# Patient Record
Sex: FEMALE | Race: WHITE | NOT HISPANIC OR LATINO | Employment: OTHER | URBAN - METROPOLITAN AREA
[De-identification: names, ages, dates, MRNs, and addresses within clinical notes are randomized per-mention and may not be internally consistent; named-entity substitution may affect disease eponyms.]

---

## 2018-07-13 ENCOUNTER — HOSPITAL ENCOUNTER (OUTPATIENT)
Dept: OTHER | Facility: HOSPITAL | Age: 73
Setting detail: SPECIMEN
Discharge: HOME | End: 2018-07-13
Attending: FAMILY MEDICINE | Admitting: FAMILY MEDICINE

## 2018-07-13 LAB
ALBUMIN SERPL-MCNC: 4.1 G/DL (ref 3.5–4.8)
ALBUMIN/GLOB SERPL: 1.3 {RATIO} (ref 1–1.7)
ALP SERPL-CCNC: 61 IU/L (ref 32–91)
ALT SERPL-CCNC: 15 IU/L (ref 14–54)
ANION GAP SERPL CALC-SCNC: 9.8 MMOL/L (ref 10–20)
AST SERPL-CCNC: 22 IU/L (ref 15–41)
BASOPHILS # BLD AUTO: 0.1 10*3/UL (ref 0–0.2)
BASOPHILS NFR BLD AUTO: 1 % (ref 0–2)
BILIRUB SERPL-MCNC: 0.5 MG/DL (ref 0.3–1.2)
BUN SERPL-MCNC: 18 MG/DL (ref 8–20)
BUN/CREAT SERPL: 20 (ref 5.4–26.2)
CALCIUM SERPL-MCNC: 9.5 MG/DL (ref 8.9–10.3)
CHLORIDE SERPL-SCNC: 104 MMOL/L (ref 101–111)
CHOLEST SERPL-MCNC: 221 MG/DL
CHOLEST/HDLC SERPL: 3.3 {RATIO}
CONV ANISOCYTES: SLIGHT
CONV CO2: 26 MMOL/L (ref 22–32)
CONV LDL CHOLESTEROL DIRECT: 139 MG/DL (ref 0–100)
CONV TOTAL PROTEIN: 7.2 G/DL (ref 6.1–7.9)
CREAT UR-MCNC: 0.9 MG/DL (ref 0.4–1)
DIFFERENTIAL METHOD BLD: (no result)
EOSINOPHIL # BLD AUTO: 0.1 10*3/UL (ref 0–0.3)
EOSINOPHIL # BLD AUTO: 1 % (ref 0–3)
ERYTHROCYTE [DISTWIDTH] IN BLOOD BY AUTOMATED COUNT: 13.6 % (ref 11.5–14.5)
GLOBULIN UR ELPH-MCNC: 3.1 G/DL (ref 2.5–3.8)
GLUCOSE SERPL-MCNC: 89 MG/DL (ref 65–99)
HCT VFR BLD AUTO: 44.6 % (ref 35–49)
HDLC SERPL-MCNC: 67 MG/DL
HGB BLD-MCNC: 15.1 G/DL (ref 12–15)
LDLC/HDLC SERPL: 2.1 {RATIO}
LIPID INTERPRETATION: ABNORMAL
LYMPHOCYTES # BLD AUTO: 2.1 10*3/UL (ref 0.8–4.8)
LYMPHOCYTES NFR BLD AUTO: 29 % (ref 18–42)
MAGNESIUM SERPL-MCNC: 2.4 MG/DL (ref 1.8–2.5)
MCH RBC QN AUTO: 31.3 PG (ref 26–32)
MCHC RBC AUTO-ENTMCNC: 33.8 G/DL (ref 32–36)
MCV RBC AUTO: 92.5 FL (ref 80–94)
MONOCYTES # BLD AUTO: 0.1 10*3/UL (ref 0.1–1.3)
MONOCYTES NFR BLD AUTO: 2 % (ref 2–11)
NEUTROPHILS # BLD AUTO: 4.9 10*3/UL (ref 2.3–8.6)
NEUTROPHILS NFR BLD AUTO: 67 % (ref 50–75)
PLATELET # BLD AUTO: 213 10*3/UL (ref 150–450)
PMV BLD AUTO: 10.2 FL (ref 7.4–10.4)
POTASSIUM SERPL-SCNC: 3.8 MMOL/L (ref 3.6–5.1)
RBC # BLD AUTO: 4.82 10*6/UL (ref 4–5.4)
SODIUM SERPL-SCNC: 136 MMOL/L (ref 136–144)
TRIGL SERPL-MCNC: 66 MG/DL
VLDLC SERPL CALC-MCNC: 14.7 MG/DL
WBC # BLD AUTO: 7.3 10*3/UL (ref 4.5–11.5)

## 2018-08-03 ENCOUNTER — HOSPITAL ENCOUNTER (OUTPATIENT)
Dept: CT IMAGING | Facility: HOSPITAL | Age: 73
Discharge: HOME | End: 2018-08-03
Attending: FAMILY MEDICINE | Admitting: FAMILY MEDICINE

## 2019-08-22 RX ORDER — AMLODIPINE BESYLATE 10 MG/1
TABLET ORAL
Qty: 30 TABLET | Refills: 2 | Status: SHIPPED | OUTPATIENT
Start: 2019-08-22 | End: 2019-10-15

## 2019-09-03 RX ORDER — LORAZEPAM 1 MG/1
TABLET ORAL
Qty: 30 TABLET | Refills: 2 | Status: SHIPPED | OUTPATIENT
Start: 2019-09-03 | End: 2019-09-11

## 2019-09-11 ENCOUNTER — OFFICE VISIT (OUTPATIENT)
Dept: FAMILY MEDICINE CLINIC | Facility: CLINIC | Age: 74
End: 2019-09-11

## 2019-09-11 VITALS
DIASTOLIC BLOOD PRESSURE: 78 MMHG | BODY MASS INDEX: 36.93 KG/M2 | HEART RATE: 83 BPM | TEMPERATURE: 97.8 F | RESPIRATION RATE: 16 BRPM | HEIGHT: 63 IN | OXYGEN SATURATION: 97 % | SYSTOLIC BLOOD PRESSURE: 140 MMHG | WEIGHT: 208.4 LBS

## 2019-09-11 DIAGNOSIS — F41.9 CHRONIC ANXIETY: ICD-10-CM

## 2019-09-11 DIAGNOSIS — F03.90 DEMENTIA WITHOUT BEHAVIORAL DISTURBANCE, UNSPECIFIED DEMENTIA TYPE: Primary | ICD-10-CM

## 2019-09-11 DIAGNOSIS — I10 ESSENTIAL HYPERTENSION: ICD-10-CM

## 2019-09-11 PROBLEM — G47.30 SLEEP APNEA: Status: ACTIVE | Noted: 2019-09-11

## 2019-09-11 PROCEDURE — 99214 OFFICE O/P EST MOD 30 MIN: CPT | Performed by: NURSE PRACTITIONER

## 2019-09-11 RX ORDER — FLUOXETINE HYDROCHLORIDE 20 MG/1
CAPSULE ORAL
Qty: 60 CAPSULE | Refills: 4 | Status: SHIPPED | OUTPATIENT
Start: 2019-09-11 | End: 2019-10-15

## 2019-09-11 RX ORDER — DONEPEZIL HYDROCHLORIDE 10 MG/1
10 TABLET, FILM COATED ORAL NIGHTLY
Qty: 30 TABLET | Refills: 3 | Status: SHIPPED | OUTPATIENT
Start: 2019-09-11 | End: 2023-03-30

## 2019-09-11 RX ORDER — FUROSEMIDE 20 MG/1
TABLET ORAL
Qty: 30 TABLET | Refills: 0 | Status: SHIPPED | OUTPATIENT
Start: 2019-09-11 | End: 2019-09-30 | Stop reason: SDUPTHER

## 2019-09-11 NOTE — PROGRESS NOTES
"Subjective   Day Cabrera is a 73 y.o. female.     Chief Complaint   Patient presents with   • possible dementia     memory issues   • Establish Care   • Back Pain       /78 (BP Location: Left arm, Patient Position: Sitting, Cuff Size: Adult)   Pulse 83   Temp 97.8 °F (36.6 °C) (Oral)   Resp 16   Ht 160 cm (63\")   Wt 94.5 kg (208 lb 6.4 oz)   SpO2 97%   BMI 36.92 kg/m²     New pt needs to get est.  Pt used to see Dr. Koenig. Pt lives with daughter. Has concerns with worsening memory. Was seen about 1 year ago with same concerns and she had CT scan done. It was discussed strating aricept or namenda, but not started.   Pt lives with daughter and grandson. She is left alone during the day while daughter is at work. There has been some incidences of her leaving the stove on.   Pt does not drive.        Past Surgical History:   Procedure Laterality Date   • CHOLECYSTECTOMY     • HYSTERECTOMY      total        Family History   Problem Relation Age of Onset   • Heart disease Mother    • Alcohol abuse Father         murdered    • Pancreatic cancer Sister    • No Known Problems Half-Sister        Social History     Socioeconomic History   • Marital status:      Spouse name: Not on file   • Number of children: 3   • Years of education: Not on file   • Highest education level: Not on file   Tobacco Use   • Smoking status: Former Smoker     Years: 30.00     Types: Cigarettes     Last attempt to quit:      Years since quittin.7   • Smokeless tobacco: Never Used   Substance and Sexual Activity   • Alcohol use: No     Frequency: Never   • Drug use: No   • Sexual activity: Defer       The following portions of the patient's history were reviewed and updated as appropriate: allergies, current medications, past family history, past medical history, past social history, past surgical history and problem list.    Review of Systems    Objective   Physical Exam   Constitutional: She is oriented to person, " place, and time. She appears well-developed and well-nourished.   Eyes: Pupils are equal, round, and reactive to light.   Cardiovascular: Normal rate and regular rhythm.   Pulmonary/Chest: Effort normal and breath sounds normal.   Neurological: She is alert and oriented to person, place, and time.   Skin: Skin is warm and dry.   Psychiatric: She has a normal mood and affect. Her behavior is normal.       Assessment/Plan   Day was seen today for possible dementia, establish care and back pain.    Diagnoses and all orders for this visit:    Dementia without behavioral disturbance, unspecified dementia type  -     donepezil (ARICEPT) 10 MG tablet; Take 1 tablet by mouth Every Night.    Essential hypertension    Chronic anxiety    Will start aricept  Obtain medical records  Recommend some sort of day program for pt  Follow up in 3 months for medicare wellness   During this office visit, we discussed the pertinent aspects of the visit and treatment recommendations. Pt verbalizes understanding. Follow up was discussed. Patient was given the opportunity to ask questions and discuss other concerns.

## 2019-09-18 ENCOUNTER — HOSPITAL ENCOUNTER (EMERGENCY)
Facility: HOSPITAL | Age: 74
Discharge: HOME | End: 2019-09-19
Admitting: EMERGENCY MEDICINE

## 2019-09-18 ENCOUNTER — APPOINTMENT (OUTPATIENT)
Dept: GENERAL RADIOLOGY | Facility: HOSPITAL | Age: 74
End: 2019-09-18

## 2019-09-18 DIAGNOSIS — A08.4 VIRAL GASTROENTERITIS: Primary | ICD-10-CM

## 2019-09-18 LAB
ALBUMIN SERPL-MCNC: 4 G/DL (ref 3.5–4.8)
ALBUMIN/GLOB SERPL: 1.3 G/DL (ref 1–1.7)
ALP SERPL-CCNC: 63 U/L (ref 32–91)
ALT SERPL W P-5'-P-CCNC: 11 U/L (ref 14–54)
ANION GAP SERPL CALCULATED.3IONS-SCNC: 19 MMOL/L (ref 5–15)
AST SERPL-CCNC: 18 U/L (ref 15–41)
BACTERIA UR QL AUTO: ABNORMAL /HPF
BASOPHILS # BLD AUTO: 0.1 10*3/MM3 (ref 0–0.2)
BASOPHILS NFR BLD AUTO: 0.9 % (ref 0–1.5)
BILIRUB SERPL-MCNC: 0.9 MG/DL (ref 0.3–1.2)
BILIRUB UR QL STRIP: NEGATIVE
BUN BLD-MCNC: 11 MG/DL (ref 8–20)
BUN/CREAT SERPL: 10 (ref 5.4–26.2)
CALCIUM SPEC-SCNC: 9.1 MG/DL (ref 8.9–10.3)
CHLORIDE SERPL-SCNC: 99 MMOL/L (ref 101–111)
CLARITY UR: CLEAR
CO2 SERPL-SCNC: 22 MMOL/L (ref 22–32)
COLOR UR: YELLOW
CREAT BLD-MCNC: 1.1 MG/DL (ref 0.4–1)
DEPRECATED RDW RBC AUTO: 43.3 FL (ref 37–54)
EOSINOPHIL # BLD AUTO: 0 10*3/MM3 (ref 0–0.4)
EOSINOPHIL NFR BLD AUTO: 0.3 % (ref 0.3–6.2)
ERYTHROCYTE [DISTWIDTH] IN BLOOD BY AUTOMATED COUNT: 13.4 % (ref 12.3–15.4)
GFR SERPL CREATININE-BSD FRML MDRD: 49 ML/MIN/1.73
GLOBULIN UR ELPH-MCNC: 3 GM/DL (ref 2.5–3.8)
GLUCOSE BLD-MCNC: 162 MG/DL (ref 65–99)
GLUCOSE UR STRIP-MCNC: NEGATIVE MG/DL
HCT VFR BLD AUTO: 42.7 % (ref 34–46.6)
HGB BLD-MCNC: 14.5 G/DL (ref 12–15.9)
HGB UR QL STRIP.AUTO: ABNORMAL
HYALINE CASTS UR QL AUTO: ABNORMAL /LPF
KETONES UR QL STRIP: NEGATIVE
LEUKOCYTE ESTERASE UR QL STRIP.AUTO: ABNORMAL
LYMPHOCYTES # BLD AUTO: 1.4 10*3/MM3 (ref 0.7–3.1)
LYMPHOCYTES NFR BLD AUTO: 13.6 % (ref 19.6–45.3)
MCH RBC QN AUTO: 31.2 PG (ref 26.6–33)
MCHC RBC AUTO-ENTMCNC: 33.9 G/DL (ref 31.5–35.7)
MCV RBC AUTO: 92.1 FL (ref 79–97)
MONOCYTES # BLD AUTO: 0.6 10*3/MM3 (ref 0.1–0.9)
MONOCYTES NFR BLD AUTO: 6.1 % (ref 5–12)
NEUTROPHILS # BLD AUTO: 8.2 10*3/MM3 (ref 1.7–7)
NEUTROPHILS NFR BLD AUTO: 79.1 % (ref 42.7–76)
NITRITE UR QL STRIP: POSITIVE
NRBC BLD AUTO-RTO: 0.1 /100 WBC (ref 0–0.2)
PH UR STRIP.AUTO: 7.5 [PH] (ref 5–8)
PLATELET # BLD AUTO: 238 10*3/MM3 (ref 140–450)
PMV BLD AUTO: 9.6 FL (ref 6–12)
POTASSIUM BLD-SCNC: 3 MMOL/L (ref 3.6–5.1)
PROT SERPL-MCNC: 7 G/DL (ref 6.1–7.9)
PROT UR QL STRIP: NEGATIVE
RBC # BLD AUTO: 4.64 10*6/MM3 (ref 3.77–5.28)
RBC # UR: ABNORMAL /HPF
REF LAB TEST METHOD: ABNORMAL
SODIUM BLD-SCNC: 137 MMOL/L (ref 136–144)
SP GR UR STRIP: 1.01 (ref 1–1.03)
SQUAMOUS #/AREA URNS HPF: ABNORMAL /HPF
UROBILINOGEN UR QL STRIP: ABNORMAL
WBC NRBC COR # BLD: 10.4 10*3/MM3 (ref 3.4–10.8)
WBC UR QL AUTO: ABNORMAL /HPF

## 2019-09-18 PROCEDURE — 71045 X-RAY EXAM CHEST 1 VIEW: CPT

## 2019-09-18 PROCEDURE — 99283 EMERGENCY DEPT VISIT LOW MDM: CPT

## 2019-09-18 PROCEDURE — 96375 TX/PRO/DX INJ NEW DRUG ADDON: CPT

## 2019-09-18 PROCEDURE — 93005 ELECTROCARDIOGRAM TRACING: CPT | Performed by: NURSE PRACTITIONER

## 2019-09-18 PROCEDURE — 25010000002 KETOROLAC TROMETHAMINE PER 15 MG: Performed by: NURSE PRACTITIONER

## 2019-09-18 PROCEDURE — 80053 COMPREHEN METABOLIC PANEL: CPT | Performed by: NURSE PRACTITIONER

## 2019-09-18 PROCEDURE — 85025 COMPLETE CBC W/AUTO DIFF WBC: CPT | Performed by: NURSE PRACTITIONER

## 2019-09-18 PROCEDURE — 96374 THER/PROPH/DIAG INJ IV PUSH: CPT

## 2019-09-18 PROCEDURE — 25010000002 ONDANSETRON PER 1 MG: Performed by: NURSE PRACTITIONER

## 2019-09-18 PROCEDURE — 81001 URINALYSIS AUTO W/SCOPE: CPT | Performed by: NURSE PRACTITIONER

## 2019-09-18 RX ORDER — KETOROLAC TROMETHAMINE 15 MG/ML
15 INJECTION, SOLUTION INTRAMUSCULAR; INTRAVENOUS ONCE
Status: COMPLETED | OUTPATIENT
Start: 2019-09-18 | End: 2019-09-18

## 2019-09-18 RX ORDER — ONDANSETRON 2 MG/ML
4 INJECTION INTRAMUSCULAR; INTRAVENOUS ONCE
Status: COMPLETED | OUTPATIENT
Start: 2019-09-18 | End: 2019-09-18

## 2019-09-18 RX ADMIN — ONDANSETRON 4 MG: 2 INJECTION INTRAMUSCULAR; INTRAVENOUS at 23:28

## 2019-09-18 RX ADMIN — KETOROLAC TROMETHAMINE 15 MG: 15 INJECTION, SOLUTION INTRAMUSCULAR; INTRAVENOUS at 23:28

## 2019-09-19 VITALS
SYSTOLIC BLOOD PRESSURE: 122 MMHG | DIASTOLIC BLOOD PRESSURE: 55 MMHG | BODY MASS INDEX: 35.51 KG/M2 | RESPIRATION RATE: 16 BRPM | WEIGHT: 208 LBS | OXYGEN SATURATION: 98 % | HEIGHT: 64 IN | TEMPERATURE: 98 F | HEART RATE: 66 BPM

## 2019-09-19 PROCEDURE — 96376 TX/PRO/DX INJ SAME DRUG ADON: CPT

## 2019-09-19 PROCEDURE — 25010000002 ONDANSETRON PER 1 MG: Performed by: NURSE PRACTITIONER

## 2019-09-19 RX ORDER — ONDANSETRON 4 MG/1
4 TABLET, ORALLY DISINTEGRATING ORAL EVERY 6 HOURS PRN
Qty: 15 TABLET | Refills: 0 | Status: SHIPPED | OUTPATIENT
Start: 2019-09-19 | End: 2019-10-15

## 2019-09-19 RX ORDER — ONDANSETRON 2 MG/ML
4 INJECTION INTRAMUSCULAR; INTRAVENOUS ONCE
Status: COMPLETED | OUTPATIENT
Start: 2019-09-19 | End: 2019-09-19

## 2019-09-19 RX ADMIN — ONDANSETRON 4 MG: 2 INJECTION INTRAMUSCULAR; INTRAVENOUS at 00:32

## 2019-09-19 NOTE — ED PROVIDER NOTES
"Subjective   Patient states that she is vomited twice today and \"I just do not feel good\".  Patient is complaining of a burning pain in her chest after vomiting.  Caretaker has had nausea and vomiting for the past 2 days.            Review of Systems   Constitutional: Negative for fever.   Respiratory: Negative for cough and shortness of breath.    Cardiovascular: Positive for chest pain. Negative for palpitations and leg swelling.   Gastrointestinal: Positive for abdominal pain, nausea and vomiting. Negative for diarrhea.   Genitourinary: Negative for difficulty urinating.       Past Medical History:   Diagnosis Date   • Anxiety    • Arthritis    • Hypertension        Allergies   Allergen Reactions   • Ciprofloxacin Unknown (See Comments)       Past Surgical History:   Procedure Laterality Date   • CHOLECYSTECTOMY     • COLONOSCOPY     • HYSTERECTOMY      total        Family History   Problem Relation Age of Onset   • Heart disease Mother    • Alcohol abuse Father         murdered    • Pancreatic cancer Sister    • No Known Problems Half-Sister        Social History     Socioeconomic History   • Marital status:      Spouse name: Not on file   • Number of children: 3   • Years of education: Not on file   • Highest education level: Not on file   Tobacco Use   • Smoking status: Former Smoker     Years: 30.00     Types: Cigarettes     Last attempt to quit:      Years since quittin.7   • Smokeless tobacco: Never Used   Substance and Sexual Activity   • Alcohol use: No     Frequency: Never   • Drug use: No   • Sexual activity: Defer           Objective   Physical Exam  73-year-old woman awake alert no acute distress on examination eyes are clear nonicteric pharynx clear airway patent mouth moist neck is supple breath sounds clear and equal bilaterally heart sounds S1-S2 no murmur abdomen is soft obese minimally tender to palpation throughout the abdomen with no rebound or guarding sounds are " present  Procedures           ED Course      Results for orders placed or performed during the hospital encounter of 09/18/19   Comprehensive Metabolic Panel   Result Value Ref Range    Glucose 162 (H) 65 - 99 mg/dL    BUN 11 8 - 20 mg/dL    Creatinine 1.10 (H) 0.40 - 1.00 mg/dL    Sodium 137 136 - 144 mmol/L    Potassium 3.0 (L) 3.6 - 5.1 mmol/L    Chloride 99 (L) 101 - 111 mmol/L    CO2 22.0 22.0 - 32.0 mmol/L    Calcium 9.1 8.9 - 10.3 mg/dL    Total Protein 7.0 6.1 - 7.9 g/dL    Albumin 4.00 3.50 - 4.80 g/dL    ALT (SGPT) 11 (L) 14 - 54 U/L    AST (SGOT) 18 15 - 41 U/L    Alkaline Phosphatase 63 32 - 91 U/L    Total Bilirubin 0.9 0.3 - 1.2 mg/dL    eGFR Non African Amer 49 (L) >60 mL/min/1.73    Globulin 3.0 2.5 - 3.8 gm/dL    A/G Ratio 1.3 1.0 - 1.7 g/dL    BUN/Creatinine Ratio 10.0 5.4 - 26.2    Anion Gap 19.0 (H) 5.0 - 15.0 mmol/L   Urinalysis With Culture If Indicated - Urine, Clean Catch   Result Value Ref Range    Color, UA Yellow Yellow, Straw    Appearance, UA Clear Clear    pH, UA 7.5 5.0 - 8.0    Specific Gravity, UA 1.015 1.005 - 1.030    Glucose, UA Negative Negative    Ketones, UA Negative Negative    Bilirubin, UA Negative Negative    Blood, UA Trace (A) Negative    Protein, UA Negative Negative    Leuk Esterase, UA Small (1+) (A) Negative    Nitrite, UA Positive (A) Negative    Urobilinogen, UA 0.2 E.U./dL 0.2 - 1.0 E.U./dL   CBC Auto Differential   Result Value Ref Range    WBC 10.40 3.40 - 10.80 10*3/mm3    RBC 4.64 3.77 - 5.28 10*6/mm3    Hemoglobin 14.5 12.0 - 15.9 g/dL    Hematocrit 42.7 34.0 - 46.6 %    MCV 92.1 79.0 - 97.0 fL    MCH 31.2 26.6 - 33.0 pg    MCHC 33.9 31.5 - 35.7 g/dL    RDW 13.4 12.3 - 15.4 %    RDW-SD 43.3 37.0 - 54.0 fl    MPV 9.6 6.0 - 12.0 fL    Platelets 238 140 - 450 10*3/mm3    Neutrophil % 79.1 (H) 42.7 - 76.0 %    Lymphocyte % 13.6 (L) 19.6 - 45.3 %    Monocyte % 6.1 5.0 - 12.0 %    Eosinophil % 0.3 0.3 - 6.2 %    Basophil % 0.9 0.0 - 1.5 %    Neutrophils, Absolute  "8.20 (H) 1.70 - 7.00 10*3/mm3    Lymphocytes, Absolute 1.40 0.70 - 3.10 10*3/mm3    Monocytes, Absolute 0.60 0.10 - 0.90 10*3/mm3    Eosinophils, Absolute 0.00 0.00 - 0.40 10*3/mm3    Basophils, Absolute 0.10 0.00 - 0.20 10*3/mm3    nRBC 0.1 0.0 - 0.2 /100 WBC   Urinalysis, Microscopic Only - Urine, Clean Catch   Result Value Ref Range    RBC, UA 0-2 (A) None Seen /HPF    WBC, UA 3-5 (A) None Seen /HPF    Bacteria, UA 2+ (A) None Seen /HPF    Squamous Epithelial Cells, UA 13-20 (A) None Seen, 0-2 /HPF    Hyaline Casts, UA 3-6 None Seen /LPF    Methodology Automated Microscopy      Chest x-ray was clear EKG had no acute findings and unchanged from previous.  On reexamination patient states she feels \"much better\" she is drinking p.o. fluids without difficulty lab work is unremarkable patient will use supportive care for this viral illness            MDM    Final diagnoses:   Viral gastroenteritis              Akhil Robb, NP  09/19/19 0019    "

## 2019-09-25 RX ORDER — FUROSEMIDE 20 MG/1
TABLET ORAL
Qty: 30 TABLET | Refills: 0 | OUTPATIENT
Start: 2019-09-25

## 2019-09-25 NOTE — TELEPHONE ENCOUNTER
If she is only taking it for swelling she should take it as needed not daily.  Please see if she is taking it daily. She should come in as soon as possible for recheck.

## 2019-09-25 NOTE — TELEPHONE ENCOUNTER
Patient's potassium was very low on recent check. Please see if she is taking lasix daily and if she takes potassium with it. She needs recheck.

## 2019-09-25 NOTE — TELEPHONE ENCOUNTER
Patient is not taking a potassium supplement with lasix.  Should she have the level rechecked as soon as possible or after stopping lasix for a certain amount of time?  She reports not feeling well and chest tightness without any other symptoms.

## 2019-09-26 NOTE — TELEPHONE ENCOUNTER
Patient does take furosemide daily instead of prn.  Would you like her to schedule an appt or does she just need to have labs?

## 2019-09-26 NOTE — TELEPHONE ENCOUNTER
She needs both but have her go ahead and come in for labs so that we can treat potassium if we need to.   yes

## 2019-09-26 NOTE — TELEPHONE ENCOUNTER
Spoke to patient.  She seemed confused and disoriented so she said she would have her daughter call me on 9/27/19.

## 2019-09-30 RX ORDER — FUROSEMIDE 20 MG/1
TABLET ORAL
Qty: 30 TABLET | Refills: 0 | Status: SHIPPED | OUTPATIENT
Start: 2019-09-30 | End: 2019-10-15

## 2019-10-15 ENCOUNTER — APPOINTMENT (OUTPATIENT)
Dept: GENERAL RADIOLOGY | Facility: HOSPITAL | Age: 74
End: 2019-10-15

## 2019-10-15 ENCOUNTER — HOSPITAL ENCOUNTER (OUTPATIENT)
Facility: HOSPITAL | Age: 74
Setting detail: OBSERVATION
Discharge: HOME | End: 2019-10-16
Attending: EMERGENCY MEDICINE | Admitting: HOSPITALIST

## 2019-10-15 ENCOUNTER — OFFICE VISIT (OUTPATIENT)
Dept: FAMILY MEDICINE CLINIC | Facility: CLINIC | Age: 74
End: 2019-10-15

## 2019-10-15 VITALS
WEIGHT: 202.2 LBS | HEART RATE: 73 BPM | HEIGHT: 64 IN | RESPIRATION RATE: 16 BRPM | SYSTOLIC BLOOD PRESSURE: 132 MMHG | OXYGEN SATURATION: 95 % | TEMPERATURE: 97.7 F | DIASTOLIC BLOOD PRESSURE: 64 MMHG | BODY MASS INDEX: 34.52 KG/M2

## 2019-10-15 DIAGNOSIS — R53.1 WEAKNESS: ICD-10-CM

## 2019-10-15 DIAGNOSIS — N30.90 CYSTITIS: ICD-10-CM

## 2019-10-15 DIAGNOSIS — R07.89 OTHER CHEST PAIN: Primary | ICD-10-CM

## 2019-10-15 DIAGNOSIS — R06.09 DYSPNEA ON EXERTION: ICD-10-CM

## 2019-10-15 DIAGNOSIS — R11.2 NAUSEA AND VOMITING, INTRACTABILITY OF VOMITING NOT SPECIFIED, UNSPECIFIED VOMITING TYPE: ICD-10-CM

## 2019-10-15 DIAGNOSIS — R07.9 CHEST PAIN, UNSPECIFIED TYPE: Primary | ICD-10-CM

## 2019-10-15 LAB
ALBUMIN SERPL-MCNC: 4.2 G/DL (ref 3.5–5.2)
ALBUMIN/GLOB SERPL: 1.7 G/DL
ALP SERPL-CCNC: 74 U/L (ref 39–117)
ALT SERPL W P-5'-P-CCNC: 10 U/L (ref 1–33)
ANION GAP SERPL CALCULATED.3IONS-SCNC: 16.1 MMOL/L (ref 5–15)
AST SERPL-CCNC: 19 U/L (ref 1–32)
BACTERIA UR QL AUTO: ABNORMAL /HPF
BASOPHILS # BLD AUTO: 0.1 10*3/MM3 (ref 0–0.2)
BASOPHILS NFR BLD AUTO: 1 % (ref 0–1.5)
BILIRUB SERPL-MCNC: 0.4 MG/DL (ref 0.2–1.2)
BILIRUB UR QL STRIP: NEGATIVE
BUN BLD-MCNC: 7 MG/DL (ref 8–23)
BUN/CREAT SERPL: 7.5 (ref 7–25)
CALCIUM SPEC-SCNC: 8.8 MG/DL (ref 8.6–10.5)
CHLORIDE SERPL-SCNC: 102 MMOL/L (ref 98–107)
CLARITY UR: ABNORMAL
CO2 SERPL-SCNC: 25 MMOL/L (ref 22–29)
COLOR UR: YELLOW
CREAT BLD-MCNC: 0.93 MG/DL (ref 0.57–1)
DEPRECATED RDW RBC AUTO: 42.9 FL (ref 37–54)
EOSINOPHIL # BLD AUTO: 0.1 10*3/MM3 (ref 0–0.4)
EOSINOPHIL NFR BLD AUTO: 0.8 % (ref 0.3–6.2)
ERYTHROCYTE [DISTWIDTH] IN BLOOD BY AUTOMATED COUNT: 13.4 % (ref 12.3–15.4)
GFR SERPL CREATININE-BSD FRML MDRD: 59 ML/MIN/1.73
GLOBULIN UR ELPH-MCNC: 2.5 GM/DL
GLUCOSE BLD-MCNC: 113 MG/DL (ref 65–99)
GLUCOSE UR STRIP-MCNC: NEGATIVE MG/DL
HCT VFR BLD AUTO: 42.1 % (ref 34–46.6)
HGB BLD-MCNC: 14.2 G/DL (ref 12–15.9)
HGB UR QL STRIP.AUTO: NEGATIVE
HYALINE CASTS UR QL AUTO: ABNORMAL /LPF
KETONES UR QL STRIP: NEGATIVE
LEUKOCYTE ESTERASE UR QL STRIP.AUTO: ABNORMAL
LIPASE SERPL-CCNC: 44 U/L (ref 13–60)
LYMPHOCYTES # BLD AUTO: 1.5 10*3/MM3 (ref 0.7–3.1)
LYMPHOCYTES NFR BLD AUTO: 21.6 % (ref 19.6–45.3)
MCH RBC QN AUTO: 30.7 PG (ref 26.6–33)
MCHC RBC AUTO-ENTMCNC: 33.7 G/DL (ref 31.5–35.7)
MCV RBC AUTO: 91.1 FL (ref 79–97)
MONOCYTES # BLD AUTO: 0.5 10*3/MM3 (ref 0.1–0.9)
MONOCYTES NFR BLD AUTO: 6.6 % (ref 5–12)
NEUTROPHILS # BLD AUTO: 5 10*3/MM3 (ref 1.7–7)
NEUTROPHILS NFR BLD AUTO: 70 % (ref 42.7–76)
NITRITE UR QL STRIP: POSITIVE
NRBC BLD AUTO-RTO: 0.1 /100 WBC (ref 0–0.2)
PH UR STRIP.AUTO: 7.5 [PH] (ref 5–8)
PLATELET # BLD AUTO: 244 10*3/MM3 (ref 140–450)
PMV BLD AUTO: 9.6 FL (ref 6–12)
POTASSIUM BLD-SCNC: 3.1 MMOL/L (ref 3.5–5.2)
PROT SERPL-MCNC: 6.7 G/DL (ref 6–8.5)
PROT UR QL STRIP: NEGATIVE
RBC # BLD AUTO: 4.62 10*6/MM3 (ref 3.77–5.28)
RBC # UR: ABNORMAL /HPF
REF LAB TEST METHOD: ABNORMAL
SODIUM BLD-SCNC: 140 MMOL/L (ref 136–145)
SP GR UR STRIP: <=1.005 (ref 1–1.03)
SQUAMOUS #/AREA URNS HPF: ABNORMAL /HPF
TROPONIN T SERPL-MCNC: <0.01 NG/ML (ref 0–0.03)
TROPONIN T SERPL-MCNC: <0.01 NG/ML (ref 0–0.03)
UROBILINOGEN UR QL STRIP: ABNORMAL
WBC NRBC COR # BLD: 7.1 10*3/MM3 (ref 3.4–10.8)
WBC UR QL AUTO: ABNORMAL /HPF

## 2019-10-15 PROCEDURE — G0378 HOSPITAL OBSERVATION PER HR: HCPCS

## 2019-10-15 PROCEDURE — 99213 OFFICE O/P EST LOW 20 MIN: CPT | Performed by: NURSE PRACTITIONER

## 2019-10-15 PROCEDURE — 93005 ELECTROCARDIOGRAM TRACING: CPT | Performed by: EMERGENCY MEDICINE

## 2019-10-15 PROCEDURE — 84484 ASSAY OF TROPONIN QUANT: CPT | Performed by: EMERGENCY MEDICINE

## 2019-10-15 PROCEDURE — 87086 URINE CULTURE/COLONY COUNT: CPT | Performed by: EMERGENCY MEDICINE

## 2019-10-15 PROCEDURE — 96374 THER/PROPH/DIAG INJ IV PUSH: CPT

## 2019-10-15 PROCEDURE — 81001 URINALYSIS AUTO W/SCOPE: CPT | Performed by: EMERGENCY MEDICINE

## 2019-10-15 PROCEDURE — 99285 EMERGENCY DEPT VISIT HI MDM: CPT

## 2019-10-15 PROCEDURE — 71045 X-RAY EXAM CHEST 1 VIEW: CPT

## 2019-10-15 PROCEDURE — 99220 PR INITIAL OBSERVATION CARE/DAY 70 MINUTES: CPT | Performed by: HOSPITALIST

## 2019-10-15 PROCEDURE — 25010000002 CEFTRIAXONE PER 250 MG: Performed by: EMERGENCY MEDICINE

## 2019-10-15 PROCEDURE — 87088 URINE BACTERIA CULTURE: CPT | Performed by: EMERGENCY MEDICINE

## 2019-10-15 PROCEDURE — 80053 COMPREHEN METABOLIC PANEL: CPT | Performed by: EMERGENCY MEDICINE

## 2019-10-15 PROCEDURE — 85025 COMPLETE CBC W/AUTO DIFF WBC: CPT | Performed by: EMERGENCY MEDICINE

## 2019-10-15 PROCEDURE — 87186 SC STD MICRODIL/AGAR DIL: CPT | Performed by: EMERGENCY MEDICINE

## 2019-10-15 PROCEDURE — 25010000002 ONDANSETRON PER 1 MG: Performed by: EMERGENCY MEDICINE

## 2019-10-15 PROCEDURE — 96375 TX/PRO/DX INJ NEW DRUG ADDON: CPT

## 2019-10-15 PROCEDURE — 84484 ASSAY OF TROPONIN QUANT: CPT | Performed by: NURSE PRACTITIONER

## 2019-10-15 PROCEDURE — 83690 ASSAY OF LIPASE: CPT | Performed by: EMERGENCY MEDICINE

## 2019-10-15 RX ORDER — CHOLECALCIFEROL (VITAMIN D3) 125 MCG
5 CAPSULE ORAL NIGHTLY PRN
Status: DISCONTINUED | OUTPATIENT
Start: 2019-10-15 | End: 2019-10-16 | Stop reason: HOSPADM

## 2019-10-15 RX ORDER — ONDANSETRON 4 MG/1
4 TABLET, FILM COATED ORAL EVERY 6 HOURS PRN
Status: DISCONTINUED | OUTPATIENT
Start: 2019-10-15 | End: 2019-10-16 | Stop reason: HOSPADM

## 2019-10-15 RX ORDER — MAGNESIUM SULFATE HEPTAHYDRATE 40 MG/ML
2 INJECTION, SOLUTION INTRAVENOUS AS NEEDED
Status: DISCONTINUED | OUTPATIENT
Start: 2019-10-15 | End: 2019-10-16 | Stop reason: HOSPADM

## 2019-10-15 RX ORDER — POTASSIUM CHLORIDE 20 MEQ/1
20 TABLET, EXTENDED RELEASE ORAL DAILY
Status: DISCONTINUED | OUTPATIENT
Start: 2019-10-15 | End: 2019-10-16 | Stop reason: HOSPADM

## 2019-10-15 RX ORDER — FUROSEMIDE 20 MG/1
20 TABLET ORAL DAILY
COMMUNITY

## 2019-10-15 RX ORDER — ACETAMINOPHEN 325 MG/1
650 TABLET ORAL EVERY 4 HOURS PRN
Status: DISCONTINUED | OUTPATIENT
Start: 2019-10-15 | End: 2019-10-16 | Stop reason: HOSPADM

## 2019-10-15 RX ORDER — FLUOXETINE 10 MG/1
30 CAPSULE ORAL DAILY
COMMUNITY
End: 2023-03-30

## 2019-10-15 RX ORDER — SODIUM CHLORIDE 0.9 % (FLUSH) 0.9 %
10 SYRINGE (ML) INJECTION EVERY 12 HOURS SCHEDULED
Status: DISCONTINUED | OUTPATIENT
Start: 2019-10-15 | End: 2019-10-16 | Stop reason: HOSPADM

## 2019-10-15 RX ORDER — ASPIRIN 325 MG
325 TABLET ORAL ONCE
Status: COMPLETED | OUTPATIENT
Start: 2019-10-15 | End: 2019-10-15

## 2019-10-15 RX ORDER — AMLODIPINE BESYLATE 5 MG/1
10 TABLET ORAL DAILY
Status: DISCONTINUED | OUTPATIENT
Start: 2019-10-16 | End: 2019-10-16 | Stop reason: HOSPADM

## 2019-10-15 RX ORDER — ACETAMINOPHEN 650 MG/1
650 SUPPOSITORY RECTAL EVERY 4 HOURS PRN
Status: DISCONTINUED | OUTPATIENT
Start: 2019-10-15 | End: 2019-10-16 | Stop reason: HOSPADM

## 2019-10-15 RX ORDER — POTASSIUM CHLORIDE 20 MEQ/1
20 TABLET, EXTENDED RELEASE ORAL DAILY
Status: DISCONTINUED | OUTPATIENT
Start: 2019-10-16 | End: 2019-10-15

## 2019-10-15 RX ORDER — ONDANSETRON 2 MG/ML
8 INJECTION INTRAMUSCULAR; INTRAVENOUS ONCE
Status: COMPLETED | OUTPATIENT
Start: 2019-10-15 | End: 2019-10-15

## 2019-10-15 RX ORDER — SODIUM CHLORIDE 0.9 % (FLUSH) 0.9 %
10 SYRINGE (ML) INJECTION AS NEEDED
Status: DISCONTINUED | OUTPATIENT
Start: 2019-10-15 | End: 2019-10-16 | Stop reason: HOSPADM

## 2019-10-15 RX ORDER — POTASSIUM CHLORIDE 20 MEQ/1
40 TABLET, EXTENDED RELEASE ORAL AS NEEDED
Status: DISCONTINUED | OUTPATIENT
Start: 2019-10-15 | End: 2019-10-16 | Stop reason: HOSPADM

## 2019-10-15 RX ORDER — FLUOXETINE HYDROCHLORIDE 20 MG/1
40 CAPSULE ORAL DAILY
Status: DISCONTINUED | OUTPATIENT
Start: 2019-10-16 | End: 2019-10-16 | Stop reason: HOSPADM

## 2019-10-15 RX ORDER — DONEPEZIL HYDROCHLORIDE 5 MG/1
10 TABLET, FILM COATED ORAL NIGHTLY
Status: DISCONTINUED | OUTPATIENT
Start: 2019-10-15 | End: 2019-10-16 | Stop reason: HOSPADM

## 2019-10-15 RX ORDER — DONEPEZIL HYDROCHLORIDE 10 MG/1
10 TABLET, FILM COATED ORAL DAILY
Status: ON HOLD | COMMUNITY
End: 2019-10-16

## 2019-10-15 RX ORDER — AMLODIPINE BESYLATE 10 MG/1
10 TABLET ORAL DAILY
COMMUNITY
End: 2020-08-15 | Stop reason: HOSPADM

## 2019-10-15 RX ORDER — POTASSIUM CHLORIDE 1.5 G/1.77G
40 POWDER, FOR SOLUTION ORAL AS NEEDED
Status: DISCONTINUED | OUTPATIENT
Start: 2019-10-15 | End: 2019-10-16 | Stop reason: HOSPADM

## 2019-10-15 RX ORDER — ONDANSETRON 2 MG/ML
4 INJECTION INTRAMUSCULAR; INTRAVENOUS EVERY 6 HOURS PRN
Status: DISCONTINUED | OUTPATIENT
Start: 2019-10-15 | End: 2019-10-16 | Stop reason: HOSPADM

## 2019-10-15 RX ORDER — MAGNESIUM SULFATE 1 G/100ML
1 INJECTION INTRAVENOUS AS NEEDED
Status: DISCONTINUED | OUTPATIENT
Start: 2019-10-15 | End: 2019-10-16 | Stop reason: HOSPADM

## 2019-10-15 RX ORDER — ACETAMINOPHEN 160 MG/5ML
650 SOLUTION ORAL EVERY 4 HOURS PRN
Status: DISCONTINUED | OUTPATIENT
Start: 2019-10-15 | End: 2019-10-16 | Stop reason: HOSPADM

## 2019-10-15 RX ORDER — FUROSEMIDE 40 MG/1
20 TABLET ORAL DAILY
Status: DISCONTINUED | OUTPATIENT
Start: 2019-10-16 | End: 2019-10-16 | Stop reason: HOSPADM

## 2019-10-15 RX ADMIN — DONEPEZIL HYDROCHLORIDE 10 MG: 5 TABLET, FILM COATED ORAL at 21:44

## 2019-10-15 RX ADMIN — ASPIRIN 325 MG ORAL TABLET 325 MG: 325 PILL ORAL at 13:58

## 2019-10-15 RX ADMIN — Medication 10 ML: at 21:44

## 2019-10-15 RX ADMIN — POTASSIUM CHLORIDE 20 MEQ: 1500 TABLET, EXTENDED RELEASE ORAL at 17:55

## 2019-10-15 RX ADMIN — CEFTRIAXONE SODIUM 1 G: 10 INJECTION, POWDER, FOR SOLUTION INTRAVENOUS at 15:06

## 2019-10-15 RX ADMIN — Medication 10 ML: at 17:55

## 2019-10-15 RX ADMIN — POTASSIUM CHLORIDE 40 MEQ: 1500 TABLET, EXTENDED RELEASE ORAL at 17:55

## 2019-10-15 RX ADMIN — MELATONIN TAB 5 MG 5 MG: 5 TAB at 21:45

## 2019-10-15 RX ADMIN — ONDANSETRON 8 MG: 2 INJECTION INTRAMUSCULAR; INTRAVENOUS at 11:13

## 2019-10-15 RX ADMIN — SODIUM CHLORIDE 1000 ML: 900 INJECTION, SOLUTION INTRAVENOUS at 11:11

## 2019-10-15 NOTE — PROGRESS NOTES
"Subjective   Day Cabrera is a 74 y.o. female.     Chief Complaint   Patient presents with   • Vomiting       /64 (BP Location: Left arm, Patient Position: Sitting, Cuff Size: Large Adult)   Pulse 73   Temp 97.7 °F (36.5 °C) (Oral)   Resp 16   Ht 162.6 cm (64\")   Wt 91.7 kg (202 lb 3.2 oz)   SpO2 95%   BMI 34.71 kg/m²     BP Readings from Last 3 Encounters:   10/15/19 132/64   09/19/19 122/55   09/11/19 140/78       Wt Readings from Last 3 Encounters:   10/15/19 91.7 kg (202 lb 3.2 oz)   09/18/19 94.3 kg (208 lb)   09/11/19 94.5 kg (208 lb 6.4 oz)       Pt comes in today with c/o vomiting, nausea, weakness that started this morning.   Daughter states she has been c/o for months that weakness, fatigue, chest pain, SOA.   This morning woke up not feeling well. Vomited 4 times before coming to appt this morning.   Not eating well.   Pt has advanced dementia and daughter states she has been c/o CP and is always SOA, but seems to be getting worse over the past couple of weeks.  Has had N/V x 2 years, but not like this. Usually will get sick/vomit about once/month.   No history of CAD.        The following portions of the patient's history were reviewed and updated as appropriate: allergies, current medications, past family history, past medical history, past social history, past surgical history and problem list.    Review of Systems   Constitutional: Positive for activity change and fatigue.   Eyes: Negative for blurred vision.   Respiratory: Positive for chest tightness and shortness of breath.    Cardiovascular: Positive for chest pain. Negative for palpitations and leg swelling.   Gastrointestinal: Positive for nausea and vomiting.   Neurological: Positive for dizziness, weakness and confusion.       Objective   Physical Exam   Constitutional: She appears well-developed and well-nourished.   Eyes: Pupils are equal, round, and reactive to light.   Cardiovascular: Normal rate and regular rhythm. "   Pulmonary/Chest: Effort normal and breath sounds normal.   Abdominal: Soft. Bowel sounds are normal.   Neurological: She is alert. She is disoriented.         Diagnoses and all orders for this visit:    1. Other chest pain (Primary)    2. Dyspnea on exertion    3. Nausea and vomiting, intractability of vomiting not specified, unspecified vomiting type    4. Weakness    Daughter is taking pt to ER for further evaluation.     Return if symptoms worsen or fail to improve.

## 2019-10-16 VITALS
DIASTOLIC BLOOD PRESSURE: 64 MMHG | OXYGEN SATURATION: 96 % | RESPIRATION RATE: 17 BRPM | SYSTOLIC BLOOD PRESSURE: 113 MMHG | WEIGHT: 201.94 LBS | BODY MASS INDEX: 34.48 KG/M2 | TEMPERATURE: 97.8 F | HEART RATE: 73 BPM | HEIGHT: 64 IN

## 2019-10-16 PROBLEM — R07.9 CHEST PAIN: Status: RESOLVED | Noted: 2019-10-15 | Resolved: 2019-10-16

## 2019-10-16 PROBLEM — N39.0 E. COLI UTI (URINARY TRACT INFECTION): Status: ACTIVE | Noted: 2019-10-16

## 2019-10-16 PROBLEM — B96.20 E. COLI UTI (URINARY TRACT INFECTION): Status: ACTIVE | Noted: 2019-10-16

## 2019-10-16 LAB
ANION GAP SERPL CALCULATED.3IONS-SCNC: 14.7 MMOL/L (ref 5–15)
BASOPHILS # BLD AUTO: 0.1 10*3/MM3 (ref 0–0.2)
BASOPHILS NFR BLD AUTO: 1.3 % (ref 0–1.5)
BUN BLD-MCNC: 7 MG/DL (ref 8–23)
BUN/CREAT SERPL: 8.5 (ref 7–25)
CALCIUM SPEC-SCNC: 8.7 MG/DL (ref 8.6–10.5)
CHLORIDE SERPL-SCNC: 105 MMOL/L (ref 98–107)
CO2 SERPL-SCNC: 25 MMOL/L (ref 22–29)
CREAT BLD-MCNC: 0.82 MG/DL (ref 0.57–1)
DEPRECATED RDW RBC AUTO: 43.3 FL (ref 37–54)
EOSINOPHIL # BLD AUTO: 0.1 10*3/MM3 (ref 0–0.4)
EOSINOPHIL NFR BLD AUTO: 2.3 % (ref 0.3–6.2)
ERYTHROCYTE [DISTWIDTH] IN BLOOD BY AUTOMATED COUNT: 13.6 % (ref 12.3–15.4)
GFR SERPL CREATININE-BSD FRML MDRD: 68 ML/MIN/1.73
GLUCOSE BLD-MCNC: 99 MG/DL (ref 65–99)
HCT VFR BLD AUTO: 38.4 % (ref 34–46.6)
HGB BLD-MCNC: 13.3 G/DL (ref 12–15.9)
LYMPHOCYTES # BLD AUTO: 1.7 10*3/MM3 (ref 0.7–3.1)
LYMPHOCYTES NFR BLD AUTO: 34.8 % (ref 19.6–45.3)
MAGNESIUM SERPL-MCNC: 2 MG/DL (ref 1.6–2.4)
MCH RBC QN AUTO: 31.8 PG (ref 26.6–33)
MCHC RBC AUTO-ENTMCNC: 34.7 G/DL (ref 31.5–35.7)
MCV RBC AUTO: 91.5 FL (ref 79–97)
MONOCYTES # BLD AUTO: 0.4 10*3/MM3 (ref 0.1–0.9)
MONOCYTES NFR BLD AUTO: 7.8 % (ref 5–12)
NEUTROPHILS # BLD AUTO: 2.6 10*3/MM3 (ref 1.7–7)
NEUTROPHILS NFR BLD AUTO: 53.8 % (ref 42.7–76)
NRBC BLD AUTO-RTO: 0.1 /100 WBC (ref 0–0.2)
PLATELET # BLD AUTO: 205 10*3/MM3 (ref 140–450)
PMV BLD AUTO: 9.6 FL (ref 6–12)
POTASSIUM BLD-SCNC: 3.7 MMOL/L (ref 3.5–5.2)
RBC # BLD AUTO: 4.2 10*6/MM3 (ref 3.77–5.28)
SODIUM BLD-SCNC: 141 MMOL/L (ref 136–145)
TROPONIN T SERPL-MCNC: <0.01 NG/ML (ref 0–0.03)
TROPONIN T SERPL-MCNC: <0.01 NG/ML (ref 0–0.03)
WBC NRBC COR # BLD: 4.9 10*3/MM3 (ref 3.4–10.8)

## 2019-10-16 PROCEDURE — 85025 COMPLETE CBC W/AUTO DIFF WBC: CPT | Performed by: HOSPITALIST

## 2019-10-16 PROCEDURE — 84484 ASSAY OF TROPONIN QUANT: CPT | Performed by: NURSE PRACTITIONER

## 2019-10-16 PROCEDURE — 99217 PR OBSERVATION CARE DISCHARGE MANAGEMENT: CPT | Performed by: HOSPITALIST

## 2019-10-16 PROCEDURE — G0378 HOSPITAL OBSERVATION PER HR: HCPCS

## 2019-10-16 PROCEDURE — 80048 BASIC METABOLIC PNL TOTAL CA: CPT | Performed by: HOSPITALIST

## 2019-10-16 PROCEDURE — 83735 ASSAY OF MAGNESIUM: CPT | Performed by: HOSPITALIST

## 2019-10-16 RX ADMIN — FLUOXETINE 40 MG: 20 CAPSULE ORAL at 09:06

## 2019-10-16 RX ADMIN — Medication 10 ML: at 09:08

## 2019-10-16 RX ADMIN — POTASSIUM CHLORIDE 20 MEQ: 1500 TABLET, EXTENDED RELEASE ORAL at 09:06

## 2019-10-16 RX ADMIN — FUROSEMIDE 20 MG: 20 TABLET ORAL at 09:06

## 2019-10-16 RX ADMIN — AMLODIPINE BESYLATE 10 MG: 5 TABLET ORAL at 09:06

## 2019-10-17 ENCOUNTER — READMISSION MANAGEMENT (OUTPATIENT)
Dept: CALL CENTER | Facility: HOSPITAL | Age: 74
End: 2019-10-17

## 2019-10-17 ENCOUNTER — HOSPITAL ENCOUNTER (INPATIENT)
Facility: HOSPITAL | Age: 74
LOS: 5 days | Discharge: HOME | End: 2019-10-22
Attending: HOSPITALIST | Admitting: HOSPITALIST

## 2019-10-17 DIAGNOSIS — E87.6 HYPOKALEMIA: ICD-10-CM

## 2019-10-17 DIAGNOSIS — N39.0 URINARY TRACT INFECTION WITHOUT HEMATURIA, SITE UNSPECIFIED: Primary | ICD-10-CM

## 2019-10-17 LAB
ANION GAP SERPL CALCULATED.3IONS-SCNC: 16.1 MMOL/L (ref 5–15)
BACTERIA SPEC AEROBE CULT: ABNORMAL
BASOPHILS # BLD AUTO: 0.1 10*3/MM3 (ref 0–0.2)
BASOPHILS NFR BLD AUTO: 1.1 % (ref 0–1.5)
BILIRUB UR QL STRIP: NEGATIVE
BUN BLD-MCNC: 9 MG/DL (ref 8–23)
BUN/CREAT SERPL: 10.1 (ref 7–25)
CALCIUM SPEC-SCNC: 9 MG/DL (ref 8.6–10.5)
CHLORIDE SERPL-SCNC: 99 MMOL/L (ref 98–107)
CLARITY UR: CLEAR
CO2 SERPL-SCNC: 27 MMOL/L (ref 22–29)
COLOR UR: YELLOW
CREAT BLD-MCNC: 0.89 MG/DL (ref 0.57–1)
DEPRECATED RDW RBC AUTO: 43.3 FL (ref 37–54)
EOSINOPHIL # BLD AUTO: 0.1 10*3/MM3 (ref 0–0.4)
EOSINOPHIL NFR BLD AUTO: 0.8 % (ref 0.3–6.2)
ERYTHROCYTE [DISTWIDTH] IN BLOOD BY AUTOMATED COUNT: 13.7 % (ref 12.3–15.4)
GFR SERPL CREATININE-BSD FRML MDRD: 62 ML/MIN/1.73
GLUCOSE BLD-MCNC: 143 MG/DL (ref 65–99)
GLUCOSE UR STRIP-MCNC: NEGATIVE MG/DL
HCT VFR BLD AUTO: 41.6 % (ref 34–46.6)
HGB BLD-MCNC: 14.4 G/DL (ref 12–15.9)
HGB UR QL STRIP.AUTO: NEGATIVE
KETONES UR QL STRIP: NEGATIVE
LEUKOCYTE ESTERASE UR QL STRIP.AUTO: NEGATIVE
LYMPHOCYTES # BLD AUTO: 1.8 10*3/MM3 (ref 0.7–3.1)
LYMPHOCYTES NFR BLD AUTO: 28.8 % (ref 19.6–45.3)
MCH RBC QN AUTO: 31.5 PG (ref 26.6–33)
MCHC RBC AUTO-ENTMCNC: 34.5 G/DL (ref 31.5–35.7)
MCV RBC AUTO: 91.1 FL (ref 79–97)
MONOCYTES # BLD AUTO: 0.4 10*3/MM3 (ref 0.1–0.9)
MONOCYTES NFR BLD AUTO: 7 % (ref 5–12)
NEUTROPHILS # BLD AUTO: 3.9 10*3/MM3 (ref 1.7–7)
NEUTROPHILS NFR BLD AUTO: 62.3 % (ref 42.7–76)
NITRITE UR QL STRIP: NEGATIVE
NRBC BLD AUTO-RTO: 0.2 /100 WBC (ref 0–0.2)
PH UR STRIP.AUTO: 7 [PH] (ref 5–8)
PLATELET # BLD AUTO: 251 10*3/MM3 (ref 140–450)
PMV BLD AUTO: 9.6 FL (ref 6–12)
POTASSIUM BLD-SCNC: 3.1 MMOL/L (ref 3.5–5.2)
PROT UR QL STRIP: NEGATIVE
RBC # BLD AUTO: 4.57 10*6/MM3 (ref 3.77–5.28)
SODIUM BLD-SCNC: 139 MMOL/L (ref 136–145)
SP GR UR STRIP: 1.01 (ref 1–1.03)
UROBILINOGEN UR QL STRIP: NORMAL
WBC NRBC COR # BLD: 6.3 10*3/MM3 (ref 3.4–10.8)

## 2019-10-17 PROCEDURE — P9612 CATHETERIZE FOR URINE SPEC: HCPCS

## 2019-10-17 PROCEDURE — 99220 PR INITIAL OBSERVATION CARE/DAY 70 MINUTES: CPT | Performed by: PHYSICIAN ASSISTANT

## 2019-10-17 PROCEDURE — 85025 COMPLETE CBC W/AUTO DIFF WBC: CPT | Performed by: NURSE PRACTITIONER

## 2019-10-17 PROCEDURE — 80048 BASIC METABOLIC PNL TOTAL CA: CPT | Performed by: NURSE PRACTITIONER

## 2019-10-17 PROCEDURE — 93005 ELECTROCARDIOGRAM TRACING: CPT

## 2019-10-17 PROCEDURE — 93005 ELECTROCARDIOGRAM TRACING: CPT | Performed by: HOSPITALIST

## 2019-10-17 PROCEDURE — 25010000002 CEFTRIAXONE PER 250 MG: Performed by: NURSE PRACTITIONER

## 2019-10-17 PROCEDURE — 99285 EMERGENCY DEPT VISIT HI MDM: CPT

## 2019-10-17 PROCEDURE — G0378 HOSPITAL OBSERVATION PER HR: HCPCS

## 2019-10-17 PROCEDURE — 81003 URINALYSIS AUTO W/O SCOPE: CPT | Performed by: NURSE PRACTITIONER

## 2019-10-17 RX ORDER — BISACODYL 10 MG
10 SUPPOSITORY, RECTAL RECTAL DAILY PRN
Status: DISCONTINUED | OUTPATIENT
Start: 2019-10-17 | End: 2019-10-22 | Stop reason: HOSPADM

## 2019-10-17 RX ORDER — FUROSEMIDE 20 MG/1
20 TABLET ORAL DAILY PRN
Status: DISCONTINUED | OUTPATIENT
Start: 2019-10-17 | End: 2019-10-22 | Stop reason: HOSPADM

## 2019-10-17 RX ORDER — POTASSIUM CHLORIDE 20 MEQ/1
40 TABLET, EXTENDED RELEASE ORAL AS NEEDED
Status: DISCONTINUED | OUTPATIENT
Start: 2019-10-17 | End: 2019-10-22 | Stop reason: HOSPADM

## 2019-10-17 RX ORDER — AMOXICILLIN 500 MG/1
1000 CAPSULE ORAL 2 TIMES DAILY
Qty: 36 CAPSULE | Refills: 0 | Status: ON HOLD | OUTPATIENT
Start: 2019-10-17 | End: 2019-10-22 | Stop reason: SDUPTHER

## 2019-10-17 RX ORDER — ONDANSETRON 2 MG/ML
4 INJECTION INTRAMUSCULAR; INTRAVENOUS EVERY 6 HOURS PRN
Status: DISCONTINUED | OUTPATIENT
Start: 2019-10-17 | End: 2019-10-22 | Stop reason: HOSPADM

## 2019-10-17 RX ORDER — ACETAMINOPHEN 160 MG/5ML
650 SOLUTION ORAL EVERY 4 HOURS PRN
Status: DISCONTINUED | OUTPATIENT
Start: 2019-10-17 | End: 2019-10-22 | Stop reason: HOSPADM

## 2019-10-17 RX ORDER — POTASSIUM CHLORIDE 1.5 G/1.77G
40 POWDER, FOR SOLUTION ORAL AS NEEDED
Status: DISCONTINUED | OUTPATIENT
Start: 2019-10-17 | End: 2019-10-22 | Stop reason: HOSPADM

## 2019-10-17 RX ORDER — AMLODIPINE BESYLATE 5 MG/1
10 TABLET ORAL DAILY
Status: DISCONTINUED | OUTPATIENT
Start: 2019-10-17 | End: 2019-10-22 | Stop reason: HOSPADM

## 2019-10-17 RX ORDER — DONEPEZIL HYDROCHLORIDE 5 MG/1
10 TABLET, FILM COATED ORAL NIGHTLY
Status: DISCONTINUED | OUTPATIENT
Start: 2019-10-17 | End: 2019-10-22 | Stop reason: HOSPADM

## 2019-10-17 RX ORDER — SODIUM CHLORIDE 0.9 % (FLUSH) 0.9 %
10 SYRINGE (ML) INJECTION EVERY 12 HOURS SCHEDULED
Status: DISCONTINUED | OUTPATIENT
Start: 2019-10-17 | End: 2019-10-22 | Stop reason: HOSPADM

## 2019-10-17 RX ORDER — ONDANSETRON 4 MG/1
4 TABLET, FILM COATED ORAL EVERY 6 HOURS PRN
Status: DISCONTINUED | OUTPATIENT
Start: 2019-10-17 | End: 2019-10-22 | Stop reason: HOSPADM

## 2019-10-17 RX ORDER — ACETAMINOPHEN 650 MG/1
650 SUPPOSITORY RECTAL EVERY 4 HOURS PRN
Status: DISCONTINUED | OUTPATIENT
Start: 2019-10-17 | End: 2019-10-22 | Stop reason: HOSPADM

## 2019-10-17 RX ORDER — SODIUM CHLORIDE 0.9 % (FLUSH) 0.9 %
10 SYRINGE (ML) INJECTION AS NEEDED
Status: DISCONTINUED | OUTPATIENT
Start: 2019-10-17 | End: 2019-10-22 | Stop reason: HOSPADM

## 2019-10-17 RX ORDER — FLUOXETINE HYDROCHLORIDE 20 MG/1
40 CAPSULE ORAL DAILY
Status: DISCONTINUED | OUTPATIENT
Start: 2019-10-17 | End: 2019-10-22 | Stop reason: HOSPADM

## 2019-10-17 RX ORDER — DOCUSATE SODIUM 100 MG/1
100 CAPSULE, LIQUID FILLED ORAL 2 TIMES DAILY PRN
Status: DISCONTINUED | OUTPATIENT
Start: 2019-10-17 | End: 2019-10-22 | Stop reason: HOSPADM

## 2019-10-17 RX ORDER — ACETAMINOPHEN 325 MG/1
650 TABLET ORAL EVERY 4 HOURS PRN
Status: DISCONTINUED | OUTPATIENT
Start: 2019-10-17 | End: 2019-10-22 | Stop reason: HOSPADM

## 2019-10-17 RX ORDER — CALCIUM CARBONATE 200(500)MG
2 TABLET,CHEWABLE ORAL 2 TIMES DAILY PRN
Status: DISCONTINUED | OUTPATIENT
Start: 2019-10-17 | End: 2019-10-22 | Stop reason: HOSPADM

## 2019-10-17 RX ORDER — ONDANSETRON 4 MG/1
4 TABLET, ORALLY DISINTEGRATING ORAL ONCE
Status: COMPLETED | OUTPATIENT
Start: 2019-10-17 | End: 2019-10-17

## 2019-10-17 RX ORDER — CHOLECALCIFEROL (VITAMIN D3) 125 MCG
5 CAPSULE ORAL NIGHTLY PRN
Status: DISCONTINUED | OUTPATIENT
Start: 2019-10-17 | End: 2019-10-22 | Stop reason: HOSPADM

## 2019-10-17 RX ORDER — ALUMINA, MAGNESIA, AND SIMETHICONE 2400; 2400; 240 MG/30ML; MG/30ML; MG/30ML
15 SUSPENSION ORAL EVERY 6 HOURS PRN
Status: DISCONTINUED | OUTPATIENT
Start: 2019-10-17 | End: 2019-10-22 | Stop reason: HOSPADM

## 2019-10-17 RX ADMIN — CEFTRIAXONE SODIUM 1 G: 10 INJECTION, POWDER, FOR SOLUTION INTRAVENOUS at 11:05

## 2019-10-17 RX ADMIN — DONEPEZIL HYDROCHLORIDE 10 MG: 5 TABLET, FILM COATED ORAL at 22:57

## 2019-10-17 RX ADMIN — SODIUM CHLORIDE 500 ML: 900 INJECTION, SOLUTION INTRAVENOUS at 09:58

## 2019-10-17 RX ADMIN — MELATONIN TAB 5 MG 5 MG: 5 TAB at 22:58

## 2019-10-17 RX ADMIN — Medication 10 ML: at 22:58

## 2019-10-17 RX ADMIN — POTASSIUM CHLORIDE 40 MEQ: 1500 TABLET, EXTENDED RELEASE ORAL at 13:19

## 2019-10-17 RX ADMIN — ONDANSETRON 4 MG: 4 TABLET, ORALLY DISINTEGRATING ORAL at 09:52

## 2019-10-17 NOTE — OUTREACH NOTE
Prep Survey      Responses   Facility patient discharged from?  [facility]   Is patient eligible?  No   What are the reasons patient is not eligible?  Readmitted   Does the patient have one of the following disease processes/diagnoses(primary or secondary)?  Other   Prep survey completed?  Yes          Ksenia Fiore LPN

## 2019-10-17 NOTE — OUTREACH NOTE
Prep Survey      Responses   Facility patient discharged from?  [facility]   Is patient eligible?  No   What are the reasons patient is not eligible?  Readmitted   Does the patient have one of the following disease processes/diagnoses(primary or secondary)?  Other   Prep survey completed?  Yes          Noemy Jefferson RN

## 2019-10-18 LAB
ANION GAP SERPL CALCULATED.3IONS-SCNC: 10 MMOL/L (ref 5–15)
BASOPHILS # BLD AUTO: 0.1 10*3/MM3 (ref 0–0.2)
BASOPHILS NFR BLD AUTO: 1.2 % (ref 0–1.5)
BUN BLD-MCNC: 8 MG/DL (ref 8–23)
BUN/CREAT SERPL: 10.3 (ref 7–25)
CALCIUM SPEC-SCNC: 9.2 MG/DL (ref 8.6–10.5)
CHLORIDE SERPL-SCNC: 101 MMOL/L (ref 98–107)
CO2 SERPL-SCNC: 27 MMOL/L (ref 22–29)
CREAT BLD-MCNC: 0.78 MG/DL (ref 0.57–1)
DEPRECATED RDW RBC AUTO: 42.9 FL (ref 37–54)
EOSINOPHIL # BLD AUTO: 0.1 10*3/MM3 (ref 0–0.4)
EOSINOPHIL NFR BLD AUTO: 1.8 % (ref 0.3–6.2)
ERYTHROCYTE [DISTWIDTH] IN BLOOD BY AUTOMATED COUNT: 13.4 % (ref 12.3–15.4)
GFR SERPL CREATININE-BSD FRML MDRD: 72 ML/MIN/1.73
GLUCOSE BLD-MCNC: 115 MG/DL (ref 65–99)
HCT VFR BLD AUTO: 41.5 % (ref 34–46.6)
HGB BLD-MCNC: 14.1 G/DL (ref 12–15.9)
LYMPHOCYTES # BLD AUTO: 1.8 10*3/MM3 (ref 0.7–3.1)
LYMPHOCYTES NFR BLD AUTO: 29.4 % (ref 19.6–45.3)
MCH RBC QN AUTO: 31.3 PG (ref 26.6–33)
MCHC RBC AUTO-ENTMCNC: 34 G/DL (ref 31.5–35.7)
MCV RBC AUTO: 92 FL (ref 79–97)
MONOCYTES # BLD AUTO: 0.4 10*3/MM3 (ref 0.1–0.9)
MONOCYTES NFR BLD AUTO: 7 % (ref 5–12)
NEUTROPHILS # BLD AUTO: 3.7 10*3/MM3 (ref 1.7–7)
NEUTROPHILS NFR BLD AUTO: 60.6 % (ref 42.7–76)
NRBC BLD AUTO-RTO: 0 /100 WBC (ref 0–0.2)
PLATELET # BLD AUTO: 220 10*3/MM3 (ref 140–450)
PMV BLD AUTO: 10.2 FL (ref 6–12)
POTASSIUM BLD-SCNC: 3.3 MMOL/L (ref 3.5–5.2)
POTASSIUM BLD-SCNC: 4.5 MMOL/L (ref 3.5–5.2)
RBC # BLD AUTO: 4.51 10*6/MM3 (ref 3.77–5.28)
SODIUM BLD-SCNC: 138 MMOL/L (ref 136–145)
WBC NRBC COR # BLD: 6.2 10*3/MM3 (ref 3.4–10.8)

## 2019-10-18 PROCEDURE — 85025 COMPLETE CBC W/AUTO DIFF WBC: CPT | Performed by: PHYSICIAN ASSISTANT

## 2019-10-18 PROCEDURE — 80048 BASIC METABOLIC PNL TOTAL CA: CPT | Performed by: PHYSICIAN ASSISTANT

## 2019-10-18 PROCEDURE — 99225 PR SBSQ OBSERVATION CARE/DAY 25 MINUTES: CPT | Performed by: HOSPITALIST

## 2019-10-18 PROCEDURE — G0378 HOSPITAL OBSERVATION PER HR: HCPCS

## 2019-10-18 PROCEDURE — 25010000002 CEFTRIAXONE PER 250 MG: Performed by: PHYSICIAN ASSISTANT

## 2019-10-18 PROCEDURE — 97116 GAIT TRAINING THERAPY: CPT

## 2019-10-18 PROCEDURE — 97161 PT EVAL LOW COMPLEX 20 MIN: CPT

## 2019-10-18 PROCEDURE — 97165 OT EVAL LOW COMPLEX 30 MIN: CPT

## 2019-10-18 PROCEDURE — 84132 ASSAY OF SERUM POTASSIUM: CPT | Performed by: HOSPITALIST

## 2019-10-18 RX ORDER — POTASSIUM CHLORIDE 750 MG/1
10 TABLET, FILM COATED, EXTENDED RELEASE ORAL DAILY
Status: DISCONTINUED | OUTPATIENT
Start: 2019-10-19 | End: 2019-10-22 | Stop reason: HOSPADM

## 2019-10-18 RX ORDER — POTASSIUM CHLORIDE 20 MEQ/1
20 TABLET, EXTENDED RELEASE ORAL DAILY
Status: DISCONTINUED | OUTPATIENT
Start: 2019-10-19 | End: 2019-10-18

## 2019-10-18 RX ADMIN — POTASSIUM CHLORIDE 40 MEQ: 1500 TABLET, EXTENDED RELEASE ORAL at 09:07

## 2019-10-18 RX ADMIN — DONEPEZIL HYDROCHLORIDE 10 MG: 5 TABLET, FILM COATED ORAL at 20:07

## 2019-10-18 RX ADMIN — ACETAMINOPHEN 650 MG: 325 TABLET ORAL at 06:08

## 2019-10-18 RX ADMIN — POTASSIUM CHLORIDE 40 MEQ: 1500 TABLET, EXTENDED RELEASE ORAL at 11:14

## 2019-10-18 RX ADMIN — FLUOXETINE 40 MG: 20 CAPSULE ORAL at 09:07

## 2019-10-18 RX ADMIN — CEFTRIAXONE SODIUM 1 G: 1 INJECTION, POWDER, FOR SOLUTION INTRAMUSCULAR; INTRAVENOUS at 11:14

## 2019-10-18 RX ADMIN — MELATONIN TAB 5 MG 5 MG: 5 TAB at 20:07

## 2019-10-18 RX ADMIN — Medication 10 ML: at 09:04

## 2019-10-18 RX ADMIN — AMLODIPINE BESYLATE 10 MG: 5 TABLET ORAL at 09:07

## 2019-10-18 RX ADMIN — Medication 10 ML: at 20:07

## 2019-10-19 PROBLEM — F05 DELIRIUM DUE TO ANOTHER MEDICAL CONDITION: Status: ACTIVE | Noted: 2019-10-19

## 2019-10-19 LAB
ANION GAP SERPL CALCULATED.3IONS-SCNC: 11 MMOL/L (ref 5–15)
BASOPHILS # BLD AUTO: 0.1 10*3/MM3 (ref 0–0.2)
BASOPHILS NFR BLD AUTO: 1.2 % (ref 0–1.5)
BUN BLD-MCNC: 9 MG/DL (ref 8–23)
BUN/CREAT SERPL: 11.3 (ref 7–25)
CALCIUM SPEC-SCNC: 9 MG/DL (ref 8.6–10.5)
CHLORIDE SERPL-SCNC: 104 MMOL/L (ref 98–107)
CO2 SERPL-SCNC: 26 MMOL/L (ref 22–29)
CREAT BLD-MCNC: 0.8 MG/DL (ref 0.57–1)
DEPRECATED RDW RBC AUTO: 43.8 FL (ref 37–54)
EOSINOPHIL # BLD AUTO: 0.1 10*3/MM3 (ref 0–0.4)
EOSINOPHIL NFR BLD AUTO: 2.2 % (ref 0.3–6.2)
ERYTHROCYTE [DISTWIDTH] IN BLOOD BY AUTOMATED COUNT: 13.6 % (ref 12.3–15.4)
GFR SERPL CREATININE-BSD FRML MDRD: 70 ML/MIN/1.73
GLUCOSE BLD-MCNC: 83 MG/DL (ref 65–99)
HCT VFR BLD AUTO: 39.7 % (ref 34–46.6)
HGB BLD-MCNC: 13.2 G/DL (ref 12–15.9)
LYMPHOCYTES # BLD AUTO: 2.2 10*3/MM3 (ref 0.7–3.1)
LYMPHOCYTES NFR BLD AUTO: 36.8 % (ref 19.6–45.3)
MCH RBC QN AUTO: 30.8 PG (ref 26.6–33)
MCHC RBC AUTO-ENTMCNC: 33.4 G/DL (ref 31.5–35.7)
MCV RBC AUTO: 92.2 FL (ref 79–97)
MONOCYTES # BLD AUTO: 0.5 10*3/MM3 (ref 0.1–0.9)
MONOCYTES NFR BLD AUTO: 8.3 % (ref 5–12)
NEUTROPHILS # BLD AUTO: 3.1 10*3/MM3 (ref 1.7–7)
NEUTROPHILS NFR BLD AUTO: 51.5 % (ref 42.7–76)
NRBC BLD AUTO-RTO: 0.1 /100 WBC (ref 0–0.2)
PLATELET # BLD AUTO: 226 10*3/MM3 (ref 140–450)
PMV BLD AUTO: 10.2 FL (ref 6–12)
POTASSIUM BLD-SCNC: 3.9 MMOL/L (ref 3.5–5.2)
RBC # BLD AUTO: 4.3 10*6/MM3 (ref 3.77–5.28)
SODIUM BLD-SCNC: 141 MMOL/L (ref 136–145)
WBC NRBC COR # BLD: 6 10*3/MM3 (ref 3.4–10.8)

## 2019-10-19 PROCEDURE — 85025 COMPLETE CBC W/AUTO DIFF WBC: CPT | Performed by: PHYSICIAN ASSISTANT

## 2019-10-19 PROCEDURE — G0378 HOSPITAL OBSERVATION PER HR: HCPCS

## 2019-10-19 PROCEDURE — 99222 1ST HOSP IP/OBS MODERATE 55: CPT | Performed by: PSYCHIATRY & NEUROLOGY

## 2019-10-19 PROCEDURE — 99225 PR SBSQ OBSERVATION CARE/DAY 25 MINUTES: CPT | Performed by: HOSPITALIST

## 2019-10-19 PROCEDURE — 80048 BASIC METABOLIC PNL TOTAL CA: CPT | Performed by: PHYSICIAN ASSISTANT

## 2019-10-19 PROCEDURE — 25010000002 CEFTRIAXONE PER 250 MG: Performed by: PHYSICIAN ASSISTANT

## 2019-10-19 RX ORDER — RISPERIDONE 0.25 MG/1
0.25 TABLET ORAL 2 TIMES DAILY PRN
Status: DISCONTINUED | OUTPATIENT
Start: 2019-10-19 | End: 2019-10-22 | Stop reason: HOSPADM

## 2019-10-19 RX ADMIN — FLUOXETINE 40 MG: 20 CAPSULE ORAL at 07:34

## 2019-10-19 RX ADMIN — ACETAMINOPHEN 650 MG: 325 TABLET ORAL at 04:54

## 2019-10-19 RX ADMIN — Medication 10 ML: at 07:36

## 2019-10-19 RX ADMIN — AMLODIPINE BESYLATE 10 MG: 5 TABLET ORAL at 07:35

## 2019-10-19 RX ADMIN — POTASSIUM CHLORIDE 10 MEQ: 750 TABLET, EXTENDED RELEASE ORAL at 07:35

## 2019-10-19 RX ADMIN — ACETAMINOPHEN 650 MG: 325 TABLET ORAL at 22:25

## 2019-10-19 RX ADMIN — ALUMINUM HYDROXIDE, MAGNESIUM HYDROXIDE, AND DIMETHICONE 15 ML: 400; 400; 40 SUSPENSION ORAL at 04:54

## 2019-10-19 RX ADMIN — Medication 10 ML: at 20:16

## 2019-10-19 RX ADMIN — MELATONIN TAB 5 MG 5 MG: 5 TAB at 20:16

## 2019-10-19 RX ADMIN — DONEPEZIL HYDROCHLORIDE 10 MG: 5 TABLET, FILM COATED ORAL at 20:16

## 2019-10-19 RX ADMIN — CEFTRIAXONE SODIUM 1 G: 1 INJECTION, POWDER, FOR SOLUTION INTRAMUSCULAR; INTRAVENOUS at 12:19

## 2019-10-20 LAB
ANION GAP SERPL CALCULATED.3IONS-SCNC: 9 MMOL/L (ref 5–15)
BASOPHILS # BLD AUTO: 0.1 10*3/MM3 (ref 0–0.2)
BASOPHILS NFR BLD AUTO: 1.3 % (ref 0–1.5)
BUN BLD-MCNC: 8 MG/DL (ref 8–23)
BUN/CREAT SERPL: 9.5 (ref 7–25)
CALCIUM SPEC-SCNC: 8.8 MG/DL (ref 8.6–10.5)
CHLORIDE SERPL-SCNC: 102 MMOL/L (ref 98–107)
CO2 SERPL-SCNC: 27 MMOL/L (ref 22–29)
CREAT BLD-MCNC: 0.84 MG/DL (ref 0.57–1)
DEPRECATED RDW RBC AUTO: 43.8 FL (ref 37–54)
EOSINOPHIL # BLD AUTO: 0.1 10*3/MM3 (ref 0–0.4)
EOSINOPHIL NFR BLD AUTO: 2.1 % (ref 0.3–6.2)
ERYTHROCYTE [DISTWIDTH] IN BLOOD BY AUTOMATED COUNT: 13.9 % (ref 12.3–15.4)
GFR SERPL CREATININE-BSD FRML MDRD: 66 ML/MIN/1.73
GLUCOSE BLD-MCNC: 90 MG/DL (ref 65–99)
HCT VFR BLD AUTO: 37.7 % (ref 34–46.6)
HGB BLD-MCNC: 13.5 G/DL (ref 12–15.9)
LYMPHOCYTES # BLD AUTO: 2.5 10*3/MM3 (ref 0.7–3.1)
LYMPHOCYTES NFR BLD AUTO: 37.9 % (ref 19.6–45.3)
MCH RBC QN AUTO: 32.6 PG (ref 26.6–33)
MCHC RBC AUTO-ENTMCNC: 35.8 G/DL (ref 31.5–35.7)
MCV RBC AUTO: 91 FL (ref 79–97)
MONOCYTES # BLD AUTO: 0.5 10*3/MM3 (ref 0.1–0.9)
MONOCYTES NFR BLD AUTO: 7.9 % (ref 5–12)
NEUTROPHILS # BLD AUTO: 3.3 10*3/MM3 (ref 1.7–7)
NEUTROPHILS NFR BLD AUTO: 50.8 % (ref 42.7–76)
NRBC BLD AUTO-RTO: 0.1 /100 WBC (ref 0–0.2)
PLATELET # BLD AUTO: 216 10*3/MM3 (ref 140–450)
PMV BLD AUTO: 9.3 FL (ref 6–12)
POTASSIUM BLD-SCNC: 3.7 MMOL/L (ref 3.5–5.2)
RBC # BLD AUTO: 4.15 10*6/MM3 (ref 3.77–5.28)
SODIUM BLD-SCNC: 138 MMOL/L (ref 136–145)
WBC NRBC COR # BLD: 6.5 10*3/MM3 (ref 3.4–10.8)

## 2019-10-20 PROCEDURE — 25010000002 CEFTRIAXONE PER 250 MG: Performed by: PHYSICIAN ASSISTANT

## 2019-10-20 PROCEDURE — 97530 THERAPEUTIC ACTIVITIES: CPT

## 2019-10-20 PROCEDURE — G0378 HOSPITAL OBSERVATION PER HR: HCPCS

## 2019-10-20 PROCEDURE — 85025 COMPLETE CBC W/AUTO DIFF WBC: CPT | Performed by: PHYSICIAN ASSISTANT

## 2019-10-20 PROCEDURE — 99225 PR SBSQ OBSERVATION CARE/DAY 25 MINUTES: CPT | Performed by: HOSPITALIST

## 2019-10-20 PROCEDURE — 80048 BASIC METABOLIC PNL TOTAL CA: CPT | Performed by: PHYSICIAN ASSISTANT

## 2019-10-20 PROCEDURE — 99232 SBSQ HOSP IP/OBS MODERATE 35: CPT | Performed by: PSYCHIATRY & NEUROLOGY

## 2019-10-20 PROCEDURE — 97116 GAIT TRAINING THERAPY: CPT

## 2019-10-20 RX ADMIN — POTASSIUM CHLORIDE 10 MEQ: 750 TABLET, EXTENDED RELEASE ORAL at 09:18

## 2019-10-20 RX ADMIN — AMLODIPINE BESYLATE 10 MG: 5 TABLET ORAL at 09:18

## 2019-10-20 RX ADMIN — Medication 10 ML: at 21:25

## 2019-10-20 RX ADMIN — CEFTRIAXONE SODIUM 1 G: 1 INJECTION, POWDER, FOR SOLUTION INTRAMUSCULAR; INTRAVENOUS at 11:32

## 2019-10-20 RX ADMIN — DONEPEZIL HYDROCHLORIDE 10 MG: 5 TABLET, FILM COATED ORAL at 21:24

## 2019-10-20 RX ADMIN — ALUMINUM HYDROXIDE, MAGNESIUM HYDROXIDE, AND DIMETHICONE 15 ML: 400; 400; 40 SUSPENSION ORAL at 01:41

## 2019-10-20 RX ADMIN — Medication 10 ML: at 09:18

## 2019-10-20 RX ADMIN — FLUOXETINE 40 MG: 20 CAPSULE ORAL at 09:18

## 2019-10-20 RX ADMIN — ACETAMINOPHEN 650 MG: 325 TABLET ORAL at 04:07

## 2019-10-21 LAB
ANION GAP SERPL CALCULATED.3IONS-SCNC: 12 MMOL/L (ref 5–15)
BASOPHILS # BLD AUTO: 0.1 10*3/MM3 (ref 0–0.2)
BASOPHILS NFR BLD AUTO: 1.2 % (ref 0–1.5)
BUN BLD-MCNC: 8 MG/DL (ref 8–23)
BUN/CREAT SERPL: 10.4 (ref 7–25)
CALCIUM SPEC-SCNC: 8.9 MG/DL (ref 8.6–10.5)
CHLORIDE SERPL-SCNC: 101 MMOL/L (ref 98–107)
CO2 SERPL-SCNC: 24 MMOL/L (ref 22–29)
CREAT BLD-MCNC: 0.77 MG/DL (ref 0.57–1)
DEPRECATED RDW RBC AUTO: 44.6 FL (ref 37–54)
EOSINOPHIL # BLD AUTO: 0.1 10*3/MM3 (ref 0–0.4)
EOSINOPHIL NFR BLD AUTO: 1.9 % (ref 0.3–6.2)
ERYTHROCYTE [DISTWIDTH] IN BLOOD BY AUTOMATED COUNT: 13.6 % (ref 12.3–15.4)
GFR SERPL CREATININE-BSD FRML MDRD: 73 ML/MIN/1.73
GLUCOSE BLD-MCNC: 96 MG/DL (ref 65–99)
HCT VFR BLD AUTO: 41.2 % (ref 34–46.6)
HGB BLD-MCNC: 14 G/DL (ref 12–15.9)
LYMPHOCYTES # BLD AUTO: 1.9 10*3/MM3 (ref 0.7–3.1)
LYMPHOCYTES NFR BLD AUTO: 28.7 % (ref 19.6–45.3)
MCH RBC QN AUTO: 31.6 PG (ref 26.6–33)
MCHC RBC AUTO-ENTMCNC: 34.1 G/DL (ref 31.5–35.7)
MCV RBC AUTO: 92.6 FL (ref 79–97)
MONOCYTES # BLD AUTO: 0.5 10*3/MM3 (ref 0.1–0.9)
MONOCYTES NFR BLD AUTO: 7.8 % (ref 5–12)
NEUTROPHILS # BLD AUTO: 4 10*3/MM3 (ref 1.7–7)
NEUTROPHILS NFR BLD AUTO: 60.4 % (ref 42.7–76)
NRBC BLD AUTO-RTO: 0 /100 WBC (ref 0–0.2)
PLATELET # BLD AUTO: 220 10*3/MM3 (ref 140–450)
PMV BLD AUTO: 10 FL (ref 6–12)
POTASSIUM BLD-SCNC: 3.6 MMOL/L (ref 3.5–5.2)
RBC # BLD AUTO: 4.45 10*6/MM3 (ref 3.77–5.28)
SODIUM BLD-SCNC: 137 MMOL/L (ref 136–145)
WBC NRBC COR # BLD: 6.6 10*3/MM3 (ref 3.4–10.8)

## 2019-10-21 PROCEDURE — 97530 THERAPEUTIC ACTIVITIES: CPT

## 2019-10-21 PROCEDURE — 97535 SELF CARE MNGMENT TRAINING: CPT

## 2019-10-21 PROCEDURE — 85025 COMPLETE CBC W/AUTO DIFF WBC: CPT | Performed by: PHYSICIAN ASSISTANT

## 2019-10-21 PROCEDURE — 80048 BASIC METABOLIC PNL TOTAL CA: CPT | Performed by: PHYSICIAN ASSISTANT

## 2019-10-21 PROCEDURE — 99232 SBSQ HOSP IP/OBS MODERATE 35: CPT | Performed by: INTERNAL MEDICINE

## 2019-10-21 RX ORDER — CEPHALEXIN 250 MG/1
250 CAPSULE ORAL EVERY 12 HOURS SCHEDULED
Status: DISCONTINUED | OUTPATIENT
Start: 2019-10-21 | End: 2019-10-22 | Stop reason: HOSPADM

## 2019-10-21 RX ADMIN — CEPHALEXIN 250 MG: 250 CAPSULE ORAL at 20:17

## 2019-10-21 RX ADMIN — POTASSIUM CHLORIDE 10 MEQ: 750 TABLET, EXTENDED RELEASE ORAL at 10:04

## 2019-10-21 RX ADMIN — AMLODIPINE BESYLATE 10 MG: 5 TABLET ORAL at 10:04

## 2019-10-21 RX ADMIN — DONEPEZIL HYDROCHLORIDE 10 MG: 5 TABLET, FILM COATED ORAL at 20:17

## 2019-10-21 RX ADMIN — FLUOXETINE 40 MG: 20 CAPSULE ORAL at 10:04

## 2019-10-21 RX ADMIN — Medication 10 ML: at 10:05

## 2019-10-21 RX ADMIN — Medication 10 ML: at 20:31

## 2019-10-21 RX ADMIN — ACETAMINOPHEN 650 MG: 325 TABLET ORAL at 04:57

## 2019-10-21 RX ADMIN — MELATONIN TAB 5 MG 5 MG: 5 TAB at 23:56

## 2019-10-22 VITALS
DIASTOLIC BLOOD PRESSURE: 69 MMHG | BODY MASS INDEX: 31.96 KG/M2 | SYSTOLIC BLOOD PRESSURE: 119 MMHG | HEART RATE: 71 BPM | RESPIRATION RATE: 16 BRPM | WEIGHT: 191.8 LBS | TEMPERATURE: 97.9 F | HEIGHT: 65 IN | OXYGEN SATURATION: 96 %

## 2019-10-22 LAB
ANION GAP SERPL CALCULATED.3IONS-SCNC: 14 MMOL/L (ref 5–15)
BASOPHILS # BLD AUTO: 0 10*3/MM3 (ref 0–0.2)
BASOPHILS NFR BLD AUTO: 0.8 % (ref 0–1.5)
BUN BLD-MCNC: 9 MG/DL (ref 8–23)
BUN/CREAT SERPL: 10.5 (ref 7–25)
CALCIUM SPEC-SCNC: 9.4 MG/DL (ref 8.6–10.5)
CHLORIDE SERPL-SCNC: 101 MMOL/L (ref 98–107)
CO2 SERPL-SCNC: 27 MMOL/L (ref 22–29)
CREAT BLD-MCNC: 0.86 MG/DL (ref 0.57–1)
DEPRECATED RDW RBC AUTO: 43.3 FL (ref 37–54)
EOSINOPHIL # BLD AUTO: 0.1 10*3/MM3 (ref 0–0.4)
EOSINOPHIL NFR BLD AUTO: 1.9 % (ref 0.3–6.2)
ERYTHROCYTE [DISTWIDTH] IN BLOOD BY AUTOMATED COUNT: 13.6 % (ref 12.3–15.4)
GFR SERPL CREATININE-BSD FRML MDRD: 65 ML/MIN/1.73
GLUCOSE BLD-MCNC: 91 MG/DL (ref 65–99)
HCT VFR BLD AUTO: 39.6 % (ref 34–46.6)
HGB BLD-MCNC: 13.7 G/DL (ref 12–15.9)
LYMPHOCYTES # BLD AUTO: 2 10*3/MM3 (ref 0.7–3.1)
LYMPHOCYTES NFR BLD AUTO: 33.9 % (ref 19.6–45.3)
MCH RBC QN AUTO: 31.7 PG (ref 26.6–33)
MCHC RBC AUTO-ENTMCNC: 34.6 G/DL (ref 31.5–35.7)
MCV RBC AUTO: 91.4 FL (ref 79–97)
MONOCYTES # BLD AUTO: 0.5 10*3/MM3 (ref 0.1–0.9)
MONOCYTES NFR BLD AUTO: 8.9 % (ref 5–12)
NEUTROPHILS # BLD AUTO: 3.2 10*3/MM3 (ref 1.7–7)
NEUTROPHILS NFR BLD AUTO: 54.5 % (ref 42.7–76)
NRBC BLD AUTO-RTO: 0.1 /100 WBC (ref 0–0.2)
PLATELET # BLD AUTO: 224 10*3/MM3 (ref 140–450)
PMV BLD AUTO: 9.7 FL (ref 6–12)
POTASSIUM BLD-SCNC: 4.1 MMOL/L (ref 3.5–5.2)
RBC # BLD AUTO: 4.34 10*6/MM3 (ref 3.77–5.28)
SODIUM BLD-SCNC: 142 MMOL/L (ref 136–145)
WBC NRBC COR # BLD: 5.8 10*3/MM3 (ref 3.4–10.8)

## 2019-10-22 PROCEDURE — 80048 BASIC METABOLIC PNL TOTAL CA: CPT | Performed by: PHYSICIAN ASSISTANT

## 2019-10-22 PROCEDURE — 85025 COMPLETE CBC W/AUTO DIFF WBC: CPT | Performed by: PHYSICIAN ASSISTANT

## 2019-10-22 PROCEDURE — 99239 HOSP IP/OBS DSCHRG MGMT >30: CPT | Performed by: INTERNAL MEDICINE

## 2019-10-22 RX ORDER — PANTOPRAZOLE SODIUM 40 MG/10ML
40 INJECTION, POWDER, LYOPHILIZED, FOR SOLUTION INTRAVENOUS ONCE
Status: COMPLETED | OUTPATIENT
Start: 2019-10-22 | End: 2019-10-22

## 2019-10-22 RX ORDER — PANTOPRAZOLE SODIUM 40 MG/1
40 TABLET, DELAYED RELEASE ORAL DAILY
Qty: 30 TABLET | Refills: 0 | Status: SHIPPED | OUTPATIENT
Start: 2019-10-22 | End: 2019-11-21

## 2019-10-22 RX ORDER — AMOXICILLIN 500 MG/1
500 CAPSULE ORAL 2 TIMES DAILY
Qty: 8 CAPSULE | Refills: 0 | Status: SHIPPED | OUTPATIENT
Start: 2019-10-22 | End: 2019-10-26

## 2019-10-22 RX ORDER — ONDANSETRON 4 MG/1
4 TABLET, FILM COATED ORAL 4 TIMES DAILY PRN
Qty: 20 TABLET | Refills: 0 | Status: SHIPPED | OUTPATIENT
Start: 2019-10-22 | End: 2020-08-10

## 2019-10-22 RX ADMIN — Medication 10 ML: at 08:36

## 2019-10-22 RX ADMIN — CEPHALEXIN 250 MG: 250 CAPSULE ORAL at 08:36

## 2019-10-22 RX ADMIN — POTASSIUM CHLORIDE 10 MEQ: 750 TABLET, EXTENDED RELEASE ORAL at 08:36

## 2019-10-22 RX ADMIN — PANTOPRAZOLE SODIUM 40 MG: 40 INJECTION, POWDER, FOR SOLUTION INTRAVENOUS at 10:19

## 2019-10-22 RX ADMIN — ACETAMINOPHEN 650 MG: 325 TABLET ORAL at 03:20

## 2019-10-22 RX ADMIN — FLUOXETINE 40 MG: 20 CAPSULE ORAL at 08:36

## 2019-10-22 RX ADMIN — AMLODIPINE BESYLATE 10 MG: 5 TABLET ORAL at 08:35

## 2019-10-22 RX ADMIN — ONDANSETRON HYDROCHLORIDE 4 MG: 4 TABLET, FILM COATED ORAL at 08:37

## 2019-10-23 ENCOUNTER — APPOINTMENT (OUTPATIENT)
Dept: CT IMAGING | Facility: HOSPITAL | Age: 74
End: 2019-10-23

## 2019-10-23 ENCOUNTER — HOSPITAL ENCOUNTER (OUTPATIENT)
Facility: HOSPITAL | Age: 74
Setting detail: OBSERVATION
Discharge: NURSING, REHAB OR LONG-TERM CARE | End: 2019-10-25
Attending: INTERNAL MEDICINE | Admitting: INTERNAL MEDICINE

## 2019-10-23 ENCOUNTER — APPOINTMENT (OUTPATIENT)
Dept: GENERAL RADIOLOGY | Facility: HOSPITAL | Age: 74
End: 2019-10-23

## 2019-10-23 ENCOUNTER — READMISSION MANAGEMENT (OUTPATIENT)
Dept: CALL CENTER | Facility: HOSPITAL | Age: 74
End: 2019-10-23

## 2019-10-23 DIAGNOSIS — R53.1 WEAKNESS: ICD-10-CM

## 2019-10-23 DIAGNOSIS — I26.94 MULTIPLE SUBSEGMENTAL PULMONARY EMBOLI WITHOUT ACUTE COR PULMONALE (HCC): Primary | ICD-10-CM

## 2019-10-23 PROBLEM — G47.30 SLEEP APNEA: Chronic | Status: ACTIVE | Noted: 2019-09-11

## 2019-10-23 PROBLEM — F03.90 DEMENTIA (HCC): Chronic | Status: ACTIVE | Noted: 2018-07-13

## 2019-10-23 LAB
ANION GAP SERPL CALCULATED.3IONS-SCNC: 12 MMOL/L (ref 5–15)
APTT PPP: 117.4 SECONDS (ref 24–31)
APTT PPP: 23.9 SECONDS (ref 24–31)
BASOPHILS # BLD AUTO: 0 10*3/MM3 (ref 0–0.2)
BASOPHILS NFR BLD AUTO: 0.6 % (ref 0–1.5)
BUN BLD-MCNC: 12 MG/DL (ref 8–23)
BUN/CREAT SERPL: 11.2 (ref 7–25)
CALCIUM SPEC-SCNC: 9.1 MG/DL (ref 8.6–10.5)
CHLORIDE SERPL-SCNC: 102 MMOL/L (ref 98–107)
CO2 SERPL-SCNC: 24 MMOL/L (ref 22–29)
CREAT BLD-MCNC: 1.07 MG/DL (ref 0.57–1)
D DIMER PPP FEU-MCNC: 1.42 MCGFEU/ML (ref 0.17–0.59)
DEPRECATED RDW RBC AUTO: 44.6 FL (ref 37–54)
EOSINOPHIL # BLD AUTO: 0 10*3/MM3 (ref 0–0.4)
EOSINOPHIL NFR BLD AUTO: 0.6 % (ref 0.3–6.2)
ERYTHROCYTE [DISTWIDTH] IN BLOOD BY AUTOMATED COUNT: 13.7 % (ref 12.3–15.4)
GFR SERPL CREATININE-BSD FRML MDRD: 50 ML/MIN/1.73
GLUCOSE BLD-MCNC: 161 MG/DL (ref 65–99)
HCT VFR BLD AUTO: 41.8 % (ref 34–46.6)
HGB BLD-MCNC: 14.6 G/DL (ref 12–15.9)
HOLD SPECIMEN: NORMAL
INR PPP: 1.06 (ref 0.9–1.1)
LYMPHOCYTES # BLD AUTO: 1.4 10*3/MM3 (ref 0.7–3.1)
LYMPHOCYTES NFR BLD AUTO: 20 % (ref 19.6–45.3)
MCH RBC QN AUTO: 32.2 PG (ref 26.6–33)
MCHC RBC AUTO-ENTMCNC: 35.1 G/DL (ref 31.5–35.7)
MCV RBC AUTO: 91.9 FL (ref 79–97)
MONOCYTES # BLD AUTO: 0.4 10*3/MM3 (ref 0.1–0.9)
MONOCYTES NFR BLD AUTO: 5.7 % (ref 5–12)
NEUTROPHILS # BLD AUTO: 5.2 10*3/MM3 (ref 1.7–7)
NEUTROPHILS NFR BLD AUTO: 73.1 % (ref 42.7–76)
NRBC BLD AUTO-RTO: 0 /100 WBC (ref 0–0.2)
NT-PROBNP SERPL-MCNC: 81.4 PG/ML (ref 5–900)
PLATELET # BLD AUTO: 234 10*3/MM3 (ref 140–450)
PMV BLD AUTO: 9.1 FL (ref 6–12)
POTASSIUM BLD-SCNC: 3.4 MMOL/L (ref 3.5–5.2)
PROTHROMBIN TIME: 11 SECONDS (ref 9.6–11.7)
RBC # BLD AUTO: 4.55 10*6/MM3 (ref 3.77–5.28)
SODIUM BLD-SCNC: 138 MMOL/L (ref 136–145)
TROPONIN T SERPL-MCNC: <0.01 NG/ML (ref 0–0.03)
WBC NRBC COR # BLD: 7.1 10*3/MM3 (ref 3.4–10.8)

## 2019-10-23 PROCEDURE — 80048 BASIC METABOLIC PNL TOTAL CA: CPT | Performed by: PHYSICIAN ASSISTANT

## 2019-10-23 PROCEDURE — 93005 ELECTROCARDIOGRAM TRACING: CPT | Performed by: INTERNAL MEDICINE

## 2019-10-23 PROCEDURE — 83880 ASSAY OF NATRIURETIC PEPTIDE: CPT | Performed by: PHYSICIAN ASSISTANT

## 2019-10-23 PROCEDURE — 93005 ELECTROCARDIOGRAM TRACING: CPT

## 2019-10-23 PROCEDURE — 99284 EMERGENCY DEPT VISIT MOD MDM: CPT

## 2019-10-23 PROCEDURE — G0378 HOSPITAL OBSERVATION PER HR: HCPCS

## 2019-10-23 PROCEDURE — 85025 COMPLETE CBC W/AUTO DIFF WBC: CPT | Performed by: PHYSICIAN ASSISTANT

## 2019-10-23 PROCEDURE — 71045 X-RAY EXAM CHEST 1 VIEW: CPT

## 2019-10-23 PROCEDURE — 85379 FIBRIN DEGRADATION QUANT: CPT | Performed by: PHYSICIAN ASSISTANT

## 2019-10-23 PROCEDURE — 84484 ASSAY OF TROPONIN QUANT: CPT | Performed by: PHYSICIAN ASSISTANT

## 2019-10-23 PROCEDURE — 96366 THER/PROPH/DIAG IV INF ADDON: CPT

## 2019-10-23 PROCEDURE — 99220 PR INITIAL OBSERVATION CARE/DAY 70 MINUTES: CPT | Performed by: INTERNAL MEDICINE

## 2019-10-23 PROCEDURE — 36415 COLL VENOUS BLD VENIPUNCTURE: CPT

## 2019-10-23 PROCEDURE — 25010000002 HEPARIN (PORCINE) PER 1000 UNITS: Performed by: PHYSICIAN ASSISTANT

## 2019-10-23 PROCEDURE — 85610 PROTHROMBIN TIME: CPT | Performed by: PHYSICIAN ASSISTANT

## 2019-10-23 PROCEDURE — 96365 THER/PROPH/DIAG IV INF INIT: CPT

## 2019-10-23 PROCEDURE — 71275 CT ANGIOGRAPHY CHEST: CPT

## 2019-10-23 PROCEDURE — 85730 THROMBOPLASTIN TIME PARTIAL: CPT | Performed by: PHYSICIAN ASSISTANT

## 2019-10-23 PROCEDURE — 0 IOPAMIDOL PER 1 ML: Performed by: PHYSICIAN ASSISTANT

## 2019-10-23 RX ORDER — ALUMINA, MAGNESIA, AND SIMETHICONE 2400; 2400; 240 MG/30ML; MG/30ML; MG/30ML
15 SUSPENSION ORAL EVERY 6 HOURS PRN
Status: DISCONTINUED | OUTPATIENT
Start: 2019-10-23 | End: 2019-10-25 | Stop reason: HOSPADM

## 2019-10-23 RX ORDER — SODIUM CHLORIDE 0.9 % (FLUSH) 0.9 %
10 SYRINGE (ML) INJECTION EVERY 12 HOURS SCHEDULED
Status: DISCONTINUED | OUTPATIENT
Start: 2019-10-23 | End: 2019-10-25 | Stop reason: HOSPADM

## 2019-10-23 RX ORDER — ONDANSETRON 4 MG/1
4 TABLET, FILM COATED ORAL EVERY 6 HOURS PRN
Status: DISCONTINUED | OUTPATIENT
Start: 2019-10-23 | End: 2019-10-25 | Stop reason: HOSPADM

## 2019-10-23 RX ORDER — PANTOPRAZOLE SODIUM 40 MG/1
40 TABLET, DELAYED RELEASE ORAL DAILY
Status: DISCONTINUED | OUTPATIENT
Start: 2019-10-23 | End: 2019-10-25 | Stop reason: HOSPADM

## 2019-10-23 RX ORDER — AMLODIPINE BESYLATE 5 MG/1
10 TABLET ORAL DAILY
Status: DISCONTINUED | OUTPATIENT
Start: 2019-10-23 | End: 2019-10-25 | Stop reason: HOSPADM

## 2019-10-23 RX ORDER — ONDANSETRON 4 MG/1
4 TABLET, FILM COATED ORAL 4 TIMES DAILY PRN
Status: DISCONTINUED | OUTPATIENT
Start: 2019-10-23 | End: 2019-10-23 | Stop reason: SDUPTHER

## 2019-10-23 RX ORDER — PANTOPRAZOLE SODIUM 40 MG/1
TABLET, DELAYED RELEASE ORAL
Status: COMPLETED
Start: 2019-10-23 | End: 2019-10-23

## 2019-10-23 RX ORDER — AMLODIPINE BESYLATE 5 MG/1
TABLET ORAL
Status: COMPLETED
Start: 2019-10-23 | End: 2019-10-23

## 2019-10-23 RX ORDER — BISACODYL 10 MG
10 SUPPOSITORY, RECTAL RECTAL DAILY PRN
Status: DISCONTINUED | OUTPATIENT
Start: 2019-10-23 | End: 2019-10-25 | Stop reason: HOSPADM

## 2019-10-23 RX ORDER — HEPARIN SODIUM 10000 [USP'U]/100ML
1500 INJECTION, SOLUTION INTRAVENOUS
Status: DISCONTINUED | OUTPATIENT
Start: 2019-10-23 | End: 2019-10-24

## 2019-10-23 RX ORDER — DONEPEZIL HYDROCHLORIDE 5 MG/1
10 TABLET, FILM COATED ORAL NIGHTLY
Status: DISCONTINUED | OUTPATIENT
Start: 2019-10-23 | End: 2019-10-25 | Stop reason: HOSPADM

## 2019-10-23 RX ORDER — SODIUM CHLORIDE 0.9 % (FLUSH) 0.9 %
10 SYRINGE (ML) INJECTION AS NEEDED
Status: DISCONTINUED | OUTPATIENT
Start: 2019-10-23 | End: 2019-10-25 | Stop reason: HOSPADM

## 2019-10-23 RX ORDER — CHOLECALCIFEROL (VITAMIN D3) 125 MCG
5 CAPSULE ORAL NIGHTLY PRN
Status: DISCONTINUED | OUTPATIENT
Start: 2019-10-23 | End: 2019-10-25 | Stop reason: HOSPADM

## 2019-10-23 RX ORDER — ONDANSETRON 2 MG/ML
4 INJECTION INTRAMUSCULAR; INTRAVENOUS EVERY 6 HOURS PRN
Status: DISCONTINUED | OUTPATIENT
Start: 2019-10-23 | End: 2019-10-25 | Stop reason: HOSPADM

## 2019-10-23 RX ORDER — FLUOXETINE HYDROCHLORIDE 20 MG/1
20 CAPSULE ORAL DAILY
Status: DISCONTINUED | OUTPATIENT
Start: 2019-10-23 | End: 2019-10-25 | Stop reason: HOSPADM

## 2019-10-23 RX ORDER — FLUOXETINE HYDROCHLORIDE 20 MG/1
CAPSULE ORAL
Status: COMPLETED
Start: 2019-10-23 | End: 2019-10-23

## 2019-10-23 RX ORDER — FUROSEMIDE 40 MG/1
20 TABLET ORAL DAILY PRN
Status: DISCONTINUED | OUTPATIENT
Start: 2019-10-23 | End: 2019-10-25 | Stop reason: HOSPADM

## 2019-10-23 RX ORDER — AMOXICILLIN 250 MG/1
500 CAPSULE ORAL 2 TIMES DAILY
Status: DISCONTINUED | OUTPATIENT
Start: 2019-10-23 | End: 2019-10-25 | Stop reason: HOSPADM

## 2019-10-23 RX ADMIN — AMLODIPINE BESYLATE 10 MG: 5 TABLET ORAL at 17:23

## 2019-10-23 RX ADMIN — IOPAMIDOL 100 ML: 755 INJECTION, SOLUTION INTRAVENOUS at 13:08

## 2019-10-23 RX ADMIN — PANTOPRAZOLE SODIUM 40 MG: 40 TABLET, DELAYED RELEASE ORAL at 17:22

## 2019-10-23 RX ADMIN — HEPARIN SODIUM 1500 UNITS/HR: 10000 INJECTION, SOLUTION INTRAVENOUS at 14:12

## 2019-10-23 RX ADMIN — FLUOXETINE 20 MG: 20 CAPSULE ORAL at 17:22

## 2019-10-23 RX ADMIN — DONEPEZIL HYDROCHLORIDE 10 MG: 5 TABLET, FILM COATED ORAL at 21:18

## 2019-10-23 RX ADMIN — AMOXICILLIN 500 MG: 250 CAPSULE ORAL at 21:18

## 2019-10-23 RX ADMIN — MELATONIN TAB 5 MG 5 MG: 5 TAB at 22:36

## 2019-10-23 NOTE — OUTREACH NOTE
Prep Survey      Responses   Facility patient discharged from?  [facility]   Is patient eligible?  No   What are the reasons patient is not eligible?  Readmitted   Does the patient have one of the following disease processes/diagnoses(primary or secondary)?  Other   Prep survey completed?  Yes          Peg Rivas RN

## 2019-10-23 NOTE — ED NOTES
Pt provided with ice water and updated with CT results and next steps of plan of care to start Heparin gtt for PE treatment and management. Visitor at bedside. Will continue to monitor.     Iza Parker RN  10/23/19 1736

## 2019-10-23 NOTE — ED NOTES
Friend just returned to pt bedside. Pt asking for food and a soft drink at this time. Will inquire with DESIRAE Mclain. Pt awaiting admission room assignment. Tolerating Heparin gtt well. Will continue to monitor.       Iza Parker RN  10/23/19 1500

## 2019-10-23 NOTE — ED NOTES
Pt assisted with calling kitchen, ordering dinner from kitchen now on phone. Awaiting admission room to be cleaned. Pt updated and agreeable. Will continue to monitor.     Iza Parker RN  10/23/19 3436

## 2019-10-23 NOTE — ED PROVIDER NOTES
Subjective   History:  Patient is a 74-year-old female who presents to the ER with weakness and fatigue.  She reports that she was discharged from the hospital yesterday and her caregiver does not think that she can take care of her at home.  She did not get approval from insurance for higher level of care.  She also reports some chest pain and chest tightness but it is hard to nail her history down secondary to her Alzheimer's disease    Onset: several days  Location: generalized  Duration: constant  Character: weakness  Aggravating/Alleviating factors: None  Radiation None  Severity: moderate              Review of Systems   HENT: Negative.    Eyes: Negative.    Respiratory: Negative.    Cardiovascular: Positive for chest pain.   Gastrointestinal: Negative.    Genitourinary: Negative.    Musculoskeletal: Negative.    Skin: Negative.    Neurological: Positive for weakness.       Past Medical History:   Diagnosis Date   • Anxiety    • Arthritis    • Chest pain 10/15/2019   • Dementia (CMS/[facility])    • Hypertension        Allergies   Allergen Reactions   • Ciprofloxacin Unknown (See Comments)       Past Surgical History:   Procedure Laterality Date   • CHOLECYSTECTOMY     • COLONOSCOPY     • HYSTERECTOMY      total        Family History   Problem Relation Age of Onset   • Heart disease Mother    • Alcohol abuse Father         murdered    • Pancreatic cancer Sister    • No Known Problems Half-Sister        Social History     Socioeconomic History   • Marital status:      Spouse name: Not on file   • Number of children: 3   • Years of education: Not on file   • Highest education level: Not on file   Tobacco Use   • Smoking status: Former Smoker     Years: 30.00     Types: Cigarettes     Last attempt to quit:      Years since quittin.8   • Smokeless tobacco: Never Used   Substance and Sexual Activity   • Alcohol use: No     Frequency: Never   • Drug use: No   • Sexual activity: Defer           Objective    Physical Exam   Constitutional: She is oriented to person, place, and time. She appears well-developed and well-nourished.   HENT:   Head: Normocephalic and atraumatic.   Eyes: Pupils are equal, round, and reactive to light.   Neck: Normal range of motion.   Cardiovascular: Normal rate and regular rhythm.   Pulmonary/Chest: Effort normal and breath sounds normal.   Musculoskeletal: Normal range of motion.   Neurological: She is alert and oriented to person, place, and time.   Skin: Skin is warm and dry.   Psychiatric: She has a normal mood and affect. Her behavior is normal.       Procedures           ED Course      Xr Chest 1 View    Result Date: 10/23/2019  No acute cardiopulmonary disease is seen radiographically.   Electronically Signed By-Dr. Alex Tomlin MD On:10/23/2019 11:29 AM This report was finalized on [id number] by Dr. Alex Tomlin MD.    Ct Chest Pulmonary Embolism    Result Date: 10/23/2019  1. Acute pulmonary emboli involving right upper and right lower lobe pulmonary arteries extending into segmental branches as above. No findings of right heart strain or pulmonary infarct. 2. Coronary atherosclerotic disease. 3. Small hiatal hernia.  Findings discussed with the referring provider at 1:20 PM on 10/23/2019  Electronically Signed By-Rolan Fernandez On:10/23/2019 1:28 PM This report was finalized on [id number] by  Rolan Fernandez, .    Labs Reviewed   BASIC METABOLIC PANEL - Abnormal; Notable for the following components:       Result Value    Glucose 161 (*)     Creatinine 1.07 (*)     Potassium 3.4 (*)     eGFR Non  Amer 50 (*)     All other components within normal limits    Narrative:     GFR Normal >60  Chronic Kidney Disease <60  Kidney Failure <15   D-DIMER, QUANTITATIVE - Abnormal; Notable for the following components:    D-Dimer, Quantitative 1.42 (*)     All other components within normal limits    Narrative:     Reference  Range  --------------------------------------------------------------------     < 0.50   Negative Predictive Value  0.50-0.59   Indeterminate    >= 0.60   Probable VTE             A very low percentage of patients with DVT may yield D-Dimer results   below the cut-off of 0.50 MCGFEU/mL.  This is known to be more   prevalent in patients with distal DVT.             Results of this test should always be interpreted in conjunction with   the patient's medical history, clinical presentation and other   findings.  Clinical diagnosis should not be based on the result of   INNOVANCE D-Dimer alone.   APTT - Abnormal; Notable for the following components:    PTT 23.9 (*)     All other components within normal limits   TROPONIN (IN-HOUSE) - Normal    Narrative:     Troponin T Reference Range:  <= 0.03 ng/mL-   Negative for AMI  >0.03 ng/mL-     Abnormal for myocardial necrosis.  Clinicians would have to utilize clinical acumen, EKG, Troponin and serial changes to determine if it is an Acute Myocardial Infarction or myocardial injury due to an underlying chronic condition.    BNP (IN-HOUSE) - Normal    Narrative:     Among patients with dyspnea, NT-proBNP is highly sensitive for the detection of acute congestive heart failure. In addition NT-proBNP of <300 pg/ml effectively rules out acute congestive heart failure with 99% negative predictive value.   CBC WITH AUTO DIFFERENTIAL - Normal   PROTIME-INR - Normal   CBC AND DIFFERENTIAL    Narrative:     The following orders were created for panel order CBC & Differential.  Procedure                               Abnormality         Status                     ---------                               -----------         ------                     CBC Auto Differential[[id number]]        Normal              Final result                 Please view results for these tests on the individual orders.   EXTRA TUBES    Narrative:     The following orders were created for panel order Extra  Tubes.  Procedure                               Abnormality         Status                     ---------                               -----------         ------                     Gold Top - SST[[id number]]                                   Final result                 Please view results for these tests on the individual orders.   GOLD TOP - SST     Medications   sodium chloride 0.9 % flush 10 mL (not administered)   heparin bolus from bag 6,700 Units (not administered)   heparin 22011 units/250 ml (100 units/ml) in D5W (not administered)   heparin bolus from bag 3,300 Units (not administered)   heparin bolus from bag 6,700 Units (not administered)   iopamidol (ISOVUE-370) 76 % injection 100 mL (100 mL Intravenous Given 10/23/19 1308)                 MDM  Number of Diagnoses or Management Options  Multiple subsegmental pulmonary emboli without acute cor pulmonale:   Weakness:   Diagnosis management comments: DISPOSITION:   Chart Review:  Comorbidity:  has a past medical history of Anxiety, Arthritis, Chest pain (10/15/2019), Dementia (CMS/HCC), and Hypertension.  Differentials:this list is not all inclusive and does not constitute the entirety of considered causes --> Electrolyte abnormalities, worsening baseline, PE  ECG: interpreted by ER physician and reviewed by myself: Sinus rhythm   Labs: Elevated dimer    Imaging: Was interpreted by physician and reviewed by myself:  Xr Chest 1 View    Result Date: 10/23/2019  No acute cardiopulmonary disease is seen radiographically.   Electronically Signed By-Dr. Alex Tomlin MD On:10/23/2019 11:29 AM This report was finalized on [id number] by Dr. Alex Tomlin MD.    Ct Chest Pulmonary Embolism    Result Date: 10/23/2019  1. Acute pulmonary emboli involving right upper and right lower lobe pulmonary arteries extending into segmental branches as above. No findings of right heart strain or pulmonary infarct. 2. Coronary atherosclerotic disease. 3. Small hiatal  hernia.  Findings discussed with the referring provider at 1:20 PM on 10/23/2019  Electronically Signed By-Rolan Fernandez On:10/23/2019 1:28 PM This report was finalized on [id number] by  Rolan Fernandez, .      Disposition/Treatment:  Patient is a 74-year-old female presents to the ER with generalized weakness.  She reports this is worsening.  Caregiver is concerned she cannot take care of her at home.  Patient was found to have acute pulmonary emboli involving the right upper and right lower lobe.  She was started on heparin she was admitted to the hospital she was stable in agreement with plan       Amount and/or Complexity of Data Reviewed  Clinical lab tests: reviewed  Tests in the radiology section of CPT®: reviewed  Tests in the medicine section of CPT®: reviewed    Patient Progress  Patient progress: stable      Final diagnoses:   Multiple subsegmental pulmonary emboli without acute cor pulmonale   Weakness              Aliza Bran PA-C  10/23/19 1402

## 2019-10-23 NOTE — H&P
[facility] Medicine Services            Primary Care Provider:  Smita Howard APRN    Patient Care Team:  Smita Howard APRN as PCP - General (Nurse Practitioner)    CHIEF COMPLAINT:     Chief Complaint   Patient presents with   • Shortness of Breath         HISTORY OF PRESENT ILLNESS:    Information obtained from sister-in-law at bedside due to patient having Alzheimer's.    This is a 74-year-old  female with a past medical history of anxiety, depression, hypertension, and obesity who presented to [facility] on 10/23/2019 with complaints of shortness of breath, weakness, and chest pressure.  Per the sister-in-law at bedside the patient was just relieved yesterday.  She was hospitalized then for weakness, shortness of breath, chest pain, and was diagnosed with the UTI.  When the patient was discharged yesterday and got home she still have the same complaints so family brought her back into the ED.  Patient was able to admit she is still having shortness of breath and chest pressure.  She describes her chest pressure as a 6 out of 10.  She states pain medication helps the chest pressure and tension makes it worse.She denies any recent nausea, vomiting, diarrhea, fever, chills.     In the ED, CT chest showed Acute pulmonary emboli involving right upper and right lower lobe  pulmonary arteries extending into segmental branches as above. No  findings of right heart strain or pulmonary infarct.Coronary atherosclerotic disease.Small hiatal hernia.  Chest x-ray is unremarkable.  EKG showed Sinus rhythm, Left anterior fascicular block, Probable anteroseptal infarct, old.  All labs unremarkable upon admission except d-dimer 1.42.  All vital signs stable upon admission.  Patient started on heparin drip in the ED.    Upon review of patient, patient just recently discharged 10/17/2019.  She was admitted from 10/15-10/16 for a chest pain rule out, which was negative.  She was  "found to have a UTI and was treated with Rocephin while inpatient.  She was discharged home on Amoxicillin, but reportedly did not take any of this yet.      Past Medical History:   Diagnosis Date   • Anxiety    • Arthritis    • Chest pain 10/15/2019   • Dementia (CMS/HCC)    • Hypertension        Past Surgical History:   Procedure Laterality Date   • CHOLECYSTECTOMY     • COLONOSCOPY     • HYSTERECTOMY      total        Family History   Problem Relation Age of Onset   • Heart disease Mother    • Alcohol abuse Father         murdered    • Pancreatic cancer Sister    • No Known Problems Half-Sister        Social History     Tobacco Use   • Smoking status: Former Smoker     Years: 30.00     Types: Cigarettes     Last attempt to quit:      Years since quittin.8   • Smokeless tobacco: Never Used   Substance Use Topics   • Alcohol use: No     Frequency: Never   • Drug use: No         (Not in a hospital admission)    Allergies:  Ciprofloxacin      There is no immunization history on file for this patient.        REVIEW OF SYSTEMS:     Review of Systems   Constitution: Positive for weakness.   HENT: Negative.    Cardiovascular: Positive for chest pain.   Respiratory: Positive for shortness of breath.    Endocrine: Negative.    Skin: Negative.    Musculoskeletal: Negative.    Gastrointestinal: Negative.    Genitourinary: Negative.    Neurological:        Negative   Psychiatric/Behavioral: Negative.        Vital Signs  Temp:  [97.5 °F (36.4 °C)] 97.5 °F (36.4 °C)  Heart Rate:  [68-84] 71  Resp:  [20] 20  BP: (109-133)/(52-79) 119/52    Flowsheet Rows      First Filed Value   Admission Height  162.6 cm (64\") Documented at 10/23/2019 1032   Admission Weight  83.5 kg (184 lb) Documented at 10/23/2019 1032           Physical Exam:    Physical Exam   Constitutional: She appears well-developed and well-nourished.   HENT:   Head: Normocephalic and atraumatic.   Eyes: EOM are normal. Pupils are equal, round, and reactive to " light.   Neck: Normal range of motion. Neck supple.   Cardiovascular: Normal rate, regular rhythm and normal heart sounds.   S1, S2 audible   Pulmonary/Chest: Effort normal and breath sounds normal.   On room air    Abdominal: Soft. Bowel sounds are normal.   Neurological: She is alert.   Oriented X2, patient repeats and asks the same questions multiple times    Skin: Skin is warm and dry.   fragile   Psychiatric: She has a normal mood and affect. Her behavior is normal. Judgment and thought content normal.   Vitals reviewed.          Results Review:      I reviewed the patient's new clinical results.    Lab Results (most recent)     Procedure Component Value Units Date/Time    Protime-INR [[id number]]  (Normal) Collected:  10/23/19 1137    Specimen:  Blood from Arm, Left Updated:  10/23/19 1353     Protime 11.0 Seconds      INR 1.06    aPTT [[id number]]  (Abnormal) Collected:  10/23/19 1137    Specimen:  Blood from Arm, Left Updated:  10/23/19 1353     PTT 23.9 seconds     Extra Tubes [[id number]] Collected:  10/23/19 1137    Specimen:  Blood, Venous Line Updated:  10/23/19 1245    Narrative:       The following orders were created for panel order Extra Tubes.  Procedure                               Abnormality         Status                     ---------                               -----------         ------                     Gold Top - SST[[id number]]                                   Final result                 Please view results for these tests on the individual orders.    Gold Top - SST [[id number]] Collected:  10/23/19 1137    Specimen:  Blood Updated:  10/23/19 1245     Extra Tube Hold for add-ons.     Comment: Auto resulted.       Troponin [[id number]]  (Normal) Collected:  10/23/19 1137    Specimen:  Blood from Arm, Left Updated:  10/23/19 1218     Troponin T <0.010 ng/mL     Narrative:       Troponin T Reference Range:  <= 0.03 ng/mL-   Negative for AMI  >0.03 ng/mL-     Abnormal for myocardial necrosis.   Clinicians would have to utilize clinical acumen, EKG, Troponin and serial changes to determine if it is an Acute Myocardial Infarction or myocardial injury due to an underlying chronic condition.     BNP [[id number]]  (Normal) Collected:  10/23/19 1137    Specimen:  Blood from Arm, Left Updated:  10/23/19 1218     proBNP 81.4 pg/mL     Narrative:       Among patients with dyspnea, NT-proBNP is highly sensitive for the detection of acute congestive heart failure. In addition NT-proBNP of <300 pg/ml effectively rules out acute congestive heart failure with 99% negative predictive value.    Basic Metabolic Panel [[id number]]  (Abnormal) Collected:  10/23/19 1137    Specimen:  Blood from Arm, Left Updated:  10/23/19 1216     Glucose 161 mg/dL      BUN 12 mg/dL      Creatinine 1.07 mg/dL      Sodium 138 mmol/L      Potassium 3.4 mmol/L      Chloride 102 mmol/L      CO2 24.0 mmol/L      Calcium 9.1 mg/dL      eGFR Non African Amer 50 mL/min/1.73      BUN/Creatinine Ratio 11.2     Anion Gap 12.0 mmol/L     Narrative:       GFR Normal >60  Chronic Kidney Disease <60  Kidney Failure <15    D-dimer, Quantitative [[id number]]  (Abnormal) Collected:  10/23/19 1137    Specimen:  Blood from Arm, Left Updated:  10/23/19 1214     D-Dimer, Quantitative 1.42 MCGFEU/mL     Narrative:       Reference Range  --------------------------------------------------------------------     < 0.50   Negative Predictive Value  0.50-0.59   Indeterminate    >= 0.60   Probable VTE             A very low percentage of patients with DVT may yield D-Dimer results   below the cut-off of 0.50 MCGFEU/mL.  This is known to be more   prevalent in patients with distal DVT.             Results of this test should always be interpreted in conjunction with   the patient's medical history, clinical presentation and other   findings.  Clinical diagnosis should not be based on the result of   INNOVANCE D-Dimer alone.    CBC & Differential [[id number]] Collected:   10/23/19 1137    Specimen:  Blood Updated:  10/23/19 1157    Narrative:       The following orders were created for panel order CBC & Differential.  Procedure                               Abnormality         Status                     ---------                               -----------         ------                     CBC Auto Differential[[id number]]        Normal              Final result                 Please view results for these tests on the individual orders.    CBC Auto Differential [[id number]]  (Normal) Collected:  10/23/19 1137    Specimen:  Blood from Arm, Left Updated:  10/23/19 1157     WBC 7.10 10*3/mm3      RBC 4.55 10*6/mm3      Hemoglobin 14.6 g/dL      Hematocrit 41.8 %      MCV 91.9 fL      MCH 32.2 pg      MCHC 35.1 g/dL      RDW 13.7 %      RDW-SD 44.6 fl      MPV 9.1 fL      Platelets 234 10*3/mm3      Neutrophil % 73.1 %      Lymphocyte % 20.0 %      Monocyte % 5.7 %      Eosinophil % 0.6 %      Basophil % 0.6 %      Neutrophils, Absolute 5.20 10*3/mm3      Lymphocytes, Absolute 1.40 10*3/mm3      Monocytes, Absolute 0.40 10*3/mm3      Eosinophils, Absolute 0.00 10*3/mm3      Basophils, Absolute 0.00 10*3/mm3      nRBC 0.0 /100 WBC           Imaging Results (most recent)     Procedure Component Value Units Date/Time    CT Chest Pulmonary Embolism [[id number]] Collected:  10/23/19 1321     Updated:  10/23/19 1330    Narrative:          DATE OF EXAM:  10/23/2019 1:02 PM     PROCEDURE:  CT CHEST PULMONARY EMBOLISM-     INDICATIONS:   elevated dimer + SOA     COMPARISON:   Portable chest radiograph 10/23/2019     TECHNIQUE:  Routine transaxial slices were obtained through chest after  administration of intravenous 68 ml of Isovue 370. Reconstructed coronal  and sagittal images were also obtained. Automated exposure control and  iterative reconstruction methods were used.      FINDINGS:  Pulmonary arteries are well-opacified with contrast. There are filling  defects within the right upper lobe  pulmonary artery extending into  multiple segmental branches and within the right lower lobe pulmonary  artery consistent with acute pulmonary emboli. No embolus identified in  the left. No evidence of right heart strain. The main pulmonary artery  is normal in caliber. Heart size normal. Mild coronary calcifications.  No pericardial effusion. There is a small hiatal hernia.     The trachea and mainstem bronchi are patent. No focal consolidation,  pneumothorax, or pleural effusion. The liver, spleen, adrenal glands are  within normal limits. Pancreas without findings of pancreatitis. The  gallbladder is absent. No free fluid in the upper abdomen. Osseous  structures intact. Disc disease noted at the C5-6 level with posterior  disc osteophyte.        Impression:       1. Acute pulmonary emboli involving right upper and right lower lobe  pulmonary arteries extending into segmental branches as above. No  findings of right heart strain or pulmonary infarct.  2. Coronary atherosclerotic disease.  3. Small hiatal hernia.     Findings discussed with the referring provider at 1:20 PM on 10/23/2019     Electronically Signed By-Rolan Fernandez On:10/23/2019 1:28 PM  This report was finalized on [id number] by  Rolan Fernandez, .    XR Chest 1 View [[id number]] Collected:  10/23/19 1128     Updated:  10/23/19 1131    Narrative:       DATE OF EXAM:  10/23/2019 11:20 AM     PROCEDURE:  XR CHEST 1 VW-     INDICATIONS:  chest pain and heaviness     COMPARISON:  10/15/2019.     TECHNIQUE:   Single radiographic AP view of the chest was obtained.     FINDINGS:  A single view of the chest reveals no cardiac enlargement and no focal  infiltrate. No pneumothorax is seen. There is chronic calcified  granulomatous disease of the chest. No significant interval change is  appreciated since the 10/15/2019 study.       Impression:       No acute cardiopulmonary disease is seen radiographically.        Electronically Signed By-Dr. Alex Tomlin MD  On:10/23/2019 11:29 AM  This report was finalized on [id number] by Dr. Alex Tomlin MD.            ECG/EMG Results (most recent)     Procedure Component Value Units Date/Time    ECG 12 Lead [[id number]] Collected:  10/23/19 1042     Updated:  10/23/19 1044    Narrative:       HEART RATE= 92  bpm  RR Interval= 652  ms  NH Interval= 161  ms  P Horizontal Axis= 9  deg  P Front Axis= 17  deg  QRSD Interval= 87  ms  QT Interval= 365  ms  QRS Axis= -57  deg  T Wave Axis= -58  deg  - ABNORMAL ECG -  Sinus rhythm  Left anterior fascicular block  Probable anteroseptal infarct, old  Electronically Signed By:   Date and Time of Study: 2019-10-23 10:42:04                      CT Chest Pulmonary Embolism  Narrative:    DATE OF EXAM:  10/23/2019 1:02 PM     PROCEDURE:  CT CHEST PULMONARY EMBOLISM-     INDICATIONS:   elevated dimer + SOA     COMPARISON:   Portable chest radiograph 10/23/2019     TECHNIQUE:  Routine transaxial slices were obtained through chest after  administration of intravenous 68 ml of Isovue 370. Reconstructed coronal  and sagittal images were also obtained. Automated exposure control and  iterative reconstruction methods were used.      FINDINGS:  Pulmonary arteries are well-opacified with contrast. There are filling  defects within the right upper lobe pulmonary artery extending into  multiple segmental branches and within the right lower lobe pulmonary  artery consistent with acute pulmonary emboli. No embolus identified in  the left. No evidence of right heart strain. The main pulmonary artery  is normal in caliber. Heart size normal. Mild coronary calcifications.  No pericardial effusion. There is a small hiatal hernia.     The trachea and mainstem bronchi are patent. No focal consolidation,  pneumothorax, or pleural effusion. The liver, spleen, adrenal glands are  within normal limits. Pancreas without findings of pancreatitis. The  gallbladder is absent. No free fluid in the upper abdomen.  Osseous  structures intact. Disc disease noted at the C5-6 level with posterior  disc osteophyte.      Impression: 1. Acute pulmonary emboli involving right upper and right lower lobe  pulmonary arteries extending into segmental branches as above. No  findings of right heart strain or pulmonary infarct.  2. Coronary atherosclerotic disease.  3. Small hiatal hernia.     Findings discussed with the referring provider at 1:20 PM on 10/23/2019     Electronically Signed By-Rolan Fernandez On:10/23/2019 1:28 PM  This report was finalized on [id number] by  Rolan Fernandez, .  XR Chest 1 View  Narrative: DATE OF EXAM:  10/23/2019 11:20 AM     PROCEDURE:  XR CHEST 1 VW-     INDICATIONS:  chest pain and heaviness     COMPARISON:  10/15/2019.     TECHNIQUE:   Single radiographic AP view of the chest was obtained.     FINDINGS:  A single view of the chest reveals no cardiac enlargement and no focal  infiltrate. No pneumothorax is seen. There is chronic calcified  granulomatous disease of the chest. No significant interval change is  appreciated since the 10/15/2019 study.     Impression: No acute cardiopulmonary disease is seen radiographically.        Electronically Signed By-Dr. Alex Tomlin MD On:10/23/2019 11:29 AM  This report was finalized on [id number] by Dr. Alex Tomlin MD.      Active Hospital Problems    Diagnosis  POA   • Multiple subsegmental pulmonary emboli without acute cor pulmonale [I26.94]  Yes      Resolved Hospital Problems   No resolved problems to display.         Assessment/Plan     Shortness of breath and chest pressure secondary to pulmonary emboli  - CT chest showed Acute pulmonary emboli involving right upper and right lower lobe  pulmonary arteries extending into segmental branches as above. No  findings of right heart strain or pulmonary infarct.Coronary atherosclerotic disease.Small hiatal hernia.  Chest x-ray is unremarkable.    - EKG showed Sinus rhythm, Left anterior fascicular block,  Probable anteroseptal infarct, old.    - D-dimer 1.42  -  Patient started on heparin drip in the ED.-continue heparin drip  - consult to see what anticoagulations insurance may cover upon discharge  -We will switch patient to oral anticoagulation when appropriate  -Continuous cardiac monitoring    Weakness  - Likely mutifactorial  - PT/OT consulted     Recent diagnosis of UTI  -Continue amoxicillin    Essential hypertension  -Controlled  -Continue amlodipine 9    Bilateral lower leg edema  -Continue Lasix as needed      GERD  -Continue pantoprazole    Depression  -Continue Prozac    Dementia  -Patient alert and oriented x2 at this time  -Continue Aricept    Obesity  -BMI 31.5  -Encouraged lifestyle modifications    Disposition  Possibly discharge tomorrow or when medically stable    I discussed the patients findings and my recommendations with patient.     Goldie Herman, APRN  10/23/19  2:33 PM

## 2019-10-23 NOTE — ED NOTES
Pt resting in stretcher, friend at bedside, tolerating Heparin gtt well, awaiting admission room assignment. Will continue to monitor.     Iza Parker RN  10/23/19 4149

## 2019-10-23 NOTE — ED NOTES
Pt resting in room with family at bedside, awaiting to go to CT. Will continue to monitor.     Iza Parker RN  10/23/19 9534

## 2019-10-24 LAB
ANION GAP SERPL CALCULATED.3IONS-SCNC: 11 MMOL/L (ref 5–15)
APTT PPP: 109.5 SECONDS (ref 61–76.5)
BASOPHILS # BLD AUTO: 0.1 10*3/MM3 (ref 0–0.2)
BASOPHILS NFR BLD AUTO: 1.9 % (ref 0–1.5)
BUN BLD-MCNC: 9 MG/DL (ref 8–23)
BUN/CREAT SERPL: 9.7 (ref 7–25)
CALCIUM SPEC-SCNC: 9.1 MG/DL (ref 8.6–10.5)
CHLORIDE SERPL-SCNC: 100 MMOL/L (ref 98–107)
CO2 SERPL-SCNC: 26 MMOL/L (ref 22–29)
CREAT BLD-MCNC: 0.93 MG/DL (ref 0.57–1)
DEPRECATED RDW RBC AUTO: 45.5 FL (ref 37–54)
EOSINOPHIL # BLD AUTO: 0.2 10*3/MM3 (ref 0–0.4)
EOSINOPHIL NFR BLD AUTO: 2.6 % (ref 0.3–6.2)
ERYTHROCYTE [DISTWIDTH] IN BLOOD BY AUTOMATED COUNT: 13.9 % (ref 12.3–15.4)
GFR SERPL CREATININE-BSD FRML MDRD: 59 ML/MIN/1.73
GLUCOSE BLD-MCNC: 109 MG/DL (ref 65–99)
HCT VFR BLD AUTO: 41.6 % (ref 34–46.6)
HGB BLD-MCNC: 14.2 G/DL (ref 12–15.9)
LYMPHOCYTES # BLD AUTO: 2.7 10*3/MM3 (ref 0.7–3.1)
LYMPHOCYTES NFR BLD AUTO: 34.3 % (ref 19.6–45.3)
MCH RBC QN AUTO: 31.8 PG (ref 26.6–33)
MCHC RBC AUTO-ENTMCNC: 34.1 G/DL (ref 31.5–35.7)
MCV RBC AUTO: 93.4 FL (ref 79–97)
MONOCYTES # BLD AUTO: 0.6 10*3/MM3 (ref 0.1–0.9)
MONOCYTES NFR BLD AUTO: 8 % (ref 5–12)
NEUTROPHILS # BLD AUTO: 4.2 10*3/MM3 (ref 1.7–7)
NEUTROPHILS NFR BLD AUTO: 53.2 % (ref 42.7–76)
NRBC BLD AUTO-RTO: 0.4 /100 WBC (ref 0–0.2)
PLATELET # BLD AUTO: 240 10*3/MM3 (ref 140–450)
PMV BLD AUTO: 9.4 FL (ref 6–12)
POTASSIUM BLD-SCNC: 4.2 MMOL/L (ref 3.5–5.2)
RBC # BLD AUTO: 4.46 10*6/MM3 (ref 3.77–5.28)
SODIUM BLD-SCNC: 137 MMOL/L (ref 136–145)
WBC NRBC COR # BLD: 7.9 10*3/MM3 (ref 3.4–10.8)

## 2019-10-24 PROCEDURE — 85730 THROMBOPLASTIN TIME PARTIAL: CPT | Performed by: INTERNAL MEDICINE

## 2019-10-24 PROCEDURE — G0378 HOSPITAL OBSERVATION PER HR: HCPCS

## 2019-10-24 PROCEDURE — 96366 THER/PROPH/DIAG IV INF ADDON: CPT

## 2019-10-24 PROCEDURE — 97535 SELF CARE MNGMENT TRAINING: CPT

## 2019-10-24 PROCEDURE — 97116 GAIT TRAINING THERAPY: CPT

## 2019-10-24 PROCEDURE — 99222 1ST HOSP IP/OBS MODERATE 55: CPT | Performed by: NURSE PRACTITIONER

## 2019-10-24 PROCEDURE — 85025 COMPLETE CBC W/AUTO DIFF WBC: CPT | Performed by: NURSE PRACTITIONER

## 2019-10-24 PROCEDURE — 97161 PT EVAL LOW COMPLEX 20 MIN: CPT

## 2019-10-24 PROCEDURE — 25010000002 ONDANSETRON PER 1 MG: Performed by: NURSE PRACTITIONER

## 2019-10-24 PROCEDURE — 99225 PR SBSQ OBSERVATION CARE/DAY 25 MINUTES: CPT | Performed by: INTERNAL MEDICINE

## 2019-10-24 PROCEDURE — 25010000002 HEPARIN (PORCINE) PER 1000 UNITS: Performed by: PHYSICIAN ASSISTANT

## 2019-10-24 PROCEDURE — 80048 BASIC METABOLIC PNL TOTAL CA: CPT | Performed by: NURSE PRACTITIONER

## 2019-10-24 PROCEDURE — 97166 OT EVAL MOD COMPLEX 45 MIN: CPT

## 2019-10-24 PROCEDURE — 25010000002 ENOXAPARIN PER 10 MG: Performed by: INTERNAL MEDICINE

## 2019-10-24 PROCEDURE — 96375 TX/PRO/DX INJ NEW DRUG ADDON: CPT

## 2019-10-24 PROCEDURE — 96372 THER/PROPH/DIAG INJ SC/IM: CPT

## 2019-10-24 RX ORDER — RISPERIDONE 0.25 MG/1
0.25 TABLET ORAL NIGHTLY
Status: DISCONTINUED | OUTPATIENT
Start: 2019-10-24 | End: 2019-10-25 | Stop reason: HOSPADM

## 2019-10-24 RX ORDER — ALPRAZOLAM 0.25 MG/1
0.25 TABLET ORAL NIGHTLY PRN
Status: DISCONTINUED | OUTPATIENT
Start: 2019-10-24 | End: 2019-10-25 | Stop reason: HOSPADM

## 2019-10-24 RX ADMIN — ONDANSETRON 4 MG: 2 INJECTION INTRAMUSCULAR; INTRAVENOUS at 09:52

## 2019-10-24 RX ADMIN — Medication 10 ML: at 20:30

## 2019-10-24 RX ADMIN — Medication 10 ML: at 09:51

## 2019-10-24 RX ADMIN — AMOXICILLIN 500 MG: 250 CAPSULE ORAL at 20:29

## 2019-10-24 RX ADMIN — HEPARIN SODIUM 1250 UNITS/HR: 10000 INJECTION, SOLUTION INTRAVENOUS at 04:02

## 2019-10-24 RX ADMIN — RISPERIDONE 0.25 MG: 0.25 TABLET ORAL at 18:19

## 2019-10-24 RX ADMIN — DONEPEZIL HYDROCHLORIDE 10 MG: 5 TABLET, FILM COATED ORAL at 20:29

## 2019-10-24 RX ADMIN — AMLODIPINE BESYLATE 10 MG: 5 TABLET ORAL at 09:52

## 2019-10-24 RX ADMIN — RISPERIDONE 0.25 MG: 0.25 TABLET ORAL at 20:29

## 2019-10-24 RX ADMIN — AMOXICILLIN 500 MG: 250 CAPSULE ORAL at 09:53

## 2019-10-24 RX ADMIN — ENOXAPARIN SODIUM 80 MG: 80 INJECTION SUBCUTANEOUS at 20:28

## 2019-10-24 RX ADMIN — ENOXAPARIN SODIUM 80 MG: 80 INJECTION SUBCUTANEOUS at 10:17

## 2019-10-24 RX ADMIN — PANTOPRAZOLE SODIUM 40 MG: 40 TABLET, DELAYED RELEASE ORAL at 09:52

## 2019-10-24 RX ADMIN — FLUOXETINE 20 MG: 20 CAPSULE ORAL at 09:52

## 2019-10-24 RX ADMIN — ENOXAPARIN SODIUM 80 MG: 80 INJECTION SUBCUTANEOUS at 09:51

## 2019-10-24 NOTE — CONSULTS
"Adult Nutrition  Assessment/PES    Patient Name:  Day Cabrera  YOB: 1945  MRN: [id number]  Admit Date:  10/23/2019    Assessment Date:  10/24/2019    Comments:  Ordering Boost + QD to provide additional nutrients with po diet, will continue to monitor po intake for nutritional adequacy.    Reason for Assessment     Row Name 10/24/19 1449          Reason for Assessment    Reason For Assessment MST 2, Nursing Admission Screen Consult       Diagnosis PMH: Anxiety, Depression, HTN, Dementia    Current: admit r/t SOA, weakness, chest pressure    Recent dx of UTI, SOA and chest pressure 2* pulmonary emboli, weakness, bilateral lower leg edema         Nutrition/Diet History     Row Name 10/24/19 1449          Nutrition/Diet History    Typical Food/Fluid Intake  Unable to speak with pt during visit attempt     Food Allergies  No known food allergies per EMR         Anthropometrics     Row Name 10/24/19 1449       Anthropometrics    Height  162.6 cm (64\")    Weight  89.8 kg (197 lb 15.6 oz)  10/24/19       Admit Weight    Admit Weight  83.5 kg (184 lb)  10/23/19 (noted to be a stated weight)       Ideal Body Weight (IBW)    Ideal Body Weight (IBW) 120 lb    % Ideal Body Weight  164%       Usual Body Weight (UBW)    Usual Body Weight  UTD    % Usual Body Weight  UTD %    Weight History  202 lb (10/15/19)  208 lb (9/11/19)       Body Mass Index (BMI)    BMI (kg/m2)  34.05     Labs/Tests/Procedures/Meds     Row Name 10/24/19 1450          Labs/Procedures/Meds    Lab Results Reviewed  reviewed     Lab Results Comments  Gluc 109, Na 137, K 4.2, BUN 9, Crt 0.9, Ca 9.1, Hgb 14.2, Hct 41.6        Medications    Pertinent Medications Reviewed  reviewed     Pertinent Medications Comments  Protonix, Zofran         Physical Findings     Row Name 10/24/19 1451          Physical Findings    Overall Physical Appearance  10/24: Unable to visually observe     Gastrointestinal  Unable to review N/V, No BM recorded x1 day  "    Tubes  N/A     Oral/Mouth Cavity  Unable to review     Skin  Skin intact         Estimated/Assessed Needs     Row Name 10/24/19 1451 10/24/19 1449       Calculation Measurements    Weight Used For Calculations      Height         KCAL/KG    KCAL/KG         Es[place]-St. Jeor Equation    RMR (Essex-St. Jeor Equation)         Protein Requirements    Weight Used For Protein Calculations      Est Protein Requirement Amount (gms/kg)      Estimated Protein Requirements (gms/day)         Fluid Requirements    Estimated Fluid Requirements (mL/day)       Nutrition Prescription Ordered     Row Name 10/24/19 1452          Nutrition Prescription PO    Current PO Diet  Healthy Heart        Nutrition Prescription EN    Enteral Route      Product      TF Delivery Method      Continuous TF Goal Rate (mL/hr)      Continuous TF Current Rate (mL/hr)      Water flush (mL)       Water Flush Frequency          Evaluation of Received Nutrient/Fluid Intake     Row Name 10/24/19 1452          PO Evaluation    % PO Intake  75% x1 meal        EN Evaluation    TF Changes      TF Residual      TF Tolerance          Problem/Interventions:  Problem 1     Row Name 10/24/19 1452          Nutrition Diagnoses Problem 1    Problem 1  Inadequate oral intake     Etiology (related to)  decreased ability to consume sufficient energy needs     Signs/Symptoms (evidenced by)  moderate weight loss per EMR (5% x1 month)         Intervention Goal     Row Name 10/24/19 1453          Intervention Goal    PO  PO intake (%)     PO Intake %  75 %     TF/PN  N/A         Nutrition Intervention     Row Name 10/24/19 1453          Nutrition Intervention    RD/Tech Action  Recommend/ordered     Recommended/Ordered  Supplement         Nutrition Prescription     Row Name 10/24/19 1453          Nutrition Prescription PO    PO Prescription  Begin/change supplement     Supplement  Boost Plus     Supplement Frequency  Daily        Nutrition Prescription EN    Enteral  Prescription  N/A         Education/Evaluation     Row Name 10/24/19 1454          Monitor/Evaluation    Monitor  PO intake;Supplement intake;Weight;Skin status;Pertinent labs/meds; BMs           Electronically signed by:  Shyanne Palacios RD  10/24/19 2:54 PM

## 2019-10-24 NOTE — PLAN OF CARE
Problem: Patient Care Overview  Goal: Plan of Care Review   10/24/19 151   Coping/Psychosocial   Plan of Care Reviewed With patient;family;sibling   OTHER   Outcome Summary 73 yo female adm for acute PEs. Demonstrates inconsistent motor planning ability. Able to perform task w/ supervision one moment, then requires min to moderate assistance the next. Pt has signifiant confusion & is very unsafe for home alone. She is high risk for injurious falls due to her decreased motor planning ability. Will follow 3xwk. Needs short IP rehab stay at d/c.

## 2019-10-24 NOTE — PLAN OF CARE
Problem: Patient Care Overview  Goal: Plan of Care Review  Outcome: Ongoing (interventions implemented as appropriate)   10/24/19 7083   Coping/Psychosocial   Plan of Care Reviewed With patient   Plan of Care Review   Progress no change   OTHER   Outcome Summary Pt admitted w/ HTN & pulmonary emboli. She is weak and dizzy/nauseated after minimal upright activity. She lives w/ employed dtr & needs to be safe alone during the day, which she is not now due to imbalance & poor activity tolerance as well as a persistent UTI & mild confusion. Pt is recommended for [facility] rehab

## 2019-10-24 NOTE — THERAPY EVALUATION
Patient Name: Day Cabrera  : 1945    MRN: [id number]                              Today's Date: 10/24/2019       Admit Date: 10/23/2019    Visit Dx:     ICD-10-CM ICD-9-CM   1. Multiple subsegmental pulmonary emboli without acute cor pulmonale I26.94 415.19   2. Weakness R53.1 780.79     Patient Active Problem List   Diagnosis   • Chronic anxiety   • Dementia (CMS/HCC)   • Hypertension   • Memory impairment   • Obesity   • Sleep apnea   • E. coli UTI (urinary tract infection)   • Urinary tract infection without hematuria   • Delirium due to another medical condition   • Multiple subsegmental pulmonary emboli without acute cor pulmonale     Past Medical History:   Diagnosis Date   • Anxiety    • Arthritis    • Chest pain 10/15/2019   • Dementia (CMS/HCC)    • Hypertension      Past Surgical History:   Procedure Laterality Date   • CHOLECYSTECTOMY     • COLONOSCOPY     • HYSTERECTOMY      total      General Information     Row Name 10/24/19 1448          PT Evaluation Time/Intention    Document Type  evaluation  -CM     Mode of Treatment  physical therapy 75 yo female adm for acute PEs. Now s/p anticoagulation x 24 hrs. Heparin was stopped 2* marked elevation in PTT after heparin was given. Pt then given lovenox.  -CM     Row Name 10/24/19 1448          General Information    Patient Profile Reviewed?  yes  -CM     Prior Level of Function  independent:;all household mobility;community mobility walks dog; family members have seen pt out walking on her own  -CM     Existing Precautions/Restrictions  fall  -CM     Barriers to Rehab  cognitive status  -CM     Row Name 10/24/19 1446          Relationship/Environment    Lives With  child(chapis), adult;alone is alone all day while daughter works; grandson has severe seizure d/o and is often home but unable to assist  -CM     Row Name 10/24/19 1441          Resource/Environmental Concerns    Current Living Arrangements  home/apartment/condo sister states that pt  lives in 2 bedroom apartment w/ daughter & gson; pt sleeps in recliner or on couch; does not have a bed to sleep in  -CM     Row Name 10/24/19 1448          Home Main Entrance    Number of Stairs, Main Entrance  none  -CM     Row Name 10/24/19 1448          Stairs Within Home, Primary    Number of Stairs, Within Home, Primary  none  -CM     Row Name 10/24/19 1508 10/24/19 1448       Cognitive Assessment/Intervention- PT/OT    Orientation Status (Cognition)  --  disoriented to;time;person  -CM    Cognitive Assessment/Intervention Comment  unable to state what street she lives on; cannot remember where family members live; unable to state which city she lives in.  -CM  --    Row Name 10/24/19 1448          Safety Issues, Functional Mobility    Safety Issues Affecting Function (Mobility)  at risk behavior observed;impulsivity;insight into deficits/self awareness;problem solving;judgment;safety precaution awareness;safety precautions follow-through/compliance  -CM     Impairments Affecting Function (Mobility)  cognition;endurance/activity tolerance;motor planning  -CM       User Key  (r) = Recorded By, (t) = Taken By, (c) = Cosigned By    Initials Name Provider Type    CM Aggie Rolle, PT Physical Therapist        Mobility     Row Name 10/24/19 1502          Bed Mobility Assessment/Treatment    Bed Mobility Assessment/Treatment  sit-supine  -CM     Sit-Supine Bracken (Bed Mobility)  set up;supervision  -CM     Comment (Bed Mobility)  was observed by 2 staff earlier to nearly fall when tried to get out of bed earlier.  -CM     Row Name 10/24/19 1502          Transfer Assessment/Treatment    Comment (Transfers)  was observed by OT to be inconsistent in motor planning; able to amb/transfer well one minute, then later becomes confused & demonstrates festinating gait & difficulty w/ motor planning for transfer  -CM     Row Name 10/24/19 1502          Sit-Stand Transfer    Sit-Stand Bracken (Transfers)  contact  guard  -CM     Row Name 10/24/19 1502          Gait/Stairs Assessment/Training    [place]oun Level (Gait)  contact guard  -CM     Distance in Feet (Gait)  50  -CM     Pattern (Gait)  step-through  -CM     Deviations/Abnormal Patterns (Gait)  -- impulsive elisabeth; lateral sway   -CM     Bilateral Gait Deviations  forward flexed posture  -CM       User Key  (r) = Recorded By, (t) = Taken By, (c) = Cosigned By    Initials Name Provider Type    CM Aggie Rolle, PT Physical Therapist        Obj/Interventions     Row Name 10/24/19 1505          General ROM    GENERAL ROM COMMENTS  wfl  -CM     Row Name 10/24/19 1505          MMT (Manual Muscle Testing)    General MMT Comments  wfl  -CM     Row Name 10/24/19 1505          Static Sitting Balance    Level of [place]oun (Unsupported Sitting, Static Balance)  conditional independence  -CM     Sitting Position (Unsupported Sitting, Static Balance)  sitting in chair  -CM     Row Name 10/24/19 1505          Dynamic Sitting Balance    Level of [place], Reaches Outside Midline (Sitting, Dynamic Balance)  supervision  -CM     Sitting Position, Reaches Outside Midline (Sitting, Dynamic Balance)  sitting in chair  -CM     Row Name 10/24/19 1505          Static Standing Balance    Level of [place] (Supported Standing, Static Balance)  supervision  -CM     Row Name 10/24/19 1505          Dynamic Standing Balance    Level of [place], Reaches Outside Midline (Standing, Dynamic Balance)  contact guard assist  -CM     Row Name 10/24/19 1505          Sensory Assessment/Intervention    Sensory General Assessment  no sensation deficits identified  -CM       User Key  (r) = Recorded By, (t) = Taken By, (c) = Cosigned By    Initials Name Provider Type    CM Aggie Rolle, PT Physical Therapist        Goals/Plan     Row Name 10/24/19 1510          Bed Mobility Goal 1 (PT)    Activity/Assistive Device (Bed Mobility Goal 1, PT)  bed mobility activities, all  -CM      Lorman Level/Cues Needed (Bed Mobility Goal 1, PT)  independent  -CM     Time Frame (Bed Mobility Goal 1, PT)  2 weeks  -CM     Barriers (Bed Mobility Goal 1, PT)  no loss of balance or set up needed  -CM     Row Name 10/24/19 1510          Transfer Goal 1 (PT)    Activity/Assistive Device (Transfer Goal 1, PT)  transfers, all  -CM     [place]orman Level/Cues Needed (Transfer Goal 1, PT)  independent  -CM     Time Frame (Transfer Goal 1, PT)  2 weeks  -CM     Barriers (Transfers Goal 1, PT)  comes to stand w/o loss of balance 10 of 10 tries; able to perform dynamic standing exercises w/o loss of balance.   -CM     Row Name 10/24/19 1510          Gait Training Goal 1 (PT)    Activity/Assistive Device (Gait Training Goal 1, PT)  gait (walking locomotion);assistive device use  -CM     Lorman Level (Gait Training Goal 1, PT)  independent  -CM     Distance (Gait Goal 1, PT)  100 ft w/o loss of balance, lateral sway or confusion regarding direction of ambulation  -CM     Time Frame (Gait Training Goal 1, PT)  2 weeks  -CM       User Key  (r) = Recorded By, (t) = Taken By, (c) = Cosigned By    Initials Name Provider Type    CM Aggie Rolle, PT Physical Therapist        Clinical Impression     Row Name 10/24/19 1504          Pain Assessment    Additional Documentation  Pain Scale: FACES Pre/Post-Treatment (Group)  -CM     Row Name 10/24/19 1507          Pain Scale: FACES Pre/Post-Treatment    Pain: FACES Scale, Pretreatment  0-->no hurt  -CM     Pain: FACES Scale, Post-Treatment  0-->no hurt  -CM     Row Name 10/24/19 1505          Plan of Care Review    Plan of Care Reviewed With  patient  -CM     Row Name 10/24/19 1509 10/24/19 150       Physical Therapy Clinical Impression    Patient/Family Goals Statement (PT Clinical Impression)  73 yo female adm for acute PEs. Presents w/ significant confusion & decreased endurance due to PEs. Pt also presents w/ inconsistent ability for motor planning, which causes  her to appear steady one moment, then unable to safely perform transfers/ambulation the next. Pt is very unsafe for home. Will require IP rehab at d/c.   -CM  --    Criteria for Skilled Interventions Met (PT Clinical Impression)  yes;treatment indicated  -CM  yes;treatment indicated  -CM    Rehab Potential (PT Clinical Summary)  --  good, to achieve stated therapy goals  -CM    Predicted Duration of Therapy (PT)  --  until d/c  -CM    Row Name 10/24/19 1508          Vital Signs    O2 Delivery Pre Treatment  room air  -CM     O2 Delivery Post Treatment  room air  -CM     Pre Patient Position  Supine  -CM     Post Patient Position  Sitting  -CM     Row Name 10/24/19 1508          Positioning and Restraints    Pre-Treatment Position  in bed  -CM     Post Treatment Position  chair  -CM     In Chair  notified nsg;sitting;call light within reach;encouraged to call for assist;with family/caregiver;exit alarm on  -CM       User Key  (r) = Recorded By, (t) = Taken By, (c) = Cosigned By    Initials Name Provider Type    Aggie Gibbs, PT Physical Therapist        Outcome Measures     Row Name 10/24/19 1515          How much help from another person do you currently need...    Turning from your back to your side while in flat bed without using bedrails?  4  -CM     Moving from lying on back to sitting on the side of a flat bed without bedrails?  4  -CM     Moving to and from a bed to a chair (including a wheelchair)?  3  -CM     Standing up from a chair using your arms (e.g., wheelchair, bedside chair)?  3  -CM     Climbing 3-5 steps with a railing?  3  -CM     To walk in hospital room?  3  -CM     AM-PAC 6 Clicks Score (PT)  20  -CM       User Key  (r) = Recorded By, (t) = Taken By, (c) = Cosigned By    Initials Name Provider Type    Aggie Gibbs, PT Physical Therapist        Physical Therapy Education     Title: PT OT SLP Therapies (In Progress)     Topic: Physical Therapy (In Progress)     Point: Mobility  training (Done)     Learning Progress Summary           Patient Acceptance, E,TB, VU,NR by CM at 10/24/2019  3:13 PM    Acceptance, E,TB, NR by MA at 10/23/2019  6:42 PM   Family Acceptance, E,TB, VU,NR by CM at 10/24/2019  3:13 PM    Acceptance, E,TB, NR by MA at 10/23/2019  6:42 PM                   Point: Home exercise program (In Progress)     Learning Progress Summary           Patient Acceptance, E,TB, NR by MA at 10/23/2019  6:42 PM   Family Acceptance, E,TB, NR by MA at 10/23/2019  6:42 PM                   Point: Body mechanics (Done)     Learning Progress Summary           Patient Acceptance, E,TB, VU,NR by CM at 10/24/2019  3:13 PM    Acceptance, E,TB, NR by MA at 10/23/2019  6:42 PM   Family Acceptance, E,TB, VU,NR by CM at 10/24/2019  3:13 PM    Acceptance, E,TB, NR by MA at 10/23/2019  6:42 PM                   Point: Precautions (Done)     Learning Progress Summary           Patient Acceptance, E,TB, VU,NR by JORGE L at 10/24/2019  3:13 PM    Acceptance, E,TB, NR by MA at 10/23/2019  6:42 PM   Family Acceptance, E,TB, VU,NR by CM at 10/24/2019  3:13 PM    Acceptance, E,TB, NR by MA at 10/23/2019  6:42 PM                               User Key     Initials Effective Dates Name Provider Type Discipline     03/01/19 -  Aggie Rolle, PT Physical Therapist PT    MA 03/01/19 -  Becca Marquez, RN Registered Nurse Nurse              PT Recommendation and Plan  Planned Therapy Interventions (PT Eval): balance training, bed mobility training, gait training, home exercise program, motor coordination training, patient/family education, postural re-education, strengthening, transfer training  Outcome Summary/Treatment Plan (PT)  Anticipated Discharge Disposition (PT): inpatient rehabilitation facility, skilled nursing facility  Plan of Care Reviewed With: patient, family, sibling  Outcome Summary: 73 yo female adm for acute PEs. Demonstrates inconsistent motor planning ability. Able to perform task w/  supervision one moment, then requires min to moderate assistance the next. Pt has signifiant confusion & is very unsafe for home alone. She is high risk for injurious falls due to her decreased motor planning ability. Will follow 3xwk. Needs short IP rehab stay at d/c.      Time Calculation:   PT Charges     Row Name 10/24/19 1515 10/24/19 0936          Time Calculation    Start Time  1315  -CM  --     Stop Time  1343  -CM  --     Time Calculation (min)  28 min  -CM  --     PT Received On  10/24/19  -CM  --     PT - Next Appointment  10/27/19  -CM  10/25/19  -CM     PT Goal Re-Cert Due Date  11/07/19  -CM  --        Time Calculation- PT    Total Timed Code Minutes- PT  10 minute(s)  -CM  --       User Key  (r) = Recorded By, (t) = Taken By, (c) = Cosigned By    Initials Name Provider Type     Aggie Rolle, PT Physical Therapist        Therapy Charges for Today     Code Description Service Date Service Provider Modifiers Qty    35733963667 HC PT EVAL LOW COMPLEXITY 4 10/24/2019 Aggie Rolle, PT GP 1    [id number] HC GAIT TRAINING EA 15 MIN 10/24/2019 Aggie Rolle, PT GP 1          PT G-Codes  Outcome Measure Options: AM-PAC 6 Clicks Basic Mobility (PT)  AM-PAC 6 Clicks Score (PT): 20    Aggie Rolle PT  10/24/2019

## 2019-10-24 NOTE — NURSING NOTE
Pt's PTT resulted >102 for the second time, heparin drip stopped and call placed to hospitalist group. Waiting for call back. Will continue to monitor.

## 2019-10-24 NOTE — SIGNIFICANT NOTE
10/24/19 1000   Coping/Psychosocial   Observed Emotional State cooperative;anxious;sad   Verbalized Emotional State sadness   Plan of Care Reviewed With patient   Additional Documentation Pastoral/Spiritual Care (Group)   Psychosocial Support   Trust Relationship/Rapport care explained;empathic listening provided   Pastoral/Spiritual Care   Pastoral Care Visit Type initial   Pastoral Care Source patient request (describe)   Receptivity to Spiritual Care visit welcomed   Pastoral Care Request spiritual/moral support;prayer support;sacrament support  (PT requested to speak with . Contacted Father Andrea)   Pastoral Care Interventions theological discussion provided;supportive conversation provided;referral provided;prayer support provided   Pastoral Care Response visit helpful;thanks expressed;receptive of support;engaged in conversation   Use of Spiritual Resources spirituality for coping, indicated strong use of;prayer   Pastoral Care Follow-Up follow-up planned regularly for general support     PT is saddened by her ailments and requested to be prayed for. PT requested that a  come and see her. Contacted Father Andrea who will come and see them.

## 2019-10-24 NOTE — DISCHARGE PLACEMENT REQUEST
"Pilar Cabreraley CHUCK (74 y.o. Female)     Date of Birth Social Security Number Address Home Phone MRN    [id number]  [address] [id number] [phone] [id number]    [employer]al Marital Status          Christianity        Admission Date Admission Type Admitting Provider Attending Provider Department, Room/Bed    10/23/19 Emergency Fabrice Kiser MD Salcedo, Federico N, MD [address] PEDIATRICS, 205/1    Discharge Date Discharge Disposition Discharge Destination                       Attending Provider:  Fabrice Kiser MD    Allergies:  Ciprofloxacin    Isolation:  None   Infection:  None   Code Status:  CPR    Ht:  162.6 cm (64\")   Wt:  89.8 kg (197 lb 15.6 oz)    Admission Cmt:  None   Principal Problem:  Multiple subsegmental pulmonary emboli without acute cor pulmonale [I26.94]                 Active Insurance as of 10/23/2019     Primary Coverage     Payor Plan Insurance Group Employer/Plan Group    [facility] MEDICARE REPLACEMENT [facility] [id number]     Payor Plan Address Payor Plan Phone Number Payor Plan Fax Number Effective Dates    [address] [address]   1/1/2019 - None Entered    [facility] [id number]       Subscriber Name Subscriber Birth Date Member ID       Day aCbrera 1945 [id number]                 Emergency Contacts      (Rel.) Home Phone Work Phone Mobile Phone    SHANON CABRERA (Daughter) -- -- [phone]            Emergency Contact Information     Name Relation Home Work Mobile    SHANON CABRERA Daughter   [phone]          Insurance Information                [facility] MEDICARE REPLACEMENT/[employer] Phone:     Subscriber: Day Cabrera Subscriber#: [id number]    Group#: [id number] Precert#:              History & Physical      Goldie Herman, [place] at 10/23/19 1433     Attestation signed by Fabrice Kiser MD at 10/23/19 9797    74-year-old  female with a past medical history of anxiety, " depression, hypertension, and obesity who presented to [facility] on 10/23/2019 with complaints of shortness of breath, weakness, and chest pressure.  Per the sister-in-law at bedside the patient was just relieved yesterday.  She was hospitalized then for weakness, shortness of breath, chest pain, and was diagnosed with the UTI.  When the patient was discharged yesterday and got home she still have the same complaints so family brought her back into the ED.  Patient was able to admit she is still having shortness of breath and chest pressure.    Shortness of breath and chest pressure secondary to pulmonary emboli  - CT chest showed Acute pulmonary emboli involving right upper and right lower lobe  pulmonary arteries extending into segmental branches as above. No  findings of right heart strain or pulmonary infarct.Coronary atherosclerotic disease.Small hiatal hernia.  Chest x-ray is unremarkable.    - EKG showed Sinus rhythm, Left anterior fascicular block, Probable anteroseptal infarct, old.    - D-dimer 1.42  -  Patient started on heparin drip in the ED.-continue heparin drip  - consult to see what anticoagulations insurance may cover upon discharge  -We will switch patient to oral anticoagulation when appropriate  -Continuous cardiac monitoring                        [facility] Medicine Services            Primary Care Provider:  Smita Howard APRN    Patient Care Team:  Smita Howard APRN as PCP - General (Nurse Practitioner)    CHIEF COMPLAINT:     Chief Complaint   Patient presents with   • Shortness of Breath         HISTORY OF PRESENT ILLNESS:    Information obtained from sister-in-law at bedside due to patient having Alzheimer's.    This is a 74-year-old  female with a past medical history of anxiety, depression, hypertension, and obesity who presented to [facility] on 10/23/2019 with complaints of shortness of breath, weakness, and chest  pressure.  Per the sister-in-law at bedside the patient was just relieved yesterday.  She was hospitalized then for weakness, shortness of breath, chest pain, and was diagnosed with the UTI.  When the patient was discharged yesterday and got home she still have the same complaints so family brought her back into the ED.  Patient was able to admit she is still having shortness of breath and chest pressure.  She describes her chest pressure as a 6 out of 10.  She states pain medication helps the chest pressure and tension makes it worse.She denies any recent nausea, vomiting, diarrhea, fever, chills.     In the ED, CT chest showed Acute pulmonary emboli involving right upper and right lower lobe  pulmonary arteries extending into segmental branches as above. No  findings of right heart strain or pulmonary infarct.Coronary atherosclerotic disease.Small hiatal hernia.  Chest x-ray is unremarkable.  EKG showed Sinus rhythm, Left anterior fascicular block, Probable anteroseptal infarct, old.  All labs unremarkable upon admission except d-dimer 1.42.  All vital signs stable upon admission.  Patient started on heparin drip in the ED.    Upon review of patient, patient just recently discharged 10/17/2019.  She was admitted from 10/15-10/16 for a chest pain rule out, which was negative.  She was found to have a UTI and was treated with Rocephin while inpatient.  She was discharged home on Amoxicillin, but reportedly did not take any of this yet.      Past Medical History:   Diagnosis Date   • Anxiety    • Arthritis    • Chest pain 10/15/2019   • Dementia (CMS/HCC)    • Hypertension        Past Surgical History:   Procedure Laterality Date   • CHOLECYSTECTOMY     • COLONOSCOPY     • HYSTERECTOMY      total        Family History   Problem Relation Age of Onset   • Heart disease Mother    • Alcohol abuse Father         murdered    • Pancreatic cancer Sister    • No Known Problems Half-Sister        Social History     Tobacco Use  "  • Smoking status: Former Smoker     Years: 30.00     Types: Cigarettes     Last attempt to quit: 1984     Years since quittin.8   • Smokeless tobacco: Never Used   Substance Use Topics   • Alcohol use: No     Frequency: Never   • Drug use: No         (Not in a hospital admission)    Allergies:  Ciprofloxacin      There is no immunization history on file for this patient.        REVIEW OF SYSTEMS:     Review of Systems   Constitution: Positive for weakness.   HENT: Negative.    Cardiovascular: Positive for chest pain.   Respiratory: Positive for shortness of breath.    Endocrine: Negative.    Skin: Negative.    Musculoskeletal: Negative.    Gastrointestinal: Negative.    Genitourinary: Negative.    Neurological:        Negative   Psychiatric/Behavioral: Negative.        Vital Signs  Temp:  [97.5 °F (36.4 °C)] 97.5 °F (36.4 °C)  Heart Rate:  [68-84] 71  Resp:  [20] 20  BP: (109-133)/(52-79) 119/52    Flowsheet Rows      First Filed Value   Admission Height  162.6 cm (64\") Documented at 10/23/2019 1032   Admission Weight  83.5 kg (184 lb) Documented at 10/23/2019 1032           Physical Exam:    Physical Exam   Constitutional: She appears well-developed and well-nourished.   HENT:   Head: Normocephalic and atraumatic.   Eyes: EOM are normal. Pupils are equal, round, and reactive to light.   Neck: Normal range of motion. Neck supple.   Cardiovascular: Normal rate, regular rhythm and normal heart sounds.   S1, S2 audible   Pulmonary/Chest: Effort normal and breath sounds normal.   On room air    Abdominal: Soft. Bowel sounds are normal.   Neurological: She is alert.   Oriented X2, patient repeats and asks the same questions multiple times    Skin: Skin is warm and dry.   fragile   Psychiatric: She has a normal mood and affect. Her behavior is normal. Judgment and thought content normal.   Vitals reviewed.          Results Review:      I reviewed the patient's new clinical results.    Lab Results (most recent)     " Procedure Component Value Units Date/Time    Protime-INR [[id number]]  (Normal) Collected:  10/23/19 1137    Specimen:  Blood from Arm, Left Updated:  10/23/19 1353     Protime 11.0 Seconds      INR 1.06    aPTT [[id number]]  (Abnormal) Collected:  10/23/19 1137    Specimen:  Blood from Arm, Left Updated:  10/23/19 1353     PTT 23.9 seconds     Extra Tubes [[id number]] Collected:  10/23/19 1137    Specimen:  Blood, Venous Line Updated:  10/23/19 1245    Narrative:       The following orders were created for panel order Extra Tubes.  Procedure                               Abnormality         Status                     ---------                               -----------         ------                     Gold Top - SST[[id number]]                                   Final result                 Please view results for these tests on the individual orders.    Gold Top - SST [[id number]] Collected:  10/23/19 1137    Specimen:  Blood Updated:  10/23/19 1245     Extra Tube Hold for add-ons.     Comment: Auto resulted.       Troponin [[id number]]  (Normal) Collected:  10/23/19 1137    Specimen:  Blood from Arm, Left Updated:  10/23/19 1218     Troponin T <0.010 ng/mL     Narrative:       Troponin T Reference Range:  <= 0.03 ng/mL-   Negative for AMI  >0.03 ng/mL-     Abnormal for myocardial necrosis.  Clinicians would have to utilize clinical acumen, EKG, Troponin and serial changes to determine if it is an Acute Myocardial Infarction or myocardial injury due to an underlying chronic condition.     BNP [[id number]]  (Normal) Collected:  10/23/19 1137    Specimen:  Blood from Arm, Left Updated:  10/23/19 1218     proBNP 81.4 pg/mL     Narrative:       Among patients with dyspnea, NT-proBNP is highly sensitive for the detection of acute congestive heart failure. In addition NT-proBNP of <300 pg/ml effectively rules out acute congestive heart failure with 99% negative predictive value.    Basic Metabolic Panel [[id number]]  (Abnormal)  Collected:  10/23/19 1137    Specimen:  Blood from Arm, Left Updated:  10/23/19 1216     Glucose 161 mg/dL      BUN 12 mg/dL      Creatinine 1.07 mg/dL      Sodium 138 mmol/L      Potassium 3.4 mmol/L      Chloride 102 mmol/L      CO2 24.0 mmol/L      Calcium 9.1 mg/dL      eGFR Non African Amer 50 mL/min/1.73      BUN/Creatinine Ratio 11.2     Anion Gap 12.0 mmol/L     Narrative:       GFR Normal >60  Chronic Kidney Disease <60  Kidney Failure <15    D-dimer, Quantitative [[id number]]  (Abnormal) Collected:  10/23/19 1137    Specimen:  Blood from Arm, Left Updated:  10/23/19 1214     D-Dimer, Quantitative 1.42 MCGFEU/mL     Narrative:       Reference Range  --------------------------------------------------------------------     < 0.50   Negative Predictive Value  0.50-0.59   Indeterminate    >= 0.60   Probable VTE             A very low percentage of patients with DVT may yield D-Dimer results   below the cut-off of 0.50 MCGFEU/mL.  This is known to be more   prevalent in patients with distal DVT.             Results of this test should always be interpreted in conjunction with   the patient's medical history, clinical presentation and other   findings.  Clinical diagnosis should not be based on the result of   INNOVANCE D-Dimer alone.    CBC & Differential [[id number]] Collected:  10/23/19 1137    Specimen:  Blood Updated:  10/23/19 1157    Narrative:       The following orders were created for panel order CBC & Differential.  Procedure                               Abnormality         Status                     ---------                               -----------         ------                     CBC Auto Differential[[id number]]        Normal              Final result                 Please view results for these tests on the individual orders.    CBC Auto Differential [[id number]]  (Normal) Collected:  10/23/19 1137    Specimen:  Blood from Arm, Left Updated:  10/23/19 1157     WBC 7.10 10*3/mm3      RBC 4.55  10*6/mm3      Hemoglobin 14.6 g/dL      Hematocrit 41.8 %      MCV 91.9 fL      MCH 32.2 pg      MCHC 35.1 g/dL      RDW 13.7 %      RDW-SD 44.6 fl      MPV 9.1 fL      Platelets 234 10*3/mm3      Neutrophil % 73.1 %      Lymphocyte % 20.0 %      Monocyte % 5.7 %      Eosinophil % 0.6 %      Basophil % 0.6 %      Neutrophils, Absolute 5.20 10*3/mm3      Lymphocytes, Absolute 1.40 10*3/mm3      Monocytes, Absolute 0.40 10*3/mm3      Eosinophils, Absolute 0.00 10*3/mm3      Basophils, Absolute 0.00 10*3/mm3      nRBC 0.0 /100 WBC           Imaging Results (most recent)     Procedure Component Value Units Date/Time    CT Chest Pulmonary Embolism [[id number]] Collected:  10/23/19 1321     Updated:  10/23/19 1330    Narrative:          DATE OF EXAM:  10/23/2019 1:02 PM     PROCEDURE:  CT CHEST PULMONARY EMBOLISM-     INDICATIONS:   elevated dimer + SOA     COMPARISON:   Portable chest radiograph 10/23/2019     TECHNIQUE:  Routine transaxial slices were obtained through chest after  administration of intravenous 68 ml of Isovue 370. Reconstructed coronal  and sagittal images were also obtained. Automated exposure control and  iterative reconstruction methods were used.      FINDINGS:  Pulmonary arteries are well-opacified with contrast. There are filling  defects within the right upper lobe pulmonary artery extending into  multiple segmental branches and within the right lower lobe pulmonary  artery consistent with acute pulmonary emboli. No embolus identified in  the left. No evidence of right heart strain. The main pulmonary artery  is normal in caliber. Heart size normal. Mild coronary calcifications.  No pericardial effusion. There is a small hiatal hernia.     The trachea and mainstem bronchi are patent. No focal consolidation,  pneumothorax, or pleural effusion. The liver, spleen, adrenal glands are  within normal limits. Pancreas without findings of pancreatitis. The  gallbladder is absent. No free fluid in the  upper abdomen. Osseous  structures intact. Disc disease noted at the C5-6 level with posterior  disc osteophyte.        Impression:       1. Acute pulmonary emboli involving right upper and right lower lobe  pulmonary arteries extending into segmental branches as above. No  findings of right heart strain or pulmonary infarct.  2. Coronary atherosclerotic disease.  3. Small hiatal hernia.     Findings discussed with the referring provider at 1:20 PM on 10/23/2019     Electronically Signed By-Rolan Fernandez On:10/23/2019 1:28 PM  This report was finalized on [id number] by  Rolan Fernandez, .    XR Chest 1 View [[id number]] Collected:  10/23/19 1128     Updated:  10/23/19 1131    Narrative:       DATE OF EXAM:  10/23/2019 11:20 AM     PROCEDURE:  XR CHEST 1 VW-     INDICATIONS:  chest pain and heaviness     COMPARISON:  10/15/2019.     TECHNIQUE:   Single radiographic AP view of the chest was obtained.     FINDINGS:  A single view of the chest reveals no cardiac enlargement and no focal  infiltrate. No pneumothorax is seen. There is chronic calcified  granulomatous disease of the chest. No significant interval change is  appreciated since the 10/15/2019 study.       Impression:       No acute cardiopulmonary disease is seen radiographically.        Electronically Signed By-Dr. Alex Tomlin MD On:10/23/2019 11:29 AM  This report was finalized on [id number] by Dr. Alex Tomlin MD.            ECG/EMG Results (most recent)     Procedure Component Value Units Date/Time    ECG 12 Lead [[id number]] Collected:  10/23/19 1042     Updated:  10/23/19 1044    Narrative:       HEART RATE= 92  bpm  RR Interval= 652  ms  [place] Interval= 161  ms  P Horizontal Axis= 9  deg  P Front Axis= 17  deg  QRSD Interval= 87  ms  QT Interval= 365  ms  QRS Axis= -57  deg  T Wave Axis= -58  deg  - ABNORMAL ECG -  Sinus rhythm  Left anterior fascicular block  Probable anteroseptal infarct, old  Electronically Signed By:   Date and Time of Study:  2019-10-23 10:42:04                      CT Chest Pulmonary Embolism  Narrative:    DATE OF EXAM:  10/23/2019 1:02 PM     PROCEDURE:  CT CHEST PULMONARY EMBOLISM-     INDICATIONS:   elevated dimer + SOA     COMPARISON:   Portable chest radiograph 10/23/2019     TECHNIQUE:  Routine transaxial slices were obtained through chest after  administration of intravenous 68 ml of Isovue 370. Reconstructed coronal  and sagittal images were also obtained. Automated exposure control and  iterative reconstruction methods were used.      FINDINGS:  Pulmonary arteries are well-opacified with contrast. There are filling  defects within the right upper lobe pulmonary artery extending into  multiple segmental branches and within the right lower lobe pulmonary  artery consistent with acute pulmonary emboli. No embolus identified in  the left. No evidence of right heart strain. The main pulmonary artery  is normal in caliber. Heart size normal. Mild coronary calcifications.  No pericardial effusion. There is a small hiatal hernia.     The trachea and mainstem bronchi are patent. No focal consolidation,  pneumothorax, or pleural effusion. The liver, spleen, adrenal glands are  within normal limits. Pancreas without findings of pancreatitis. The  gallbladder is absent. No free fluid in the upper abdomen. Osseous  structures intact. Disc disease noted at the C5-6 level with posterior  disc osteophyte.      Impression: 1. Acute pulmonary emboli involving right upper and right lower lobe  pulmonary arteries extending into segmental branches as above. No  findings of right heart strain or pulmonary infarct.  2. Coronary atherosclerotic disease.  3. Small hiatal hernia.     Findings discussed with the referring provider at 1:20 PM on 10/23/2019     Electronically Signed By-Rolan Fernandez On:10/23/2019 1:28 PM  This report was finalized on [id number] by  Rolan Fernandez, .  XR Chest 1 View  Narrative: DATE OF EXAM:  10/23/2019 11:20 AM      PROCEDURE:  XR CHEST 1 VW-     INDICATIONS:  chest pain and heaviness     COMPARISON:  10/15/2019.     TECHNIQUE:   Single radiographic AP view of the chest was obtained.     FINDINGS:  A single view of the chest reveals no cardiac enlargement and no focal  infiltrate. No pneumothorax is seen. There is chronic calcified  granulomatous disease of the chest. No significant interval change is  appreciated since the 10/15/2019 study.     Impression: No acute cardiopulmonary disease is seen radiographically.        Electronically Signed By-Dr. Alex Tomlin MD On:10/23/2019 11:29 AM  This report was finalized on [id number] by Dr. Alex Tomlin MD.      Active Hospital Problems    Diagnosis  POA   • Multiple subsegmental pulmonary emboli without acute cor pulmonale [I26.94]  Yes      Resolved Hospital Problems   No resolved problems to display.         Assessment/Plan     Shortness of breath and chest pressure secondary to pulmonary emboli  - CT chest showed Acute pulmonary emboli involving right upper and right lower lobe  pulmonary arteries extending into segmental branches as above. No  findings of right heart strain or pulmonary infarct.Coronary atherosclerotic disease.Small hiatal hernia.  Chest x-ray is unremarkable.    - EKG showed Sinus rhythm, Left anterior fascicular block, Probable anteroseptal infarct, old.    - D-dimer 1.42  -  Patient started on heparin drip in the ED.-continue heparin drip  - consult to see what anticoagulations insurance may cover upon discharge  -We will switch patient to oral anticoagulation when appropriate  -Continuous cardiac monitoring    Weakness  - Likely mutifactorial  - PT/OT consulted     Recent diagnosis of UTI  -Continue amoxicillin    Essential hypertension  -Controlled  -Continue amlodipine 9    Bilateral lower leg edema  -Continue Lasix as needed      GERD  -Continue pantoprazole    Depression  -Continue Prozac    Dementia  -Patient alert and oriented x2  "at this time  -Continue Aricept    Obesity  -BMI 31.5  -Encouraged lifestyle modifications    Disposition  Possibly discharge tomorrow or when medically stable    I discussed the patients findings and my recommendations with patient.     Goldie PATINO Virgil, APRN  10/23/19  2:33 PM            Electronically signed by Fabrice Kiser MD at 10/23/19 2305       Hospital Medications (active)       Dose Frequency Start End    aluminum-magnesium hydroxide-simethicone (MAALOX MAX) 400-400-40 MG/5ML suspension 15 mL 15 mL Every 6 Hours PRN 10/23/2019     Sig - Route: Take 15 mL by mouth Every 6 (Six) Hours As Needed for Heartburn. - Oral    amLODIPine (NORVASC) tablet 10 mg 10 mg Daily 10/23/2019     Sig - Route: Take 2 tablets by mouth Daily. - Oral    amoxicillin (AMOXIL) capsule 500 mg 500 mg 2 Times Daily 10/23/2019 10/26/2019    Sig - Route: Take 2 capsules by mouth 2 (Two) Times a Day. - Oral    apixaban (ELIQUIS) tablet 10 mg 10 mg Every 12 Hours Scheduled 10/25/2019 11/1/2019    Sig - Route: Take 2 tablets by mouth Every 12 (Twelve) Hours. - Oral    Linked Group 1:  \"Followed by\" Linked Group Details        apixaban (ELIQUIS) tablet 5 mg 5 mg Every 12 Hours Scheduled 11/1/2019     Sig - Route: Take 1 tablet by mouth Every 12 (Twelve) Hours. - Oral    Linked Group 1:  \"Followed by\" Linked Group Details        bisacodyl (DULCOLAX) suppository 10 mg 10 mg Daily PRN 10/23/2019     Sig - Route: Insert 1 suppository into the rectum Daily As Needed for Constipation. - Rectal    donepezil (ARICEPT) tablet 10 mg 10 mg Nightly 10/23/2019     Sig - Route: Take 2 tablets by mouth Every Night. - Oral    enoxaparin (LOVENOX) syringe 80 mg 80 mg Every 12 Hours Scheduled 10/24/2019 10/25/2019    Sig - Route: Inject 0.8 mL under the skin into the appropriate area as directed Every 12 (Twelve) Hours. - Subcutaneous    FLUoxetine (PROzac) capsule 20 mg 20 mg Daily 10/23/2019     Sig - Route: Take 1 capsule by mouth Daily. - Oral    " "furosemide (LASIX) tablet 20 mg 20 mg Daily PRN 10/23/2019     Sig - Route: Take 0.5 tablets by mouth Daily As Needed (for swelling). - Oral    magnesium hydroxide (MILK OF MAGNESIA) suspension 2400 mg/10mL 10 mL 10 mL Daily PRN 10/23/2019     Sig - Route: Take 10 mL by mouth Daily As Needed for Constipation. - Oral    melatonin tablet 5 mg 5 mg Nightly PRN 10/23/2019     Sig - Route: Take 1 tablet by mouth At Night As Needed for Sleep. - Oral    ondansetron (ZOFRAN) injection 4 mg 4 mg Every 6 Hours PRN 10/23/2019     Sig - Route: Infuse 2 mL into a venous catheter Every 6 (Six) Hours As Needed for Nausea or Vomiting. - Intravenous    Linked Group 2:  \"Or\" Linked Group Details        ondansetron (ZOFRAN) tablet 4 mg 4 mg Every 6 Hours PRN 10/23/2019     Sig - Route: Take 1 tablet by mouth Every 6 (Six) Hours As Needed for Nausea or Vomiting. - Oral    Linked Group 2:  \"Or\" Linked Group Details        pantoprazole (PROTONIX) EC tablet 40 mg 40 mg Daily 10/23/2019 11/21/2019    Sig - Route: Take 1 tablet by mouth Daily. - Oral    sodium chloride 0.9 % flush 10 mL 10 mL As Needed 10/23/2019     Sig - Route: Infuse 10 mL into a venous catheter As Needed for Line Care. - Intravenous    Cosign for Ordering: Required by Giacomo Pierre MD    Linked Group 3:  \"And\" Linked Group Details        sodium chloride 0.9 % flush 10 mL 10 mL Every 12 Hours Scheduled 10/23/2019     Sig - Route: Infuse 10 mL into a venous catheter Every 12 (Twelve) Hours. - Intravenous    sodium chloride 0.9 % flush 10 mL 10 mL As Needed 10/23/2019     Sig - Route: Infuse 10 mL into a venous catheter As Needed for Line Care. - Intravenous    enoxaparin (LOVENOX) syringe 80 mg (Discontinued) 80 mg Every 12 Hours 10/24/2019 10/24/2019    Sig - Route: Inject 0.8 mL under the skin into the appropriate area as directed Every 12 (Twelve) Hours. - Subcutaneous    heparin 08387 units/250 ml (100 units/ml) in D5W (Discontinued) 1,500 Units/hr Titrated " 10/23/2019 10/24/2019    Sig - Route: Infuse 1,500 Units/hr into a venous catheter Dose Adjusted By Provider As Needed. - Intravenous    Cosign for Ordering: Required by Giacomo Pierre MD    heparin bolus from bag 3,300 Units (Discontinued) 40 Units/kg × 83.5 kg Every 6 Hours PRN 10/23/2019 10/24/2019    Sig - Route: Infuse 33.4 mL into a venous catheter Every 6 (Six) Hours As Needed (per heparin nomogram). - Intravenous    Reason for Discontinue: *Therapy completed    Cosign for Ordering: Required by Giacomo Pierre MD    heparin bolus from bag 6,700 Units (Discontinued) 80 Units/kg × 83.5 kg Every 6 Hours PRN 10/23/2019 10/24/2019    Sig - Route: Infuse 66.8 mL into a venous catheter Every 6 (Six) Hours As Needed (per heparin nomogram). - Intravenous    Reason for Discontinue: *Therapy completed    Cosign for Ordering: Required by Giacomo Pierre MD    ondansetron (ZOFRAN) tablet 4 mg (Discontinued) 4 mg 4 Times Daily PRN 10/23/2019 10/23/2019    Sig - Route: Take 1 tablet by mouth 4 (Four) Times a Day As Needed for Nausea or Vomiting. - Oral    Reason for Discontinue: Duplicate order             Physician Progress Notes (last 24 hours) (Notes from 10/23/19 1508 through 10/24/19 1508)      Fabrice Kiser MD at 10/24/19 0929          Hospitalist Team    Patient Care Team:  Smita Howard APRN as PCP - General (Nurse Practitioner)    Interval History and ROS: 74-year-old  female with a past medical history of anxiety, depression, hypertension, and obesity who presented to [facility] on 10/23/2019 with complaints of shortness of breath, weakness, and chest pressure.  Per the sister-in-law at bedside the patient was just relieved yesterday.  She was hospitalized then for weakness, shortness of breath, chest pain, and was diagnosed with the UTI.  When the patient was discharged yesterday and got home she still have the same complaints so family brought her back into the  "ED.  Patient was able to admit she is still having shortness of breath and chest pressure.  She describes her chest pressure as a 6 out of 10.  She states pain medication helps the chest pressure and tension makes it worse.She denies any recent nausea, vomiting, diarrhea, fever, chills.      In the ED, CT chest showed Acute pulmonary emboli involving right upper and right lower lobe  pulmonary arteries extending into segmental branches as above. No  findings of right heart strain or pulmonary infarct.Coronary atherosclerotic disease.Small hiatal hernia.  Chest x-ray is unremarkable.  EKG showed Sinus rhythm, Left anterior fascicular block, Probable anteroseptal infarct, old.  All labs unremarkable upon admission except d-dimer 1.42.  All vital signs stable upon admission.  Patient started on heparin drip in the ED.     Upon review of patient, patient just recently discharged 10/17/2019.  She was admitted from 10/15-10/16 for a chest pain rule out, which was negative.  She was found to have a UTI and was treated with Rocephin while inpatient.  She was discharged home on Amoxicillin, but reportedly did not take any of this yet    10/24 weakness, denies chest pain      History taken from: patient    Review of Systems   Constitutional: Positive for fatigue.   HENT: Negative.    Respiratory: Negative.    Cardiovascular: Negative.    Gastrointestinal: Negative.    Neurological: Positive for weakness.       Objective    Vital Signs  Temp:  [97.5 °F (36.4 °C)-97.8 °F (36.6 °C)] 97.5 °F (36.4 °C)  Heart Rate:  [67-89] 81  Resp:  [16-20] 16  BP: (109-135)/(52-89) 116/71  Oxygen Therapy  SpO2: 96 %  Pulse Oximetry Type: Intermittent  Device (Oxygen Therapy): room air  Flowsheet Rows      First Filed Value   Admission Height  162.6 cm (64\") Documented at 10/23/2019 1032   Admission Weight  83.5 kg (184 lb) Documented at 10/23/2019 1032        Intake & Output (last 3 days)       10/21 0701 - 10/22 0700 10/22 0701 - 10/23 0700 " 10/23 0701 - 10/24 0700 10/24 0701 - 10/25 0700            Urine Unmeasured Occurrence   3 x         Lines, Drains & Airways    Active LDAs     Name:   Placement date:   Placement time:   Site:   Days:    Peripheral IV 10/23/19 1109   10/23/19    1109    --   less than 1                Physical Exam:    General Appearance:    Alert, cooperative, in no acute distress   Head:    Normocephalic, without obvious abnormality, atraumatic   Eyes:            Lids and lashes normal, conjunctivae and sclerae normal, no   icterus, no pallor, corneas clear, PERRLA   Neck:   No adenopathy, supple, trachea midline, no thyromegaly, no   carotid bruit, no JVD   Back:     No kyphosis present, no scoliosis present, no skin lesions,      erythema or scars, no tenderness to percussion or                   palpation,   range of motion normal   Lungs:     Clear to auscultation,respirations regular, even and                  unlabored    Heart:    Regular rhythm and normal rate, normal S1 and S2, no            murmur, no gallop, no rub, no click   Chest Wall:    No abnormalities observed   Abdomen:     Normal bowel sounds, no masses, no organomegaly, soft        non-tender, non-distended, no guarding, no rebound                tenderness   Extremities:   Moves all extremities well, no edema, no cyanosis, no             redness   Skin:   No bleeding, bruising or rash   Neurologic:   Cranial nerves 2 - 12 grossly intact, sensation intact, DTR       present and equal bilaterally       Results Review:      Lab Results (last 24 hours)     Procedure Component Value Units Date/Time    Basic Metabolic Panel [[id number]]  (Abnormal) Collected:  10/24/19 0410    Specimen:  Blood Updated:  10/24/19 0506     Glucose 109 mg/dL      BUN 9 mg/dL      Creatinine 0.93 mg/dL      Sodium 137 mmol/L      Potassium 4.2 mmol/L      Chloride 100 mmol/L      CO2 26.0 mmol/L      Calcium 9.1 mg/dL      eGFR Non African Amer 59 mL/min/1.73      BUN/Creatinine Ratio  9.7     Anion Gap 11.0 mmol/L     Narrative:       GFR Normal >60  Chronic Kidney Disease <60  Kidney Failure <15    aPTT [[id number]]  (Abnormal) Collected:  10/24/19 0410    Specimen:  Blood Updated:  10/24/19 0445     .5 seconds     CBC Auto Differential [[id number]]  (Abnormal) Collected:  10/24/19 0410    Specimen:  Blood Updated:  10/24/19 0429     WBC 7.90 10*3/mm3      RBC 4.46 10*6/mm3      Hemoglobin 14.2 g/dL      Hematocrit 41.6 %      MCV 93.4 fL      MCH 31.8 pg      MCHC 34.1 g/dL      RDW 13.9 %      RDW-SD 45.5 fl      MPV 9.4 fL      Platelets 240 10*3/mm3      Neutrophil % 53.2 %      Lymphocyte % 34.3 %      Monocyte % 8.0 %      Eosinophil % 2.6 %      Basophil % 1.9 %      Neutrophils, Absolute 4.20 10*3/mm3      Lymphocytes, Absolute 2.70 10*3/mm3      Monocytes, Absolute 0.60 10*3/mm3      Eosinophils, Absolute 0.20 10*3/mm3      Basophils, Absolute 0.10 10*3/mm3      nRBC 0.4 /100 WBC     aPTT [[id number]]  (Abnormal) Collected:  10/23/19 1958    Specimen:  Blood Updated:  10/23/19 2044     .4 seconds     Protime-INR [[id number]]  (Normal) Collected:  10/23/19 1137    Specimen:  Blood from Arm, Left Updated:  10/23/19 1353     Protime 11.0 Seconds      INR 1.06    aPTT [[id number]]  (Abnormal) Collected:  10/23/19 1137    Specimen:  Blood from Arm, Left Updated:  10/23/19 1353     PTT 23.9 seconds     Extra Tubes [[id number]] Collected:  10/23/19 1137    Specimen:  Blood, Venous Line Updated:  10/23/19 1245    Narrative:       The following orders were created for panel order Extra Tubes.  Procedure                               Abnormality         Status                     ---------                               -----------         ------                     Gold Top - SST[[id number]]                                   Final result                 Please view results for these tests on the individual orders.    Gold Top - SST [[id number]] Collected:  10/23/19 1137    Specimen:  Blood  Updated:  10/23/19 1245     Extra Tube Hold for add-ons.     Comment: Auto resulted.       Troponin [[id number]]  (Normal) Collected:  10/23/19 1137    Specimen:  Blood from Arm, Left Updated:  10/23/19 1218     Troponin T <0.010 ng/mL     Narrative:       Troponin T Reference Range:  <= 0.03 ng/mL-   Negative for AMI  >0.03 ng/mL-     Abnormal for myocardial necrosis.  Clinicians would have to utilize clinical acumen, EKG, Troponin and serial changes to determine if it is an Acute Myocardial Infarction or myocardial injury due to an underlying chronic condition.     BNP [[id number]]  (Normal) Collected:  10/23/19 1137    Specimen:  Blood from Arm, Left Updated:  10/23/19 1218     proBNP 81.4 pg/mL     Narrative:       Among patients with dyspnea, NT-proBNP is highly sensitive for the detection of acute congestive heart failure. In addition NT-proBNP of <300 pg/ml effectively rules out acute congestive heart failure with 99% negative predictive value.    Basic Metabolic Panel [[id number]]  (Abnormal) Collected:  10/23/19 1137    Specimen:  Blood from Arm, Left Updated:  10/23/19 1216     Glucose 161 mg/dL      BUN 12 mg/dL      Creatinine 1.07 mg/dL      Sodium 138 mmol/L      Potassium 3.4 mmol/L      Chloride 102 mmol/L      CO2 24.0 mmol/L      Calcium 9.1 mg/dL      eGFR Non African Amer 50 mL/min/1.73      BUN/Creatinine Ratio 11.2     Anion Gap 12.0 mmol/L     Narrative:       GFR Normal >60  Chronic Kidney Disease <60  Kidney Failure <15    D-dimer, Quantitative [[id number]]  (Abnormal) Collected:  10/23/19 1137    Specimen:  Blood from Arm, Left Updated:  10/23/19 1214     D-Dimer, Quantitative 1.42 MCGFEU/mL     Narrative:       Reference Range  --------------------------------------------------------------------     < 0.50   Negative Predictive Value  0.50-0.59   Indeterminate    >= 0.60   Probable VTE             A very low percentage of patients with DVT may yield D-Dimer results   below the cut-off of 0.50  MCGFEU/mL.  This is known to be more   prevalent in patients with distal DVT.             Results of this test should always be interpreted in conjunction with   the patient's medical history, clinical presentation and other   findings.  Clinical diagnosis should not be based on the result of   IN[place]OVANCE D-Dimer alone.    CBC & Differential [[id number]] Collected:  10/23/19 1137    Specimen:  Blood Updated:  10/23/19 1157    Narrative:       The following orders were created for panel order CBC & Differential.  Procedure                               Abnormality         Status                     ---------                               -----------         ------                     CBC Auto Differential[[id number]]        Normal              Final result                 Please view results for these tests on the individual orders.    CBC Auto Differential [[id number]]  (Normal) Collected:  10/23/19 1137    Specimen:  Blood from Arm, Left Updated:  10/23/19 1157     WBC 7.10 10*3/mm3      RBC 4.55 10*6/mm3      Hemoglobin 14.6 g/dL      Hematocrit 41.8 %      MCV 91.9 fL      MCH 32.2 pg      MCHC 35.1 g/dL      RDW 13.7 %      RDW-SD 44.6 fl      MPV 9.1 fL      Platelets 234 10*3/mm3      Neutrophil % 73.1 %      Lymphocyte % 20.0 %      Monocyte % 5.7 %      Eosinophil % 0.6 %      Basophil % 0.6 %      Neutrophils, Absolute 5.20 10*3/mm3      Lymphocytes, Absolute 1.40 10*3/mm3      Monocytes, Absolute 0.40 10*3/mm3      Eosinophils, Absolute 0.00 10*3/mm3      Basophils, Absolute 0.00 10*3/mm3      nRBC 0.0 /100 WBC           Imaging Results (last 24 hours)     Procedure Component Value Units Date/Time    CT Chest Pulmonary Embolism [[id number]] Collected:  10/23/19 1321     Updated:  10/23/19 1330    Narrative:          DATE OF EXAM:  10/23/2019 1:02 PM     PROCEDURE:  CT CHEST PULMONARY EMBOLISM-     INDICATIONS:   elevated dimer + SOA     COMPARISON:   Portable chest radiograph 10/23/2019     TECHNIQUE:  Routine  transaxial slices were obtained through chest after  administration of intravenous 68 ml of Isovue 370. Reconstructed coronal  and sagittal images were also obtained. Automated exposure control and  iterative reconstruction methods were used.      FINDINGS:  Pulmonary arteries are well-opacified with contrast. There are filling  defects within the right upper lobe pulmonary artery extending into  multiple segmental branches and within the right lower lobe pulmonary  artery consistent with acute pulmonary emboli. No embolus identified in  the left. No evidence of right heart strain. The main pulmonary artery  is normal in caliber. Heart size normal. Mild coronary calcifications.  No pericardial effusion. There is a small hiatal hernia.     The trachea and mainstem bronchi are patent. No focal consolidation,  pneumothorax, or pleural effusion. The liver, spleen, adrenal glands are  within normal limits. Pancreas without findings of pancreatitis. The  gallbladder is absent. No free fluid in the upper abdomen. Osseous  structures intact. Disc disease noted at the C5-6 level with posterior  disc osteophyte.        Impression:       1. Acute pulmonary emboli involving right upper and right lower lobe  pulmonary arteries extending into segmental branches as above. No  findings of right heart strain or pulmonary infarct.  2. Coronary atherosclerotic disease.  3. Small hiatal hernia.     Findings discussed with the referring provider at 1:20 PM on 10/23/2019     Electronically Signed By-Rolan Fernandez On:10/23/2019 1:28 PM  This report was finalized on [id number] by  Rolan Fernandez, .    XR Chest 1 View [[id number]] Collected:  10/23/19 1128     Updated:  10/23/19 1131    Narrative:       DATE OF EXAM:  10/23/2019 11:20 AM     PROCEDURE:  XR CHEST 1 VW-     INDICATIONS:  chest pain and heaviness     COMPARISON:  10/15/2019.     TECHNIQUE:   Single radiographic AP view of the chest was obtained.     FINDINGS:  A single view of  the chest reveals no cardiac enlargement and no focal  infiltrate. No pneumothorax is seen. There is chronic calcified  granulomatous disease of the chest. No significant interval change is  appreciated since the 10/15/2019 study.       Impression:       No acute cardiopulmonary disease is seen radiographically.        Electronically Signed By-Dr. Alex Tomlin MD On:10/23/2019 11:29 AM  This report was finalized on [id number] by Dr. Alex Tomlin MD.          ECG/EMG Results (most recent)     Procedure Component Value Units Date/Time    ECG 12 Lead [[id number]] Collected:  10/23/19 1042     Updated:  10/24/19 0841    Narrative:       HEART RATE= 92  bpm  RR Interval= 652  ms  MA Interval= 161  ms  P Horizontal Axis= 9  deg  P Front Axis= 17  deg  QRSD Interval= 87  ms  QT Interval= 365  ms  QRS Axis= -57  deg  T Wave Axis= -58  deg  - ABNORMAL ECG -  Sinus rhythm  Probable anteroseptal infarct, old  When compared with ECG of 17-Oct-2019 9:37:59,  No significant change  Electronically Signed By: Giacomo Pierre (BRIAN) 24-Oct-2019 08:40:55  Date and Time of Study: 2019-10-23 10:42:04          I reviewed the patient's new clinical results.    Medication Review:     Current Facility-Administered Medications:   •  aluminum-magnesium hydroxide-simethicone (MAALOX MAX) 400-400-40 MG/5ML suspension 15 mL, 15 mL, Oral, Q6H PRN, Kellams, Goldie Z, APRN  •  amLODIPine (NORVASC) tablet 10 mg, 10 mg, Oral, Daily, Kellams Goldie Z, APRN, 10 mg at 10/23/19 1723  •  amoxicillin (AMOXIL) capsule 500 mg, 500 mg, Oral, BID, Kellams, Goldie Z, APRN, 500 mg at 10/23/19 2118  •  bisacodyl (DULCOLAX) suppository 10 mg, 10 mg, Rectal, Daily PRN, Kellams Goldie Z, APRN  •  donepezil (ARICEPT) tablet 10 mg, 10 mg, Oral, Nightly, Kellams, Goldie Z, APRN, 10 mg at 10/23/19 2118  •  enoxaparin (LOVENOX) syringe 80 mg, 80 mg, Subcutaneous, Q12H, Fabrice Kiser MD  •  FLUoxetine (PROzac) capsule 20 mg, 20 mg, Oral, Daily, Virgil,  Goldie Z, APRN, 20 mg at 10/23/19 1722  •  furosemide (LASIX) tablet 20 mg, 20 mg, Oral, Daily PRN, Goldie Herman, APRN  •  heparin bolus from bag 3,300 Units, 40 Units/kg, Intravenous, Q6H PRN, Aliza Bran PA-C  •  heparin bolus from bag 6,700 Units, 80 Units/kg, Intravenous, Q6H PRN, Aliza Bran PA-C  •  magnesium hydroxide (MILK OF MAGNESIA) suspension 2400 mg/10mL 10 mL, 10 mL, Oral, Daily PRN, Goldie Herman, APRN  •  melatonin tablet 5 mg, 5 mg, Oral, Nightly PRN, Goldie Herman, APRN, 5 mg at 10/23/19 2236  •  ondansetron (ZOFRAN) tablet 4 mg, 4 mg, Oral, Q6H PRN **OR** ondansetron (ZOFRAN) injection 4 mg, 4 mg, Intravenous, Q6H PRN, Goldie Herman, APRN  •  pantoprazole (PROTONIX) EC tablet 40 mg, 40 mg, Oral, Daily, Frankie Hermana SUKUMAR, APRN, 40 mg at 10/23/19 1722  •  [COMPLETED] Insert peripheral IV, , , Once **AND** sodium chloride 0.9 % flush 10 mL, 10 mL, Intravenous, PRN, Aliza Bran PA-C  •  sodium chloride 0.9 % flush 10 mL, 10 mL, Intravenous, Q12H, Goldie Herman, APRN  •  sodium chloride 0.9 % flush 10 mL, 10 mL, Intravenous, PRN, Goldie Herman, APRN    I have reviewed the patient's current medication list    Assessment/Plan     Multiple subsegmental pulmonary emboli without acute cor pulmonale   Shortness of breath and chest pressure secondary to pulmonary emboli  - CT chest showed Acute pulmonary emboli involving right upper and right lower lobe  pulmonary arteries extending into segmental branches as above. No  findings of right heart strain or pulmonary infarct.Coronary atherosclerotic disease.Small hiatal hernia.  Chest x-ray is unremarkable.    - EKG showed Sinus rhythm, Left anterior fascicular block, Probable anteroseptal infarct, old.    - D-dimer 1.42  -  Patient started on heparin drip in the ED.-continue heparin drip  - consult to see what anticoagulations insurance may cover upon discharge  -We will switch patient to oral  anticoagulation in am  -Continuous cardiac monitoring     Weakness- Likely mutifactorial  - PT/OT consulted      Recent diagnosis of UTI  -Continue amoxicillin     Essential hypertension-Controlled  -Continue amlodipine 9     Bilateral lower leg edema  -Continue Lasix as needed     GERD  -Continue pantoprazole     Depression  -Continue Prozac     Dementia  -Patient alert and oriented x2 at this time  -Continue Aricept     Obesity  -BMI 31.5  -Encouraged lifestyle modifications     Disposition  Per clinical course    dw daughter concern patient is alone at home, unable to take meds,   Can care for self       Fabrice Kiser MD  10/24/19  9:17 AM          Electronically signed by Fabrice Kiser MD at 10/24/19 1502       Physical Therapy Notes (last 24 hours) (Notes from 10/23/19 1508 through 10/24/19 1508)     No notes of this type exist for this encounter.           Occupational Therapy Notes (last 24 hours) (Notes from 10/23/19 1508 through 10/24/19 1508)      Jaimee Bennett OT at 10/24/19 1242          Acute Care - Occupational Therapy Initial Evaluation  [facility]     Patient Name: Day Cabrera  : 1945  MRN: [id number]  Today's Date: 10/24/2019             Admit Date: 10/23/2019       ICD-10-CM ICD-9-CM   1. Multiple subsegmental pulmonary emboli without acute cor pulmonale I26.94 415.19   2. Weakness R53.1 780.79     Patient Active Problem List   Diagnosis   • Chronic anxiety   • Dementia (CMS/HCC)   • Hypertension   • Memory impairment   • Obesity   • Sleep apnea   • E. coli UTI (urinary tract infection)   • Urinary tract infection without hematuria   • Delirium due to another medical condition   • Multiple subsegmental pulmonary emboli without acute cor pulmonale     Past Medical History:   Diagnosis Date   • Anxiety    • Arthritis    • Chest pain 10/15/2019   • Dementia (CMS/HCC)    • Hypertension      Past Surgical History:   Procedure Laterality Date   • CHOLECYSTECTOMY     •  "COLONOSCOPY     • HYSTERECTOMY      total           OT ASSESSMENT FLOWSHEET (last 12 hours)      Occupational Therapy Evaluation     Row Name 10/24/19 1100                   OT Evaluation Time/Intention    Subjective Information  complains of;weakness;fatigue;dizziness;nausea/vomiting sudden onset dizziness & nause after 10 min. upright ADL.  -        Patient Effort  excellent  -        Symptoms Noted During/After Treatment  shortness of breath;fatigue;dizziness;significant change in vital signs BP was 130's this am and was 114/62 after upright activity.   -        Comment  Pt does not own or use DME. She lives with employed Dtr and she does her own ADL but reports she \"Isn;t doing much else. That's probably part of the problem.\" Pt was educated about blood pooling in extremities during inactivity & sudden HTN possibly loosening arterial clots, etc. OT also wrote down a med mgmt gwendolyn to help Pt not miss doses.  -           General Information    Patient Observations  alert;cooperative;agree to therapy  -        Patient/Family Observations  Dtr is [employer] employee. Pt is pleasant & agreeable but confused & reports impaired memory as well as frequent UTIs for the past \"year or so\". She also reports missing BM med doses just prior to admission for multiple small pulmonary emboli.  -        General Observations of Patient  Always wears her lipstick  -        Prior Level of Function  independent:;all household mobility  -        Pertinent History of Current Functional Problem  Pt is admitted to treat pulmonary emboli. She has been anticoagulated on Heparin. She is off Heparin now & on Lovenox to maintain levels.  -           Relationship/Environment    Lives With  child(chapis), adult;grandchild(chapis)  -           Resource/Environmental Concerns    Current Living Arrangements  home/apartment/condo  -           Home Main Entrance    Number of Stairs, Main Entrance  two  -           Cognitive " Assessment/Intervention- PT/OT    Orientation Status (Cognition)  disoriented to;time  -        Follows Commands (Cognition)  follows one step commands;WFL  -        Safety Deficit (Cognitive)  mild deficit;at risk behavior observed;awareness of need for assistance;judgment;problem solving Pt got up w/o (A) & nearly fell per staff who assistted.  -           Safety Issues, Functional Mobility    Safety Issues Affecting Function (Mobility)  at risk behavior observed;awareness of need for assistance;insight into deficits/self awareness;judgment;problem solving;safety precautions follow-through/compliance  -        Impairments Affecting Function (Mobility)  cognition;strength;endurance/activity tolerance  -           Bed Mobility Assessment/Treatment    Bed Mobility Assessment/Treatment  sit-supine  -        Sit-Supine [place] (Bed Mobility)  set up;supervision  -           Functional Mobility    Functional Mobility- Ind. Level  contact guard assist  -        Functional Mobility-Distance (Feet)  20  -        Functional Mobility- Safety Issues  balance decreased during turns  -        Functional Mobility- Comment  Pt was walking fairly well except when she first got up out of the bed, and when she reported the need to lie down due to sudden dizziness/nausea she required Min (A) for safe transfer & to walk 2 feet to the bed. Pt was very unsteady at this time.  -           Transfer Assessment/Treatment    Transfer Assessment/Treatment  sit-stand transfer;stand-sit transfer  -           Sit-Stand Transfer    Sit-Stand [place] (Transfers)  contact guard unsteady upon come to stand  -           Stand-Sit Transfer    Stand-Sit [place] (Transfers)  contact guard  -           ADL Assessment/Intervention    BADL Assessment/Intervention  grooming;lower body dressing;toileting  -           Lower Body Dressing Assessment/Training    Lower Body Dressing [place] Level   doff;don;undergarment;conditional independence  -        Lower Body Dressing Position  supported standing;unsupported sitting  -           Grooming Assessment/Training    [place] Level (Grooming)  hair care, combing/brushing;oral care regimen;wash face, hands;supervision  -        Grooming Position  sink side  -           Toileting Assessment/Training    [place] Level (Toileting)  adjust/manage clothing;perform perineal hygiene;conditional independence  -        Toileting Position  unsupported sitting  -           General ROM    GENERAL ROM COMMENTS  WFL  -           MMT (Manual Muscle Testing)    General MMT Comments  WFL though Pt reports & likely does have some global weakness from her baseline  -           Positioning and Restraints    Pre-Treatment Position  standing in room  -        Post Treatment Position  bed  -           Clinical Impression (OT)    Criteria for Skilled Therapeutic Interventions Met (OT Eval)  treatment indicated  -        Rehab Potential (OT Eval)  good, to achieve stated therapy goals  -        Therapy Frequency (OT Eval)  3 times/wk  -        Predicted Duration of Therapy Intervention (Therapy Eval)  until D/C  -        Anticipated Equipment Needs at Discharge (OT)  front wheeled walker  -        Anticipated Discharge Disposition (OT)  inpatient rehabilitation facility  -           Planned OT Interventions    Planned Therapy Interventions (OT Eval)  activity tolerance training;adaptive equipment training;BADL retraining;cognitive/visual perception retraining;functional balance retraining;occupation/activity based interventions;patient/caregiver education/training;transfer/mobility retraining  -           OT Goals    Bathing Goal Selection (OT)  bathing, OT goal 1  -        Dressing Goal Selection (OT)  dressing, OT goal 1  -        Activity Tolerance Goal Selection (OT)  activity tolerance, OT goal 1  -        Additional Documentation   Activity Tolerance Goal Selection (OT) (Row)  -           Bathing Goal 1 (OT)    Activity/Assistive Device (Bathing Goal 1, OT)  bathing skills, all  -        [place] Level/Cues Needed (Bathing Goal 1, OT)  conditional independence  -MH        Time Frame (Bathing Goal 1, OT)  2 weeks  -           Dressing Goal 1 (OT)    Activity/Assistive Device (Dressing Goal 1, OT)  dressing skills, all  -MH        [place]/Cues Needed (Dressing Goal 1, OT)  conditional independence  -        Time Frame (Dressing Goal 1, OT)  2 weeks  -            Activity Tolerance Goal 1 (OT)    Activity Tolerance Goal 1 (OT)  no disturbance of vital signs or symptoms of malaise after upright  -        Activity Level (Endurance Goal 1, OT)  15 min activity  -        Time Frame (Activity Tolerance Goal 1, OT)  by discharge  -           Living Environment    Home Accessibility  stairs to enter home  -          User Key  (r) = Recorded By, (t) = Taken By, (c) = Cosigned By    Initials Name Effective Dates     Jaimee Bennett, KIMBERLEY 03/01/19 -          Occupational Therapy Education     Title: PT OT SLP Therapies (In Progress)     Topic: Occupational Therapy (In Progress)     Point: ADL training (In Progress)     Description: Instruct learner(s) on proper safety adaptation and remediation techniques during self care or transfers.   Instruct in proper use of assistive devices.    Learning Progress Summary           Patient Acceptance, E,TB, NR by MA at 10/23/2019  6:42 PM   Family Acceptance, E,TB, NR by MA at 10/23/2019  6:42 PM                   Point: Home exercise program (In Progress)     Description: Instruct learner(s) on appropriate technique for monitoring, assisting and/or progressing therapeutic exercises/activities.    Learning Progress Summary           Patient Acceptance, E,TB, NR by MA at 10/23/2019  6:42 PM   Family Acceptance, E,TB, NR by MA at 10/23/2019  6:42 PM                   Point: Precautions (In  Progress)     Description: Instruct learner(s) on prescribed precautions during self-care and functional transfers.    Learning Progress Summary           Patient Acceptance, E,TB, NR by MA at 10/23/2019  6:42 PM   Family Acceptance, E,TB, NR by MA at 10/23/2019  6:42 PM                   Point: Body mechanics (In Progress)     Description: Instruct learner(s) on proper positioning and spine alignment during self-care, functional mobility activities and/or exercises.    Learning Progress Summary           Patient Acceptance, E,TB, NR by MA at 10/23/2019  6:42 PM   Family Acceptance, E,TB, NR by MA at 10/23/2019  6:42 PM                               User Key     Initials Effective Dates Name Provider Type Discipline    MA 03/01/19 -  Becca Marquez RN Registered Nurse Nurse                  OT Recommendation and Plan  Outcome Summary/Treatment Plan (OT)  Anticipated Equipment Needs at Discharge (OT): front wheeled walker  Anticipated Discharge Disposition (OT): inpatient rehabilitation facility  Planned Therapy Interventions (OT Eval): activity tolerance training, adaptive equipment training, BADL retraining, cognitive/visual perception retraining, functional balance retraining, occupation/activity based interventions, patient/caregiver education/training, transfer/mobility retraining  Therapy Frequency (OT Eval): 3 times/wk  Plan of Care Review  Plan of Care Reviewed With: patient  Plan of Care Reviewed With: patient  Outcome Summary: Pt admitted w/ HTN & pulmonary emboli. She is weak and dizzy/nauseated after minimal upright activity. She lives w/ employed dtr & needs to be safe alone during the day, which she is not now due to imbalance & poor activity tolerance as well as a persistent UTI & mild confusion. Pt is recommended for [facility] rehab    Outcome Measures     Row Name 10/24/19 1200             How much help from another person do you currently need...    Turning from your back to your side while in flat  bed without using bedrails?  4  -MH      Moving from lying on back to sitting on the side of a flat bed without bedrails?  4  -MH      Moving to and from a bed to a chair (including a wheelchair)?  3  -MH      Standing up from a chair using your arms (e.g., wheelchair, bedside chair)?  3  -MH      Climbing 3-5 steps with a railing?  3  -MH      To walk in hospital room?  3  -MH      AM-PAC 6 Clicks Score (PT)  20  -         Functional Assessment    Outcome Measure Options  AM-PAC 6 Clicks Basic Mobility (PT)  -        User Key  (r) = Recorded By, (t) = Taken By, (c) = Cosigned By    Initials Name Provider Type     Jaimee Bennett OT Occupational Therapist          Time Calculation:   Time Calculation- OT     Row Name 10/24/19 1241             Time Calculation-     OT Start Time  1112  -      OT Stop Time  1147  -      OT Time Calculation (min)  35 min  -      Total Timed Code Minutes- OT  15 minute(s)  -      OT Received On  10/24/19  -      OT - Next Appointment  10/25/19  -      OT Goal Re-Cert Due Date  11/07/19  -        User Key  (r) = Recorded By, (t) = Taken By, (c) = Cosigned By    Initials Name Provider Type     Jaimee Bennett OT Occupational Therapist        Therapy Charges for Today     Code Description Service Date Service Provider Modifiers Qty    82050952761  OT EVAL MOD COMPLEXITY 4 10/24/2019 Jaimee Bennett OT GO 1    [id number]  OT SELF CARE/MGMT/TRAIN EA 15 MIN 10/24/2019 Jaimee Bennett OT GO 1               Jaimee Bennett OT  10/24/2019    Electronically signed by Jaimee Bennett OT at 10/24/19 1242     Jaimee Bennett OT at 10/24/19 1240          Problem: Patient Care Overview  Goal: Plan of Care Review  Outcome: Ongoing (interventions implemented as appropriate)   10/24/19 1236   Coping/Psychosocial   Plan of Care Reviewed With patient   Plan of Care Review   Progress no change   OTHER   Outcome Summary Pt admitted w/ HTN & pulmonary emboli. She is weak and  dizzy/nauseated after minimal upright activity. She lives w/ employed dtr & needs to be safe alone during the day, which she is not now due to imbalance & poor activity tolerance as well as a persistent UTI & mild confusion. Pt is recommended for [facility] rehab           Electronically signed by Jaimee Bennett OT at 10/24/19 9568

## 2019-10-24 NOTE — PROGRESS NOTES
Hospitalist Team    Patient Care Team:  Smita Howard APRN as PCP - General (Nurse Practitioner)    Interval History and ROS: 74-year-old  female with a past medical history of anxiety, depression, hypertension, and obesity who presented to [facility] on 10/23/2019 with complaints of shortness of breath, weakness, and chest pressure.  Per the sister-in-law at bedside the patient was just relieved yesterday.  She was hospitalized then for weakness, shortness of breath, chest pain, and was diagnosed with the UTI.  When the patient was discharged yesterday and got home she still have the same complaints so family brought her back into the ED.  Patient was able to admit she is still having shortness of breath and chest pressure.  She describes her chest pressure as a 6 out of 10.  She states pain medication helps the chest pressure and tension makes it worse.She denies any recent nausea, vomiting, diarrhea, fever, chills.      In the ED, CT chest showed Acute pulmonary emboli involving right upper and right lower lobe  pulmonary arteries extending into segmental branches as above. No  findings of right heart strain or pulmonary infarct.Coronary atherosclerotic disease.Small hiatal hernia.  Chest x-ray is unremarkable.  EKG showed Sinus rhythm, Left anterior fascicular block, Probable anteroseptal infarct, old.  All labs unremarkable upon admission except d-dimer 1.42.  All vital signs stable upon admission.  Patient started on heparin drip in the ED.     Upon review of patient, patient just recently discharged 10/17/2019.  She was admitted from 10/15-10/16 for a chest pain rule out, which was negative.  She was found to have a UTI and was treated with Rocephin while inpatient.  She was discharged home on Amoxicillin, but reportedly did not take any of this yet    10/24 weakness, denies chest pain      History taken from: patient    Review of Systems   Constitutional: Positive for fatigue.   HENT:  "Negative.    Respiratory: Negative.    Cardiovascular: Negative.    Gastrointestinal: Negative.    Neurological: Positive for weakness.       Objective    Vital Signs  Temp:  [97.5 °F (36.4 °C)-97.8 °F (36.6 °C)] 97.5 °F (36.4 °C)  Heart Rate:  [67-89] 81  Resp:  [16-20] 16  BP: (109-135)/(52-89) 116/71  Oxygen Therapy  SpO2: 96 %  Pulse Oximetry Type: Intermittent  Device (Oxygen Therapy): room air  Flowsheet Rows      First Filed Value   Admission Height  162.6 cm (64\") Documented at 10/23/2019 1032   Admission Weight  83.5 kg (184 lb) Documented at 10/23/2019 1032        Intake & Output (last 3 days)       10/21 0701 - 10/22 0700 10/22 0701 - 10/23 0700 10/23 0701 - 10/24 0700 10/24 0701 - 10/25 0700            Urine Unmeasured Occurrence   3 x         Lines, Drains & Airways    Active LDAs     Name:   Placement date:   Placement time:   Site:   Days:    Peripheral IV 10/23/19 1109   10/23/19    1109    --   less than 1                Physical Exam:    General Appearance:    Alert, cooperative, in no acute distress   Head:    Normocephalic, without obvious abnormality, atraumatic   Eyes:            Lids and lashes normal, conjunctivae and sclerae normal, no   icterus, no pallor, corneas clear, PERRLA   Neck:   No adenopathy, supple, trachea midline, no thyromegaly, no   carotid bruit, no JVD   Back:     No kyphosis present, no scoliosis present, no skin lesions,      erythema or scars, no tenderness to percussion or                   palpation,   range of motion normal   Lungs:     Clear to auscultation,respirations regular, even and                  unlabored    Heart:    Regular rhythm and normal rate, normal S1 and S2, no            murmur, no gallop, no rub, no click   Chest Wall:    No abnormalities observed   Abdomen:     Normal bowel sounds, no masses, no organomegaly, soft        non-tender, non-distended, no guarding, no rebound                tenderness   Extremities:   Moves all extremities well, no " edema, no cyanosis, no             redness   Skin:   No bleeding, bruising or rash   Neurologic:   Cranial nerves 2 - 12 grossly intact, sensation intact, DTR       present and equal bilaterally       Results Review:      Lab Results (last 24 hours)     Procedure Component Value Units Date/Time    Basic Metabolic Panel [[id number]]  (Abnormal) Collected:  10/24/19 0410    Specimen:  Blood Updated:  10/24/19 0506     Glucose 109 mg/dL      BUN 9 mg/dL      Creatinine 0.93 mg/dL      Sodium 137 mmol/L      Potassium 4.2 mmol/L      Chloride 100 mmol/L      CO2 26.0 mmol/L      Calcium 9.1 mg/dL      eGFR Non African Amer 59 mL/min/1.73      BUN/Creatinine Ratio 9.7     Anion Gap 11.0 mmol/L     Narrative:       GFR Normal >60  Chronic Kidney Disease <60  Kidney Failure <15    aPTT [[id number]]  (Abnormal) Collected:  10/24/19 0410    Specimen:  Blood Updated:  10/24/19 0445     .5 seconds     CBC Auto Differential [[id number]]  (Abnormal) Collected:  10/24/19 0410    Specimen:  Blood Updated:  10/24/19 0429     WBC 7.90 10*3/mm3      RBC 4.46 10*6/mm3      Hemoglobin 14.2 g/dL      Hematocrit 41.6 %      MCV 93.4 fL      MCH 31.8 pg      MCHC 34.1 g/dL      RDW 13.9 %      RDW-SD 45.5 fl      MPV 9.4 fL      Platelets 240 10*3/mm3      Neutrophil % 53.2 %      Lymphocyte % 34.3 %      Monocyte % 8.0 %      Eosinophil % 2.6 %      Basophil % 1.9 %      Neutrophils, Absolute 4.20 10*3/mm3      Lymphocytes, Absolute 2.70 10*3/mm3      Monocytes, Absolute 0.60 10*3/mm3      Eosinophils, Absolute 0.20 10*3/mm3      Basophils, Absolute 0.10 10*3/mm3      nRBC 0.4 /100 WBC     aPTT [[id number]]  (Abnormal) Collected:  10/23/19 1958    Specimen:  Blood Updated:  10/23/19 2044     .4 seconds     Protime-INR [[id number]]  (Normal) Collected:  10/23/19 1137    Specimen:  Blood from Arm, Left Updated:  10/23/19 1353     Protime 11.0 Seconds      INR 1.06    aPTT [[id number]]  (Abnormal) Collected:  10/23/19 1135     Specimen:  Blood from Arm, Left Updated:  10/23/19 1353     PTT 23.9 seconds     Extra Tubes [[id number]] Collected:  10/23/19 1137    Specimen:  Blood, Venous Line Updated:  10/23/19 1245    Narrative:       The following orders were created for panel order Extra Tubes.  Procedure                               Abnormality         Status                     ---------                               -----------         ------                     Gold Top - SST[[id number]]                                   Final result                 Please view results for these tests on the individual orders.    Gold Top - SST [[id number]] Collected:  10/23/19 1137    Specimen:  Blood Updated:  10/23/19 1245     Extra Tube Hold for add-ons.     Comment: Auto resulted.       Troponin [[id number]]  (Normal) Collected:  10/23/19 1137    Specimen:  Blood from Arm, Left Updated:  10/23/19 1218     Troponin T <0.010 ng/mL     Narrative:       Troponin T Reference Range:  <= 0.03 ng/mL-   Negative for AMI  >0.03 ng/mL-     Abnormal for myocardial necrosis.  Clinicians would have to utilize clinical acumen, EKG, Troponin and serial changes to determine if it is an Acute Myocardial Infarction or myocardial injury due to an underlying chronic condition.     BNP [[id number]]  (Normal) Collected:  10/23/19 1137    Specimen:  Blood from Arm, Left Updated:  10/23/19 1218     proBNP 81.4 pg/mL     Narrative:       Among patients with dyspnea, NT-proBNP is highly sensitive for the detection of acute congestive heart failure. In addition NT-proBNP of <300 pg/ml effectively rules out acute congestive heart failure with 99% negative predictive value.    Basic Metabolic Panel [[id number]]  (Abnormal) Collected:  10/23/19 1137    Specimen:  Blood from Arm, Left Updated:  10/23/19 1216     Glucose 161 mg/dL      BUN 12 mg/dL      Creatinine 1.07 mg/dL      Sodium 138 mmol/L      Potassium 3.4 mmol/L      Chloride 102 mmol/L      CO2 24.0 mmol/L      Calcium 9.1  mg/dL      eGFR Non African Amer 50 mL/min/1.73      BUN/Creatinine Ratio 11.2     Anion Gap 12.0 mmol/L     Narrative:       GFR Normal >60  Chronic Kidney Disease <60  Kidney Failure <15    D-dimer, Quantitative [[id number]]  (Abnormal) Collected:  10/23/19 1137    Specimen:  Blood from Arm, Left Updated:  10/23/19 1214     D-Dimer, Quantitative 1.42 MCGFEU/mL     Narrative:       Reference Range  --------------------------------------------------------------------     < 0.50   Negative Predictive Value  0.50-0.59   Indeterminate    >= 0.60   Probable VTE             A very low percentage of patients with DVT may yield D-Dimer results   below the cut-off of 0.50 MCGFEU/mL.  This is known to be more   prevalent in patients with distal DVT.             Results of this test should always be interpreted in conjunction with   the patient's medical history, clinical presentation and other   findings.  Clinical diagnosis should not be based on the result of   INNOVANCE D-Dimer alone.    CBC & Differential [[id number]] Collected:  10/23/19 1137    Specimen:  Blood Updated:  10/23/19 1157    Narrative:       The following orders were created for panel order CBC & Differential.  Procedure                               Abnormality         Status                     ---------                               -----------         ------                     CBC Auto Differential[[id number]]        Normal              Final result                 Please view results for these tests on the individual orders.    CBC Auto Differential [[id number]]  (Normal) Collected:  10/23/19 1137    Specimen:  Blood from Arm, Left Updated:  10/23/19 1157     WBC 7.10 10*3/mm3      RBC 4.55 10*6/mm3      Hemoglobin 14.6 g/dL      Hematocrit 41.8 %      MCV 91.9 fL      MCH 32.2 pg      MCHC 35.1 g/dL      RDW 13.7 %      RDW-SD 44.6 fl      MPV 9.1 fL      Platelets 234 10*3/mm3      Neutrophil % 73.1 %      Lymphocyte % 20.0 %      Monocyte % 5.7 %       Eosinophil % 0.6 %      Basophil % 0.6 %      Neutrophils, Absolute 5.20 10*3/mm3      Lymphocytes, Absolute 1.40 10*3/mm3      Monocytes, Absolute 0.40 10*3/mm3      Eosinophils, Absolute 0.00 10*3/mm3      Basophils, Absolute 0.00 10*3/mm3      nRBC 0.0 /100 WBC           Imaging Results (last 24 hours)     Procedure Component Value Units Date/Time    CT Chest Pulmonary Embolism [[id number]] Collected:  10/23/19 1321     Updated:  10/23/19 1330    Narrative:          DATE OF EXAM:  10/23/2019 1:02 PM     PROCEDURE:  CT CHEST PULMONARY EMBOLISM-     INDICATIONS:   elevated dimer + SOA     COMPARISON:   Portable chest radiograph 10/23/2019     TECHNIQUE:  Routine transaxial slices were obtained through chest after  administration of intravenous 68 ml of Isovue 370. Reconstructed coronal  and sagittal images were also obtained. Automated exposure control and  iterative reconstruction methods were used.      FINDINGS:  Pulmonary arteries are well-opacified with contrast. There are filling  defects within the right upper lobe pulmonary artery extending into  multiple segmental branches and within the right lower lobe pulmonary  artery consistent with acute pulmonary emboli. No embolus identified in  the left. No evidence of right heart strain. The main pulmonary artery  is normal in caliber. Heart size normal. Mild coronary calcifications.  No pericardial effusion. There is a small hiatal hernia.     The trachea and mainstem bronchi are patent. No focal consolidation,  pneumothorax, or pleural effusion. The liver, spleen, adrenal glands are  within normal limits. Pancreas without findings of pancreatitis. The  gallbladder is absent. No free fluid in the upper abdomen. Osseous  structures intact. Disc disease noted at the C5-6 level with posterior  disc osteophyte.        Impression:       1. Acute pulmonary emboli involving right upper and right lower lobe  pulmonary arteries extending into segmental branches as above.  No  findings of right heart strain or pulmonary infarct.  2. Coronary atherosclerotic disease.  3. Small hiatal hernia.     Findings discussed with the referring provider at 1:20 PM on 10/23/2019     Electronically Signed By-Rolan Fernandez On:10/23/2019 1:28 PM  This report was finalized on [id number] by  Rolan Fernandez, .    XR Chest 1 View [[id number]] Collected:  10/23/19 1128     Updated:  10/23/19 1131    Narrative:       DATE OF EXAM:  10/23/2019 11:20 AM     PROCEDURE:  XR CHEST 1 VW-     INDICATIONS:  chest pain and heaviness     COMPARISON:  10/15/2019.     TECHNIQUE:   Single radiographic AP view of the chest was obtained.     FINDINGS:  A single view of the chest reveals no cardiac enlargement and no focal  infiltrate. No pneumothorax is seen. There is chronic calcified  granulomatous disease of the chest. No significant interval change is  appreciated since the 10/15/2019 study.       Impression:       No acute cardiopulmonary disease is seen radiographically.        Electronically Signed By-Dr. Alex Tomlin MD On:10/23/2019 11:29 AM  This report was finalized on [id number] by Dr. Alex Tomlin MD.          ECG/EMG Results (most recent)     Procedure Component Value Units Date/Time    ECG 12 Lead [[id number]] Collected:  10/23/19 1042     Updated:  10/24/19 0841    Narrative:       HEART RATE= 92  bpm  RR Interval= 652  ms  OK Interval= 161  ms  P Horizontal Axis= 9  deg  P Front Axis= 17  deg  QRSD Interval= 87  ms  QT Interval= 365  ms  QRS Axis= -57  deg  T Wave Axis= -58  deg  - ABNORMAL ECG -  Sinus rhythm  Probable anteroseptal infarct, old  When compared with ECG of 17-Oct-2019 9:37:59,  No significant change  Electronically Signed By: Giacomo Pierre (BRIAN) 24-Oct-2019 08:40:55  Date and Time of Study: 2019-10-23 10:42:04          I reviewed the patient's new clinical results.    Medication Review:     Current Facility-Administered Medications:   •  aluminum-magnesium hydroxide-simethicone (MAALOX  MAX) 400-400-40 MG/5ML suspension 15 mL, 15 mL, Oral, Q6H PRN, Kellams, Goldie Z, APRN  •  amLODIPine (NORVASC) tablet 10 mg, 10 mg, Oral, Daily, Kellams, Goldie Z, APRN, 10 mg at 10/23/19 1723  •  amoxicillin (AMOXIL) capsule 500 mg, 500 mg, Oral, BID, Kellams, Goldie Z, APRN, 500 mg at 10/23/19 2118  •  bisacodyl (DULCOLAX) suppository 10 mg, 10 mg, Rectal, Daily PRN, Kellams, Goldie Z, APRN  •  donepezil (ARICEPT) tablet 10 mg, 10 mg, Oral, Nightly, Kelandress, Goldie Z, APRN, 10 mg at 10/23/19 2118  •  enoxaparin (LOVENOX) syringe 80 mg, 80 mg, Subcutaneous, Q12H, Fabrice Kiser MD  •  FLUoxetine (PROzac) capsule 20 mg, 20 mg, Oral, Daily, Kellams, Goldie Z, APRN, 20 mg at 10/23/19 1722  •  furosemide (LASIX) tablet 20 mg, 20 mg, Oral, Daily PRN, KelandressRadhaGoldie Z, APRN  •  heparin bolus from bag 3,300 Units, 40 Units/kg, Intravenous, Q6H PRN, Aliza Bran PA-C  •  heparin bolus from bag 6,700 Units, 80 Units/kg, Intravenous, Q6H PRN, Aliza Bran PA-C  •  magnesium hydroxide (MILK OF MAGNESIA) suspension 2400 mg/10mL 10 mL, 10 mL, Oral, Daily PRN, Kellams, Goldie Z, APRN  •  melatonin tablet 5 mg, 5 mg, Oral, Nightly PRN, Radha Hermansea Z, APRN, 5 mg at 10/23/19 2236  •  ondansetron (ZOFRAN) tablet 4 mg, 4 mg, Oral, Q6H PRN **OR** ondansetron (ZOFRAN) injection 4 mg, 4 mg, Intravenous, Q6H PRN, Radha Hermansea Z, APRN  •  pantoprazole (PROTONIX) EC tablet 40 mg, 40 mg, Oral, Daily, Goldie Herman APRN, 40 mg at 10/23/19 1722  •  [COMPLETED] Insert peripheral IV, , , Once **AND** sodium chloride 0.9 % flush 10 mL, 10 mL, Intravenous, PRN, Aliza Bran PA-C  •  sodium chloride 0.9 % flush 10 mL, 10 mL, Intravenous, Q12H, Goldie Herman APRN  •  sodium chloride 0.9 % flush 10 mL, 10 mL, Intravenous, PRN, Goldie Herman APRN    I have reviewed the patient's current medication list    Assessment/Plan     Multiple subsegmental pulmonary emboli without acute cor pulmonale    Shortness of breath and chest pressure secondary to pulmonary emboli  - CT chest showed Acute pulmonary emboli involving right upper and right lower lobe  pulmonary arteries extending into segmental branches as above. No  findings of right heart strain or pulmonary infarct.Coronary atherosclerotic disease.Small hiatal hernia.  Chest x-ray is unremarkable.    - EKG showed Sinus rhythm, Left anterior fascicular block, Probable anteroseptal infarct, old.    - D-dimer 1.42  -  Patient started on heparin drip in the ED.-continue heparin drip  - consult to see what anticoagulations insurance may cover upon discharge  -We will switch patient to oral anticoagulation in am  -Continuous cardiac monitoring     Weakness- Likely mutifactorial  - PT/OT consulted      Recent diagnosis of UTI  -Continue amoxicillin     Essential hypertension-Controlled  -Continue amlodipine 9     Bilateral lower leg edema  -Continue Lasix as needed     GERD  -Continue pantoprazole     Depression  -Continue Prozac     Dementia  -Patient alert and oriented x2 at this time  -Continue Aricept     Obesity  -BMI 31.5  -Encouraged lifestyle modifications     Disposition  Per clinical course    dw daughter concern patient is alone at home, unable to take meds,   Can care for self       Fabrice Kiser MD  10/24/19  9:17 AM

## 2019-10-24 NOTE — PROGRESS NOTES
Continued Stay Note  ROSY Collier     Patient Name: Day Cabrera  MRN: [id number]  Today's Date: 10/24/2019    Admit Date: 10/23/2019    Discharge Plan     Row Name 10/24/19 1607       Plan    Plan  DC Plan: [facility] accepted-pending precert. PASRR needs completed. Precert started 10/24.    Patient/Family in Agreement with Plan  yes        Discharge Codes    No documentation.       Expected Discharge Date and Time     Expected Discharge Date Expected Discharge Time    Oct 26, 2019         KAREEN Capellan    Phone # [phone]  Cell #[phone]  Fax#[phone]  [email or web address]      KAREEN Capellan

## 2019-10-24 NOTE — NURSING NOTE
Spoke to Dr. Brandon about heparin gtt. Said to re-weigh the pt and go from there. Pt's weight this morning was more than previous weight. Spoke to pharmacy. They reviewed the heparin rate and boluses and said everything looked good from what they could see. Call placed to Dr. Kiser to find out next step. Waiting for call back. Will continue to monitor.

## 2019-10-24 NOTE — THERAPY EVALUATION
"Acute Care - Occupational Therapy Initial Evaluation   N[facility]     Patient Name: Day Cabrera  : 1945  MRN: [id number]  Today's Date: 10/24/2019             Admit Date: 10/23/2019       ICD-10-CM ICD-9-CM   1. Multiple subsegmental pulmonary emboli without acute cor pulmonale I26.94 415.19   2. Weakness R53.1 780.79     Patient Active Problem List   Diagnosis   • Chronic anxiety   • Dementia (CMS/HCC)   • Hypertension   • Memory impairment   • Obesity   • Sleep apnea   • E. coli UTI (urinary tract infection)   • Urinary tract infection without hematuria   • Delirium due to another medical condition   • Multiple subsegmental pulmonary emboli without acute cor pulmonale     Past Medical History:   Diagnosis Date   • Anxiety    • Arthritis    • Chest pain 10/15/2019   • Dementia (CMS/HCC)    • Hypertension      Past Surgical History:   Procedure Laterality Date   • CHOLECYSTECTOMY     • COLONOSCOPY     • HYSTERECTOMY      total           OT ASSESSMENT FLOWSHEET (last 12 hours)      Occupational Therapy Evaluation     Row Name 10/24/19 1100                   OT Evaluation Time/Intention    Subjective Information  complains of;weakness;fatigue;dizziness;nausea/vomiting sudden onset dizziness & nause after 10 min. upright ADL.  -        Patient Effort  excellent  -        Symptoms Noted During/After Treatment  shortness of breath;fatigue;dizziness;significant change in vital signs BP was 130's this am and was 114/62 after upright activity.   -        Comment  Pt does not own or use DME. She lives with employed Dtr and she does her own ADL but reports she \"Isn;t doing much else. That's probably part of the problem.\" Pt was educated about blood pooling in extremities during inactivity & sudden HTN possibly loosening arterial clots, etc. OT also wrote down a med Hair Scynce gwendolyn to help Pt not miss doses.  -           General Information    Patient Observations  alert;cooperative;agree to therapy  -        " "Patient/Family Observations  Dtr is [employer] employee. Pt is pleasant & agreeable but confused & reports impaired memory as well as frequent UTIs for the past \"year or so\". She also reports missing BM med doses just prior to admission for multiple small pulmonary emboli.  -        General Observations of Patient  Always wears her lipstick  -        Prior Level of Function  independent:;all household mobility  -        Pertinent History of Current Functional Problem  Pt is admitted to treat pulmonary emboli. She has been anticoagulated on Heparin. She is off Heparin now & on Lovenox to maintain levels.  -           Relationship/Environment    Lives With  child(chapis), adult;grandchild(chapis)  -           Resource/Environmental Concerns    Current Living Arrangements  home/apartment/condo  -           Home Main Entrance    Number of Stairs, Main Entrance  two  -           Cognitive Assessment/Intervention- PT/OT    Orientation Status (Cognition)  disoriented to;time  -        Follows Commands (Cognition)  follows one step commands;WFL  -        Safety Deficit (Cognitive)  mild deficit;at risk behavior observed;awareness of need for assistance;judgment;problem solving Pt got up w/o (A) & nearly fell per staff who assistted.  -           Safety Issues, Functional Mobility    Safety Issues Affecting Function (Mobility)  at risk behavior observed;awareness of need for assistance;insight into deficits/self awareness;judgment;problem solving;safety precautions follow-through/compliance  -        Impairments Affecting Function (Mobility)  cognition;strength;endurance/activity tolerance  -           Bed Mobility Assessment/Treatment    Bed Mobility Assessment/Treatment  sit-supine  -        Sit-Supine Tucker (Bed Mobility)  set up;supervision  -           Functional Mobility    Functional Mobility- Ind. Level  contact guard assist  -        Functional Mobility-Distance (Feet)  20  -        " Functional Mobility- Safety Issues  balance decreased during turns  -        Functional Mobility- Comment  Pt was walking fairly well except when she first got up out of the bed, and when she reported the need to lie down due to sudden dizziness/nausea she required Min (A) for safe transfer & to walk 2 feet to the bed. Pt was very unsteady at this time.  -           Transfer Assessment/Treatment    Transfer Assessment/Treatment  sit-stand transfer;stand-sit transfer  -           Sit-Stand Transfer    Sit-Stand Sampson (Transfers)  contact guard unsteady upon come to stand  -           Stand-Sit Transfer    Stand-Sit Sampson (Transfers)  contact guard  -           ADL Assessment/Intervention    BADL Assessment/Intervention  grooming;lower body dressing;toileting  -           Lower Body Dressing Assessment/Training    Lower Body Dressing Sampson Level  doff;don;undergarment;conditional independence  -        Lower Body Dressing Position  supported standing;unsupported sitting  -           Grooming Assessment/Training    [place] Level (Grooming)  hair care, combing/brushing;oral care regimen;wash face, hands;supervision  -        Grooming Position  sink side  -           Toileting Assessment/Training    [place] Level (Toileting)  adjust/manage clothing;perform perineal hygiene;conditional independence  -        Toileting Position  unsupported sitting  -           General ROM    GENERAL ROM COMMENTS  WFL  -           MMT (Manual Muscle Testing)    General MMT Comments  WFL though Pt reports & likely does have some global weakness from her baseline  -           Positioning and Restraints    Pre-Treatment Position  standing in room  -        Post Treatment Position  bed  -           Clinical Impression (OT)    Criteria for Skilled Therapeutic Interventions Met (OT Eval)  treatment indicated  -        Rehab Potential (OT Eval)  good, to achieve stated therapy goals   -        Therapy Frequency (OT Eval)  3 times/wk  -        Predicted Duration of Therapy Intervention (Therapy Eval)  until D/C  -        Anticipated Equipment Needs at Discharge (OT)  front wheeled walker  -        Anticipated Discharge Disposition (OT)  inpatient rehabilitation facility  -           Planned OT Interventions    Planned Therapy Interventions (OT Eval)  activity tolerance training;adaptive equipment training;BADL retraining;cognitive/visual perception retraining;functional balance retraining;occupation/activity based interventions;patient/caregiver education/training;transfer/mobility retraining  -           OT Goals    Bathing Goal Selection (OT)  bathing, OT goal 1  -        Dressing Goal Selection (OT)  dressing, OT goal 1  -        Activity Tolerance Goal Selection (OT)  activity tolerance, OT goal 1  -        Additional Documentation  Activity Tolerance Goal Selection (OT) (Row)  -           Bathing Goal 1 (OT)    Activity/Assistive Device (Bathing Goal 1, OT)  bathing skills, all  -        [place] Level/Cues Needed (Bathing Goal 1, OT)  conditional independence  -        Time Frame (Bathing Goal 1, OT)  2 weeks  -           Dressing Goal 1 (OT)    Activity/Assistive Device (Dressing Goal 1, OT)  dressing skills, all  -        [place]/Cues Needed (Dressing Goal 1, OT)  conditional independence  -        Time Frame (Dressing Goal 1, OT)  2 weeks  -            Activity Tolerance Goal 1 (OT)    Activity Tolerance Goal 1 (OT)  no disturbance of vital signs or symptoms of malaise after upright  -        Activity Level (Endurance Goal 1, OT)  15 min activity  -        Time Frame (Activity Tolerance Goal 1, OT)  by discharge  -           Living Environment    Home Accessibility  stairs to enter home  -          User Key  (r) = Recorded By, (t) = Taken By, (c) = Cosigned By    Initials Name Effective Dates     Jaimee Bennett, OT 03/01/19 -           Occupational Therapy Education     Title: PT OT SLP Therapies (In Progress)     Topic: Occupational Therapy (In Progress)     Point: ADL training (In Progress)     Description: Instruct learner(s) on proper safety adaptation and remediation techniques during self care or transfers.   Instruct in proper use of assistive devices.    Learning Progress Summary           Patient Acceptance, E,TB, NR by MA at 10/23/2019  6:42 PM   Family Acceptance, E,TB, NR by MA at 10/23/2019  6:42 PM                   Point: Home exercise program (In Progress)     Description: Instruct learner(s) on appropriate technique for monitoring, assisting and/or progressing therapeutic exercises/activities.    Learning Progress Summary           Patient Acceptance, E,TB, NR by MA at 10/23/2019  6:42 PM   Family Acceptance, E,TB, NR by MA at 10/23/2019  6:42 PM                   Point: Precautions (In Progress)     Description: Instruct learner(s) on prescribed precautions during self-care and functional transfers.    Learning Progress Summary           Patient Acceptance, E,TB, NR by MA at 10/23/2019  6:42 PM   Family Acceptance, E,TB, NR by MA at 10/23/2019  6:42 PM                   Point: Body mechanics (In Progress)     Description: Instruct learner(s) on proper positioning and spine alignment during self-care, functional mobility activities and/or exercises.    Learning Progress Summary           Patient Acceptance, E,TB, NR by MA at 10/23/2019  6:42 PM   Family Acceptance, E,TB, NR by MA at 10/23/2019  6:42 PM                               User Key     Initials Effective Dates Name Provider Type Discipline    MA 03/01/19 -  Becca Marquez, RN Registered Nurse Nurse                  OT Recommendation and Plan  Outcome Summary/Treatment Plan (OT)  Anticipated Equipment Needs at Discharge (OT): front wheeled walker  Anticipated Discharge Disposition (OT): inpatient rehabilitation facility  Planned Therapy Interventions (OT Eval): activity  tolerance training, adaptive equipment training, BADL retraining, cognitive/visual perception retraining, functional balance retraining, occupation/activity based interventions, patient/caregiver education/training, transfer/mobility retraining  Therapy Frequency (OT Eval): 3 times/wk  Plan of Care Review  Plan of Care Reviewed With: patient  Plan of Care Reviewed With: patient  Outcome Summary: Pt admitted w/ HTN & pulmonary emboli. She is weak and dizzy/nauseated after minimal upright activity. She lives w/ employed dtr & needs to be safe alone during the day, which she is not now due to imbalance & poor activity tolerance as well as a persistent UTI & mild confusion. Pt is recommended for [facility] rehab    Outcome Measures     Row Name 10/24/19 1200             How much help from another person do you currently need...    Turning from your back to your side while in flat bed without using bedrails?  4  -MH      Moving from lying on back to sitting on the side of a flat bed without bedrails?  4  -MH      Moving to and from a bed to a chair (including a wheelchair)?  3  -MH      Standing up from a chair using your arms (e.g., wheelchair, bedside chair)?  3  -MH      Climbing 3-5 steps with a railing?  3  -MH      To walk in hospital room?  3  -      AM-PAC 6 Clicks Score (PT)  20  -         Functional Assessment    Outcome Measure Options  AM-PAC 6 Clicks Basic Mobility (PT)  -        User Key  (r) = Recorded By, (t) = Taken By, (c) = Cosigned By    Initials Name Provider Type     Jaimee Bennett OT Occupational Therapist          Time Calculation:   Time Calculation- OT     Row Name 10/24/19 1241             Time Calculation-     OT Start Time  1112  -      OT Stop Time  1147  -      OT Time Calculation (min)  35 min  -      Total Timed Code Minutes- OT  15 minute(s)  -      OT Received On  10/24/19  -      OT - Next Appointment  10/25/19  -      OT Goal Re-Cert Due Date  11/07/19  -         User Key  (r) = Recorded By, (t) = Taken By, (c) = Cosigned By    Initials Name Provider Type     Jaimee Bennett OT Occupational Therapist        Therapy Charges for Today     Code Description Service Date Service Provider Modifiers Qty    [id number]  OT EVAL MOD COMPLEXITY 4 10/24/2019 Jaimee Bennett OT GO 1    [id number]  OT SELF CARE/MGMT/TRAIN EA 15 MIN 10/24/2019 Jaimee Bennett OT GO 1               Jaimee Bennett OT  10/24/2019

## 2019-10-24 NOTE — PROGRESS NOTES
Discharge Planning Assessment  [facility]     Patient Name: Day Cabrera  MRN: [id number]  Today's Date: 10/24/2019    Admit Date: 10/23/2019    Discharge Needs Assessment     Row Name 10/24/19 1302       Living Environment    Lives With  child(chapis), adult    Current Living Arrangements  home/apartment/condo    Primary Care Provided by  child(chapis)    Provides Primary Care For  no one    Family Caregiver if Needed  child(chapis), adult    Family Caregiver Names  Steve Armstrong    Quality of Family Relationships  involved       Resource/Environmental Concerns    Resource/Environmental Concerns  other (see comments) family requesting Memory Care placement/[facility]    Transportation Concerns  car, none       Transition Planning    Patient/Family Anticipates Transition to  long term care facility;inpatient rehabilitation facility    Patient/Family Anticipated Services at Transition  skilled nursing;rehabilitation services    Transportation Anticipated  family or friend will provide       Discharge Needs Assessment    Readmission Within the Last 30 Days  current reason for admission unrelated to previous admission    Concerns to be Addressed  discharge planning    Equipment Currently Used at Home  cane, straight    Anticipated Changes Related to Illness  inability to care for self    Equipment Needed After Discharge  none        Discharge Plan     Row Name 10/24/19 0672       Plan    Plan  PT eval pending.     Plan Comments  Met with patient at bedside, spoke with daughter over the phone who reports her goal is to get patient in to [facility]. OT recommends inpatient rehab, PT eval pending. Spoke with Mat in PT who reports patient will be seen this afternoon. Notified CRISTAL Zazueta, of daughter's goal for discharge. Discussed with Dr. Kiser the plan at recent discharge was home with daughter and for patient to attend adult  at [facility] while daughter worked; patient was denied at [facility] during  last admission. Spoke with Kendall, pharmacist, 30 day Eliquis out of pocket will be $2.80, free 30 day coupon left at bedside. Patient currenty on anticoagulation and antibiotics.             Expected Discharge Date and Time     Expected Discharge Date Expected Discharge Time    Oct 26, 2019         Demographic Summary     Row Name 10/24/19 1301       General Information    Admission Type  observation    Arrived From  home    Required Notices Provided  Observation Status Notice    Referral Source  admission list    Reason for Consult  discharge planning        Functional Status     Row Name 10/24/19 1302       Functional Status    Usual Activity Tolerance  moderate    Current Activity Tolerance  moderate       Functional Status, IADL    Medications  assistive person    Meal Preparation  assistive person    Housekeeping  assistive person    Laundry  assistive person    Shopping  assistive person       Mental Status    General Appearance WDL  WDL       Mental Status Summary    Recent Changes in Mental Status/Cognitive Functioning  memory (recent);memory (remote)        Rossi Truong  [phone]

## 2019-10-24 NOTE — CONSULTS
Palliative Care Consultation    Patient Code Status    Code Status and Medical Interventions:   Ordered at: 10/23/19 1554     Level Of Support Discussed With:    Patient     Code Status:    CPR     Medical Interventions (Level of Support Prior to Arrest):    Full       Requesting clinician:  Consulting Physician(s)     Provider Relationship Specialty    Fabrice Kiser MD Consulting Physician Hospitalist        Reason for consult: Consultation for clarification of goals of care and code status.      Chief Complaint    Generalized weakness    History of Present Illness    Day Cabrera is a 74 y.o. female who presented to the ED on 10/23/19 with complaints of weakness and shortness of air. Elevated D dimer with CT scan completed that show positive for PE.     Study Notes      Balbir Plaza on 10/23/2019  1:10 PM   soa  68cc isovue 370  mwm      Balbir Plaza on 10/23/2019  1:07 PM   Elevated D dimer      Appointment Information   PACS Images    Radiology Images   Study Result     DATE OF EXAM:  10/23/2019 1:02 PM  PROCEDURE:  CT CHEST PULMONARY EMBOLISM-     INDICATIONS:   elevated dimer + S  COMPARISON:   Portable chest radiograph 10/23/2019  TECHNIQUE:  Routine transaxial slices were obtained through chest after  administration of intravenous 68 ml of Isovue 370. Reconstructed coronal  and sagittal images were also obtained. Automated exposure control and  iterative reconstruction methods were used.   FINDINGS:  Pulmonary arteries are well-opacified with contrast. There are filling  defects within the right upper lobe pulmonary artery extending into  multiple segmental branches and within the right lower lobe pulmonary  artery consistent with acute pulmonary emboli. No embolus identified in  the left. No evidence of right heart strain. The main pulmonary artery  is normal in caliber. Heart size normal. Mild coronary calcifications.  No pericardial effusion. There is a small hiatal hernia.  The trachea and  mainstem bronchi are patent. No focal consolidation,  pneumothorax, or pleural effusion. The liver, spleen, adrenal glands are  within normal limits. Pancreas without findings of pancreatitis. The  gallbladder is absent. No free fluid in the upper abdomen. Osseous  structures intact. Disc disease noted at the C5-6 level with posterior  disc osteophyte.   IMPRESSION:  1. Acute pulmonary emboli involving right upper and right lower lobe  pulmonary arteries extending into segmental branches as above. No  findings of right heart strain or pulmonary infarct.  2. Coronary atherosclerotic disease.  3. Small hiatal hernia.     Findings discussed with the referring provider at 1:20 PM on 10/23/2019     Electronically Signed By-Rolan Fernandez On:10/23/2019 1:28 PM  This report was finalized on [id number] by  Rolan Fernandez, .   Imaging     CT Chest Pulmonary Embolism (Order: [id number]) - 10/23/2019   Reprint Order Requisition     CT Chest Pulmonary Embolism (Order #[id number]) on 10/23/19   Signed by     Signed Date/Time  Phone Pager   ROLAN FERNANDEZ 10/23/2019 13:28 [phone]      Patient is very pleasant but is extremely forgetful. We had the same conversation over and over again multiple time with the patent not remembering the answers.  She is able to tell me her  husbands name is june but doesn't know how long ago he passed. She can name her children, grand children and great grand children. But can not remember conversation we had when I walked into the room.          Advanced Care Planning    Advanced Directives: Patient does not have advance directive  Health Care Directive on file: Other (Comment)  Health Care Surrogate:      Comments: not in the EMR    Assessment/Plan   Shortness of air with + PE  Anxiety / depression  Hypertension  Weakness  Alzheimer dementia    Principal Problem:    Multiple subsegmental pulmonary emboli without acute cor pulmonale      Plan    Need further clarification with daughters  x3    Review of Systems   Unable to perform ROS: Dementia         Past Medical History:   Diagnosis Date   • Anxiety    • Arthritis    • Chest pain 10/15/2019   • Dementia (CMS/HCC)    • Hypertension      Past Surgical History:   Procedure Laterality Date   • CHOLECYSTECTOMY     • COLONOSCOPY     • HYSTERECTOMY      total      Family History   Problem Relation Age of Onset   • Heart disease Mother    • Alcohol abuse Father         murdered    • Pancreatic cancer Sister    • No Known Problems Half-Sister      Social History     Tobacco Use   • Smoking status: Former Smoker     Years: 30.00     Types: Cigarettes     Last attempt to quit:      Years since quittin.8   • Smokeless tobacco: Never Used   Substance Use Topics   • Alcohol use: No     Frequency: Never   • Drug use: No     Medications Prior to Admission   Medication Sig Dispense Refill Last Dose   • amLODIPine (NORVASC) 10 MG tablet Take 10 mg by mouth Daily.   Unknown at Unknown time   • amoxicillin (AMOXIL) 500 MG capsule Take 1 capsule by mouth 2 (Two) Times a Day for 4 days. 8 capsule 0    • donepezil (ARICEPT) 10 MG tablet Take 1 tablet by mouth Every Night. 30 tablet 3 10/15/2019 at Unknown time   • FLUoxetine (PROzac) 20 MG capsule Take 20 mg by mouth Daily.   10/15/2019 at Unknown time   • furosemide (LASIX) 20 MG tablet Take 20 mg by mouth Daily As Needed (for swelling).   10/15/2019 at Unknown time   • ondansetron (ZOFRAN) 4 MG tablet Take 1 tablet by mouth 4 (Four) Times a Day As Needed for Nausea or Vomiting. 20 tablet 0    • pantoprazole (PROTONIX) 40 MG EC tablet Take 1 tablet by mouth Daily for 30 days. 30 tablet 0        Allergies  Ciprofloxacin    Scheduled Meds:    amLODIPine 10 mg Oral Daily   amoxicillin 500 mg Oral BID   [START ON 10/25/2019] apixaban 10 mg Oral Q12H   Followed by      [START ON 2019] apixaban 5 mg Oral Q12H   donepezil 10 mg Oral Nightly   enoxaparin 80 mg Subcutaneous Q12H   FLUoxetine 20 mg Oral Daily    pantoprazole 40 mg Oral Daily   sodium chloride 10 mL Intravenous Q12H     Continuous Infusions:     PRN Meds:  •  aluminum-magnesium hydroxide-simethicone  •  bisacodyl  •  furosemide  •  magnesium hydroxide  •  melatonin  •  ondansetron **OR** ondansetron  •  [COMPLETED] Insert peripheral IV **AND** sodium chloride  •  sodium chloride    Lab Results (last 24 hours)     Procedure Component Value Units Date/Time    Basic Metabolic Panel [[id number]]  (Abnormal) Collected:  10/24/19 0410    Specimen:  Blood Updated:  10/24/19 0506     Glucose 109 mg/dL      BUN 9 mg/dL      Creatinine 0.93 mg/dL      Sodium 137 mmol/L      Potassium 4.2 mmol/L      Chloride 100 mmol/L      CO2 26.0 mmol/L      Calcium 9.1 mg/dL      eGFR Non African Amer 59 mL/min/1.73      BUN/Creatinine Ratio 9.7     Anion Gap 11.0 mmol/L     Narrative:       GFR Normal >60  Chronic Kidney Disease <60  Kidney Failure <15    aPTT [[id number]]  (Abnormal) Collected:  10/24/19 0410    Specimen:  Blood Updated:  10/24/19 0445     .5 seconds     CBC Auto Differential [[id number]]  (Abnormal) Collected:  10/24/19 0410    Specimen:  Blood Updated:  10/24/19 0429     WBC 7.90 10*3/mm3      RBC 4.46 10*6/mm3      Hemoglobin 14.2 g/dL      Hematocrit 41.6 %      MCV 93.4 fL      MCH 31.8 pg      MCHC 34.1 g/dL      RDW 13.9 %      RDW-SD 45.5 fl      MPV 9.4 fL      Platelets 240 10*3/mm3      Neutrophil % 53.2 %      Lymphocyte % 34.3 %      Monocyte % 8.0 %      Eosinophil % 2.6 %      Basophil % 1.9 %      Neutrophils, Absolute 4.20 10*3/mm3      Lymphocytes, Absolute 2.70 10*3/mm3      Monocytes, Absolute 0.60 10*3/mm3      Eosinophils, Absolute 0.20 10*3/mm3      Basophils, Absolute 0.10 10*3/mm3      nRBC 0.4 /100 WBC     aPTT [[id number]]  (Abnormal) Collected:  10/23/19 1958    Specimen:  Blood Updated:  10/23/19 2044     .4 seconds               Palliative Assessment     Vital Signs (last 24 hours)       10/23 0700  -  10/24 0659 10/24  0700  -  10/24 1455   Most Recent    Temp (°F) 97.5 -  97.8      97.8     97.8 (36.6)    Heart Rate 67 -  89    72 -  75     75    Resp 16 -  20      16     16    /58 -  135/60    118/71 -  130/74     118/71    SpO2 (%) 95 -  100      97     97        Physical Exam   Constitutional: She appears well-developed and well-nourished.   HENT:   Head: Normocephalic.   Eyes: Pupils are equal, round, and reactive to light.   Neck: Normal range of motion.   Cardiovascular: Normal rate.   Pulmonary/Chest: Effort normal and breath sounds normal.   Abdominal: Soft. Bowel sounds are normal.   Musculoskeletal: Normal range of motion.   Generalized edema   Skin: Skin is warm and dry.   Vitals reviewed.        Decisional Capacity: questionable  Patient's understanding of illness: questionable  Patient goals of care:  Need further clarification with daughter Ellie Armstrong, & Jolene Morales, [place]

## 2019-10-25 VITALS
TEMPERATURE: 97.7 F | SYSTOLIC BLOOD PRESSURE: 113 MMHG | RESPIRATION RATE: 18 BRPM | OXYGEN SATURATION: 95 % | BODY MASS INDEX: 33.8 KG/M2 | DIASTOLIC BLOOD PRESSURE: 78 MMHG | WEIGHT: 197.97 LBS | HEART RATE: 73 BPM | HEIGHT: 64 IN

## 2019-10-25 LAB
ANION GAP SERPL CALCULATED.3IONS-SCNC: 11 MMOL/L (ref 5–15)
BASOPHILS # BLD AUTO: 0.1 10*3/MM3 (ref 0–0.2)
BASOPHILS NFR BLD AUTO: 1.2 % (ref 0–1.5)
BUN BLD-MCNC: 8 MG/DL (ref 8–23)
BUN/CREAT SERPL: 8.3 (ref 7–25)
CALCIUM SPEC-SCNC: 9 MG/DL (ref 8.6–10.5)
CHLORIDE SERPL-SCNC: 102 MMOL/L (ref 98–107)
CO2 SERPL-SCNC: 26 MMOL/L (ref 22–29)
CREAT BLD-MCNC: 0.96 MG/DL (ref 0.57–1)
D DIMER PPP FEU-MCNC: 0.84 MCGFEU/ML (ref 0.17–0.59)
DEPRECATED RDW RBC AUTO: 43.3 FL (ref 37–54)
EOSINOPHIL # BLD AUTO: 0.1 10*3/MM3 (ref 0–0.4)
EOSINOPHIL NFR BLD AUTO: 1.7 % (ref 0.3–6.2)
ERYTHROCYTE [DISTWIDTH] IN BLOOD BY AUTOMATED COUNT: 13.7 % (ref 12.3–15.4)
GFR SERPL CREATININE-BSD FRML MDRD: 57 ML/MIN/1.73
GLUCOSE BLD-MCNC: 119 MG/DL (ref 65–99)
HCT VFR BLD AUTO: 38.6 % (ref 34–46.6)
HGB BLD-MCNC: 13.5 G/DL (ref 12–15.9)
LYMPHOCYTES # BLD AUTO: 2.3 10*3/MM3 (ref 0.7–3.1)
LYMPHOCYTES NFR BLD AUTO: 38 % (ref 19.6–45.3)
MCH RBC QN AUTO: 31.9 PG (ref 26.6–33)
MCHC RBC AUTO-ENTMCNC: 35 G/DL (ref 31.5–35.7)
MCV RBC AUTO: 91 FL (ref 79–97)
MONOCYTES # BLD AUTO: 0.5 10*3/MM3 (ref 0.1–0.9)
MONOCYTES NFR BLD AUTO: 8 % (ref 5–12)
NEUTROPHILS # BLD AUTO: 3.1 10*3/MM3 (ref 1.7–7)
NEUTROPHILS NFR BLD AUTO: 51.1 % (ref 42.7–76)
NRBC BLD AUTO-RTO: 0.1 /100 WBC (ref 0–0.2)
PLATELET # BLD AUTO: 212 10*3/MM3 (ref 140–450)
PMV BLD AUTO: 8.7 FL (ref 6–12)
POTASSIUM BLD-SCNC: 3.5 MMOL/L (ref 3.5–5.2)
RBC # BLD AUTO: 4.24 10*6/MM3 (ref 3.77–5.28)
SODIUM BLD-SCNC: 139 MMOL/L (ref 136–145)
TROPONIN T SERPL-MCNC: <0.01 NG/ML (ref 0–0.03)
WBC NRBC COR # BLD: 6.1 10*3/MM3 (ref 3.4–10.8)

## 2019-10-25 PROCEDURE — 93005 ELECTROCARDIOGRAM TRACING: CPT | Performed by: INTERNAL MEDICINE

## 2019-10-25 PROCEDURE — 80048 BASIC METABOLIC PNL TOTAL CA: CPT | Performed by: INTERNAL MEDICINE

## 2019-10-25 PROCEDURE — 85025 COMPLETE CBC W/AUTO DIFF WBC: CPT | Performed by: PHYSICIAN ASSISTANT

## 2019-10-25 PROCEDURE — 85379 FIBRIN DEGRADATION QUANT: CPT | Performed by: INTERNAL MEDICINE

## 2019-10-25 PROCEDURE — 96375 TX/PRO/DX INJ NEW DRUG ADDON: CPT

## 2019-10-25 PROCEDURE — 84484 ASSAY OF TROPONIN QUANT: CPT | Performed by: INTERNAL MEDICINE

## 2019-10-25 PROCEDURE — 99217 PR OBSERVATION CARE DISCHARGE MANAGEMENT: CPT | Performed by: INTERNAL MEDICINE

## 2019-10-25 PROCEDURE — 25010000002 LORAZEPAM PER 2 MG: Performed by: INTERNAL MEDICINE

## 2019-10-25 PROCEDURE — G0378 HOSPITAL OBSERVATION PER HR: HCPCS

## 2019-10-25 RX ORDER — NITROGLYCERIN 0.4 MG/1
TABLET SUBLINGUAL
Status: COMPLETED
Start: 2019-10-25 | End: 2019-10-25

## 2019-10-25 RX ORDER — NITROGLYCERIN 0.4 MG/1
0.4 TABLET SUBLINGUAL
Qty: 30 TABLET | Refills: 12
Start: 2019-10-25

## 2019-10-25 RX ORDER — NITROGLYCERIN 0.4 MG/1
0.4 TABLET SUBLINGUAL
Status: DISCONTINUED | OUTPATIENT
Start: 2019-10-25 | End: 2019-10-25 | Stop reason: HOSPADM

## 2019-10-25 RX ORDER — ALPRAZOLAM 0.25 MG/1
0.25 TABLET ORAL NIGHTLY PRN
Qty: 30 TABLET | Refills: 0 | Status: SHIPPED | OUTPATIENT
Start: 2019-10-25 | End: 2019-11-03

## 2019-10-25 RX ORDER — LORAZEPAM 2 MG/ML
0.25 INJECTION INTRAMUSCULAR 2 TIMES DAILY
Status: DISCONTINUED | OUTPATIENT
Start: 2019-10-25 | End: 2019-10-25 | Stop reason: HOSPADM

## 2019-10-25 RX ORDER — RISPERIDONE 0.25 MG/1
0.25 TABLET ORAL NIGHTLY
Qty: 30 TABLET | Refills: 0
Start: 2019-10-25 | End: 2020-08-10

## 2019-10-25 RX ADMIN — APIXABAN 10 MG: 5 TABLET, FILM COATED ORAL at 10:13

## 2019-10-25 RX ADMIN — NITROGLYCERIN: 0.4 TABLET SUBLINGUAL at 11:17

## 2019-10-25 RX ADMIN — FLUOXETINE 20 MG: 20 CAPSULE ORAL at 10:13

## 2019-10-25 RX ADMIN — PANTOPRAZOLE SODIUM 40 MG: 40 TABLET, DELAYED RELEASE ORAL at 10:13

## 2019-10-25 RX ADMIN — Medication 10 ML: at 10:11

## 2019-10-25 RX ADMIN — LORAZEPAM 0.25 MG: 2 INJECTION INTRAMUSCULAR at 11:10

## 2019-10-25 RX ADMIN — AMOXICILLIN 500 MG: 250 CAPSULE ORAL at 10:13

## 2019-10-25 RX ADMIN — AMLODIPINE BESYLATE 10 MG: 5 TABLET ORAL at 10:14

## 2019-10-25 NOTE — PROGRESS NOTES
Continued Stay Note  ROSY Collier     Patient Name: Day Cabrera  MRN: [id number]  Today's Date: 10/25/2019    Admit Date: 10/23/2019    Discharge Plan     Row Name 10/25/19 1554       Plan    Plan  [facility] accepted, precert pending, started 10/24. See [facility]W for PASRR.     Plan Comments  Met with patient and her sister-in-law, Mindy, at bedside. Patient and family are currently agreeable to inpatient rehab. Patient currently on antibiotics and anticoagulant.             Expected Discharge Date and Time     Expected Discharge Date Expected Discharge Time    Oct 26, 2019           Rossi Truong  [phone]

## 2019-10-25 NOTE — PLAN OF CARE
Problem: Patient Care Overview  Goal: Plan of Care Review  Outcome: Ongoing (interventions implemented as appropriate)    Goal: Individualization and Mutuality  Outcome: Ongoing (interventions implemented as appropriate)    Goal: Discharge Needs Assessment  Outcome: Ongoing (interventions implemented as appropriate)      Problem: Fall Risk (Adult)  Goal: Identify Related Risk Factors and Signs and Symptoms  Outcome: Ongoing (interventions implemented as appropriate)    Goal: Absence of Fall  Outcome: Ongoing (interventions implemented as appropriate)      Problem: Skin Injury Risk (Adult)  Goal: Identify Related Risk Factors and Signs and Symptoms  Outcome: Ongoing (interventions implemented as appropriate)    Goal: Skin Health and Integrity  Outcome: Ongoing (interventions implemented as appropriate)      Problem: VTE, DVT and PE (Adult)  Goal: Signs and Symptoms of Listed Potential Problems Will be Absent, Minimized or Managed (VTE, DVT and PE)  Outcome: Ongoing (interventions implemented as appropriate)

## 2019-10-25 NOTE — PLAN OF CARE
Problem: Patient Care Overview  Goal: Plan of Care Review  Outcome: Ongoing (interventions implemented as appropriate)   10/25/19 0208   Coping/Psychosocial   Plan of Care Reviewed With patient   OTHER   Outcome Summary pt continues to have moments of confusion. Pt frequently calls to let staff know she thinks she has been abandoned and no one is checking on her. Currently sleeping comfortably. Will continue to monitor.        Problem: Fall Risk (Adult)  Goal: Identify Related Risk Factors and Signs and Symptoms  Outcome: Ongoing (interventions implemented as appropriate)   10/25/19 0208   Fall Risk (Adult)   Related Risk Factors (Fall Risk) confusion/agitation;bladder function altered;gait/mobility problems;history of falls;sensory deficits;environment unfamiliar     Goal: Absence of Fall  Outcome: Ongoing (interventions implemented as appropriate)   10/25/19 0208   Fall Risk (Adult)   Absence of Fall making progress toward outcome       Problem: Skin Injury Risk (Adult)  Goal: Identify Related Risk Factors and Signs and Symptoms  Outcome: Ongoing (interventions implemented as appropriate)   10/25/19 0208   Skin Injury Risk (Adult)   Related Risk Factors (Skin Injury Risk) cognitive impairment;infection     Goal: Skin Health and Integrity  Outcome: Ongoing (interventions implemented as appropriate)   10/25/19 0208   Skin Injury Risk (Adult)   Skin Health and Integrity making progress toward outcome

## 2019-10-25 NOTE — PROGRESS NOTES
Continued Stay Note  ROSY Collier     Patient Name: Day Cabrera  MRN: [id number]  Today's Date: 10/25/2019    Admit Date: 10/23/2019    Discharge Plan     Row Name 10/25/19 1611       Plan    Plan  DC Plan: [facility] accepted. PASRR approved. Precert approved 10/25 and is good for 72 hrs.     Patient/Family in Agreement with Plan  yes    Row Name 10/25/19 8024          KAREEN Capellan    Phone # [phone]  Cell #[phone]  Fax#[phone]  [email or web address]    KAREEN Capellan

## 2019-10-25 NOTE — DISCHARGE SUMMARY
Date of Admission: 10/23/2019    Date of Discharge:  10/25/2019    Length of stay:  LOS: 0 days     Discharge Diagnosis:     Multiple subsegmental pulmonary emboli without acute cor pulmonale   Shortness of breath and chest pressure secondary to pulmonary emboli  - CT chest showed Acute pulmonary emboli involving right upper and right lower lobe  pulmonary arteries extending into segmental branches as above. No  findings of right heart strain or pulmonary infarct.Coronary atherosclerotic disease.Small hiatal hernia.  Chest x-ray is unremarkable.    - EKG showed Sinus rhythm, Left anterior fascicular block, Probable anteroseptal infarct, old.    - D-dimer 1.42  -  Patient started on heparin drip in the ED.  -switched to oral anticoagulation     Weakness- Likely mutifactorial  - PT/OT consulted      Recent diagnosis of UTI  -Continue amoxicillin     Essential hypertension-Controlled  -Continue amlodipine 9     Bilateral lower leg edema  -Continue Lasix as needed     GERD  -Continue pantoprazole     Depression  -Continue Prozac     Dementia  -Patient alert and oriented x2 at this time  -Continue Aricept     Obesity  -BMI 31.5  -Encouraged lifestyle modifications      Presenting Problem/History of Present Illness  Hospital Course     74-year-old  female with a past medical history of anxiety, depression, hypertension, and obesity who presented to [facility] on 10/23/2019 with complaints of shortness of breath, weakness, and chest pressure.  Per the sister-in-law at bedside the patient was just relieved yesterday.  She was hospitalized then for weakness, shortness of breath, chest pain, and was diagnosed with the UTI.  When the patient was discharged yesterday and got home she still have the same complaints so family brought her back into the ED.  Patient was able to admit she is still having shortness of breath and chest pressure.  She describes her chest pressure as a 6 out of 10.  She states pain  medication helps the chest pressure and tension makes it worse.She denies any recent nausea, vomiting, diarrhea, fever, chills.      In the ED, CT chest showed Acute pulmonary emboli involving right upper and right lower lobe  pulmonary arteries extending into segmental branches as above. No  findings of right heart strain or pulmonary infarct.Coronary atherosclerotic disease.Small hiatal hernia.  Chest x-ray is unremarkable.  EKG showed Sinus rhythm, Left anterior fascicular block, Probable anteroseptal infarct, old.  All labs unremarkable upon admission except d-dimer 1.42.  All vital signs stable upon admission.  Patient started on heparin drip in the ED.     Upon review of patient, patient just recently discharged 10/17/2019.  She was admitted from 10/15-10/16 for a chest pain rule out, which was negative.  She was found to have a UTI and was treated with Rocephin while inpatient.  She was discharged home on Amoxicillin, but reportedly did not take any of this yet     10/24 weakness, denies chest pain  10/25 c/o chest pain, will dc to [place]    Active Hospital Problems    Diagnosis  POA   • **Multiple subsegmental pulmonary emboli without acute cor pulmonale [I26.94]  Yes      Resolved Hospital Problems   No resolved problems to display.          Past Medical History:     Past Medical History:   Diagnosis Date   • Anxiety    • Arthritis    • Chest pain 10/15/2019   • Dementia (CMS/HCC)    • Hypertension        Past Surgical History:     Past Surgical History:   Procedure Laterality Date   • CHOLECYSTECTOMY     • COLONOSCOPY     • HYSTERECTOMY      total        Social History:   Social History     Socioeconomic History   • Marital status:      Spouse name: Not on file   • Number of children: 3   • Years of education: Not on file   • Highest education level: Not on file   Tobacco Use   • Smoking status: Former Smoker     Years: 30.00     Types: Cigarettes     Last attempt to quit: 1984     Years since quitting:  35.8   • Smokeless tobacco: Never Used   Substance and Sexual Activity   • Alcohol use: No     Frequency: Never   • Drug use: No   • Sexual activity: Defer       Procedures Performed         Consults:   Consults     Date and Time Order Name Status Description    10/23/2019 1341 Hospitalist (on-call MD unless specified) Completed     10/18/2019 1539 Inpatient Psychiatrist Consult Completed     10/17/2019 1124 Hospitalist (on-call MD unless specified) Completed     10/15/2019 1355 Hospitalist (on-call MD unless specified) Completed           Pertinent Test Results:     Lab Results (most recent)     Procedure Component Value Units Date/Time    Basic Metabolic Panel [[id number]]  (Abnormal) Collected:  10/25/19 1152    Specimen:  Blood Updated:  10/25/19 1333     Glucose 119 mg/dL      BUN 8 mg/dL      Creatinine 0.96 mg/dL      Sodium 139 mmol/L      Potassium 3.5 mmol/L      Chloride 102 mmol/L      CO2 26.0 mmol/L      Calcium 9.0 mg/dL      eGFR Non African Amer 57 mL/min/1.73      BUN/Creatinine Ratio 8.3     Anion Gap 11.0 mmol/L     Narrative:       GFR Normal >60  Chronic Kidney Disease <60  Kidney Failure <15    Troponin [[id number]]  (Normal) Collected:  10/25/19 1152    Specimen:  Blood Updated:  10/25/19 1332     Troponin T <0.010 ng/mL     Narrative:       Troponin T Reference Range:  <= 0.03 ng/mL-   Negative for AMI  >0.03 ng/mL-     Abnormal for myocardial necrosis.  Clinicians would have to utilize clinical acumen, EKG, Troponin and serial changes to determine if it is an Acute Myocardial Infarction or myocardial injury due to an underlying chronic condition.     D-dimer, Quantitative [[id number]]  (Abnormal) Collected:  10/25/19 1152    Specimen:  Blood Updated:  10/25/19 1304     D-Dimer, Quantitative 0.84 MCGFEU/mL     Narrative:       Reference Range  --------------------------------------------------------------------     < 0.50   Negative Predictive Value  0.50-0.59   Indeterminate    >= 0.60    Probable VTE             A very low percentage of patients with DVT may yield D-Dimer results   below the cut-off of 0.50 MCGFEU/mL.  This is known to be more   prevalent in patients with distal DVT.             Results of this test should always be interpreted in conjunction with   the patient's medical history, clinical presentation and other   findings.  Clinical diagnosis should not be based on the result of   INNOVANCE D-Dimer alone.    CBC & Differential [[id number]] Collected:  10/25/19 0300    Specimen:  Blood Updated:  10/25/19 0324    Narrative:       The following orders were created for panel order CBC & Differential.  Procedure                               Abnormality         Status                     ---------                               -----------         ------                     CBC Auto Differential[[id number]]        Normal              Final result                 Please view results for these tests on the individual orders.    CBC Auto Differential [[id number]]  (Normal) Collected:  10/25/19 0300    Specimen:  Blood Updated:  10/25/19 0324     WBC 6.10 10*3/mm3      RBC 4.24 10*6/mm3      Hemoglobin 13.5 g/dL      Hematocrit 38.6 %      MCV 91.0 fL      MCH 31.9 pg      MCHC 35.0 g/dL      RDW 13.7 %      RDW-SD 43.3 fl      MPV 8.7 fL      Platelets 212 10*3/mm3      Neutrophil % 51.1 %      Lymphocyte % 38.0 %      Monocyte % 8.0 %      Eosinophil % 1.7 %      Basophil % 1.2 %      Neutrophils, Absolute 3.10 10*3/mm3      Lymphocytes, Absolute 2.30 10*3/mm3      Monocytes, Absolute 0.50 10*3/mm3      Eosinophils, Absolute 0.10 10*3/mm3      Basophils, Absolute 0.10 10*3/mm3      nRBC 0.1 /100 WBC     Basic Metabolic Panel [[id number]]  (Abnormal) Collected:  10/24/19 0410    Specimen:  Blood Updated:  10/24/19 0506     Glucose 109 mg/dL      BUN 9 mg/dL      Creatinine 0.93 mg/dL      Sodium 137 mmol/L      Potassium 4.2 mmol/L      Chloride 100 mmol/L      CO2 26.0 mmol/L      Calcium 9.1  mg/dL      eGFR Non African Amer 59 mL/min/1.73      BUN/Creatinine Ratio 9.7     Anion Gap 11.0 mmol/L     Narrative:       GFR Normal >60  Chronic Kidney Disease <60  Kidney Failure <15    aPTT [[id number]]  (Abnormal) Collected:  10/24/19 0410    Specimen:  Blood Updated:  10/24/19 0445     .5 seconds     CBC Auto Differential [[id number]]  (Abnormal) Collected:  10/24/19 0410    Specimen:  Blood Updated:  10/24/19 0429     WBC 7.90 10*3/mm3      RBC 4.46 10*6/mm3      Hemoglobin 14.2 g/dL      Hematocrit 41.6 %      MCV 93.4 fL      MCH 31.8 pg      MCHC 34.1 g/dL      RDW 13.9 %      RDW-SD 45.5 fl      MPV 9.4 fL      Platelets 240 10*3/mm3      Neutrophil % 53.2 %      Lymphocyte % 34.3 %      Monocyte % 8.0 %      Eosinophil % 2.6 %      Basophil % 1.9 %      Neutrophils, Absolute 4.20 10*3/mm3      Lymphocytes, Absolute 2.70 10*3/mm3      Monocytes, Absolute 0.60 10*3/mm3      Eosinophils, Absolute 0.20 10*3/mm3      Basophils, Absolute 0.10 10*3/mm3      nRBC 0.4 /100 WBC     aPTT [[id number]]  (Abnormal) Collected:  10/23/19 1958    Specimen:  Blood Updated:  10/23/19 2044     .4 seconds     Protime-INR [[id number]]  (Normal) Collected:  10/23/19 1137    Specimen:  Blood from Arm, Left Updated:  10/23/19 1353     Protime 11.0 Seconds      INR 1.06    Extra Tubes [[id number]] Collected:  10/23/19 1137    Specimen:  Blood, Venous Line Updated:  10/23/19 1245    Narrative:       The following orders were created for panel order Extra Tubes.  Procedure                               Abnormality         Status                     ---------                               -----------         ------                     Gold Top - SST[[id number]]                                   Final result                 Please view results for these tests on the individual orders.    Gold Top - SST [[id number]] Collected:  10/23/19 1137    Specimen:  Blood Updated:  10/23/19 1245     Extra Tube Hold for add-ons.      Comment: Auto resulted.       Troponin [[id number]]  (Normal) Collected:  10/23/19 1137    Specimen:  Blood from Arm, Left Updated:  10/23/19 1218     Troponin T <0.010 ng/mL     Narrative:       Troponin T Reference Range:  <= 0.03 ng/mL-   Negative for AMI  >0.03 ng/mL-     Abnormal for myocardial necrosis.  Clinicians would have to utilize clinical acumen, EKG, Troponin and serial changes to determine if it is an Acute Myocardial Infarction or myocardial injury due to an underlying chronic condition.     BNP [[id number]]  (Normal) Collected:  10/23/19 1137    Specimen:  Blood from Arm, Left Updated:  10/23/19 1218     proBNP 81.4 pg/mL     Narrative:       Among patients with dyspnea, NT-proBNP is highly sensitive for the detection of acute congestive heart failure. In addition NT-proBNP of <300 pg/ml effectively rules out acute congestive heart failure with 99% negative predictive value.    D-dimer, Quantitative [[id number]]  (Abnormal) Collected:  10/23/19 1137    Specimen:  Blood from Arm, Left Updated:  10/23/19 1214     D-Dimer, Quantitative 1.42 MCGFEU/mL     Narrative:       Reference Range  --------------------------------------------------------------------     < 0.50   Negative Predictive Value  0.50-0.59   Indeterminate    >= 0.60   Probable VTE             A very low percentage of patients with DVT may yield D-Dimer results   below the cut-off of 0.50 MCGFEU/mL.  This is known to be more   prevalent in patients with distal DVT.             Results of this test should always be interpreted in conjunction with   the patient's medical history, clinical presentation and other   findings.  Clinical diagnosis should not be based on the result of   INNOVANCE D-Dimer alone.    CBC & Differential [[id number]] Collected:  10/23/19 1137    Specimen:  Blood Updated:  10/23/19 1157    Narrative:       The following orders were created for panel order CBC & Differential.  Procedure                                Abnormality         Status                     ---------                               -----------         ------                     CBC Auto Differential[[id number]]        Normal              Final result                 Please view results for these tests on the individual orders.                Imaging Results (all)     Procedure Component Value Units Date/Time    CT Chest Pulmonary Embolism [[id number]] Collected:  10/23/19 1321     Updated:  10/23/19 1330    Narrative:          DATE OF EXAM:  10/23/2019 1:02 PM     PROCEDURE:  CT CHEST PULMONARY EMBOLISM-     INDICATIONS:   elevated dimer + SOA     COMPARISON:   Portable chest radiograph 10/23/2019     TECHNIQUE:  Routine transaxial slices were obtained through chest after  administration of intravenous 68 ml of Isovue 370. Reconstructed coronal  and sagittal images were also obtained. Automated exposure control and  iterative reconstruction methods were used.      FINDINGS:  Pulmonary arteries are well-opacified with contrast. There are filling  defects within the right upper lobe pulmonary artery extending into  multiple segmental branches and within the right lower lobe pulmonary  artery consistent with acute pulmonary emboli. No embolus identified in  the left. No evidence of right heart strain. The main pulmonary artery  is normal in caliber. Heart size normal. Mild coronary calcifications.  No pericardial effusion. There is a small hiatal hernia.     The trachea and mainstem bronchi are patent. No focal consolidation,  pneumothorax, or pleural effusion. The liver, spleen, adrenal glands are  within normal limits. Pancreas without findings of pancreatitis. The  gallbladder is absent. No free fluid in the upper abdomen. Osseous  structures intact. Disc disease noted at the C5-6 level with posterior  disc osteophyte.        Impression:       1. Acute pulmonary emboli involving right upper and right lower lobe  pulmonary arteries extending into segmental  branches as above. No  findings of right heart strain or pulmonary infarct.  2. Coronary atherosclerotic disease.  3. Small hiatal hernia.     Findings discussed with the referring provider at 1:20 PM on 10/23/2019     Electronically Signed By-Rolan Fernandez On:10/23/2019 1:28 PM  This report was finalized on [id number] by  Rolan Fernandez, .    XR Chest 1 View [[id number]] Collected:  10/23/19 1128     Updated:  10/23/19 1131    Narrative:       DATE OF EXAM:  10/23/2019 11:20 AM     PROCEDURE:  XR CHEST 1 VW-     INDICATIONS:  chest pain and heaviness     COMPARISON:  10/15/2019.     TECHNIQUE:   Single radiographic AP view of the chest was obtained.     FINDINGS:  A single view of the chest reveals no cardiac enlargement and no focal  infiltrate. No pneumothorax is seen. There is chronic calcified  granulomatous disease of the chest. No significant interval change is  appreciated since the 10/15/2019 study.       Impression:       No acute cardiopulmonary disease is seen radiographically.        Electronically Signed By-Dr. Alex Tomlin MD On:10/23/2019 11:29 AM  This report was finalized on [id number] by Dr. Alex Tomlin MD.          ECG/EMG Results (most recent)     Procedure Component Value Units Date/Time    ECG 12 Lead [[id number]] Collected:  10/23/19 1042     Updated:  10/24/19 0841    Narrative:       HEART RATE= 92  bpm  RR Interval= 652  ms  [place] Interval= 161  ms  P Horizontal Axis= 9  deg  P Front Axis= 17  deg  QRSD Interval= 87  ms  QT Interval= 365  ms  QRS Axis= -57  deg  T Wave Axis= -58  deg  - ABNORMAL ECG -  Sinus rhythm  Probable anteroseptal infarct, old  When compared with ECG of 17-Oct-2019 9:37:59,  No significant change  Electronically Signed By: Giacomo Pierre (BRIAN) 24-Oct-2019 08:40:55  Date and Time of Study: 2019-10-23 10:42:04    ECG 12 Lead [[id number]] Collected:  10/25/19 1308     Updated:  10/25/19 1313    Narrative:       HEART RATE= 77  bpm  RR Interval= 780  ms  KY Interval= 175   ms  P Horizontal Axis= 4  deg  P Front Axis= 47  deg  QRSD Interval= 95  ms  QT Interval= 395  ms  QRS Axis= -46  deg  T Wave Axis= 137  deg  - ABNORMAL ECG -  Sinus rhythm  Left anterior fascicular block  LVH with secondary repolarization abnormality  Anterior Q waves, possibly due to LVH  Electronically Signed By:   Date and Time of Study: 2019-10-25 13:08:52          Condition on Discharge:  stable    Vital Signs  Temp:  [97.7 °F (36.5 °C)-97.8 °F (36.6 °C)] 97.7 °F (36.5 °C)  Heart Rate:  [73-82] 73  Resp:  [18] 18  BP: (113-123)/(70-78) 113/78    Physical Exam:     General Appearance:    Alert, cooperative, in no acute distress   Head:    Normocephalic, without obvious abnormality, atraumatic   Eyes:            Lids and lashes normal, conjunctivae and sclerae normal, no   icterus, no pallor, corneas clear, PERRLA   Neck:   No adenopathy, supple, trachea midline, no thyromegaly, no   carotid bruit, no JVD   Back:     No kyphosis present, no scoliosis present, no skin lesions,      erythema or scars, no tenderness to percussion or                   palpation,   range of motion normal   Lungs:     Clear to auscultation,respirations regular, even and                  unlabored    Heart:    Regular rhythm and normal rate, normal S1 and S2, no            murmur, no gallop, no rub, no click   Chest Wall:    No abnormalities observed   Abdomen:     Normal bowel sounds, no masses, no organomegaly, soft        non-tender, non-distended, no guarding, no rebound                tenderness   Extremities:   Moves all extremities well, no edema, no cyanosis, no             redness   Skin:   No bleeding, bruising or rash   Neurologic:   Cranial nerves 2 - 12 grossly intact, sensation intact, DTR       present and equal bilaterally       Discharge Disposition  Skilled Nursing Facility (DC - External)    Discharge Medications     Discharge Medications      New Medications      Instructions Start Date   ALPRAZolam 0.25 MG  tablet  Commonly known as:  XANAX   0.25 mg, Oral, Nightly PRN      apixaban 5 MG tablet tablet  Commonly known as:  ELIQUIS   10 mg, Oral, Every 12 Hours Scheduled      apixaban 5 MG tablet tablet  Commonly known as:  ELIQUIS   5 mg, Oral, Every 12 Hours Scheduled   Start Date:  11/1/2019     magnesium hydroxide 2400 MG/10ML suspension suspension  Commonly known as:  MILK OF MAGNESIA   10 mL, Oral, Daily PRN      nitroglycerin 0.4 MG SL tablet  Commonly known as:  NITROSTAT   0.4 mg, Sublingual, Every 5 Minutes PRN, Take no more than 3 doses in 15 minutes.      risperiDONE 0.25 MG tablet  Commonly known as:  risperDAL   0.25 mg, Oral, Nightly         Continue These Medications      Instructions Start Date   amLODIPine 10 MG tablet  Commonly known as:  NORVASC   10 mg, Oral, Daily      amoxicillin 500 MG capsule  Commonly known as:  AMOXIL   500 mg, Oral, 2 Times Daily      donepezil 10 MG tablet  Commonly known as:  ARICEPT   10 mg, Oral, Nightly      FLUoxetine 20 MG capsule  Commonly known as:  PROzac   20 mg, Oral, Daily      furosemide 20 MG tablet  Commonly known as:  LASIX   20 mg, Oral, Daily PRN      ondansetron 4 MG tablet  Commonly known as:  ZOFRAN   4 mg, Oral, 4 Times Daily PRN      pantoprazole 40 MG EC tablet  Commonly known as:  PROTONIX   40 mg, Oral, Daily             Discharge Diet:   Diet Instructions     Diet: Consistent Carbohydrate      Discharge Diet:  Consistent Carbohydrate          Activity at Discharge:   Activity Instructions     Gradually Increase Activity Until at Pre-Hospitalization Level            Follow-up Appointments  No future appointments.      Test Results Pending at Discharge       Risk for Readmission (LACE) Score: 7 (10/25/2019  6:00 AM)          Fabrice Kiser MD  10/25/19  4:59 PM    Time: Discharge 35 min

## 2019-10-28 NOTE — PROGRESS NOTES
Case Management Discharge Note    Final Note: [place].         Final Discharge Disposition Code: 03 - skilled nursing facility (SNF)

## 2019-11-04 RX ORDER — FUROSEMIDE 20 MG/1
TABLET ORAL
Qty: 30 TABLET | Refills: 0 | OUTPATIENT
Start: 2019-11-04

## 2020-07-17 PROCEDURE — 93010 ELECTROCARDIOGRAM REPORT: CPT | Performed by: INTERNAL MEDICINE

## 2020-08-06 ENCOUNTER — PATIENT OUTREACH (OUTPATIENT)
Dept: FAMILY MEDICINE CLINIC | Facility: CLINIC | Age: 75
End: 2020-08-06

## 2020-08-10 ENCOUNTER — HOSPITAL ENCOUNTER (INPATIENT)
Facility: HOSPITAL | Age: 75
LOS: 5 days | Discharge: NURSING, REHAB OR LONG-TERM CARE | End: 2020-08-15
Attending: EMERGENCY MEDICINE | Admitting: INTERNAL MEDICINE

## 2020-08-10 ENCOUNTER — APPOINTMENT (OUTPATIENT)
Dept: GENERAL RADIOLOGY | Facility: HOSPITAL | Age: 75
End: 2020-08-10

## 2020-08-10 DIAGNOSIS — F03.91 DEMENTIA WITH BEHAVIORAL DISTURBANCE, UNSPECIFIED DEMENTIA TYPE: ICD-10-CM

## 2020-08-10 DIAGNOSIS — A41.9 SEVERE SEPSIS (HCC): ICD-10-CM

## 2020-08-10 DIAGNOSIS — R65.20 SEVERE SEPSIS (HCC): ICD-10-CM

## 2020-08-10 DIAGNOSIS — J12.82 PNEUMONIA DUE TO 2019 NOVEL CORONAVIRUS: Primary | ICD-10-CM

## 2020-08-10 DIAGNOSIS — U07.1 PNEUMONIA DUE TO 2019 NOVEL CORONAVIRUS: Primary | ICD-10-CM

## 2020-08-10 DIAGNOSIS — N39.0 URINARY TRACT INFECTION AFTER IMMOBILITY: ICD-10-CM

## 2020-08-10 DIAGNOSIS — E86.0 SEVERE DEHYDRATION: ICD-10-CM

## 2020-08-10 PROBLEM — J96.00 ACUTE RESPIRATORY FAILURE DUE TO COVID-19 (HCC): Status: ACTIVE | Noted: 2020-08-10

## 2020-08-10 PROBLEM — F03.918 DEMENTIA WITH BEHAVIORAL DISTURBANCE (HCC): Chronic | Status: ACTIVE | Noted: 2018-07-13

## 2020-08-10 PROBLEM — G93.49 ENCEPHALOPATHY DUE TO COVID-19 VIRUS: Status: ACTIVE | Noted: 2020-08-10

## 2020-08-10 PROBLEM — R26.9 URINARY TRACT INFECTION AFTER IMMOBILITY: Status: ACTIVE | Noted: 2019-10-17

## 2020-08-10 PROBLEM — I26.99 PULMONARY EMBOLUS: Status: ACTIVE | Noted: 2019-10-23

## 2020-08-10 PROBLEM — M19.90 ARTHRITIS: Chronic | Status: ACTIVE | Noted: 2020-08-10

## 2020-08-10 LAB
ALBUMIN SERPL-MCNC: 3.8 G/DL (ref 3.5–5.2)
ALBUMIN/GLOB SERPL: 1.1 G/DL
ALP SERPL-CCNC: 71 U/L (ref 39–117)
ALT SERPL W P-5'-P-CCNC: 18 U/L (ref 1–33)
ANION GAP SERPL CALCULATED.3IONS-SCNC: 18 MMOL/L (ref 5–15)
AST SERPL-CCNC: 34 U/L (ref 1–32)
B PARAPERT DNA SPEC QL NAA+PROBE: NOT DETECTED
B PERT DNA SPEC QL NAA+PROBE: NOT DETECTED
BACTERIA UR QL AUTO: ABNORMAL /HPF
BASOPHILS # BLD AUTO: 0.1 10*3/MM3 (ref 0–0.2)
BASOPHILS NFR BLD AUTO: 0.7 % (ref 0–1.5)
BILIRUB SERPL-MCNC: 0.8 MG/DL (ref 0–1.2)
BILIRUB UR QL STRIP: ABNORMAL
BUN SERPL-MCNC: 56 MG/DL (ref 8–23)
BUN SERPL-MCNC: ABNORMAL MG/DL
BUN/CREAT SERPL: ABNORMAL
C PNEUM DNA NPH QL NAA+NON-PROBE: NOT DETECTED
CALCIUM SPEC-SCNC: 9.8 MG/DL (ref 8.6–10.5)
CHLORIDE SERPL-SCNC: 121 MMOL/L (ref 98–107)
CLARITY UR: ABNORMAL
CO2 SERPL-SCNC: 22 MMOL/L (ref 22–29)
COLOR UR: ABNORMAL
CREAT SERPL-MCNC: 1.07 MG/DL (ref 0.57–1)
CREAT SERPL-MCNC: 1.41 MG/DL (ref 0.57–1)
CRP SERPL-MCNC: 16.47 MG/DL (ref 0–0.5)
D DIMER PPP FEU-MCNC: 0.66 MG/L (FEU) (ref 0–0.59)
DEPRECATED RDW RBC AUTO: 50.3 FL (ref 37–54)
EOSINOPHIL # BLD AUTO: 0 10*3/MM3 (ref 0–0.4)
EOSINOPHIL NFR BLD AUTO: 0.2 % (ref 0.3–6.2)
ERYTHROCYTE [DISTWIDTH] IN BLOOD BY AUTOMATED COUNT: 15.7 % (ref 12.3–15.4)
FERRITIN SERPL-MCNC: 1265 NG/ML (ref 13–150)
FLUAV H1 2009 PAND RNA NPH QL NAA+PROBE: NOT DETECTED
FLUAV H1 HA GENE NPH QL NAA+PROBE: NOT DETECTED
FLUAV H3 RNA NPH QL NAA+PROBE: NOT DETECTED
FLUAV SUBTYP SPEC NAA+PROBE: NOT DETECTED
FLUBV RNA ISLT QL NAA+PROBE: NOT DETECTED
GFR SERPL CREATININE-BSD FRML MDRD: 36 ML/MIN/1.73
GFR SERPL CREATININE-BSD FRML MDRD: 50 ML/MIN/1.73
GLOBULIN UR ELPH-MCNC: 3.4 GM/DL
GLUCOSE BLDC GLUCOMTR-MCNC: 117 MG/DL (ref 70–105)
GLUCOSE BLDC GLUCOMTR-MCNC: 124 MG/DL (ref 70–105)
GLUCOSE BLDC GLUCOMTR-MCNC: 125 MG/DL (ref 70–105)
GLUCOSE SERPL-MCNC: 130 MG/DL (ref 65–99)
GLUCOSE UR STRIP-MCNC: NEGATIVE MG/DL
HADV DNA SPEC NAA+PROBE: NOT DETECTED
HCOV 229E RNA SPEC QL NAA+PROBE: NOT DETECTED
HCOV HKU1 RNA SPEC QL NAA+PROBE: NOT DETECTED
HCOV NL63 RNA SPEC QL NAA+PROBE: NOT DETECTED
HCOV OC43 RNA SPEC QL NAA+PROBE: NOT DETECTED
HCT VFR BLD AUTO: 46.1 % (ref 34–46.6)
HGB BLD-MCNC: 15 G/DL (ref 12–15.9)
HGB UR QL STRIP.AUTO: ABNORMAL
HMPV RNA NPH QL NAA+NON-PROBE: NOT DETECTED
HOLD SPECIMEN: NORMAL
HOLD SPECIMEN: NORMAL
HPIV1 RNA SPEC QL NAA+PROBE: NOT DETECTED
HPIV2 RNA SPEC QL NAA+PROBE: NOT DETECTED
HPIV3 RNA NPH QL NAA+PROBE: NOT DETECTED
HPIV4 P GENE NPH QL NAA+PROBE: NOT DETECTED
HYALINE CASTS UR QL AUTO: ABNORMAL /LPF
KETONES UR QL STRIP: ABNORMAL
LDH SERPL-CCNC: 172 U/L (ref 135–214)
LEUKOCYTE ESTERASE UR QL STRIP.AUTO: ABNORMAL
LYMPHOCYTES # BLD AUTO: 2.1 10*3/MM3 (ref 0.7–3.1)
LYMPHOCYTES NFR BLD AUTO: 16.1 % (ref 19.6–45.3)
M PNEUMO IGG SER IA-ACNC: NOT DETECTED
MCH RBC QN AUTO: 30.1 PG (ref 26.6–33)
MCHC RBC AUTO-ENTMCNC: 32.6 G/DL (ref 31.5–35.7)
MCV RBC AUTO: 92.4 FL (ref 79–97)
MONOCYTES # BLD AUTO: 1.6 10*3/MM3 (ref 0.1–0.9)
MONOCYTES NFR BLD AUTO: 12.4 % (ref 5–12)
NEUTROPHILS NFR BLD AUTO: 70.6 % (ref 42.7–76)
NEUTROPHILS NFR BLD AUTO: 9.3 10*3/MM3 (ref 1.7–7)
NITRITE UR QL STRIP: NEGATIVE
NRBC BLD AUTO-RTO: 0.1 /100 WBC (ref 0–0.2)
PH UR STRIP.AUTO: 7 [PH] (ref 5–8)
PLATELET # BLD AUTO: 280 10*3/MM3 (ref 140–450)
PMV BLD AUTO: 12.5 FL (ref 6–12)
POTASSIUM SERPL-SCNC: 4.7 MMOL/L (ref 3.5–5.2)
PROCALCITONIN SERPL-MCNC: 0.26 NG/ML (ref 0–0.25)
PROT SERPL-MCNC: 7.2 G/DL (ref 6–8.5)
PROT UR QL STRIP: ABNORMAL
RBC # BLD AUTO: 4.99 10*6/MM3 (ref 3.77–5.28)
RBC # UR: ABNORMAL /HPF
REF LAB TEST METHOD: ABNORMAL
RHINOVIRUS RNA SPEC NAA+PROBE: NOT DETECTED
RSV RNA NPH QL NAA+NON-PROBE: NOT DETECTED
SARS-COV-2 RNA PNL SPEC NAA+PROBE: DETECTED
SODIUM SERPL-SCNC: 161 MMOL/L (ref 136–145)
SP GR UR STRIP: 1.02 (ref 1–1.03)
SQUAMOUS #/AREA URNS HPF: ABNORMAL /HPF
TROPONIN T SERPL-MCNC: <0.01 NG/ML (ref 0–0.03)
UROBILINOGEN UR QL STRIP: ABNORMAL
VALPROATE SERPL-MCNC: 16.8 MCG/ML (ref 50–125)
WBC # BLD AUTO: 13.2 10*3/MM3 (ref 3.4–10.8)
WBC UR QL AUTO: ABNORMAL /HPF
WHOLE BLOOD HOLD SPECIMEN: NORMAL
WHOLE BLOOD HOLD SPECIMEN: NORMAL

## 2020-08-10 PROCEDURE — 83615 LACTATE (LD) (LDH) ENZYME: CPT | Performed by: NURSE PRACTITIONER

## 2020-08-10 PROCEDURE — 25010000002 DEXAMETHASONE PER 1 MG: Performed by: EMERGENCY MEDICINE

## 2020-08-10 PROCEDURE — 25010000002 CEFEPIME IN SWFI 2 GM/10ML IV PUSH SYRINGE (SIMPLE): Performed by: EMERGENCY MEDICINE

## 2020-08-10 PROCEDURE — 93005 ELECTROCARDIOGRAM TRACING: CPT | Performed by: EMERGENCY MEDICINE

## 2020-08-10 PROCEDURE — 82728 ASSAY OF FERRITIN: CPT | Performed by: NURSE PRACTITIONER

## 2020-08-10 PROCEDURE — 71045 X-RAY EXAM CHEST 1 VIEW: CPT

## 2020-08-10 PROCEDURE — 85379 FIBRIN DEGRADATION QUANT: CPT | Performed by: NURSE PRACTITIONER

## 2020-08-10 PROCEDURE — 36415 COLL VENOUS BLD VENIPUNCTURE: CPT

## 2020-08-10 PROCEDURE — P9612 CATHETERIZE FOR URINE SPEC: HCPCS

## 2020-08-10 PROCEDURE — 94640 AIRWAY INHALATION TREATMENT: CPT

## 2020-08-10 PROCEDURE — 84484 ASSAY OF TROPONIN QUANT: CPT | Performed by: EMERGENCY MEDICINE

## 2020-08-10 PROCEDURE — 25010000002 VANCOMYCIN 10 G RECONSTITUTED SOLUTION: Performed by: EMERGENCY MEDICINE

## 2020-08-10 PROCEDURE — 99223 1ST HOSP IP/OBS HIGH 75: CPT | Performed by: INTERNAL MEDICINE

## 2020-08-10 PROCEDURE — 86140 C-REACTIVE PROTEIN: CPT | Performed by: NURSE PRACTITIONER

## 2020-08-10 PROCEDURE — 82962 GLUCOSE BLOOD TEST: CPT

## 2020-08-10 PROCEDURE — 93005 ELECTROCARDIOGRAM TRACING: CPT | Performed by: INTERNAL MEDICINE

## 2020-08-10 PROCEDURE — 0202U NFCT DS 22 TRGT SARS-COV-2: CPT | Performed by: EMERGENCY MEDICINE

## 2020-08-10 PROCEDURE — 94799 UNLISTED PULMONARY SVC/PX: CPT

## 2020-08-10 PROCEDURE — 93005 ELECTROCARDIOGRAM TRACING: CPT

## 2020-08-10 PROCEDURE — 85025 COMPLETE CBC W/AUTO DIFF WBC: CPT | Performed by: EMERGENCY MEDICINE

## 2020-08-10 PROCEDURE — 99285 EMERGENCY DEPT VISIT HI MDM: CPT

## 2020-08-10 PROCEDURE — 25010000002 ENOXAPARIN PER 10 MG: Performed by: NURSE PRACTITIONER

## 2020-08-10 PROCEDURE — 81001 URINALYSIS AUTO W/SCOPE: CPT | Performed by: EMERGENCY MEDICINE

## 2020-08-10 PROCEDURE — 80053 COMPREHEN METABOLIC PANEL: CPT | Performed by: EMERGENCY MEDICINE

## 2020-08-10 PROCEDURE — 80164 ASSAY DIPROPYLACETIC ACD TOT: CPT | Performed by: EMERGENCY MEDICINE

## 2020-08-10 PROCEDURE — 25010000002 CEFEPIME PER 500 MG: Performed by: EMERGENCY MEDICINE

## 2020-08-10 PROCEDURE — XW033E5 INTRODUCTION OF REMDESIVIR ANTI-INFECTIVE INTO PERIPHERAL VEIN, PERCUTANEOUS APPROACH, NEW TECHNOLOGY GROUP 5: ICD-10-PCS | Performed by: INTERNAL MEDICINE

## 2020-08-10 PROCEDURE — 87077 CULTURE AEROBIC IDENTIFY: CPT | Performed by: EMERGENCY MEDICINE

## 2020-08-10 PROCEDURE — XW033F5 INTRODUCTION OF OTHER NEW TECHNOLOGY THERAPEUTIC SUBSTANCE INTO PERIPHERAL VEIN, PERCUTANEOUS APPROACH, NEW TECHNOLOGY GROUP 5: ICD-10-PCS | Performed by: INTERNAL MEDICINE

## 2020-08-10 PROCEDURE — 87899 AGENT NOS ASSAY W/OPTIC: CPT | Performed by: NURSE PRACTITIONER

## 2020-08-10 PROCEDURE — 84520 ASSAY OF UREA NITROGEN: CPT | Performed by: EMERGENCY MEDICINE

## 2020-08-10 PROCEDURE — 87186 SC STD MICRODIL/AGAR DIL: CPT | Performed by: EMERGENCY MEDICINE

## 2020-08-10 PROCEDURE — 87086 URINE CULTURE/COLONY COUNT: CPT | Performed by: EMERGENCY MEDICINE

## 2020-08-10 PROCEDURE — 82565 ASSAY OF CREATININE: CPT | Performed by: EMERGENCY MEDICINE

## 2020-08-10 PROCEDURE — 87040 BLOOD CULTURE FOR BACTERIA: CPT | Performed by: EMERGENCY MEDICINE

## 2020-08-10 PROCEDURE — 84145 PROCALCITONIN (PCT): CPT | Performed by: NURSE PRACTITIONER

## 2020-08-10 RX ORDER — ALUMINA, MAGNESIA, AND SIMETHICONE 2400; 2400; 240 MG/30ML; MG/30ML; MG/30ML
15 SUSPENSION ORAL EVERY 6 HOURS PRN
Status: DISCONTINUED | OUTPATIENT
Start: 2020-08-10 | End: 2020-08-15 | Stop reason: HOSPADM

## 2020-08-10 RX ORDER — LEVOTHYROXINE SODIUM 0.03 MG/1
25 TABLET ORAL DAILY
Status: DISCONTINUED | OUTPATIENT
Start: 2020-08-11 | End: 2020-08-10

## 2020-08-10 RX ORDER — LEVOTHYROXINE SODIUM 0.03 MG/1
25 TABLET ORAL
Status: DISCONTINUED | OUTPATIENT
Start: 2020-08-11 | End: 2020-08-15 | Stop reason: HOSPADM

## 2020-08-10 RX ORDER — DEXAMETHASONE SODIUM PHOSPHATE 4 MG/ML
6 INJECTION, SOLUTION INTRA-ARTICULAR; INTRALESIONAL; INTRAMUSCULAR; INTRAVENOUS; SOFT TISSUE DAILY
Status: DISCONTINUED | OUTPATIENT
Start: 2020-08-11 | End: 2020-08-15 | Stop reason: HOSPADM

## 2020-08-10 RX ORDER — PANTOPRAZOLE SODIUM 40 MG/10ML
40 INJECTION, POWDER, LYOPHILIZED, FOR SOLUTION INTRAVENOUS
Status: DISCONTINUED | OUTPATIENT
Start: 2020-08-11 | End: 2020-08-11

## 2020-08-10 RX ORDER — ONDANSETRON 4 MG/1
4 TABLET, FILM COATED ORAL EVERY 6 HOURS PRN
Status: DISCONTINUED | OUTPATIENT
Start: 2020-08-10 | End: 2020-08-15 | Stop reason: HOSPADM

## 2020-08-10 RX ORDER — SODIUM CHLORIDE 0.9 % (FLUSH) 0.9 %
10 SYRINGE (ML) INJECTION AS NEEDED
Status: DISCONTINUED | OUTPATIENT
Start: 2020-08-10 | End: 2020-08-15 | Stop reason: HOSPADM

## 2020-08-10 RX ORDER — PANTOPRAZOLE SODIUM 40 MG/1
40 TABLET, DELAYED RELEASE ORAL DAILY
Status: DISCONTINUED | OUTPATIENT
Start: 2020-08-11 | End: 2020-08-10

## 2020-08-10 RX ORDER — SODIUM CHLORIDE 9 MG/ML
75 INJECTION, SOLUTION INTRAVENOUS CONTINUOUS
Status: DISCONTINUED | OUTPATIENT
Start: 2020-08-10 | End: 2020-08-15 | Stop reason: HOSPADM

## 2020-08-10 RX ORDER — MIRTAZAPINE 15 MG/1
15 TABLET, FILM COATED ORAL NIGHTLY
COMMUNITY
End: 2023-03-30

## 2020-08-10 RX ORDER — DEXAMETHASONE SODIUM PHOSPHATE 4 MG/ML
8 INJECTION, SOLUTION INTRA-ARTICULAR; INTRALESIONAL; INTRAMUSCULAR; INTRAVENOUS; SOFT TISSUE ONCE
Status: COMPLETED | OUTPATIENT
Start: 2020-08-10 | End: 2020-08-10

## 2020-08-10 RX ORDER — LEVOTHYROXINE SODIUM 0.1 MG/1
100 TABLET ORAL DAILY
COMMUNITY

## 2020-08-10 RX ORDER — DIVALPROEX SODIUM 125 MG/1
125 TABLET, DELAYED RELEASE ORAL 2 TIMES DAILY
COMMUNITY
End: 2023-03-30

## 2020-08-10 RX ORDER — ACETAMINOPHEN 650 MG/1
650 SUPPOSITORY RECTAL EVERY 4 HOURS PRN
Status: DISCONTINUED | OUTPATIENT
Start: 2020-08-10 | End: 2020-08-15 | Stop reason: HOSPADM

## 2020-08-10 RX ORDER — ACETAMINOPHEN 650 MG/1
975 SUPPOSITORY RECTAL ONCE
Status: COMPLETED | OUTPATIENT
Start: 2020-08-10 | End: 2020-08-10

## 2020-08-10 RX ORDER — PANTOPRAZOLE SODIUM 40 MG/1
40 TABLET, DELAYED RELEASE ORAL DAILY
COMMUNITY

## 2020-08-10 RX ORDER — TRAMADOL HYDROCHLORIDE 50 MG/1
50 TABLET ORAL 2 TIMES DAILY
COMMUNITY
End: 2020-09-26 | Stop reason: HOSPADM

## 2020-08-10 RX ORDER — ACETAMINOPHEN 325 MG/1
650 TABLET ORAL EVERY 6 HOURS PRN
COMMUNITY

## 2020-08-10 RX ORDER — POTASSIUM CHLORIDE 750 MG/1
30 TABLET, EXTENDED RELEASE ORAL DAILY
COMMUNITY

## 2020-08-10 RX ORDER — RISPERIDONE 0.5 MG/1
0.5 TABLET ORAL NIGHTLY
COMMUNITY
End: 2023-03-30

## 2020-08-10 RX ORDER — ACETAMINOPHEN 160 MG/5ML
650 SOLUTION ORAL EVERY 4 HOURS PRN
Status: DISCONTINUED | OUTPATIENT
Start: 2020-08-10 | End: 2020-08-15 | Stop reason: HOSPADM

## 2020-08-10 RX ORDER — ALBUTEROL SULFATE 90 UG/1
2 AEROSOL, METERED RESPIRATORY (INHALATION)
Status: DISCONTINUED | OUTPATIENT
Start: 2020-08-10 | End: 2020-08-15 | Stop reason: HOSPADM

## 2020-08-10 RX ORDER — ACETAMINOPHEN 325 MG/1
650 TABLET ORAL EVERY 4 HOURS PRN
Status: DISCONTINUED | OUTPATIENT
Start: 2020-08-10 | End: 2020-08-15 | Stop reason: HOSPADM

## 2020-08-10 RX ORDER — ASCORBIC ACID 500 MG
2000 TABLET ORAL DAILY
Status: DISCONTINUED | OUTPATIENT
Start: 2020-08-11 | End: 2020-08-15 | Stop reason: HOSPADM

## 2020-08-10 RX ORDER — ASCORBIC ACID 500 MG
2000 TABLET ORAL DAILY
COMMUNITY
End: 2020-08-15 | Stop reason: HOSPADM

## 2020-08-10 RX ORDER — BISACODYL 10 MG
10 SUPPOSITORY, RECTAL RECTAL DAILY PRN
Status: DISCONTINUED | OUTPATIENT
Start: 2020-08-10 | End: 2020-08-15 | Stop reason: HOSPADM

## 2020-08-10 RX ORDER — ZINC SULFATE 50(220)MG
220 CAPSULE ORAL DAILY
Status: DISCONTINUED | OUTPATIENT
Start: 2020-08-10 | End: 2020-08-15 | Stop reason: HOSPADM

## 2020-08-10 RX ORDER — SODIUM CHLORIDE 0.9 % (FLUSH) 0.9 %
10 SYRINGE (ML) INJECTION EVERY 12 HOURS SCHEDULED
Status: DISCONTINUED | OUTPATIENT
Start: 2020-08-10 | End: 2020-08-15 | Stop reason: HOSPADM

## 2020-08-10 RX ORDER — ALBUTEROL SULFATE 90 UG/1
2 AEROSOL, METERED RESPIRATORY (INHALATION) ONCE
Status: COMPLETED | OUTPATIENT
Start: 2020-08-10 | End: 2020-08-10

## 2020-08-10 RX ORDER — CYCLOBENZAPRINE HCL 5 MG
5 TABLET ORAL EVERY 8 HOURS PRN
COMMUNITY
End: 2020-09-26 | Stop reason: HOSPADM

## 2020-08-10 RX ORDER — DILTIAZEM HYDROCHLORIDE 5 MG/ML
10 INJECTION INTRAVENOUS ONCE
Status: COMPLETED | OUTPATIENT
Start: 2020-08-10 | End: 2020-08-10

## 2020-08-10 RX ORDER — DIVALPROEX SODIUM 125 MG/1
125 TABLET, DELAYED RELEASE ORAL 2 TIMES DAILY
Status: DISCONTINUED | OUTPATIENT
Start: 2020-08-10 | End: 2020-08-10

## 2020-08-10 RX ORDER — OMEGA-3S/DHA/EPA/FISH OIL/D3 300MG-1000
2000 CAPSULE ORAL DAILY
COMMUNITY
End: 2020-09-24 | Stop reason: ALTCHOICE

## 2020-08-10 RX ORDER — ONDANSETRON 2 MG/ML
4 INJECTION INTRAMUSCULAR; INTRAVENOUS EVERY 6 HOURS PRN
Status: DISCONTINUED | OUTPATIENT
Start: 2020-08-10 | End: 2020-08-15 | Stop reason: HOSPADM

## 2020-08-10 RX ORDER — DONEPEZIL HYDROCHLORIDE 5 MG/1
10 TABLET, FILM COATED ORAL NIGHTLY
Status: DISCONTINUED | OUTPATIENT
Start: 2020-08-10 | End: 2020-08-15 | Stop reason: HOSPADM

## 2020-08-10 RX ORDER — BISACODYL 5 MG/1
5 TABLET, DELAYED RELEASE ORAL DAILY PRN
Status: DISCONTINUED | OUTPATIENT
Start: 2020-08-10 | End: 2020-08-15 | Stop reason: HOSPADM

## 2020-08-10 RX ADMIN — DEXAMETHASONE SODIUM PHOSPHATE 8 MG: 4 INJECTION, SOLUTION INTRAMUSCULAR; INTRAVENOUS at 09:54

## 2020-08-10 RX ADMIN — ACETAMINOPHEN 975 MG: 650 SUPPOSITORY RECTAL at 10:02

## 2020-08-10 RX ADMIN — Medication 1500 MG: at 10:06

## 2020-08-10 RX ADMIN — ENOXAPARIN SODIUM 80 MG: 80 INJECTION SUBCUTANEOUS at 20:30

## 2020-08-10 RX ADMIN — SODIUM CHLORIDE, SODIUM LACTATE, POTASSIUM CHLORIDE, AND CALCIUM CHLORIDE 2535 ML: 600; 310; 30; 20 INJECTION, SOLUTION INTRAVENOUS at 09:51

## 2020-08-10 RX ADMIN — ALBUTEROL SULFATE 2 PUFF: 90 AEROSOL, METERED RESPIRATORY (INHALATION) at 09:57

## 2020-08-10 RX ADMIN — CEFEPIME HYDROCHLORIDE 2 G: 2 INJECTION, POWDER, FOR SOLUTION INTRAVENOUS at 22:47

## 2020-08-10 RX ADMIN — SODIUM CHLORIDE, SODIUM LACTATE, POTASSIUM CHLORIDE, AND CALCIUM CHLORIDE 1000 ML: .6; .31; .03; .02 INJECTION, SOLUTION INTRAVENOUS at 14:26

## 2020-08-10 RX ADMIN — REMDESIVIR 200 MG: 100 INJECTION, POWDER, LYOPHILIZED, FOR SOLUTION INTRAVENOUS at 20:30

## 2020-08-10 RX ADMIN — Medication 10 ML: at 20:30

## 2020-08-10 RX ADMIN — CEFEPIME HYDROCHLORIDE 2 G: 2 INJECTION, POWDER, FOR SOLUTION INTRAVENOUS at 10:05

## 2020-08-10 RX ADMIN — VALPROATE SODIUM 125 MG: 100 INJECTION, SOLUTION INTRAVENOUS at 22:00

## 2020-08-10 RX ADMIN — SODIUM CHLORIDE 5 MG/HR: 900 INJECTION, SOLUTION INTRAVENOUS at 20:52

## 2020-08-10 RX ADMIN — SODIUM CHLORIDE 75 ML/HR: 900 INJECTION, SOLUTION INTRAVENOUS at 20:29

## 2020-08-10 RX ADMIN — DILTIAZEM HYDROCHLORIDE 10 MG: 5 INJECTION INTRAVENOUS at 20:31

## 2020-08-10 NOTE — ED NOTES
Spoke with daughter, Patti [phone] and updated on status and wait for results.      Celeste Serna RN  08/10/20 6721

## 2020-08-10 NOTE — ED NOTES
Patient has already had a creatinine, Celeste is going to verify if we need it recollected     Mae Cha  08/10/20 0610

## 2020-08-10 NOTE — PROGRESS NOTES
"Pharmacy Antimicrobial Dosing Service    Subjective:  Day Cabrera is a 74 y.o.female admitted with shortness of breath, fever. Pharmacy has been consulted to dose Vancomycin for possible sepsis.     COVID (+)      Assessment/Plan    1. Day #1 Vancomycin: Pulse dosing d/t HD status. Patient received 1500 mg (20 mg/kg DBW) IV x1 dose. Given NENA, will pulse dose for now. Plan to obtain random with AM labs tomorrow and re-dose when level expected to be < 20 mcg/mL.     2. Day #1 Cefepime: 2g IV q12h for estCrCl 30-59 mL/min.    Will continue to monitor drug levels, renal function, culture and sensitivities, and patient clinical status.       Objective:  Relevant clinical data and objective history reviewed:  172.7 cm (68\")   84.5 kg (186 lb 4.6 oz)   Ideal body weight: 63.9 kg (140 lb 14 oz)  Adjusted ideal body weight: 72.1 kg (159 lb 0.6 oz)  Body mass index is 28.33 kg/m².        Results from last 7 days   Lab Units 08/10/20  0945   CREATININE mg/dL 1.41*     Estimated Creatinine Clearance: 39.8 mL/min (A) (by C-G formula based on SCr of 1.41 mg/dL (H)).  No intake/output data recorded.    Results from last 7 days   Lab Units 08/10/20  0945   WBC 10*3/mm3 13.20*     Temperature    08/10/20 0928 08/10/20 0942   Temp: 98.1 °F (36.7 °C) (!) 101.3 °F (38.5 °C)     Baseline culture/source/susceptibility:  Microbiology Results (last 10 days)       Procedure Component Value - Date/Time    COVID PRE-OP / PRE-PROCEDURE SCREENING ORDER (NO ISOLATION) - Swab, Nasopharynx [[id number]] Collected:  08/10/20 0951    Lab Status:  Final result Specimen:  Swab from Nasopharynx Updated:  08/10/20 1049    Narrative:       The following orders were created for panel order COVID PRE-OP / PRE-PROCEDURE SCREENING ORDER (NO ISOLATION) - Swab, Nasopharynx.  Procedure                               Abnormality         Status                     ---------                               -----------         ------                     Respiratory " Panel PCR w/...[[id number]]  Abnormal            Final result                 Please view results for these tests on the individual orders.    Respiratory Panel PCR w/COVID-19(SARS-CoV-2) [facility] In-House, NP Swab in UT/VTP, 3-4 Hr TAT - Swab, Nasopharynx [[id number]]  (Abnormal) Collected:  08/10/20 0951    Lab Status:  Final result Specimen:  Swab from Nasopharynx Updated:  08/10/20 1049     ADENOVIRUS, PCR Not Detected     Coronavirus 229E Not Detected     Coronavirus HKU1 Not Detected     Coronavirus NL63 Not Detected     Coronavirus OC43 Not Detected     COVID19 Detected     Human Metapneumovirus Not Detected     Human Rhinovirus/Enterovirus Not Detected     Influenza A PCR Not Detected     Influenza A H1 Not Detected     Influenza A H1 2009 PCR Not Detected     Influenza A H3 Not Detected     Influenza B PCR Not Detected     Parainfluenza Virus 1 Not Detected     Parainfluenza Virus 2 Not Detected     Parainfluenza Virus 3 Not Detected     Parainfluenza Virus 4 Not Detected     RSV, PCR Not Detected     Bordetella pertussis pcr Not Detected     Bordetella parapertussis PCR Not Detected     Chlamydophila pneumoniae PCR Not Detected     Mycoplasma pneumo by PCR Not Detected    Narrative:       Fact sheet for providers: https://docs.[employer]/wp-content/uploads/EOD7586-5045-QD1.1-EUA-Provider-Fact-Sheet-3.pdf    Fact sheet for patients: https://docs.[employer]/wp-content/uploads/CEJ4897-3327-DF9.1-EUA-Patient-Fact-Sheet-1.pdf             Anti-Infectives (From admission, onward)      Ordered     Dose/Rate Route Frequency Start Stop    08/10/20 0947  ceFEPime (MAXIPIME) in FI 2g/10ml IV PUSH syringe  Review   Ordering Provider:  Bruno Roach MD    2 g  over 5 Minutes Intravenous Every 12 Hours 08/10/20 1000 08/13/20 0959    08/10/20 0954  vancomycin 1500 mg/500 mL 0.9% NS IVPB ([facility])     Ordering Provider:  Bruno Roach MD    1,500 mg Intravenous Once 08/10/20 0956 08/10/20 1006    08/10/20  0947  Pharmacy to dose vancomycin  Review   Ordering Provider:  Bruno Roach MD     Does not apply Continuous PRN 08/10/20 0944 08/15/20 0943            Barbara Huff RPH  08/10/20 11:08

## 2020-08-10 NOTE — H&P
[facility]      Patient Name: Day Cabrera  : 1945  MRN: [id number]  Primary Care Physician: Smita Howard APRN  Date of admission: 8/10/2020    Patient Care Team:  Smita Howard APRN as PCP - General (Nurse Practitioner)          Subjective   History Present Illness     Chief Complaint:   Chief Complaint   Patient presents with   • Loss of Consciousness       Ms. Cabrera is a 74 y.o. female from [facility] who presented to [facility] ER 8/10/20 with decreased responsiveness, fever, cough, and congestion. [facility] has a known outbreak of Covid-19. The patient had a fever of 101.3, HR 130s. CXR did not show acute findings per radiology report; however, ER physician felt the patient's CXR was suggestive of pneumonia from Covid-19. Covid-19 swab was positive. Her WBC was 13.2, creatinine 1.41, AST 34. UA showed large leukocytes, too numerous wbcs, and 3+ bacteria. She was given cefepime, vanc, 30cc/kg IVFs, 8mg decadron, albuterol inhaler, and tylenol suppository. Daughter Patti Cabrera was notified of the patient's condition and she is a full code.     Upon exam the patient would open her eyes and mumble but could not provide any information. She is requiring 4L O2 via nasal cannula.     I spoke with Patti Cabrera about remdesivir over the phone. She is in agreement with starting the medication.  The FDA has authorized the emergency use of remdesivir, which is not an FDA approved drug. Discussions with the patient/patient caregiver regarding the risks and benefits of remdesivir have occurred. The patient/patient caregiver verbalizes recognition that this is an investigational medication which may offer both benefits and risks, the extent of which are unknown. Information on available alternative treatments and the risks and benefits of those alternatives was discussed. “Fact Sheet for Patients and Parents/Caregivers” was discussed with the  patient/patient caregiver. All questions were answered to satisfaction and the patient/patient caregiver would like to proceed treating with remdesivir.         Review of Systems   Unable to perform ROS: mental status change           Personal History     Past Medical History:   Past Medical History:   Diagnosis Date   • Anxiety    • Arthritis    • Chest pain 10/15/2019   • Dementia (CMS/HCC)    • Hypertension    • Pulmonary embolus (CMS/HCC)        Surgical History:      Past Surgical History:   Procedure Laterality Date   • CHOLECYSTECTOMY     • COLONOSCOPY     • HYSTERECTOMY      total            Family History: family history includes Alcohol abuse in her father; Heart disease in her mother; No Known Problems in her half-sister; Pancreatic cancer in her sister.     Social History:  reports that she quit smoking about 36 years ago. Her smoking use included cigarettes. She quit after 30.00 years of use. She has never used smokeless tobacco. She reports that she does not drink alcohol or use drugs.      Medications:  Prior to Admission medications    Medication Sig Start Date End Date Taking? Authorizing Provider   acetaminophen (TYLENOL) 325 MG tablet Take 650 mg by mouth Every 6 (Six) Hours As Needed for Mild Pain .   Yes Keyana Selby MD   cholecalciferol (VITAMIN D3) 10 MCG (400 UNIT) tablet Take 2,000 Units by mouth Daily.   Yes Keyana Selby MD   cyclobenzaprine (FLEXERIL) 5 MG tablet Take 5 mg by mouth Every 8 (Eight) Hours As Needed for Muscle Spasms.   Yes Keyana Selby MD   divalproex (DEPAKOTE) 125 MG DR tablet Take 125 mg by mouth 2 (Two) Times a Day.   Yes Keyana Selby MD   levothyroxine (SYNTHROID, LEVOTHROID) 25 MCG tablet Take 25 mcg by mouth Daily.   Yes Keyana Selby MD   Menthol, Topical Analgesic, (Biofreeze) 4 % gel Apply 1 application topically Every 6 (Six) Hours.   Yes Keyana Selby MD   mirtazapine (REMERON) 7.5 MG half tablet Take 15 mg by  mouth Every Night.   Yes Keyana Selby MD   pantoprazole (PROTONIX) 40 MG EC tablet Take 40 mg by mouth Daily.   Yes Keyana Selby MD   potassium chloride (K-DUR,KLOR-CON) 10 MEQ CR tablet Take 30 mEq by mouth Daily.   Yes Keyana Selby MD   risperiDONE (risperDAL) 0.5 MG tablet Take 0.5 mg by mouth Every Night.   Yes Keyana Selby MD   traMADol (ULTRAM) 50 MG tablet Take 50 mg by mouth 2 (two) times a day.   Yes Keyana Selby MD   vitamin C (ASCORBIC ACID) 500 MG tablet Take 2,000 mg by mouth Daily.   Yes Keyana Selby MD   ZINC GLUCONATE PO Take 220 mg by mouth Daily.   Yes Keyana Selby MD   amLODIPine (NORVASC) 10 MG tablet Take 10 mg by mouth Daily.    Keyana Selby MD   apixaban (ELIQUIS) 5 MG tablet tablet Take 1 tablet by mouth Every 12 (Twelve) Hours. 11/1/19   Fabrice Kiser MD   donepezil (ARICEPT) 10 MG tablet Take 1 tablet by mouth Every Night. 9/11/19   Smita Howard APRN   FLUoxetine (PROzac) 20 MG capsule Take 30 mg by mouth Daily.    ProviderKeyana MD   furosemide (LASIX) 20 MG tablet Take 20 mg by mouth Daily As Needed (for swelling).    Keyana Selby MD   nitroglycerin (NITROSTAT) 0.4 MG SL tablet Place 1 tablet under the tongue Every 5 (Five) Minutes As Needed for Chest Pain. Take no more than 3 doses in 15 minutes. 10/25/19   Fabrice Kiser MD   magnesium hydroxide (MILK OF MAGNESIA) 2400 MG/10ML suspension suspension Take 10 mL by mouth Daily As Needed (constipation). 10/25/19 8/10/20  Fabrice Kiser MD   ondansetron (ZOFRAN) 4 MG tablet Take 1 tablet by mouth 4 (Four) Times a Day As Needed for Nausea or Vomiting. 10/22/19 8/10/20  Yobany Marquez MD   risperiDONE (risperDAL) 0.25 MG tablet Take 1 tablet by mouth Every Night. 10/25/19 8/10/20  Fabrice Kiser MD       Allergies:    Allergies   Allergen Reactions   • Ciprofloxacin Unknown (See Comments)       Objective   Objective     Vital  Signs  Temp:  [98.1 °F (36.7 °C)-101.3 °F (38.5 °C)] 100 °F (37.8 °C)  Heart Rate:  [103-138] 108  Resp:  [18-30] 26  BP: (102-124)/(59-76) 122/71  SpO2:  [92 %-97 %] 97 %  on  Flow (L/min):  [3-4] 4;   Device (Oxygen Therapy): nasal cannula  Body mass index is 28.33 kg/m².    Physical Exam   Constitutional: She appears lethargic. She appears ill.   HENT:   Head: Normocephalic and atraumatic.   Nose: Nose normal.   Eyes: Pupils are equal, round, and reactive to light. Conjunctivae and EOM are normal.   Neck: Normal range of motion.   Cardiovascular: Regular rhythm, normal heart sounds and intact distal pulses. Tachycardia present.   Pulmonary/Chest: Effort normal. No stridor. No respiratory distress. She has decreased breath sounds in the right lower field and the left lower field. She has no wheezes.   Abdominal: Soft. Bowel sounds are normal. She exhibits no distension and no mass. There is no tenderness. There is no guarding.   Musculoskeletal: Normal range of motion. She exhibits no edema, tenderness or deformity.   Neurological: She appears lethargic. No cranial nerve deficit. Coordination normal.   Drowsy, opens eyes to name. Will not follow commands, mumbles    Skin: Skin is warm and dry. No rash noted. She is not diaphoretic. No erythema.   Psychiatric: She has a normal mood and affect. Her behavior is normal.   Nursing note and vitals reviewed.      Results Review:  I have personally reviewed most recent lab results and radiology images and interpretations and agree with findings    Results from last 7 days   Lab Units 08/10/20  0945   WBC 10*3/mm3 13.20*   HEMOGLOBIN g/dL 15.0   HEMATOCRIT % 46.1   PLATELETS 10*3/mm3 280     Results from last 7 days   Lab Units 08/10/20  1409 08/10/20  0945   SODIUM mmol/L  --  161*   POTASSIUM mmol/L  --  4.7   CHLORIDE mmol/L  --  121*   CO2 mmol/L  --  22.0   BUN mg/dL  --  56*   CREATININE mg/dL 1.07* 1.41*   GLUCOSE mg/dL  --  130*   CALCIUM mg/dL  --  9.8   ALT  (SGPT) U/L  --  18   AST (SGOT) U/L  --  34*   TROPONIN T ng/mL  --  <0.010     Estimated Creatinine Clearance: 52.5 mL/min (A) (by C-G formula based on SCr of 1.07 mg/dL (H)).  Brief Urine Lab Results  (Last result in the past 365 days)      Color   Clarity   Blood   Leuk Est   Nitrite   Protein   CREAT   Urine HCG        08/10/20 0949 Dark Yellow  Comment:  Possible interference with the dipstick results due to the color of the urine. Turbid Moderate (2+) Large (3+) Negative 100 mg/dL (2+)               Microbiology Results (last 10 days)     Procedure Component Value - Date/Time    COVID PRE-OP / PRE-PROCEDURE SCREENING ORDER (NO ISOLATION) - Swab, Nasopharynx [[id number]] Collected:  08/10/20 0951    Lab Status:  Final result Specimen:  Swab from Nasopharynx Updated:  08/10/20 1049    Narrative:       The following orders were created for panel order COVID PRE-OP / PRE-PROCEDURE SCREENING ORDER (NO ISOLATION) - Swab, Nasopharynx.  Procedure                               Abnormality         Status                     ---------                               -----------         ------                     Respiratory Panel PCR w/...[[id number]]  Abnormal            Final result                 Please view results for these tests on the individual orders.    Respiratory Panel PCR w/COVID-19(SARS-CoV-2) XANDER/BIN/BRIAN In-House, NP Swab in [facility], 3-4 Hr TAT - Swab, Nasopharynx [[id number]]  (Abnormal) Collected:  08/10/20 0951    Lab Status:  Final result Specimen:  Swab from Nasopharynx Updated:  08/10/20 1049     ADENOVIRUS, PCR Not Detected     Coronavirus 229E Not Detected     Coronavirus HKU1 Not Detected     Coronavirus NL63 Not Detected     Coronavirus OC43 Not Detected     COVID19 Detected     Human Metapneumovirus Not Detected     Human Rhinovirus/Enterovirus Not Detected     Influenza A PCR Not Detected     Influenza A H1 Not Detected     Influenza A H1 2009 PCR Not Detected     Influenza A H3 Not Detected      Influenza B PCR Not Detected     Parainfluenza Virus 1 Not Detected     Parainfluenza Virus 2 Not Detected     Parainfluenza Virus 3 Not Detected     Parainfluenza Virus 4 Not Detected     RSV, PCR Not Detected     Bordetella pertussis pcr Not Detected     Bordetella parapertussis PCR Not Detected     Chlamydophila pneumoniae PCR Not Detected     Mycoplasma pneumo by PCR Not Detected    Narrative:       Fact sheet for providers: https://docs.[employer]/wp-content/uploads/JLM9714-1425-YO5.1-EUA-Provider-Fact-Sheet-3.pdf    Fact sheet for patients: https://docs.[employer]/wp-content/uploads/PQY6596-2112-CE8.1-EUA-Patient-Fact-Sheet-1.pdf          ECG/EMG Results (most recent)     Procedure Component Value Units Date/Time    ECG 12 Lead [[id number]] Collected:  08/10/20 0928     Updated:  08/10/20 0930    Narrative:       HEART RATE= 137  bpm  RR Interval= 436  ms  MS Interval= 112  ms  P Horizontal Axis= 18  deg  P Front Axis= 46  deg  QRSD Interval= 86  ms  QT Interval= 310  ms  QRS Axis= -77  deg  T Wave Axis= 102  deg  - ABNORMAL ECG -  Sinus tachycardia  Consider left ventricular hypertrophy  Nonspecific T abnormalities, lateral leads  When compared with ECG of 25-Oct-2019 13:08:52,  Significant rate increase  Significant repolarization change  Electronically Signed By:   Date and Time of Study: 2020-08-10 09:28:48                    Xr Chest 1 View    Result Date: 8/10/2020  No acute chest finding.  Electronically Signed By-Dr. Leanne Lyn MD On:8/10/2020 10:42 AM This report was finalized on [id number] by Dr. Leanne Lyn MD.        Estimated Creatinine Clearance: 52.5 mL/min (A) (by C-G formula based on SCr of 1.07 mg/dL (H)).    Assessment/Plan   Assessment/Plan       Active Hospital Problems    Diagnosis  POA   • **COVID-19 virus detected [U07.1]  Yes     Priority: High   • Pneumonia due to 2019 novel coronavirus [U07.1, J12.89]  Yes     Priority: High   • Severe sepsis (CMS/HCC) [A41.9, R65.20]   Unknown     Priority: High   • Encephalopathy due to COVID-19 virus [U07.1, G93.49]  Yes     Priority: High   • Acute respiratory failure due to COVID-19 (CMS/HCC) [U07.1, J96.00]  Yes     Priority: High   • Urinary tract infection after immobility [N39.0]  Unknown     Priority: High   • Severe dehydration [E86.0]  Unknown     Priority: Medium   • Pulmonary embolus (CMS/HCC) [I26.99]  Unknown   • Dementia with behavioral disturbance (CMS/HCC) [F03.91]  Unknown   • Chronic anxiety [F41.9]  Yes   • Hypertension [I10]  Yes      Resolved Hospital Problems   No resolved problems to display.     Covid-19 virus detected  -Covid-19 swab was positive  -isolation precautions  -fever 101.3, tachycardic, decreased responsiveness, requiring 4L O2   -recheck CXR  -covid labs pending   -antipyretics prn  -given decadron 8mg IV x1, start daily 6mg IV decadron per Covid-19 treatment recommendations   -remdesivir discussed with daughter, pharmacy to dose  -cont zinc and vitamin C    Acute hypoxic respiratory failure due to Covid-19  -requiring 4L O2  -recheck CXR as above  -albuterol inhaler 4x/daily    Pneumonia due to Covid-19  -CXR reviewed. Appears there is some left basilar atelectasis, Will treat as pneumonia due to acute respiratory failure requiring 4L O2, fever, and positive Covid-19 swab  -IV cefepime, vanc    Urinary tract infection  -UA: large leukocytes, too numerous wbcs, and 3+ bacteria.  -IV cefepime and vanc started in ER    Sepsis without hypotension  -fever, tachycardia, wbc 13.2, not hypotensive  -procalcitonin pending  -30cc/kg IVFs given in ER  -IV cefepime, vancomcyin     Acute encephalopathy due to Covid-19  -pt opens eyes but lethargic  -neuro checks  -hold sedating meds flexeril, risperdal, ultram, remeron    NENA, Dehydration   -pt with creatinine 1.41 and Na 161 s/p 30cc/kg IVFs now with creatinine 1.07  -cont IVFs  -hold lasix    H/o multiple PE's on eliquis    Hypertension  -bp normal but borderline  normal. Hold norvasc for now    Depression  -cont home depakote, prozac    Hypothyroidism  -cont home synthroid    Dementia  -cont home aricept  -hold risperdal, ultram, remeron       VTE Prophylaxis - lovenox, pharmacy to dose. Pt unable to take home oral eliquis    Prognosis guarded     CODE STATUS:    Code Status and Medical Interventions:   Ordered at: 08/10/20 1522     Level Of Support Discussed With:    Health Care Surrogate     Code Status:    CPR     Medical Interventions (Level of Support Prior to Arrest):    Full       This patient has been examined wearing appropriate Personal Protective Equipment. N95, faceshield, gown, gloves. 08/10/20      I discussed the patient's findings and my recommendations with family.        Electronically signed by CHARLOTTE Mcguire, 08/10/20, 1:47 PM.  [facility] Hospitalist Team

## 2020-08-10 NOTE — ED NOTES
Pt from [place] with c/o altered mental status and SOB. Pt has congested cough, conjunctivitis. Responsiveness is minimal, nonverbal but does moan and groan occasionally.      Celeste Serna RN  08/10/20 1014

## 2020-08-10 NOTE — NURSING NOTE
Daughter Patti called and does not want pt to be on a respirator, would like her DNI but would like CPR if needed. This conversation was also witnessed by nurse Veras.

## 2020-08-10 NOTE — NURSING NOTE
Pts daughter, Patti called and stated her mom would need last rights if she takes a turn. Would like to know if our pastoral staff can do last rights, if not her mothers  is willing to come in.  Let night shift nurse know and charge nurse.

## 2020-08-10 NOTE — ED PROVIDER NOTES
Mr. Bergeron Subjective   74-year-old female found to be less responsive than usual at [facility].  The patient had been previously tested for COVID and had a negative test on the .  The patient had the onset of fever and chills within the last 24 hours.  The patient has had some cough.  The patient was noted to have decreased oral intake and decreased urine output.  There is been no reports of vomiting or diarrhea.  The patient is unable to provide any history          Review of Systems   Unable to perform ROS: Dementia       Past Medical History:   Diagnosis Date   • Anxiety    • Arthritis    • Chest pain 10/15/2019   • Dementia (CMS/HCC)    • Hypertension      Patient has known history of coronary artery disease and glaucoma.  She has had a history of seizures in the past as well as chronic dependent edema  Allergies   Allergen Reactions   • Ciprofloxacin Unknown (See Comments)       Past Surgical History:   Procedure Laterality Date   • CHOLECYSTECTOMY     • COLONOSCOPY     • HYSTERECTOMY      total        Family History   Problem Relation Age of Onset   • Heart disease Mother    • Alcohol abuse Father         murdered    • Pancreatic cancer Sister    • No Known Problems Half-Sister        Social History     Socioeconomic History   • Marital status:      Spouse name: Not on file   • Number of children: 3   • Years of education: Not on file   • Highest education level: Not on file   Tobacco Use   • Smoking status: Former Smoker     Years: 30.00     Types: Cigarettes     Last attempt to quit:      Years since quittin.6   • Smokeless tobacco: Never Used   Substance and Sexual Activity   • Alcohol use: No     Frequency: Never   • Drug use: No   • Sexual activity: Defer           Objective   Physical Exam  Alert [place] Coma Scale 15   HEENT: Pupils equal and reactive to light. Conjunctivae are not injected. normal tympanic membranes. Oropharynx and nares are normal.   Neck: Supple. Midline  trachea. No JVD. No goiter.   Chest: Clear and equal breath sounds bilaterally regular rate and rhythm without murmur or rub.   Abdomen: Positive bowel sounds nontender nondistended. No rebound or peritoneal signs. No CVA tenderness.   Extremities no clubbing cyanosis or edema motor sensory exam is normal the full range of motion is intact   skin: Warm and dry, no rashes or petechia.   Lymphatic: No regional lymphadenopathy. No calf pain, swelling or Lukas's sign    Procedures           ED Course           Labs Reviewed   RESPIRATORY PANEL PCR W/ COVID-19 (SARS-COV-2) XANDER/BIN/BRIAN IN-HOUSE, NP SWAB IN UTM/VTP, 3-4 HR TAT - Abnormal; Notable for the following components:       Result Value    COVID19 Detected (*)     All other components within normal limits    Narrative:     Fact sheet for providers: https://docs.[employer]/wp-content/uploads/XTW9915-5680-GD6.1-EUA-Provider-Fact-Sheet-3.pdf    Fact sheet for patients: https://docs.[employer]/wp-content/uploads/DGK0325-2365-YL1.1-EUA-Patient-Fact-Sheet-1.pdf   COMPREHENSIVE METABOLIC PANEL - Abnormal; Notable for the following components:    Glucose 130 (*)     Creatinine 1.41 (*)     Sodium 161 (*)     Chloride 121 (*)     AST (SGOT) 34 (*)     eGFR Non  Amer 36 (*)     Anion Gap 18.0 (*)     All other components within normal limits    Narrative:     GFR Normal >60  Chronic Kidney Disease <60  Kidney Failure <15     CBC WITH AUTO DIFFERENTIAL - Abnormal; Notable for the following components:    WBC 13.20 (*)     RDW 15.7 (*)     MPV 12.5 (*)     Lymphocyte % 16.1 (*)     Monocyte % 12.4 (*)     Eosinophil % 0.2 (*)     Neutrophils, Absolute 9.30 (*)     Monocytes, Absolute 1.60 (*)     All other components within normal limits   URINALYSIS W/ CULTURE IF INDICATED - Abnormal; Notable for the following components:    Color, UA Dark Yellow (*)     Appearance, UA Turbid (*)     Ketones, UA Trace (*)     Bilirubin, UA Small (1+) (*)     Blood, UA Moderate  (2+) (*)     Protein,  mg/dL (2+) (*)     Leuk Esterase, UA Large (3+) (*)     All other components within normal limits   BUN - Abnormal; Notable for the following components:    BUN 56 (*)     All other components within normal limits   URINALYSIS, MICROSCOPIC ONLY - Abnormal; Notable for the following components:    RBC, UA 6-12 (*)     WBC, UA Too Numerous to Count (*)     Bacteria, UA 3+ (*)     All other components within normal limits   POCT GLUCOSE FINGERSTICK - Abnormal; Notable for the following components:    Glucose 124 (*)     All other components within normal limits   TROPONIN (IN-HOUSE) - Normal    Narrative:     Troponin T Reference Range:  <= 0.03 ng/mL-   Negative for AMI  >0.03 ng/mL-     Abnormal for myocardial necrosis.  Clinicians would have to utilize clinical acumen, EKG, Troponin and serial changes to determine if it is an Acute Myocardial Infarction or myocardial injury due to an underlying chronic condition.       Results may be falsely decreased if patient taking Biotin.     COVID PRE-OP / PRE-PROCEDURE SCREENING ORDER (NO ISOLATION)    Narrative:     The following orders were created for panel order COVID PRE-OP / PRE-PROCEDURE SCREENING ORDER (NO ISOLATION) - Swab, Nasopharynx.  Procedure                               Abnormality         Status                     ---------                               -----------         ------                     Respiratory Panel PCR w/...[[id number]]  Abnormal            Final result                 Please view results for these tests on the individual orders.   BLOOD CULTURE   BLOOD CULTURE   URINE CULTURE   RAINBOW DRAW    Narrative:     The following orders were created for panel order [place] Draw.  Procedure                               Abnormality         Status                     ---------                               -----------         ------                     Light Blue Top[[id number]]                                   Final  result               Green Top (Gel)[[id number]]                                  Final result               Lavender Top[[id number]]                                     Final result               Gold Top - SST[[id number]]                                   Final result                 Please view results for these tests on the individual orders.   CREATININE, SERUM   VALPROIC ACID LEVEL, TOTAL   POC LACTATE   POC LACTATE   CBC AND DIFFERENTIAL    Narrative:     The following orders were created for panel order CBC & Differential.  Procedure                               Abnormality         Status                     ---------                               -----------         ------                     CBC Auto Differential[[id number]]        Abnormal            Final result                 Please view results for these tests on the individual orders.   LIGHT BLUE TOP   GREEN TOP   LAVENDER TOP   GOLD TOP - SST     Medications   sodium chloride 0.9 % flush 10 mL (has no administration in time range)   ceFEPime (MAXIPIME) in SWFI 2g/10ml IV PUSH syringe (2 g Intravenous Given 8/10/20 1005)   Pharmacy to dose vancomycin (has no administration in time range)   vancomycin (VANCOCIN) 1,000 mg in sodium chloride 0.9 % 250 mL IVPB (has no administration in time range)   !Vancomycin Level Draw Needed (has no administration in time range)   lactated ringers bolus 1,000 mL (has no administration in time range)   acetaminophen (TYLENOL) suppository 975 mg (975 mg Rectal Given 8/10/20 1002)   dexamethasone (DECADRON) injection 8 mg (8 mg Intravenous Given 8/10/20 0954)   albuterol sulfate HFA (PROVENTIL HFA;VENTOLIN HFA;PROAIR HFA) inhaler 2 puff (2 puffs Inhalation Given 8/10/20 0957)   lactated ringers bolus 2,535 mL (0 mL/kg × 84.5 kg Intravenous Stopped 8/10/20 1129)   vancomycin 1500 mg/500 mL 0.9% NS IVPB ([facility]) (1,500 mg Intravenous New Bag 8/10/20 1006)     Xr Chest 1 View    Result Date: 8/10/2020  No acute chest finding.   Electronically Signed By-Dr. Leanne Lyn MD On:8/10/2020 10:42 AM This report was finalized on [id number] by Dr. Leanne Lyn MD.                          Patient was managed using sepsis protocols        SEPTIC SHOCK FOCUSED EXAM ATTESTATION    I attest that I have reassessed tissue perfusion after the fluid bolus given.    Bruno Roach MD  08/10/20  13:14  MDM  Number of Diagnoses or Management Options     Amount and/or Complexity of Data Reviewed  Clinical lab tests: reviewed  Tests in the radiology section of CPT®: reviewed  Review and summarize past medical records: yes  Discuss the patient with other providers: yes  Independent visualization of images, tracings, or specimens: yes    Risk of Complications, Morbidity, and/or Mortality  Presenting problems: high  Diagnostic procedures: high  Management options: high        Final diagnoses:   Pneumonia due to 2019 novel coronavirus   Severe sepsis (CMS/HCC)   Urinary tract infection after immobility   Severe dehydration   Dementia with behavioral disturbance, unspecified dementia type (CMS/HCC)            Bruno Roach MD  08/10/20 1318

## 2020-08-11 LAB
ALBUMIN SERPL-MCNC: 2.9 G/DL (ref 3.5–5.2)
ALBUMIN/GLOB SERPL: 1 G/DL
ALP SERPL-CCNC: 55 U/L (ref 39–117)
ALT SERPL W P-5'-P-CCNC: 12 U/L (ref 1–33)
ANION GAP SERPL CALCULATED.3IONS-SCNC: 12 MMOL/L (ref 5–15)
AST SERPL-CCNC: 13 U/L (ref 1–32)
BASOPHILS # BLD AUTO: 0 10*3/MM3 (ref 0–0.2)
BASOPHILS NFR BLD AUTO: 0.3 % (ref 0–1.5)
BILIRUB SERPL-MCNC: 0.4 MG/DL (ref 0–1.2)
BUN SERPL-MCNC: 42 MG/DL (ref 8–23)
BUN SERPL-MCNC: ABNORMAL MG/DL
BUN/CREAT SERPL: ABNORMAL
CALCIUM SPEC-SCNC: 8.7 MG/DL (ref 8.6–10.5)
CHLORIDE SERPL-SCNC: 122 MMOL/L (ref 98–107)
CO2 SERPL-SCNC: 23 MMOL/L (ref 22–29)
CREAT SERPL-MCNC: 0.69 MG/DL (ref 0.57–1)
CRP SERPL-MCNC: 14.67 MG/DL (ref 0–0.5)
D DIMER PPP FEU-MCNC: 0.77 MG/L (FEU) (ref 0–0.59)
DEPRECATED RDW RBC AUTO: 51.6 FL (ref 37–54)
EOSINOPHIL # BLD AUTO: 0 10*3/MM3 (ref 0–0.4)
EOSINOPHIL NFR BLD AUTO: 0 % (ref 0.3–6.2)
ERYTHROCYTE [DISTWIDTH] IN BLOOD BY AUTOMATED COUNT: 15.7 % (ref 12.3–15.4)
FERRITIN SERPL-MCNC: 1402 NG/ML (ref 13–150)
GFR SERPL CREATININE-BSD FRML MDRD: 83 ML/MIN/1.73
GLOBULIN UR ELPH-MCNC: 2.9 GM/DL
GLUCOSE BLDC GLUCOMTR-MCNC: 113 MG/DL (ref 70–105)
GLUCOSE BLDC GLUCOMTR-MCNC: 122 MG/DL (ref 70–105)
GLUCOSE SERPL-MCNC: 123 MG/DL (ref 65–99)
HCT VFR BLD AUTO: 37.2 % (ref 34–46.6)
HGB BLD-MCNC: 11.9 G/DL (ref 12–15.9)
LYMPHOCYTES # BLD AUTO: 0.9 10*3/MM3 (ref 0.7–3.1)
LYMPHOCYTES NFR BLD AUTO: 6.7 % (ref 19.6–45.3)
MAGNESIUM SERPL-MCNC: 2.3 MG/DL (ref 1.6–2.4)
MCH RBC QN AUTO: 29.6 PG (ref 26.6–33)
MCHC RBC AUTO-ENTMCNC: 31.9 G/DL (ref 31.5–35.7)
MCV RBC AUTO: 92.9 FL (ref 79–97)
MONOCYTES # BLD AUTO: 0.8 10*3/MM3 (ref 0.1–0.9)
MONOCYTES NFR BLD AUTO: 6.3 % (ref 5–12)
MRSA DNA SPEC QL NAA+PROBE: NORMAL
NEUTROPHILS NFR BLD AUTO: 11.3 10*3/MM3 (ref 1.7–7)
NEUTROPHILS NFR BLD AUTO: 86.7 % (ref 42.7–76)
NRBC BLD AUTO-RTO: 0 /100 WBC (ref 0–0.2)
PLATELET # BLD AUTO: 188 10*3/MM3 (ref 140–450)
PMV BLD AUTO: 12.5 FL (ref 6–12)
POTASSIUM SERPL-SCNC: 3.5 MMOL/L (ref 3.5–5.2)
PROCALCITONIN SERPL-MCNC: 0.25 NG/ML (ref 0–0.25)
PROT SERPL-MCNC: 5.8 G/DL (ref 6–8.5)
RBC # BLD AUTO: 4 10*6/MM3 (ref 3.77–5.28)
S PNEUM AG SPEC QL LA: NEGATIVE
SODIUM SERPL-SCNC: 157 MMOL/L (ref 136–145)
TSH SERPL DL<=0.05 MIU/L-ACNC: 1.22 UIU/ML (ref 0.27–4.2)
VANCOMYCIN SERPL-MCNC: 10.2 MCG/ML (ref 5–40)
WBC # BLD AUTO: 13 10*3/MM3 (ref 3.4–10.8)

## 2020-08-11 PROCEDURE — 25010000002 DIGOXIN PER 500 MCG: Performed by: INTERNAL MEDICINE

## 2020-08-11 PROCEDURE — 87641 MR-STAPH DNA AMP PROBE: CPT | Performed by: NURSE PRACTITIONER

## 2020-08-11 PROCEDURE — 25010000002 CEFEPIME PER 500 MG: Performed by: INTERNAL MEDICINE

## 2020-08-11 PROCEDURE — 25010000002 DEXAMETHASONE PER 1 MG: Performed by: NURSE PRACTITIONER

## 2020-08-11 PROCEDURE — 25010000002 VANCOMYCIN 10 G RECONSTITUTED SOLUTION: Performed by: INTERNAL MEDICINE

## 2020-08-11 PROCEDURE — 80202 ASSAY OF VANCOMYCIN: CPT | Performed by: EMERGENCY MEDICINE

## 2020-08-11 PROCEDURE — 85379 FIBRIN DEGRADATION QUANT: CPT | Performed by: INTERNAL MEDICINE

## 2020-08-11 PROCEDURE — B548ZZA ULTRASONOGRAPHY OF SUPERIOR VENA CAVA, GUIDANCE: ICD-10-PCS | Performed by: INTERNAL MEDICINE

## 2020-08-11 PROCEDURE — 80053 COMPREHEN METABOLIC PANEL: CPT | Performed by: INTERNAL MEDICINE

## 2020-08-11 PROCEDURE — 85025 COMPLETE CBC W/AUTO DIFF WBC: CPT | Performed by: INTERNAL MEDICINE

## 2020-08-11 PROCEDURE — 92610 EVALUATE SWALLOWING FUNCTION: CPT

## 2020-08-11 PROCEDURE — 83735 ASSAY OF MAGNESIUM: CPT | Performed by: NURSE PRACTITIONER

## 2020-08-11 PROCEDURE — 25010000003 POTASSIUM CHLORIDE 10 MEQ/100ML SOLUTION: Performed by: NURSE PRACTITIONER

## 2020-08-11 PROCEDURE — 99233 SBSQ HOSP IP/OBS HIGH 50: CPT | Performed by: INTERNAL MEDICINE

## 2020-08-11 PROCEDURE — 25010000002 ENOXAPARIN PER 10 MG: Performed by: NURSE PRACTITIONER

## 2020-08-11 PROCEDURE — 99222 1ST HOSP IP/OBS MODERATE 55: CPT | Performed by: INTERNAL MEDICINE

## 2020-08-11 PROCEDURE — C1751 CATH, INF, PER/CENT/MIDLINE: HCPCS

## 2020-08-11 PROCEDURE — 25010000002 AMIODARONE PER 30 MG: Performed by: INTERNAL MEDICINE

## 2020-08-11 PROCEDURE — 94799 UNLISTED PULMONARY SVC/PX: CPT

## 2020-08-11 PROCEDURE — 93005 ELECTROCARDIOGRAM TRACING: CPT | Performed by: INTERNAL MEDICINE

## 2020-08-11 PROCEDURE — 02HV33Z INSERTION OF INFUSION DEVICE INTO SUPERIOR VENA CAVA, PERCUTANEOUS APPROACH: ICD-10-PCS | Performed by: INTERNAL MEDICINE

## 2020-08-11 PROCEDURE — 84443 ASSAY THYROID STIM HORMONE: CPT | Performed by: INTERNAL MEDICINE

## 2020-08-11 PROCEDURE — 84145 PROCALCITONIN (PCT): CPT | Performed by: INTERNAL MEDICINE

## 2020-08-11 PROCEDURE — 86140 C-REACTIVE PROTEIN: CPT | Performed by: INTERNAL MEDICINE

## 2020-08-11 PROCEDURE — 82728 ASSAY OF FERRITIN: CPT | Performed by: INTERNAL MEDICINE

## 2020-08-11 PROCEDURE — 82962 GLUCOSE BLOOD TEST: CPT

## 2020-08-11 RX ORDER — POTASSIUM CHLORIDE 20 MEQ/1
40 TABLET, EXTENDED RELEASE ORAL ONCE
Status: DISCONTINUED | OUTPATIENT
Start: 2020-08-11 | End: 2020-08-15 | Stop reason: HOSPADM

## 2020-08-11 RX ORDER — AMIODARONE HCL/D5W 450 MG/250
1 PLASTIC BAG, INJECTION (ML) INTRAVENOUS CONTINUOUS
Status: DISCONTINUED | OUTPATIENT
Start: 2020-08-11 | End: 2020-08-11

## 2020-08-11 RX ORDER — AMIODARONE HYDROCHLORIDE 200 MG/1
400 TABLET ORAL EVERY 12 HOURS SCHEDULED
Status: DISCONTINUED | OUTPATIENT
Start: 2020-08-11 | End: 2020-08-11

## 2020-08-11 RX ORDER — AMIODARONE HCL/D5W 450 MG/250
0.5 PLASTIC BAG, INJECTION (ML) INTRAVENOUS CONTINUOUS
Status: DISCONTINUED | OUTPATIENT
Start: 2020-08-11 | End: 2020-08-13

## 2020-08-11 RX ORDER — AMIODARONE HCL/D5W 450 MG/250
0.5 PLASTIC BAG, INJECTION (ML) INTRAVENOUS CONTINUOUS
Status: DISCONTINUED | OUTPATIENT
Start: 2020-08-11 | End: 2020-08-11

## 2020-08-11 RX ORDER — FAMOTIDINE 10 MG/ML
20 INJECTION, SOLUTION INTRAVENOUS EVERY 12 HOURS SCHEDULED
Status: DISCONTINUED | OUTPATIENT
Start: 2020-08-11 | End: 2020-08-15 | Stop reason: HOSPADM

## 2020-08-11 RX ORDER — POTASSIUM CHLORIDE 20 MEQ/1
20 TABLET, EXTENDED RELEASE ORAL ONCE
Status: COMPLETED | OUTPATIENT
Start: 2020-08-11 | End: 2020-08-11

## 2020-08-11 RX ORDER — POTASSIUM CHLORIDE 7.45 MG/ML
10 INJECTION INTRAVENOUS ONCE
Status: COMPLETED | OUTPATIENT
Start: 2020-08-11 | End: 2020-08-11

## 2020-08-11 RX ORDER — DIGOXIN 0.25 MG/ML
500 INJECTION INTRAMUSCULAR; INTRAVENOUS ONCE
Status: COMPLETED | OUTPATIENT
Start: 2020-08-11 | End: 2020-08-11

## 2020-08-11 RX ORDER — CHOLECALCIFEROL (VITAMIN D3) 125 MCG
10 CAPSULE ORAL NIGHTLY
Status: DISCONTINUED | OUTPATIENT
Start: 2020-08-12 | End: 2020-08-15 | Stop reason: HOSPADM

## 2020-08-11 RX ADMIN — CEFEPIME HYDROCHLORIDE 2 G: 2 INJECTION, POWDER, FOR SOLUTION INTRAVENOUS at 11:18

## 2020-08-11 RX ADMIN — Medication 4000 UNITS: at 09:21

## 2020-08-11 RX ADMIN — SODIUM CHLORIDE 75 ML/HR: 900 INJECTION, SOLUTION INTRAVENOUS at 11:19

## 2020-08-11 RX ADMIN — ENOXAPARIN SODIUM 80 MG: 80 INJECTION SUBCUTANEOUS at 22:37

## 2020-08-11 RX ADMIN — FAMOTIDINE 20 MG: 10 INJECTION INTRAVENOUS at 11:20

## 2020-08-11 RX ADMIN — VALPROATE SODIUM 125 MG: 100 INJECTION, SOLUTION INTRAVENOUS at 22:37

## 2020-08-11 RX ADMIN — DIGOXIN 500 MCG: 250 INJECTION, SOLUTION INTRAMUSCULAR; INTRAVENOUS; PARENTERAL at 01:49

## 2020-08-11 RX ADMIN — ZINC SULFATE 220 MG (50 MG) CAPSULE 220 MG: CAPSULE at 09:20

## 2020-08-11 RX ADMIN — CEFEPIME HYDROCHLORIDE 2 G: 2 INJECTION, POWDER, FOR SOLUTION INTRAVENOUS at 17:33

## 2020-08-11 RX ADMIN — REMDESIVIR 100 MG: 100 INJECTION, POWDER, LYOPHILIZED, FOR SOLUTION INTRAVENOUS at 22:36

## 2020-08-11 RX ADMIN — ALBUTEROL SULFATE 2 PUFF: 90 AEROSOL, METERED RESPIRATORY (INHALATION) at 11:09

## 2020-08-11 RX ADMIN — Medication: at 05:21

## 2020-08-11 RX ADMIN — POTASSIUM CHLORIDE 10 MEQ: 7.46 INJECTION, SOLUTION INTRAVENOUS at 14:42

## 2020-08-11 RX ADMIN — ALBUTEROL SULFATE 2 PUFF: 90 AEROSOL, METERED RESPIRATORY (INHALATION) at 20:39

## 2020-08-11 RX ADMIN — VANCOMYCIN HYDROCHLORIDE 1500 MG: 10 INJECTION, POWDER, LYOPHILIZED, FOR SOLUTION INTRAVENOUS at 11:45

## 2020-08-11 RX ADMIN — AMIODARONE HYDROCHLORIDE 1 MG/MIN: 50 INJECTION, SOLUTION INTRAVENOUS at 01:59

## 2020-08-11 RX ADMIN — Medication 10 ML: at 09:25

## 2020-08-11 RX ADMIN — DEXAMETHASONE SODIUM PHOSPHATE 6 MG: 4 INJECTION, SOLUTION INTRAMUSCULAR; INTRAVENOUS at 09:06

## 2020-08-11 RX ADMIN — ENOXAPARIN SODIUM 80 MG: 80 INJECTION SUBCUTANEOUS at 09:20

## 2020-08-11 RX ADMIN — AMIODARONE HYDROCHLORIDE 0.5 MG/MIN: 50 INJECTION, SOLUTION INTRAVENOUS at 11:19

## 2020-08-11 RX ADMIN — METOPROLOL TARTRATE 12.5 MG: 25 TABLET, FILM COATED ORAL at 09:20

## 2020-08-11 RX ADMIN — FAMOTIDINE 20 MG: 10 INJECTION INTRAVENOUS at 01:49

## 2020-08-11 RX ADMIN — POTASSIUM CHLORIDE 20 MEQ: 1500 TABLET, EXTENDED RELEASE ORAL at 09:19

## 2020-08-11 RX ADMIN — OXYCODONE HYDROCHLORIDE AND ACETAMINOPHEN 2000 MG: 500 TABLET ORAL at 09:20

## 2020-08-11 RX ADMIN — FAMOTIDINE 20 MG: 10 INJECTION INTRAVENOUS at 22:38

## 2020-08-11 RX ADMIN — FLUOXETINE 30 MG: 20 CAPSULE ORAL at 09:20

## 2020-08-11 RX ADMIN — Medication 10 ML: at 22:38

## 2020-08-11 RX ADMIN — ALBUTEROL SULFATE 2 PUFF: 90 AEROSOL, METERED RESPIRATORY (INHALATION) at 07:38

## 2020-08-11 RX ADMIN — VALPROATE SODIUM 125 MG: 100 INJECTION, SOLUTION INTRAVENOUS at 09:02

## 2020-08-11 NOTE — PROGRESS NOTES
Discharge Planning Assessment  [facility]     Patient Name: Day Cabrera  MRN: [id number]  Today's Date: 8/11/2020    Admit Date: 8/10/2020        Row Name 08/11/20 1219       Living Environment    Lives With  facility resident    Name(s) of Who Lives With Patient  LTC at [facility]    Current Living Arrangements  extended care facility    Primary Care Provided by  homecare agency    Provides Primary Care For  no one, unable/limited ability to care for self    Family Caregiver if Needed  none    Able to Return to Prior Arrangements  yes       Resource/Environmental Concerns    Resource/Environmental Concerns  none       Transition Planning    Patient/Family Anticipates Transition to  long term care facility    Patient/Family Anticipated Services at Transition  skilled nursing    Transportation Anticipated  health plan transportation       Discharge Needs Assessment    Readmission Within the Last 30 Days  no previous admission in last 30 days    Concerns to be Addressed  discharge planning    Anticipated Changes Related to Illness  none    Equipment Needed After Discharge  none    Provided Post Acute Provider List?  N/A        Discharge Plan     Row Name 08/11/20 1224       Plan    Plan  OK to return to [facility]. no PASRR or precert needed    Plan Comments  due to covid precautions spoke to patient's daughter by phone.  Daughter confirms plan is to return to [facility] on dishcarge.        Destination      Service Provider Request Status Selected Services Address Phone Number Fax Number    [facility] [place] Pending - Request Sent N/A [id number] [place] [address] [phone]             Demographic Summary     Row Name 08/11/20 1219       General Information    Admission Type  inpatient    Arrived From  long term [facility]    Referral Source  admission list    Reason for Consult  discharge planning        Functional Status     Row Name 08/11/20 7226        Functional Status    Usual Activity Tolerance  moderate    Current Activity Tolerance  moderate       Functional Status, IADL    Medications  completely dependent    Meal Preparation  completely dependent    Housekeeping  completely dependent    Laundry  completely dependent    Shopping  completely dependent    IADL Comments  LTC resident at [place]          Sheri Erwin RN

## 2020-08-11 NOTE — DISCHARGE PLACEMENT REQUEST
"Day Cabrera (74 y.o. Female)     Date of Birth Social Security Number Address Home Phone MRN    1945  [id number] [address] [phone] [id number]    [employer] Marital Status          Faith        Admission Date Admission Type Admitting Provider Attending Provider Department, Room/Bed    8/10/20 Emergency Salena Fitch MD Kapadia, Shefali A, MD [facility], 2128/1    Discharge Date Discharge Disposition Discharge Destination                       Attending Provider:  Salena Fitch MD    Allergies:  Ciprofloxacin    Isolation:  Enh Drop/Con   Infection:  COVID (confirmed) (08/10/20)   Code Status:  CPR    Ht:  172.7 cm (68\")   Wt:  76.3 kg (168 lb 3.4 oz)    Admission Cmt:  None   Principal Problem:  COVID-19 virus detected [U07.1]                 Active Insurance as of 8/10/2020     Primary Coverage     Payor Plan Insurance Group Employer/Plan Group    [facility] MEDICARE REPLACEMENT [facility] MEDICARE REPLACEMENT [id number]     Payor Plan Address Payor Plan Phone Number Payor Plan Fax Number Effective Dates    [address] [address]   1/1/2019 - None Entered    [facility] [id number]       Subscriber Name Subscriber Birth Date Member ID       Day Cabrera 1945 [id number]                 Emergency Contacts      (Rel.) Home Phone Work Phone Mobile Phone    SHANON CABRERA (Daughter) -- -- [phone]              "

## 2020-08-11 NOTE — PLAN OF CARE
Problem: Patient Care Overview  Goal: Plan of Care Review  Outcome: Ongoing (interventions implemented as appropriate)  Flowsheets  Taken 8/11/2020 0405  Progress: improving  Taken 8/11/2020 0000  Plan of Care Reviewed With: patient  Note:   Pt converted to NSR from AFIB this am after cardizem switched to amio. Pt alert and oriented to self. Unsure if pt at baseline ms as pt has hx dementia. Bedside swallow this am. Will continue to monitor.

## 2020-08-11 NOTE — NURSING NOTE
Dr. Munson paged r/t PO amiodarone and speech recommendation to keep her NPO including meds due to vomiting episode while evaluating.  This nurse also advised him speech said she may be able to take her medications with applesauce and be okay.  He advised to continue the drip at this time and to start the PO amio when patient was able to take medications by mouth.

## 2020-08-11 NOTE — CONSULTS
picc team note,  Triple lumen power picc placed in left upper arm under u/s guidance. Pt tolerated well.

## 2020-08-11 NOTE — PROGRESS NOTES
"Pharmacy Antimicrobial Dosing Service    Subjective:  Day Cabrera is a 74 y.o.female admitted with shortness of breath, fever. Pharmacy has been consulted to dose Vancomycin for possible sepsis, PNA.     COVID (+)      Assessment/Plan    1. Day #2 Vancomycin: Goal -600 mcg*h/mL. Due patients return to baseline renal function and random level this morning ~04:00= 10.2 mcg/mL.  Plan to initiate 1500 mg IV q24h due to age and renal function.   Peak 8/13 at 1500  Trough 8/14 at 1000    2. Day #2 Cefepime: 2g IV q8h for estCrCl > 60 mL/min.    Will continue to monitor drug levels, renal function, culture and sensitivities, and patient clinical status.       Objective:  Relevant clinical data and objective history reviewed:  172.7 cm (68\")   76.3 kg (168 lb 3.4 oz)   Ideal body weight: 63.9 kg (140 lb 14 oz)  Adjusted ideal body weight: 68.9 kg (151 lb 12.9 oz)  Body mass index is 25.58 kg/m².    Results from last 7 days   Lab Units 08/11/20  0416   VANCOMYCIN RM mcg/mL 10.20     Results from last 7 days   Lab Units 08/11/20  0416 08/10/20  1409 08/10/20  0945   CREATININE mg/dL 0.69 1.07* 1.41*     Estimated Creatinine Clearance: 74.3 mL/min (by C-G formula based on SCr of 0.69 mg/dL).  I/O last 3 completed shifts:  In: 3035 [IV Piggyback:3035]  Out: -     Results from last 7 days   Lab Units 08/10/20  0945   WBC 10*3/mm3 13.20*     Temperature    08/10/20 1844 08/11/20 0146 08/11/20 0525   Temp: 96.7 °F (35.9 °C) 97.6 °F (36.4 °C) 97.2 °F (36.2 °C)     Baseline culture/source/susceptibility:  Microbiology Results (last 10 days)       Procedure Component Value - Date/Time    S. Pneumo Ag Urine or CSF - Urine, Urine, Catheter In/Out [[id number]]  (Normal) Collected:  08/10/20 2305    Lab Status:  Final result Specimen:  Urine, Catheter In/Out Updated:  08/11/20 0014     Strep Pneumo Ag Negative    COVID PRE-OP / PRE-PROCEDURE SCREENING ORDER (NO ISOLATION) - Swab, Nasopharynx [[id number]] Collected:  08/10/20 " 0951    Lab Status:  Final result Specimen:  Swab from Nasopharynx Updated:  08/10/20 1049    Narrative:       The following orders were created for panel order COVID PRE-OP / PRE-PROCEDURE SCREENING ORDER (NO ISOLATION) - Swab, Nasopharynx.  Procedure                               Abnormality         Status                     ---------                               -----------         ------                     Respiratory Panel PCR w/...[[id number]]  Abnormal            Final result                 Please view results for these tests on the individual orders.    Respiratory Panel PCR w/COVID-19(SARS-CoV-2) XANDER/BIN/BRIAN In-House, NP Swab in UTM/VTP, 3-4 Hr TAT - Swab, Nasopharynx [[id number]]  (Abnormal) Collected:  08/10/20 0951    Lab Status:  Final result Specimen:  Swab from Nasopharynx Updated:  08/10/20 1049     ADENOVIRUS, PCR Not Detected     Coronavirus 229E Not Detected     Coronavirus HKU1 Not Detected     Coronavirus NL63 Not Detected     Coronavirus OC43 Not Detected     COVID19 Detected     Human Metapneumovirus Not Detected     Human Rhinovirus/Enterovirus Not Detected     Influenza A PCR Not Detected     Influenza A H1 Not Detected     Influenza A H1 2009 PCR Not Detected     Influenza A H3 Not Detected     Influenza B PCR Not Detected     Parainfluenza Virus 1 Not Detected     Parainfluenza Virus 2 Not Detected     Parainfluenza Virus 3 Not Detected     Parainfluenza Virus 4 Not Detected     RSV, PCR Not Detected     Bordetella pertussis pcr Not Detected     Bordetella parapertussis PCR Not Detected     Chlamydophila pneumoniae PCR Not Detected     Mycoplasma pneumo by PCR Not Detected    Narrative:       Fact sheet for providers: https://docs.[employer]/wp-content/uploads/SAQ4776-6782-JZ3.1-EUA-Provider-Fact-Sheet-3.pdf    Fact sheet for patients: https://docs.[employer]/wp-content/uploads/PMT9643-5670-TK7.1-EUA-Patient-Fact-Sheet-1.pdf    Urine Culture - Urine, Urine, Catheter In/Out  [[id number]]  (Abnormal) Collected:  08/10/20 0949    Lab Status:  Preliminary result Specimen:  Urine, Catheter In/Out Updated:  08/11/20 0747     Urine Culture >100,000 CFU/mL Gram Negative Bacilli    Blood Culture - Blood, Arm, Right [[id number]] Collected:  08/10/20 0946    Lab Status:  Preliminary result Specimen:  Blood from Arm, Right Updated:  08/11/20 1015     Blood Culture No growth at 24 hours    Blood Culture - Blood, Arm, Right [[id number]] Collected:  08/10/20 0945    Lab Status:  Preliminary result Specimen:  Blood from Arm, Right Updated:  08/11/20 1015     Blood Culture No growth at 24 hours             Anti-Infectives (From admission, onward)      Ordered     Dose/Rate Route Frequency Start Stop    08/10/20 1110  !Vancomycin Level Draw Needed     Ordering Provider:  Bruno Roach MD     Does not apply Once 08/11/20 0600 08/11/20 0521    08/10/20 1110  vancomycin (VANCOCIN) 1,000 mg in sodium chloride 0.9 % 250 mL IVPB  Review   Ordering Provider:  Bruno Roach MD    1,000 mg Intravenous As Needed 08/10/20 1109 08/17/20 1108    08/10/20 0947  ceFEPime (MAXIPIME) in SWFI 2g/10ml IV PUSH syringe  Review   Ordering Provider:  Bruno Roach MD    2 g  over 5 Minutes Intravenous Every 12 Hours 08/10/20 1000 08/13/20 0959    08/10/20 0954  vancomycin 1500 mg/500 mL 0.9% NS IVPB ([facility])     Ordering Provider:  Bruno Roach MD    1,500 mg Intravenous Once 08/10/20 0956 08/10/20 1207    08/10/20 0947  Pharmacy to dose vancomycin  Review   Ordering Provider:  Bruno Roach MD     Does not apply Continuous PRN 08/10/20 0944 08/15/20 0943            Francia Herring [facility]  08/11/20 10:16

## 2020-08-11 NOTE — CONSULTS
[facility] CONSULT        Subjective:     Encounter Date:08/10/2020      Patient ID: Day Cabrera is a 74 y.o. female.    Chief Complaint: cough, fever      HPI: Seen and examined today with nurse practitioner, following narrative reported to us.  Day Cabrera is a 74 y.o. female with a PMH of HTN, dementia, PE (anticoagulated with eliquis), and hypothyroidism but no prior cardiac history and unknown to us who presents from the nursing home with c/o cough, fever, and decreased level of consciousness.  She was diagnosed with COVID 19 PNA, respiratory failure, UTI, and NENA.  Overnight she developed rapid afib up to 150's and started on a cardizem gtt but became hypotensive.  This was discontinued and she was given 500 mcg digoxin and amiodarone gtt.  She has now converted to SR.  Patient is confused and cannot provide any history.  She is unable to answer questions coherently but repeatedly states she does not feel well.  She seemed to indication inspiratory chest pain with taking inhaler.  She is in no apparent distress on nasal cannula.  2D echo is been ordered however she is COVID positive and this will be deferred at this time.  She had a stress test back in 2017 which was unremarkable with a EF of greater than 60%.  Her EKG since this time is unchanged.  New onset atrial fibrillation felt to be from respiratory etiology low likelihood of ischemia.  No other acute events overnight.  Hemodynamically electrically stable presently with conversion back to sinus rhythm.    PPE worn: gown, surgical mask, face shield, gloves,shoe covers, bonnet; placed surgical mask on patient.    Today, convert amiodarone drip to oral 400 mg p.o. twice daily  Advance beta-blocker metoprolol 12.5 up to 25 twice daily  Digoxin level in the morning, dose per level 0.8-1.4  Anticoagulation with therapeutic Lovenox with COVID, history of PE as primary indication as well as now with atrial fibrillation chads Vasc 4  greater than 2      Past Medical History:   Diagnosis Date   • Anxiety    • Arthritis    • Chest pain 10/15/2019   • Dementia (CMS/HCC)    • Hypertension    • Pulmonary embolus (CMS/HCC)          Past Surgical History:   Procedure Laterality Date   • CHOLECYSTECTOMY     • COLONOSCOPY     • HYSTERECTOMY      total          Social History     Socioeconomic History   • Marital status:      Spouse name: Not on file   • Number of children: 3   • Years of education: Not on file   • Highest education level: Not on file   Tobacco Use   • Smoking status: Former Smoker     Years: 30.00     Types: Cigarettes     Last attempt to quit:      Years since quittin.6   • Smokeless tobacco: Never Used   Substance and Sexual Activity   • Alcohol use: No     Frequency: Never   • Drug use: No   • Sexual activity: Defer       Family History   Problem Relation Age of Onset   • Heart disease Mother    • Alcohol abuse Father         murdered    • Pancreatic cancer Sister    • No Known Problems Half-Sister          Allergies   Allergen Reactions   • Ciprofloxacin Unknown (See Comments)       Current Medications:   Scheduled Meds:  albuterol sulfate HFA 2 puff Inhalation 4x Daily - RT   cefepime 2 g Intravenous Q12H   Cholecalciferol 4,000 Units Oral Daily   dexamethasone 6 mg Intravenous Daily   donepezil 10 mg Oral Nightly   enoxaparin 1 mg/kg Subcutaneous Q12H   famotidine 20 mg Intravenous Q12H   FLUoxetine 30 mg Oral Daily   INV [id number] remdesivir in NS IVPB 100 mg Intravenous Q24H   levothyroxine 25 mcg Oral Q AM   [START ON 2020] melatonin 10 mg Oral Nightly   metoprolol tartrate 12.5 mg Oral Q12H   pharmacy 1 each Does not apply Once   potassium chloride 20 mEq Oral Once   sodium chloride 10 mL Intravenous Q12H   valproate sodium 125 mg Intravenous Q12H   vitamin C 2,000 mg Oral Daily   zinc sulfate 220 mg Oral Daily     Continuous Infusions:  amiodarone 1 mg/min Last Rate: 1 mg/min (20 0159)   amiodarone 0.5  "mg/min    Pharmacy Consult - Remdesivir     Pharmacy to Dose enoxaparin (LOVENOX)     Pharmacy to dose vancomycin     sodium chloride 75 mL/hr Last Rate: 75 mL/hr (08/10/20 2029)       ROS  Unable to obtain secondary to patient confusion.       Objective:         /52 (BP Location: Right arm, Patient Position: Lying)   Pulse 85   Temp 97.2 °F (36.2 °C) (Oral)   Resp 16   Ht 172.7 cm (68\")   Wt 76.3 kg (168 lb 3.4 oz)   SpO2 97%   BMI 25.58 kg/m²       General: Elderly, sluggish, in NAD.  Neuro: Confused.  He was all extremities no focal deficits  HEENT: sclera clear, no xanthalasmas.  CV: Distant S1S2 RRR. No murmurs or gallops. No JVD.  Resp: Breathing is unlabored. Anterior lungs CTA upper / dim bases  GI: BS+. Abdomen soft and NTTP.  Ext: Pedal pulses are palpable. Extremities are with 1+ non-pitting BLE edema.  MS: moves all extremities, generalized weakness.  Skin: warm, dry.  Psych: calm and cooperative.            Lab Review:     Results from last 7 days   Lab Units 08/11/20  0416 08/10/20  1409 08/10/20  0945   SODIUM mmol/L 157*  --  161*   POTASSIUM mmol/L 3.5  --  4.7   CHLORIDE mmol/L 122*  --  121*   CO2 mmol/L 23.0  --  22.0   BUN mg/dL  --   --  56*   CREATININE mg/dL 0.69 1.07* 1.41*   GLUCOSE mg/dL 123*  --  130*   CALCIUM mg/dL 8.7  --  9.8   AST (SGOT) U/L 13  --  34*   ALT (SGPT) U/L 12  --  18     Results from last 7 days   Lab Units 08/10/20  0945   TROPONIN T ng/mL <0.010     Results from last 7 days   Lab Units 08/10/20  0945   WBC 10*3/mm3 13.20*   HEMOGLOBIN g/dL 15.0   HEMATOCRIT % 46.1   PLATELETS 10*3/mm3 280                   Invalid input(s): LDLCALC      Results from last 7 days   Lab Units 08/11/20  0416   TSH uIU/mL 1.220       Recent Radiology:  Imaging Results (Most Recent)     Procedure Component Value Units Date/Time    XR Chest 1 View [[id number]] Collected:  08/10/20 1659     Updated:  08/10/20 1702    Narrative:       DATE OF EXAM:  8/10/2020 4:54 PM   "   PROCEDURE:  XR CHEST 1 VW-     INDICATIONS:  dyspnea, covid-19; U07.1-COVID-19; J12.89-Other viral pneumonia;  A41.9-Sepsis, unspecified organism; R65.20-Severe sepsis without septic  shock; N39.0-Urinary tract infection, site not specified;  E86.0-Dehydration; F03.91-Unspecified dementia with behavioral  disturbance       COMPARISON:  AP portable chest 08/10/2020 earlier today..     TECHNIQUE:   Single radiographic view of the chest was obtained.     FINDINGS:  Small left basilar pleural effusion is present. Probable mild left  basilar atelectasis partially obscured the diaphragmatic margin. The  right lung is clear. The heart size is normal. No pneumothorax is seen.       Impression:       Small left pleural effusion with probable mild left basilar atelectasis  is new compared to earlier today..     Electronically Signed By-Dr. Leanne Lyn MD On:8/10/2020 5:00 PM  This report was finalized on [id number] by Dr. Leanne Lyn MD.    XR Chest 1 View [[id number]] Collected:  08/10/20 1042     Updated:  08/10/20 1044    Narrative:       DATE OF EXAM:  8/10/2020 10:14 AM     PROCEDURE:  XR CHEST 1 VW-     INDICATIONS:  fever cough covid exposure       COMPARISON:  AP portable chest 10/23/2019, CT chest 10/23/2019.     TECHNIQUE:   Single radiographic view of the chest was obtained.     FINDINGS:  No acute airspace disease. Mild scarring in the lung bases, unchanged.  Normal heart size. No pleural effusion, pneumothorax or acute osseous  abnormality.       Impression:       No acute chest finding.     Electronically Signed By-Dr. Leanne Lyn MD On:8/10/2020 10:42 AM  This report was finalized on [id number] by Dr. Leanne Lyn MD.            ECHOCARDIOGRAM:               Assessment:         Active Hospital Problems    Diagnosis POA   • **COVID-19 virus detected [U07.1] Yes   • Pneumonia due to 2019 novel coronavirus [U07.1, J12.89] Yes   • Severe sepsis (CMS/HCC) [A41.9, R65.20] Unknown   • Severe  dehydration [E86.0] Unknown   • Encephalopathy due to COVID-19 virus [U07.1, G93.49] Yes   • Acute respiratory failure due to COVID-19 (CMS/HCC) [U07.1, J96.00] Yes   • Pulmonary embolus (CMS/HCC) [I26.99] Unknown   • Urinary tract infection after immobility [N39.0] Unknown   • Dementia with behavioral disturbance (CMS/HCC) [F03.91] Unknown   • Chronic anxiety [F41.9] Yes   • Hypertension [I10] Yes     1) Respiratory failure  - POSITIVE COVID 19  - PNA on CXR  - elevated d-dimer  - on nasal cannula  - primary team managing    2) Atrial Fibrillation with RVR  - troponin negative  - EKG shows no acute ST abnormalities  - cardizem gtt stopped secondary to hypotension  - received 500 mcg IV digoxin, started on amio gtt  - in SR this am 8/11  - 2D echo pending  - TSH WNL  - K replaced  - check Mg  - Hx nuclear stress test 2017 that was low risk study with EF = 68%.    3) Altered mental status  - decreased LOC on admit  - improved    4) NENA / dehydration  - Cr 1.41 --> 0.69  - on IVFs  - Na 161 --> 157    5) Hypernatremia  - Na 161 --> 157    6) UTI  - per primary team    7) Hx dementia     8) Hx PE  - anticoagulated with eliquis at home; on hold  - on therapeutic dose lovenox    9) Hx hypothyroidism    10) HTN             Plan:   Continue amnio drip transition to p.o. amiodarone at 24 hours  Beta-blocker will be instituted  Digoxin dose per level 0.8-1.4  Hypernatremia per primary, free water recommended  Anticoagulation with therapeutic Lovenox while here in the hospital for stroke risk reduction as well as with history of PE in the past  Toxic metabolic encephalopathy with pneumonia    Conservative management from CV standpoint conversion back to sinus rhythm presently, rate and rhythm control strategies with anticoagulation  Defer 2D echo which would not  acutely at this point in time    Amiodarone convert to p.o.  Advance metoprolol  Dig level in the morning, anticoagulation as above    Continue to  follow  Balbir Munson MD, PhD    Thank you very much for the consult is a pleasure of involved in the Care of this patient please call with any questions or concerns  Balbir Munson MD, PhD

## 2020-08-11 NOTE — PLAN OF CARE
Problem: Patient Care Overview  Goal: Plan of Care Review  Outcome: Ongoing (interventions implemented as appropriate)  Note:   Pt seen for clinical swallow eval. Pt given trials of ice chips, water by spoon and straw. No other trials given due to pt vomiting.    Pt exhibited weak labial closure over spoon and straw and slow oral transit. Pt had no overt s/s of aspiration on ice chips and water by spoon. She did have coughing on water by straw, and following administration of this, she began to have vomiting. Pt then stated that she felt very nauseous and no other trials were given. Pt does not appear ready for PO at this time due to cougjhing on water and vomiting. It is rec that pt be NPO for now. She may have meds crushed in puree only and small sips of water by spoon with strict supervision from RN. ST will follow to re-eval swallow as indicated.

## 2020-08-11 NOTE — PROGRESS NOTES
Continued Stay Note  ROSY Collier     Patient Name: Day Cabrera  MRN: [id number]  Today's Date: 8/11/2020    Admit Date: 8/10/2020    Discharge Plan     Row Name 08/11/20 1533       Plan    Plan  DC Plan: Return to [facility], LTC (under Medicaid). Okay to return per facility liaison & pt's family. No PASRR needed for return. No pre-cert required (will return under [place] per liaison).     Plan Comments  CM confirmed return with pt's family. SW confirmed return with facility liaison. Pt is LTC at facility.      KAREEN Russo    Phone: [phone]  Cell: [phone]  Fax: [phone]  [email or web address]

## 2020-08-11 NOTE — PLAN OF CARE
Patient was alert and was able to take morning medications with applesauce. However, with speech patient vomited with water while evaluating.  Speech made patient NPO,including medications at the time.  PICC line inserted in DC, phone consent obtained from Patti, her daughter.  Potassium replacement completed as ordered.

## 2020-08-11 NOTE — PROGRESS NOTES
"      [facility] Medicine Services Daily Progress Note          Hospitalist Team  LOS 1 days      Patient Care Team:  Smita Howard APRN as PCP - General (Nurse Practitioner)    Patient Location: 2128/1      Subjective   Subjective     Chief Complaint / Subjective  Chief Complaint   Patient presents with   • Loss of Consciousness         Brief Synopsis of Hospital Course/HPI  Ms. Cabrera is a 74 y.o. female from [facility] who presented to [facility] ER 8/10/20 with decreased responsiveness, fever, cough, and congestion. [facility] has a known outbreak of Covid-19. The patient had a fever of 101.3, HR 130s. CXR did not show acute findings per radiology report; however, ER physician felt the patient's CXR was suggestive of pneumonia from Covid-19. Covid-19 swab was positive. Her WBC was 13.2, creatinine 1.41, AST 34. UA showed large leukocytes, too numerous wbcs, and 3+ bacteria. She was given cefepime, vanc, 30cc/kg IVFs, 8mg decadron, albuterol inhaler, and tylenol suppository. Daughter Patti Cabrera was notified of the patient's condition and she is a full code.      Upon exam the patient would open her eyes and mumble but could not provide any information. She is requiring 4L O2 via nasal cannula.     Date::        Review of Systems   Unable to perform ROS: mental status change         Objective   Objective      Vital Signs  Temp:  [97.2 °F (36.2 °C)-99 °F (37.2 °C)] 97.3 °F (36.3 °C)  Heart Rate:  [] 82  Resp:  [13-20] 18  BP: ()/(40-89) 96/40  Oxygen Therapy  SpO2: 91 %  Pulse Oximetry Type: Continuous  Device (Oxygen Therapy): nasal cannula  Device (Oxygen Therapy): room air  Flow (L/min): 3  Oxygen Concentration (%): 21  Flowsheet Rows      First Filed Value   Admission Height  172.7 cm (68\") Documented at 08/10/2020 0942   Admission Weight  84.5 kg (186 lb 4.6 oz) Documented at 08/10/2020 0942        Intake & Output (last 3 days)       08/09 0701 - 08/10 0700 08/10 0701 " - 08/11 0700 08/11 0701 - 08/12 0700    P.O.  0     I.V. (mL/kg)   1000 (13.1)    IV Piggyback  3035 210    Total Intake(mL/kg)  3035 (39.8) 1210 (15.9)    Net  +3035 +1210           Urine Unmeasured Occurrence  1 x         Lines, Drains & Airways    Active LDAs     Name:   Placement date:   Placement time:   Site:   Days:    PICC Triple Lumen 08/11/20 Left Basilic   08/11/20    1415    Basilic   less than 1    Peripheral IV 08/10/20 0940 Right Antecubital   08/10/20    0940    Antecubital   1    External Urinary Catheter   08/10/20    2310    --   less than 1                  Physical Exam:  General: well-developed and well-nourished, ill-appearing NAD  HEENT: NC/AT; EOMI; PERRLA  Heart: tachycardic rhythm. No murmur   Chest: Decreased breath sounds bilaterally. Normal respiratory effort.  Abdominal: Soft. NT/ND. Bowel sounds present  Musculoskeletal: Normal ROM.  No edema. No calf tenderness.  Neurological: lethargic/somnolent, nonverbal  Skin: Skin is warm and dry. No rash         Wounds (last 24 hours)      LDA Wound     Row Name 08/11/20 1622 08/11/20 1210 08/11/20 0810       Wound 08/10/20 1500 midline sacral spine Pressure Injury    Wound - Properties Group Date first assessed: 08/10/20  - Time first assessed: 1500 -JH Orientation: midline  - Location: sacral spine  -JH Primary Wound Type: Pressure inj  -JH Stage, Pressure Injury: Stage 2  -JH    Dressing Appearance  open to air  -DA  open to air  -DA  open to air  -DA    Base  red  -DA  red  -DA  red  -DA    Edges  rolled/closed  -DA  rolled/closed  -DA  rolled/closed  -DA    Care, Wound  --  --  cleansed with;soap and water  -DA    Row Name 08/11/20 0402 08/11/20 0000 08/10/20 2000       Wound 08/10/20 1500 midline sacral spine Pressure Injury    Wound - Properties Group Date first assessed: 08/10/20  - Time first assessed: 1500  -JH Orientation: midline  - Location: sacral spine  -JH Primary Wound Type: Pressure inj  -JH Stage, Pressure Injury:  Stage 2  -    Dressing Appearance  open to air  -AB  open to air  -AB  open to air  -AB    Base  red  -AB  red  -AB  red  -AB    Edges  rolled/closed  -AB  rolled/closed  -AB  rolled/closed  -AB      User Key  (r) = Recorded By, (t) = Taken By, (c) = Cosigned By    Initials Name Provider Type    Estelle Monsalve RN Registered Nurse    Porsha Monreal, RN Registered Nurse    Moriah Bowden RN Registered Nurse          Procedures:             COVID-19 LAB PANEL     COVID-19 test result  COVID19   Date Value Ref Range Status   08/10/2020 Detected (C) Not Detected - Ref. Range Final       COVID-19 Prognostic lab results  WBC with differential  Results from last 7 days   Lab Units 08/11/20  1316 08/10/20  0945   WBC 10*3/mm3 13.00* 13.20*   PLATELETS 10*3/mm3 188 280   NEUTROPHIL % % 86.7* 70.6   LYMPHOCYTE % % 6.7* 16.1*   NEUTROS ABS 10*3/mm3 11.30* 9.30*   LYMPHS ABS 10*3/mm3 0.90 2.10     Inflammatory markers  Results from last 7 days   Lab Units 08/11/20  0416 08/10/20  1731 08/10/20  1409 08/10/20  0945   CRP mg/dL 14.67*  --  16.47*  --    FERRITIN ng/mL 1,402.00*  --  1,265.00*  --    D DIMER QUANT mg/L (FEU) 0.77* 0.66*  --   --    PROCALCITONIN ng/mL 0.25  --  0.26*  --    LDH U/L  --   --  172  --    ALK PHOS U/L 55  --   --  71   AST (SGOT) U/L 13  --   --  34*   ALT (SGPT) U/L 12  --   --  18   TROPONIN T ng/mL  --   --   --  <0.010       Poor COVID-19 outcomes associated with:  Neutrophil:Lymphocyte ratio >3.5  Thrombocytopenia  LFT's >5x upper limit of normal  LDH >400    Results Review:     I reviewed the patient's new clinical results.    Results from last 7 days   Lab Units 08/11/20  1316 08/10/20  0945   WBC 10*3/mm3 13.00* 13.20*   HEMOGLOBIN g/dL 11.9* 15.0   HEMATOCRIT % 37.2 46.1   PLATELETS 10*3/mm3 188 280     Results from last 7 days   Lab Units 08/11/20  0416 08/10/20  1409 08/10/20  0945   SODIUM mmol/L 157*  --  161*   POTASSIUM mmol/L 3.5  --  4.7   CHLORIDE mmol/L 122*   --  121*   CO2 mmol/L 23.0  --  22.0   BUN  42*  --  56*   CREATININE mg/dL 0.69 1.07* 1.41*   GLUCOSE mg/dL 123*  --  130*   ALBUMIN g/dL 2.90*  --  3.80   BILIRUBIN mg/dL 0.4  --  0.8   ALK PHOS U/L 55  --  71   AST (SGOT) U/L 13  --  34*   ALT (SGPT) U/L 12  --  18   CALCIUM mg/dL 8.7  --  9.8     Cr Clearance Estimated Creatinine Clearance: 74.3 mL/min (by C-G formula based on SCr of 0.69 mg/dL).    Coag       HbA1C No results found for: HGBA1C  Blood Glucose   Results from last 7 days   Lab Units 08/11/20  1702 08/11/20  1201 08/10/20  2335 08/10/20  1655 08/10/20  0932   GLUCOSE mg/dL 113* 122* 125* 117* 124*       Troponin Results from last 7 days   Lab Units 08/10/20  0945   TROPONIN T ng/mL <0.010     Lipids  Lab Results   Component Value Date    CHOL 221 (H) 07/13/2018    TRIG 66 07/13/2018    HDL 67 07/13/2018     (H) 07/13/2018       UA    Results from last 7 days   Lab Units 08/10/20  0949   NITRITE UA  Negative   WBC UA /HPF Too Numerous to Count*   BACTERIA UA /HPF 3+*   SQUAM EPITHEL UA /HPF None Seen   URINECX  >100,000 CFU/mL Gram Negative Bacilli*       Microbiology Results from last 7 days   Lab Units 08/10/20  0949 08/10/20  0946 08/10/20  0945   BLOODCX   --  No growth at 24 hours No growth at 24 hours   URINECX  >100,000 CFU/mL Gram Negative Bacilli*  --   --        ABG        EKG  ECG 12 Lead   Preliminary Result   HEART RATE= 89  bpm   RR Interval= 676  ms   PA Interval= 161  ms   P Horizontal Axis= 19  deg   P Front Axis= 49  deg   QRSD Interval= 95  ms   QT Interval= 388  ms   QRS Axis= -58  deg   T Wave Axis= 151  deg   - ABNORMAL ECG -   Sinus rhythm   Probable anterior infarct, age indeterminate   Abnormal T, consider ischemia, lateral leads   When compared with ECG of 10-Aug-2020 19:40:33,   Significant rate decrease   Significant axis, voltage or hypertrophy change   Electronically Signed By:    Date and Time of Study: 2020-08-11 04:05:25      ECG 12 Lead   Preliminary Result    HEART RATE= 133  bpm   RR Interval= 449  ms   PA Interval=   ms   P Horizontal Axis=   deg   P Front Axis=   deg   QRSD Interval= 88  ms   QT Interval= 304  ms   QRS Axis= -49  deg   T Wave Axis= 161  deg   - ABNORMAL ECG -   Atrial fibrillation   Left anterior fascicular block   LVH with secondary repolarization abnormality   Anterior Q waves, possibly due to LVH   Electronically Signed By:    Date and Time of Study: 2020-08-10 19:40:33      ECG 12 Lead   Preliminary Result   HEART RATE= 137  bpm   RR Interval= 436  ms   PA Interval= 112  ms   P Horizontal Axis= 18  deg   P Front Axis= 46  deg   QRSD Interval= 86  ms   QT Interval= 310  ms   QRS Axis= -77  deg   T Wave Axis= 102  deg   - ABNORMAL ECG -   Sinus tachycardia   Consider left ventricular hypertrophy   Nonspecific T abnormalities, lateral leads   When compared with ECG of 25-Oct-2019 13:08:52,   Significant rate increase   Significant repolarization change   Electronically Signed By:    Date and Time of Study: 2020-08-10 09:28:48          Imaging:  Xr Chest 1 View    Result Date: 8/10/2020  Small left pleural effusion with probable mild left basilar atelectasis is new compared to earlier today..  Electronically Signed By-Dr. Leanne Lyn MD On:8/10/2020 5:00 PM This report was finalized on [id number] by Dr. Leanne Lyn MD.    Xr Chest 1 View    Result Date: 8/10/2020  No acute chest finding.  Electronically Signed By-Dr. Leanne Lyn MD On:8/10/2020 10:42 AM This report was finalized on [id number] by Dr. Leanne Lyn MD.            Xrays, labs reviewed personally by physician.    Medication Review:   I have reviewed the patient's current medication list      Scheduled Meds    [START ON 8/13/2020] !Vancomycin Level Draw Needed  Does not apply Once   [START ON 8/14/2020] !Vancomycin Level Draw Needed  Does not apply Once   albuterol sulfate HFA 2 puff Inhalation 4x Daily - RT   amiodarone 400 mg Oral Q12H   cefepime 2 g Intravenous Q8H    Cholecalciferol 4,000 Units Oral Daily   dexamethasone 6 mg Intravenous Daily   donepezil 10 mg Oral Nightly   enoxaparin 1 mg/kg Subcutaneous Q12H   famotidine 20 mg Intravenous Q12H   FLUoxetine 30 mg Oral Daily   INV [id number] remdesivir in NS IVPB 100 mg Intravenous Q24H   levothyroxine 25 mcg Oral Q AM   [START ON 8/12/2020] melatonin 10 mg Oral Nightly   metoprolol tartrate 25 mg Oral Q12H   pharmacy 1 each Does not apply Once   potassium chloride 40 mEq Oral Once   sodium chloride 10 mL Intravenous Q12H   valproate sodium 125 mg Intravenous Q12H   vancomycin 1,500 mg Intravenous Q24H   vitamin C 2,000 mg Oral Daily   zinc sulfate 220 mg Oral Daily       Meds Infusions    Pharmacy Consult - Remdesivir     Pharmacy to Dose enoxaparin (LOVENOX)     Pharmacy to dose vancomycin     sodium chloride 75 mL/hr Last Rate: 75 mL/hr (08/11/20 1119)       Meds PRN  acetaminophen **OR** acetaminophen **OR** acetaminophen  •  aluminum-magnesium hydroxide-simethicone  •  bisacodyl  •  bisacodyl  •  magnesium hydroxide  •  ondansetron **OR** ondansetron  •  Pharmacy Consult - Remdesivir  •  Pharmacy to Dose enoxaparin (LOVENOX)  •  Pharmacy to dose vancomycin  •  sodium chloride  •  sodium chloride      Assessment/Plan   Assessment/Plan     Active Hospital Problems    Diagnosis  POA   • **COVID-19 virus detected [U07.1]  Yes   • Pneumonia due to 2019 novel coronavirus [U07.1, J12.89]  Yes   • Severe sepsis (CMS/HCC) [A41.9, R65.20]  Unknown   • Severe dehydration [E86.0]  Unknown   • Encephalopathy due to COVID-19 virus [U07.1, G93.49]  Yes   • Acute respiratory failure due to COVID-19 (CMS/HCC) [U07.1, J96.00]  Yes   • Pulmonary embolus (CMS/HCC) [I26.99]  Unknown   • Urinary tract infection after immobility [N39.0]  Unknown   • Dementia with behavioral disturbance (CMS/HCC) [F03.91]  Unknown   • Chronic anxiety [F41.9]  Yes   • Hypertension [I10]  Yes      Resolved Hospital Problems   No resolved problems to display.        MEDICAL DECISION MAKING COMPLEXITY BY PROBLEM:     COVID-19 infection causing pneumonia  -- supplemental O2 to keep sats >93%  -- allow permissive hypoxia to sats >86%  -- pronate patient as tolerated for better oxygenation  -- continue supplements     -- Vitamin C 500mg PO q6h     -- Vitamin D3 4000u/day     -- Zinc 100mg Daily     -- Melatonin 10mg nightly     -- Famotidine 40mg/day     -- Steroids: Decadron 8mg x1, then 6mg daily thereafter     -- Remdesivir: started 8/10/20 for up to 10days  -- admission labs: PCT, CRP, IL-6, BNP, Trop, Ferritin, D-dimer, Mg, Neutrophil/Lymphocyte ration  -- monitor daily labs     -- CMP, CBC, CRP, Ferritin, PCT, D-dimer     A.fib with RVR  -- Cardizem gtt started  -- Cardiology to see in am for further evaluation    Acute hypoxic respiratory failure due to Covid-19  -requiring 4L O2 with sats around 91%  -recheck CXR as above  -albuterol inhaler 4x/daily     Pneumonia due to Covid-19  -CXR reviewed. Appears there is some left basilar atelectasis, Will treat as pneumonia due to acute respiratory failure requiring 4L O2, fever, and positive Covid-19 swab  -IV cefepime, vanc     Urinary tract infection  -UA: large leukocytes, too numerous wbcs, and 3+ bacteria.  -- urine cx growing Gram negative bacilli  -IV cefepime and vanc started in ER     Sepsis without hypotension  -fever, tachycardia, wbc 13.2, not hypotensive  -procalcitonin pending  -30cc/kg IVFs given in ER  -IV cefepime, vancomcyin      Acute encephalopathy due to Covid-19/acute UTI  -pt opens eyes but lethargic, but slowly improving  -hold sedating meds flexeril, risperdal, ultram, remeron     NENA, Dehydration   -pt with creatinine 1.41 and Na 161 s/p 30cc/kg IVFs now with creatinine 1.07  -cont IVFs  -hold lasix for now, but avoid fluid overload with COVID-19 infection     H/o multiple PE's on eliquis     Hypertension  -bp normal but borderline normal. Hold norvasc for now     Depression  -cont home depakote,  prozac     Hypothyroidism  -cont home synthroid     Dementia  -cont home aricept  -hold risperdal, ultram, remeron     VTE Prophylaxis  Mechanical Order History:      Ordered        08/10/20 1522  Place Sequential Compression Device  Once         08/10/20 1522  Maintain Sequential Compression Device  Continuous                 Pharmalogical Order History:     Ordered     Dose Route Frequency Stop    08/10/20 1836  enoxaparin (LOVENOX) syringe 80 mg      1 mg/kg SC Every 12 Hours --    08/10/20 1532  Pharmacy to Dose enoxaparin (LOVENOX)      -- XX Continuous PRN --          Code Status  Code Status and Medical Interventions:   Ordered at: 08/10/20 1852     Limited Support to NOT Include:    Intubation     Level Of Support Discussed With:    Next of Kin (If No Surrogate)     Code Status:    CPR     Medical Interventions (Level of Support Prior to Arrest):    Limited       This patient has been examined wearing appropriate Personal Protective Equipment. 08/11/20      Discharge Planning    To be determined pending patient's clinical course.  Since she is a [place] resident she will likely return there at the time of discharge.      Electronically signed by Salena Fitch MD, 08/11/20, 19:15.    [facility] Hospitalist Team

## 2020-08-11 NOTE — THERAPY EVALUATION
Acute Care - Speech Language Pathology   Swallow Initial Evaluation  Nando     Patient Name: Day Cabrera  : 1945  MRN: [id number]  Today's Date: 2020               Admit Date: 8/10/2020    Visit Dx:     ICD-10-CM ICD-9-CM   1. Pneumonia due to 2019 novel coronavirus U07.1 480.8    J12.89    2. Severe sepsis (CMS/HCC) A41.9 038.9    R65.20 995.92   3. Urinary tract infection after immobility N39.0 599.0   4. Severe dehydration E86.0 276.51   5. Dementia with behavioral disturbance, unspecified dementia type (CMS/HCC) F03.91 294.21     Patient Active Problem List   Diagnosis   • Chronic anxiety   • Dementia with behavioral disturbance (CMS/HCC)   • Hypertension   • Memory impairment   • Obesity   • Sleep apnea   • E. coli UTI (urinary tract infection)   • Urinary tract infection after immobility   • Delirium due to another medical condition   • Pulmonary embolus (CMS/HCC)   • Pneumonia due to 2019 novel coronavirus   • Severe sepsis (CMS/HCC)   • Severe dehydration   • Arthritis   • COVID-19 virus detected   • Encephalopathy due to COVID-19 virus   • Acute respiratory failure due to COVID-19 (CMS/HCC)     Past Medical History:   Diagnosis Date   • Anxiety    • Arthritis    • Chest pain 10/15/2019   • Dementia (CMS/HCC)    • Hypertension    • Pulmonary embolus (CMS/HCC)      Past Surgical History:   Procedure Laterality Date   • CHOLECYSTECTOMY     • COLONOSCOPY     • HYSTERECTOMY      total         SWALLOW EVALUATION (last 72 hours)      SLP Adult Swallow Evaluation     Row Name 20 1500          Document Type  evaluation  -CP    Subjective Information  no complaints  -CP    Patient Observations  alert;cooperative  -CP    Patient/Family Observations  pt was alert and responsive. She was able to follow commands, however had some confusion.   -CP    Patient Effort  good  -CP    Comment  PPE worn: gown, double gloves, mask, N95, face shield and hair bonnet.   -CP          Patient Profile Reviewed   yes  -CP    Pertinent History Of Current Problem  Pt was admitted from the NH for increasing SOA, cough, congestion and fever. Pt is covid 19 positive.    -CP    Current Method of Nutrition  NPO  -CP    Prior Level of Function-Swallowing  regular textures;thin liquids;other (see comments) per NH transfer papers  -CP    Plans/Goals Discussed with  patient;other (see comments) RN  -CP    Barriers to Rehab  none identified  -CP          Additional Documentation  Pain Scale: FACES Pre/Post-Treatment (Group)  -CP          Pain: FACES Scale, Pretreatment  0-->no hurt  -CP    Pain: FACES Scale, Post-Treatment  0-->no hurt  -CP          Dentition Assessment  natural, present and adequate  -CP    Secretion Management  WNL/WFL  -CP    Mucosal Quality  moist, healthy  -CP          Oral Motor General Assessment  generalized oral motor weakness  -CP          Respiratory Support Currently in Use  nasal cannula  -CP    Eating/Swallowing Skills  fed by SLP  -CP    Positioning During Eating  upright 90 degree;upright in bed  -CP    Utensils Used  spoon;straw  -CP    Consistencies Trialed  thin liquids  -CP          Clinical Swallow Evaluation Summary  Pt seen for clinical swallow eval. Pt given trials of ice chips, water by spoon and straw. No other trials given due to pt vomiting.    Pt exhibited weak labial closure over spoon and straw and slow oral transit. Pt had no overt s/s of aspiration on ice chips and water by spoon. She did have coughing on water by straw, and following administration of this, she began to have vomiting. Pt then stated that she felt very nauseous and no other trials were given. Pt does not appear ready for PO at this time due to cougjhing on water and vomiting. It is rec that pt be NPO for now. She may have meds crushed in puree only and small sips of water by spoon with strict supervision from RN. ST will follow to re-eval swallow as indicated.   -CP          SLP Swallowing Diagnosis  moderate;oral  dysfunction;suspected pharyngeal dysfunction  -CP    Functional Impact  risk of aspiration/pneumonia;risk of malnutrition  -CP    Rehab Potential/Prognosis, Swallowing  good, to achieve stated therapy goals  -CP    Swallow Criteria for Skilled Therapeutic Interventions Met  demonstrates skilled criteria  -CP          Therapy Frequency (Swallow)  PRN  -CP    Predicted Duration Therapy Intervention (Days)  until discharge  -CP    SLP Diet Recommendation  NPO  -CP    Recommended Diagnostics  reassess via clinical swallow evaluation  -CP    SLP Rec. for Method of Medication Administration  meds crushed;with pudding or applesauce;as tolerated  -CP    Monitor for Signs of Aspiration  yes;notify SLP if any concerns  -CP          Oral Nutrition/Hydration Goal Selection (SLP)  oral nutrition/hydration, SLP goal 1;oral nutrition/hydration, SLP goal 2  -CP          Oral Nutrition/Hydration Goal 1, SLP  Pt will have re-eval of swallow with 24-48 hours  -CP    Time Frame (Oral Nutrition/Hydration Goal 1, SLP)  1 day;2 days  -CP          Oral Nutrition/Hydration Goal 2, SLP  Pt will tolerate safest and L/R diet/liquids with no complications of aspiration.   -CP    Time Frame (Oral Nutrition/Hydration Goal 2, SLP)  by discharge  -CP      User Key  (r) = Recorded By, (t) = Taken By, (c) = Cosigned By    Initials Name Effective Dates    CP Lina Martinez SLP 03/01/19 -           EDUCATION  The patient has been educated in the following areas:   NPO rationale.    SLP Recommendation and Plan  SLP Swallowing Diagnosis: moderate, oral dysfunction, suspected pharyngeal dysfunction  SLP Diet Recommendation: NPO     SLP Rec. for Method of Medication Administration: meds crushed, with pudding or applesauce, as tolerated     Monitor for Signs of Aspiration: yes, notify SLP if any concerns  Recommended Diagnostics: reassess via clinical swallow evaluation  Swallow Criteria for Skilled Therapeutic Interventions Met: demonstrates skilled  criteria     Rehab Potential/Prognosis, Swallowing: good, to achieve stated therapy goals  Therapy Frequency (Swallow): PRN  Predicted Duration Therapy Intervention (Days): until discharge            SLP GOALS     Row Name 08/11/20 1500             Oral Nutrition/Hydration Goal 1 (SLP)    Oral Nutrition/Hydration Goal 1, SLP  Pt will have re-eval of swallow with 24-48 hours  -CP      Time Frame (Oral Nutrition/Hydration Goal 1, SLP)  1 day;2 days  -CP         Oral Nutrition/Hydration Goal 2 (SLP)    Oral Nutrition/Hydration Goal 2, SLP  Pt will tolerate safest and L/R diet/liquids with no complications of aspiration.   -CP      Time Frame (Oral Nutrition/Hydration Goal 2, SLP)  by discharge  -CP        User Key  (r) = Recorded By, (t) = Taken By, (c) = Cosigned By    Initials Name Provider Type    CP Lina Martinez, SLP Speech and Language Pathologist             Time Calculation:                CHARLES Long  8/11/2020

## 2020-08-12 LAB
ALBUMIN SERPL-MCNC: 2.6 G/DL (ref 3.5–5.2)
ALBUMIN/GLOB SERPL: 1.1 G/DL
ALP SERPL-CCNC: 44 U/L (ref 39–117)
ALT SERPL W P-5'-P-CCNC: 8 U/L (ref 1–33)
ANION GAP SERPL CALCULATED.3IONS-SCNC: 9 MMOL/L (ref 5–15)
ANISOCYTOSIS BLD QL: ABNORMAL
AST SERPL-CCNC: 12 U/L (ref 1–32)
BACTERIA SPEC AEROBE CULT: ABNORMAL
BILIRUB SERPL-MCNC: 0.3 MG/DL (ref 0–1.2)
BUN SERPL-MCNC: 33 MG/DL (ref 8–23)
BUN SERPL-MCNC: ABNORMAL MG/DL
BUN/CREAT SERPL: ABNORMAL
CALCIUM SPEC-SCNC: 8.1 MG/DL (ref 8.6–10.5)
CHLORIDE SERPL-SCNC: 126 MMOL/L (ref 98–107)
CO2 SERPL-SCNC: 22 MMOL/L (ref 22–29)
CREAT SERPL-MCNC: 0.63 MG/DL (ref 0.57–1)
CRP SERPL-MCNC: 7.06 MG/DL (ref 0–0.5)
D DIMER PPP FEU-MCNC: 0.5 MG/L (FEU) (ref 0–0.59)
DEPRECATED RDW RBC AUTO: 51.6 FL (ref 37–54)
DIGOXIN SERPL-MCNC: 0.5 NG/ML (ref 0.6–1.2)
ERYTHROCYTE [DISTWIDTH] IN BLOOD BY AUTOMATED COUNT: 15.7 % (ref 12.3–15.4)
FERRITIN SERPL-MCNC: 1316 NG/ML (ref 13–150)
GFR SERPL CREATININE-BSD FRML MDRD: 92 ML/MIN/1.73
GLOBULIN UR ELPH-MCNC: 2.4 GM/DL
GLUCOSE BLDC GLUCOMTR-MCNC: 107 MG/DL (ref 70–105)
GLUCOSE BLDC GLUCOMTR-MCNC: 121 MG/DL (ref 70–105)
GLUCOSE BLDC GLUCOMTR-MCNC: 97 MG/DL (ref 70–105)
GLUCOSE SERPL-MCNC: 116 MG/DL (ref 65–99)
HCT VFR BLD AUTO: 31.8 % (ref 34–46.6)
HGB BLD-MCNC: 10.8 G/DL (ref 12–15.9)
LARGE PLATELETS: ABNORMAL
LYMPHOCYTES # BLD MANUAL: 0.76 10*3/MM3 (ref 0.7–3.1)
LYMPHOCYTES NFR BLD MANUAL: 4 % (ref 5–12)
LYMPHOCYTES NFR BLD MANUAL: 8 % (ref 19.6–45.3)
MAGNESIUM SERPL-MCNC: 2.3 MG/DL (ref 1.6–2.4)
MCH RBC QN AUTO: 31.6 PG (ref 26.6–33)
MCHC RBC AUTO-ENTMCNC: 33.8 G/DL (ref 31.5–35.7)
MCV RBC AUTO: 93.4 FL (ref 79–97)
MONOCYTES # BLD AUTO: 0.38 10*3/MM3 (ref 0.1–0.9)
NEUTROPHILS # BLD AUTO: 8.36 10*3/MM3 (ref 1.7–7)
NEUTROPHILS NFR BLD MANUAL: 88 % (ref 42.7–76)
PLATELET # BLD AUTO: 154 10*3/MM3 (ref 140–450)
PMV BLD AUTO: 11.5 FL (ref 6–12)
POTASSIUM SERPL-SCNC: 3.3 MMOL/L (ref 3.5–5.2)
PROCALCITONIN SERPL-MCNC: 0.2 NG/ML (ref 0–0.25)
PROT SERPL-MCNC: 5 G/DL (ref 6–8.5)
RBC # BLD AUTO: 3.41 10*6/MM3 (ref 3.77–5.28)
SCAN SLIDE: NORMAL
SODIUM SERPL-SCNC: 157 MMOL/L (ref 136–145)
WBC # BLD AUTO: 9.5 10*3/MM3 (ref 3.4–10.8)
WBC MORPH BLD: NORMAL

## 2020-08-12 PROCEDURE — 85379 FIBRIN DEGRADATION QUANT: CPT | Performed by: INTERNAL MEDICINE

## 2020-08-12 PROCEDURE — 94799 UNLISTED PULMONARY SVC/PX: CPT

## 2020-08-12 PROCEDURE — 25010000002 CEFEPIME PER 500 MG: Performed by: INTERNAL MEDICINE

## 2020-08-12 PROCEDURE — 25010000002 DEXAMETHASONE PER 1 MG: Performed by: NURSE PRACTITIONER

## 2020-08-12 PROCEDURE — 80053 COMPREHEN METABOLIC PANEL: CPT | Performed by: INTERNAL MEDICINE

## 2020-08-12 PROCEDURE — 92526 ORAL FUNCTION THERAPY: CPT

## 2020-08-12 PROCEDURE — 85025 COMPLETE CBC W/AUTO DIFF WBC: CPT | Performed by: INTERNAL MEDICINE

## 2020-08-12 PROCEDURE — 84145 PROCALCITONIN (PCT): CPT | Performed by: INTERNAL MEDICINE

## 2020-08-12 PROCEDURE — 25010000002 AMIODARONE PER 30 MG: Performed by: INTERNAL MEDICINE

## 2020-08-12 PROCEDURE — 25010000003 POTASSIUM CHLORIDE 10 MEQ/100ML SOLUTION: Performed by: NURSE PRACTITIONER

## 2020-08-12 PROCEDURE — 80162 ASSAY OF DIGOXIN TOTAL: CPT | Performed by: INTERNAL MEDICINE

## 2020-08-12 PROCEDURE — 82728 ASSAY OF FERRITIN: CPT | Performed by: INTERNAL MEDICINE

## 2020-08-12 PROCEDURE — XW033N5 INTRODUCTION OF MEROPENEM-VABORBACTAM ANTI-INFECTIVE INTO PERIPHERAL VEIN, PERCUTANEOUS APPROACH, NEW TECHNOLOGY GROUP 5: ICD-10-PCS | Performed by: INTERNAL MEDICINE

## 2020-08-12 PROCEDURE — 25010000002 MEROPENEM PER 100 MG: Performed by: INTERNAL MEDICINE

## 2020-08-12 PROCEDURE — 82962 GLUCOSE BLOOD TEST: CPT

## 2020-08-12 PROCEDURE — 99233 SBSQ HOSP IP/OBS HIGH 50: CPT | Performed by: INTERNAL MEDICINE

## 2020-08-12 PROCEDURE — 86140 C-REACTIVE PROTEIN: CPT | Performed by: INTERNAL MEDICINE

## 2020-08-12 PROCEDURE — 83735 ASSAY OF MAGNESIUM: CPT | Performed by: NURSE PRACTITIONER

## 2020-08-12 PROCEDURE — 25010000002 ENOXAPARIN PER 10 MG: Performed by: NURSE PRACTITIONER

## 2020-08-12 PROCEDURE — 85007 BL SMEAR W/DIFF WBC COUNT: CPT | Performed by: INTERNAL MEDICINE

## 2020-08-12 RX ORDER — POTASSIUM CHLORIDE 7.45 MG/ML
10 INJECTION INTRAVENOUS
Status: DISCONTINUED | OUTPATIENT
Start: 2020-08-12 | End: 2020-08-15 | Stop reason: HOSPADM

## 2020-08-12 RX ORDER — POTASSIUM CHLORIDE 1.5 G/1.77G
40 POWDER, FOR SOLUTION ORAL AS NEEDED
Status: DISCONTINUED | OUTPATIENT
Start: 2020-08-12 | End: 2020-08-15 | Stop reason: HOSPADM

## 2020-08-12 RX ORDER — POTASSIUM CHLORIDE 20 MEQ/1
40 TABLET, EXTENDED RELEASE ORAL AS NEEDED
Status: DISCONTINUED | OUTPATIENT
Start: 2020-08-12 | End: 2020-08-15 | Stop reason: HOSPADM

## 2020-08-12 RX ORDER — LEVOTHYROXINE SODIUM 0.03 MG/1
25 TABLET ORAL ONCE
Status: COMPLETED | OUTPATIENT
Start: 2020-08-12 | End: 2020-08-12

## 2020-08-12 RX ADMIN — DONEPEZIL HYDROCHLORIDE 10 MG: 5 TABLET, FILM COATED ORAL at 22:22

## 2020-08-12 RX ADMIN — FAMOTIDINE 20 MG: 10 INJECTION INTRAVENOUS at 08:52

## 2020-08-12 RX ADMIN — AMIODARONE HYDROCHLORIDE 0.5 MG/MIN: 50 INJECTION, SOLUTION INTRAVENOUS at 15:05

## 2020-08-12 RX ADMIN — AMIODARONE HYDROCHLORIDE 0.5 MG/MIN: 50 INJECTION, SOLUTION INTRAVENOUS at 01:53

## 2020-08-12 RX ADMIN — DEXAMETHASONE SODIUM PHOSPHATE 6 MG: 4 INJECTION, SOLUTION INTRAMUSCULAR; INTRAVENOUS at 08:51

## 2020-08-12 RX ADMIN — LEVOTHYROXINE SODIUM 25 MCG: 25 TABLET ORAL at 16:11

## 2020-08-12 RX ADMIN — ENOXAPARIN SODIUM 80 MG: 80 INJECTION SUBCUTANEOUS at 22:22

## 2020-08-12 RX ADMIN — MELATONIN TAB 5 MG 10 MG: 5 TAB at 22:23

## 2020-08-12 RX ADMIN — Medication 10 ML: at 22:24

## 2020-08-12 RX ADMIN — REMDESIVIR 100 MG: 100 INJECTION, POWDER, LYOPHILIZED, FOR SOLUTION INTRAVENOUS at 22:22

## 2020-08-12 RX ADMIN — SODIUM CHLORIDE 75 ML/HR: 900 INJECTION, SOLUTION INTRAVENOUS at 04:21

## 2020-08-12 RX ADMIN — MEROPENEM 500 MG: 500 INJECTION, POWDER, FOR SOLUTION INTRAVENOUS at 22:23

## 2020-08-12 RX ADMIN — ALBUTEROL SULFATE 2 PUFF: 90 AEROSOL, METERED RESPIRATORY (INHALATION) at 07:42

## 2020-08-12 RX ADMIN — ALBUTEROL SULFATE 2 PUFF: 90 AEROSOL, METERED RESPIRATORY (INHALATION) at 20:28

## 2020-08-12 RX ADMIN — METOPROLOL TARTRATE 12.5 MG: 25 TABLET, FILM COATED ORAL at 22:23

## 2020-08-12 RX ADMIN — POTASSIUM CHLORIDE 10 MEQ: 7.46 INJECTION, SOLUTION INTRAVENOUS at 11:39

## 2020-08-12 RX ADMIN — FAMOTIDINE 20 MG: 10 INJECTION INTRAVENOUS at 22:23

## 2020-08-12 RX ADMIN — POTASSIUM CHLORIDE 10 MEQ: 7.46 INJECTION, SOLUTION INTRAVENOUS at 13:38

## 2020-08-12 RX ADMIN — MEROPENEM 500 MG: 500 INJECTION, POWDER, FOR SOLUTION INTRAVENOUS at 15:04

## 2020-08-12 RX ADMIN — POTASSIUM CHLORIDE 10 MEQ: 7.46 INJECTION, SOLUTION INTRAVENOUS at 15:04

## 2020-08-12 RX ADMIN — CEFEPIME HYDROCHLORIDE 2 G: 2 INJECTION, POWDER, FOR SOLUTION INTRAVENOUS at 08:50

## 2020-08-12 RX ADMIN — SODIUM CHLORIDE 75 ML/HR: 900 INJECTION, SOLUTION INTRAVENOUS at 15:06

## 2020-08-12 RX ADMIN — CEFEPIME HYDROCHLORIDE 2 G: 2 INJECTION, POWDER, FOR SOLUTION INTRAVENOUS at 01:43

## 2020-08-12 RX ADMIN — VALPROATE SODIUM 125 MG: 100 INJECTION, SOLUTION INTRAVENOUS at 08:53

## 2020-08-12 RX ADMIN — POTASSIUM CHLORIDE 10 MEQ: 7.46 INJECTION, SOLUTION INTRAVENOUS at 08:54

## 2020-08-12 RX ADMIN — ENOXAPARIN SODIUM 80 MG: 80 INJECTION SUBCUTANEOUS at 08:51

## 2020-08-12 RX ADMIN — VALPROATE SODIUM 125 MG: 100 INJECTION, SOLUTION INTRAVENOUS at 22:22

## 2020-08-12 NOTE — THERAPY RE-EVALUATION
Acute Care - Speech Language Pathology   Swallow Treatment Note  Nando     Patient Name: Day Cabrera  : 1945  MRN: [id number]  Today's Date: 2020               Admit Date: 8/10/2020    Visit Dx:      ICD-10-CM ICD-9-CM   1. Pneumonia due to 2019 novel coronavirus U07.1 480.8    J12.89    2. Severe sepsis (CMS/HCC) A41.9 038.9    R65.20 995.92   3. Urinary tract infection after immobility N39.0 599.0   4. Severe dehydration E86.0 276.51   5. Dementia with behavioral disturbance, unspecified dementia type (CMS/[facility]) F03.91 294.21     Patient Active Problem List   Diagnosis   • Chronic anxiety   • Dementia with behavioral disturbance (CMS/[facility])   • Hypertension   • Memory impairment   • Obesity   • Sleep apnea   • E. coli UTI (urinary tract infection)   • Urinary tract infection after immobility   • Delirium due to another medical condition   • Pulmonary embolus (CMS/[facility])   • Pneumonia due to 2019 novel coronavirus   • Severe sepsis (CMS/[facility])   • Severe dehydration   • Arthritis   • COVID-19 virus detected   • Encephalopathy due to COVID-19 virus   • Acute respiratory failure due to COVID-19 (CMS/HCC)       Therapy Treatment  Rehabilitation Treatment Summary     Row Name 20 1500       Treatment Time/Intention    Discipline  speech language pathologist  -MM    Document Type  re-evaluation  -MM    Subjective Information  no complaints  -MM    Mode of Treatment  individual therapy  -MM    Patient/Family Observations  Patient reports she is feeling a little better.  No further episodes of nausea or vomitting  -MM    Care Plan Review  care plan/treatment goals reviewed  -MM    Therapy Frequency (Swallow)  PRN  -MM    Patient Effort  good  -MM    Comment  Patient seen for re-evaluation of swallow with trials of ice chips, water by spoon and straw, applesauce and peaches.  Patient closes lips around everything presented with no anterior spillage noted.  Timely swallow initiation during water.  She takes  multiple consecutive sips by straw.  Delayed cough noted on 2/8 trials.  Oxygen sats remained at 98.  Timely swallow initiation for applesauce with no oral residue.  Slow mastication of peaches with mild oral residue evident post swallow  -MM    Treatment Considerations/Comments  PPE: gloves, N95, faceshield, gown, booties, bonnet  -MM    Patient Response to Treatment  No nausea or vomitting occurred during today's evaluation.  Recommend a puree and thin liquid diet  -MM    Recorded by [MM] Nicole Nielson SLP 08/12/20 1525    Row Name                Wound 08/10/20 1500 midline sacral spine Pressure Injury    Wound - Properties Group Date first assessed: 08/10/20 [] Time first assessed: 1500 [] Orientation: midline [] Location: sacral spine [] Primary Wound Type: Pressure inj [] Stage, Pressure Injury: Stage 2 [] Recorded by:  [] Porsha Ledesma RN 08/10/20 1522    Row Name 08/12/20 1500       Outcome Summary/Treatment Plan (SLP)    Daily Summary of Progress (SLP)  progress toward functional goals is good  -MM    Recorded by [MM] Nicole Nielson SLP 08/12/20 1525      User Key  (r) = Recorded By, (t) = Taken By, (c) = Cosigned By    Initials Name Effective Dates Discipline    MM Nicole Nielson SLP 03/01/19 -  SLP     Porsha Ledesma RN 03/01/19 -  Nurse          Outcome Summary  Outcome Summary/Treatment Plan (SLP)  Daily Summary of Progress (SLP): progress toward functional goals is good (08/12/20 1500 : Nicole Nielson, SLP)      SLP GOALS     Row Name 08/12/20 1500       Oral Nutrition/Hydration Goal 1 (SLP)    Oral Nutrition/Hydration Goal 1, SLP  Pt will have re-eval of swallow with 24-48 hours  -MM    Time Frame (Oral Nutrition/Hydration Goal 1, SLP)  1 day;2 days  -MM    Barriers (Oral Nutrition/Hydration Goal 1, SLP)  see above note  -MM    Progress/Outcomes (Oral Nutrition/Hydration Goal 1, SLP)  goal met  -MM       Oral Nutrition/Hydration Goal 2 (SLP)    Oral  Nutrition/Hydration Goal 2, SLP  Pt will tolerate safest and L/R diet/liquids with no complications of aspiration.   -MM    Time Frame (Oral Nutrition/Hydration Goal 2, SLP)  by discharge  -MM    Barriers (Oral Nutrition/Hydration Goal 2, SLP)  Patient to initiate a puree and thin liquid diet  -MM    Progress/Outcomes (Oral Nutrition/Hydration Goal 2, SLP)  goal ongoing  -MM       Oral Nutrition/Hydration Goal (SLP)    Oral Nutrition/Hydration Goal, SLP  Patient will be seen at a meal within 24-48 hours to assess tolerance of current diet with further recommendations to be given as indicated  -MM    Time Frame (Oral Nutrition/Hydration Goal, SLP)  2 days  -MM      User Key  (r) = Recorded By, (t) = Taken By, (c) = Cosigned By    Initials Name Provider Type    Lina Lr, SLP Speech and Language Pathologist    Nicole Peter, SLP Speech and Language Pathologist          EDUCATION  The patient has been educated in the following areas:   Dysphagia (Swallowing Impairment) Modified Diet Instruction.    SLP Recommendation and Plan  Daily Summary of Progress (SLP): progress toward functional goals is good                             Time Calculation:       Therapy Charges for Today     Code Description Service Date Service Provider Modifiers Qty    78646744413  ST TREATMENT SWALLOW 5 8/12/2020 Nicole Nielson, SLP GN 1                 CHARLES Caballero  8/12/2020

## 2020-08-12 NOTE — PLAN OF CARE
Problem: Patient Care Overview  Goal: Plan of Care Review  Outcome: Ongoing (interventions implemented as appropriate)  Flowsheets  Taken 8/12/2020 0340  Progress: no change  Taken 8/11/2020 2000  Plan of Care Reviewed With: patient  Note:   Pt less alert than yesterday. Unable to take aricept as it is PO and pt still NPO. VSS. Still in NSR. On amio drip at 0.5 per Dr. Jurado until safe to transition to po. Speech to re-eval this am. Will continue to monitor.

## 2020-08-12 NOTE — PLAN OF CARE
Pt. Remains on amio gtt. Speech did come up and eval today for swallowing. PT. Will have a diet this evening and meds will be given.

## 2020-08-12 NOTE — PROGRESS NOTES
[facility]        LOS:  LOS: 2 days   Patient Name: Day Cabrera  Age/Sex: 74 y.o. female  : 1945  MRN: [id number]    Day of Service: 20   Length of Stay: 2  Encounter Provider: CHARLOTTE Hoffman  Place of Service: [facility] CARDIOLOGY  Patient Care Team:  Smita Howard APRN as PCP - General (Nurse Practitioner)    Subjective:     Chief Complaint: f/u afib    Subjective:  Patient is COVID +, plan of care note. Telemetry reviewed, discussed with RN. Patient remains on amiodarone, in SR currently.  Remains NPO    Current Medications:   Scheduled Meds:  albuterol sulfate HFA 2 puff Inhalation 4x Daily - RT   Cholecalciferol 4,000 Units Oral Daily   dexamethasone 6 mg Intravenous Daily   donepezil 10 mg Oral Nightly   enoxaparin 1 mg/kg Subcutaneous Q12H   famotidine 20 mg Intravenous Q12H   FLUoxetine 30 mg Oral Daily   INV [id number] remdesivir in NS IVPB 100 mg Intravenous Q24H   levothyroxine 25 mcg Oral Q AM   melatonin 10 mg Oral Nightly   meropenem 500 mg Intravenous Q6H   metoprolol tartrate 12.5 mg Oral Q12H   pharmacy 1 each Does not apply Once   potassium chloride 40 mEq Oral Once   sodium chloride 10 mL Intravenous Q12H   valproate sodium 125 mg Intravenous Q12H   vitamin C 2,000 mg Oral Daily   zinc sulfate 220 mg Oral Daily     Continuous Infusions:  amiodarone 0.5 mg/min Last Rate: 0.5 mg/min (20 0153)   Pharmacy Consult - Remdesivir     Pharmacy to Dose enoxaparin (LOVENOX)     Pharmacy to Dose meropenem (MERREM)     sodium chloride 75 mL/hr Last Rate: 75 mL/hr (20 0421)       Allergies:  Allergies   Allergen Reactions   • Ciprofloxacin Unknown (See Comments)       ROS  Review of Symptoms:  Constitutional: Patient afebrile no chills or unexpected weight changes  Respiratory: No cough, no wheezing or dyspnea  Cardiovascular: Today the patient complains of no chest pain, palpitations, dyspnea, orthopnea and no  edema  Gastrointestinal: No nausea, vomiting, constipation or diarrhea.  No melena or dark stools    All other systems reviewed and are negative      Objective:     Temp:  [97.3 °F (36.3 °C)-99 °F (37.2 °C)] 98.2 °F (36.8 °C)  Heart Rate:  [] 66  Resp:  [18-20] 18  BP: ()/(33-65) 93/33     Intake/Output Summary (Last 24 hours) at 8/12/2020 1013  Last data filed at 8/12/2020 0130  Gross per 24 hour   Intake 1110 ml   Output 550 ml   Net 560 ml     Body mass index is 24.87 kg/m².      08/10/20  0942 08/10/20  1503 08/12/20  0500   Weight: 84.5 kg (186 lb 4.6 oz) 76.3 kg (168 lb 3.4 oz) 74.2 kg (163 lb 9.3 oz)         Physical exam  Constitutional: well-nourished, and appears stated age in no acute distress  PERRL: Conjunctiva clear, no pallor, anicteric  HENMT: normocephalic, normal dentition, no cyanosis or pallor  Neck:no bruits, or thrills and bilateral normal carotid upstroke. Normal jugular venous pressure  Cardiovascular: No parasternal heaves an non-displaced focal PMI. Normal rate and rhythm: no rub, gallop, murmur or click and normal S1 and S2; no lower or upper extremity edema.   Lungs: unlabored, no wheezing with no rales or rhonchi on auscultation.  Extremities: Warm, no clubbing, cyanosis. Full and equal peripheral pulses in extremities with no bruits appreciated.   Abdomen: soft, non-tender, non-distended  Musculoskeletal: no joint tenderness or swelling and no erythema  Skin: Warm and dry, non-erythematous   Neuro:alert and normal affect. Oriented to time, place and person.       Lab Review:   Results from last 7 days   Lab Units 08/12/20  0419 08/11/20  0416   SODIUM mmol/L 157* 157*   POTASSIUM mmol/L 3.3* 3.5   CHLORIDE mmol/L 126* 122*   CO2 mmol/L 22.0 23.0   BUN  33* 42*   CREATININE mg/dL 0.63 0.69   GLUCOSE mg/dL 116* 123*   CALCIUM mg/dL 8.1* 8.7   AST (SGOT) U/L 12 13   ALT (SGPT) U/L 8 12     Results from last 7 days   Lab Units 08/10/20  0945   TROPONIN T ng/mL <0.010     Results  from last 7 days   Lab Units 08/12/20  0419 08/11/20  1316   WBC 10*3/mm3 9.50 13.00*   HEMOGLOBIN g/dL 10.8* 11.9*   HEMATOCRIT % 31.8* 37.2   PLATELETS 10*3/mm3 154 188         Results from last 7 days   Lab Units 08/12/20  0419 08/11/20  0416   MAGNESIUM mg/dL 2.3 2.3           Invalid input(s): LDLCALC      Results from last 7 days   Lab Units 08/11/20  0416   TSH uIU/mL 1.220       Recent Radiology:  Imaging Results (Most Recent)     Procedure Component Value Units Date/Time    XR Chest 1 View [[id number]] Collected:  08/10/20 1659     Updated:  08/10/20 1702    Narrative:       DATE OF EXAM:  8/10/2020 4:54 PM     PROCEDURE:  XR CHEST 1 VW-     INDICATIONS:  dyspnea, covid-19; U07.1-COVID-19; J12.89-Other viral pneumonia;  A41.9-Sepsis, unspecified organism; R65.20-Severe sepsis without septic  shock; N39.0-Urinary tract infection, site not specified;  E86.0-Dehydration; F03.91-Unspecified dementia with behavioral  disturbance       COMPARISON:  AP portable chest 08/10/2020 earlier today..     TECHNIQUE:   Single radiographic view of the chest was obtained.     FINDINGS:  Small left basilar pleural effusion is present. Probable mild left  basilar atelectasis partially obscured the diaphragmatic margin. The  right lung is clear. The heart size is normal. No pneumothorax is seen.       Impression:       Small left pleural effusion with probable mild left basilar atelectasis  is new compared to earlier today..     Electronically Signed By-Dr. Leanne Lyn MD On:8/10/2020 5:00 PM  This report was finalized on [id number] by Dr. Leanne Lyn MD.    XR Chest 1 View [[id number]] Collected:  08/10/20 1042     Updated:  08/10/20 1044    Narrative:       DATE OF EXAM:  8/10/2020 10:14 AM     PROCEDURE:  XR CHEST 1 VW-     INDICATIONS:  fever cough covid exposure       COMPARISON:  AP portable chest 10/23/2019, CT chest 10/23/2019.     TECHNIQUE:   Single radiographic view of the chest was obtained.      FINDINGS:  No acute airspace disease. Mild scarring in the lung bases, unchanged.  Normal heart size. No pleural effusion, pneumothorax or acute osseous  abnormality.       Impression:       No acute chest finding.     Electronically Signed By-Dr. Leanne Lyn MD On:8/10/2020 10:42 AM  This report was finalized on [id number] by Dr. Leanne Lyn MD.          ECHOCARDIOGRAM:           I reviewed the patient's new clinical results.    EKG:      Assessment:       COVID-19 virus detected    Chronic anxiety    Dementia with behavioral disturbance (CMS/HCC)    Hypertension    Urinary tract infection after immobility    Pulmonary embolus (CMS/HCC)    Pneumonia due to 2019 novel coronavirus    Severe sepsis (CMS/HCC)    Severe dehydration    Encephalopathy due to COVID-19 virus    Acute respiratory failure due to COVID-19 (CMS/HCC)    1) Respiratory failure  - POSITIVE COVID 19  - PNA on CXR  - elevated d-dimer  - on nasal cannula  - primary team managing     2) Atrial Fibrillation with RVR  - troponin negative  - EKG shows no acute ST abnormalities  - cardizem gtt stopped secondary to hypotension  - received 500 mcg IV digoxin, started on amio gtt  - in SR 8/11  - 2D echo pending  - TSH WNL  - K replaced  - check Mg  - Hx nuclear stress test 2017 that was low risk study with EF = 68%.     3) Altered mental status  - decreased LOC on admit  - improved     4) NENA / dehydration  - Cr 1.41 --> 0.69  - on IVFs  - Na 161 --> 157     5) Hypernatremia  - Na 161 --> 157     6) UTI  - per primary team     7) Hx dementia      8) Hx PE  - anticoagulated with eliquis at home; on hold  - on therapeutic dose lovenox     9) Hx hypothyroidism     10) HTN    Plan:   Patient remains SR, she is still NPO, continue amiodarone gtt  Continue full dose lovenox for a/c  Patient has a h/o prior normal ECHO and stress test  Once taking PO can change to PO amiodarone 400mg PO BID  Tolerating low dose BB    Greater than 30 minutes total of  face-to-face/floor  time was spent with the patient and family and nursing coordinating plan and patient management.  Of which counseling of patient with regard specifically to his clinical improvement comprised 50% of this total time.

## 2020-08-12 NOTE — PROGRESS NOTES
Continued Stay Note  ROSY Collier     Patient Name: Day Cabrera  MRN: [id number]  Today's Date: 8/12/2020    Admit Date: 8/10/2020    Discharge Plan     Row Name 08/12/20 1303       Plan    Plan Comments  DC Barriers: IV amioadarone gtt d/t NPO status. Speech therapy following to re-eval swallow as indicated. Anticipate return to [facility] once medically stable.        Expected Discharge Date and Time     Expected Discharge Date Expected Discharge Time    Aug 17, 2020         Prabha Jewell RN       Office Phone: [phone]  Office Cell: [phone]

## 2020-08-13 LAB
ALBUMIN SERPL-MCNC: 2.6 G/DL (ref 3.5–5.2)
ALBUMIN/GLOB SERPL: 1 G/DL
ALP SERPL-CCNC: 40 U/L (ref 39–117)
ALT SERPL W P-5'-P-CCNC: 8 U/L (ref 1–33)
ANION GAP SERPL CALCULATED.3IONS-SCNC: 9 MMOL/L (ref 5–15)
ANISOCYTOSIS BLD QL: ABNORMAL
AST SERPL-CCNC: 11 U/L (ref 1–32)
BILIRUB SERPL-MCNC: 0.3 MG/DL (ref 0–1.2)
BUN SERPL-MCNC: 26 MG/DL (ref 8–23)
BUN SERPL-MCNC: ABNORMAL MG/DL
BUN/CREAT SERPL: ABNORMAL
CALCIUM SPEC-SCNC: 7.9 MG/DL (ref 8.6–10.5)
CHLORIDE SERPL-SCNC: 120 MMOL/L (ref 98–107)
CO2 SERPL-SCNC: 22 MMOL/L (ref 22–29)
CREAT SERPL-MCNC: 0.56 MG/DL (ref 0.57–1)
CRP SERPL-MCNC: 3.9 MG/DL (ref 0–0.5)
D DIMER PPP FEU-MCNC: 0.37 MG/L (FEU) (ref 0–0.59)
DEPRECATED RDW RBC AUTO: 49.9 FL (ref 37–54)
ERYTHROCYTE [DISTWIDTH] IN BLOOD BY AUTOMATED COUNT: 15.5 % (ref 12.3–15.4)
FERRITIN SERPL-MCNC: 1181 NG/ML (ref 13–150)
GFR SERPL CREATININE-BSD FRML MDRD: 106 ML/MIN/1.73
GLOBULIN UR ELPH-MCNC: 2.5 GM/DL
GLUCOSE SERPL-MCNC: 128 MG/DL (ref 65–99)
HCT VFR BLD AUTO: 32.6 % (ref 34–46.6)
HGB BLD-MCNC: 10.7 G/DL (ref 12–15.9)
LARGE PLATELETS: ABNORMAL
LYMPHOCYTES # BLD MANUAL: 0.69 10*3/MM3 (ref 0.7–3.1)
LYMPHOCYTES NFR BLD MANUAL: 10 % (ref 5–12)
LYMPHOCYTES NFR BLD MANUAL: 7 % (ref 19.6–45.3)
MAGNESIUM SERPL-MCNC: 2.2 MG/DL (ref 1.6–2.4)
MCH RBC QN AUTO: 30.5 PG (ref 26.6–33)
MCHC RBC AUTO-ENTMCNC: 32.7 G/DL (ref 31.5–35.7)
MCV RBC AUTO: 93.2 FL (ref 79–97)
MONOCYTES # BLD AUTO: 0.98 10*3/MM3 (ref 0.1–0.9)
NEUTROPHILS # BLD AUTO: 8.13 10*3/MM3 (ref 1.7–7)
NEUTROPHILS NFR BLD MANUAL: 82 % (ref 42.7–76)
NEUTS BAND NFR BLD MANUAL: 1 % (ref 0–5)
PLATELET # BLD AUTO: 170 10*3/MM3 (ref 140–450)
PMV BLD AUTO: 12 FL (ref 6–12)
POTASSIUM SERPL-SCNC: 3.4 MMOL/L (ref 3.5–5.2)
PROCALCITONIN SERPL-MCNC: 0.13 NG/ML (ref 0–0.25)
PROT SERPL-MCNC: 5.1 G/DL (ref 6–8.5)
RBC # BLD AUTO: 3.5 10*6/MM3 (ref 3.77–5.28)
SCAN SLIDE: NORMAL
SODIUM SERPL-SCNC: 151 MMOL/L (ref 136–145)
WBC # BLD AUTO: 9.8 10*3/MM3 (ref 3.4–10.8)
WBC MORPH BLD: NORMAL

## 2020-08-13 PROCEDURE — 94799 UNLISTED PULMONARY SVC/PX: CPT

## 2020-08-13 PROCEDURE — 25010000002 ENOXAPARIN PER 10 MG: Performed by: NURSE PRACTITIONER

## 2020-08-13 PROCEDURE — 85025 COMPLETE CBC W/AUTO DIFF WBC: CPT | Performed by: INTERNAL MEDICINE

## 2020-08-13 PROCEDURE — 92526 ORAL FUNCTION THERAPY: CPT

## 2020-08-13 PROCEDURE — 99233 SBSQ HOSP IP/OBS HIGH 50: CPT | Performed by: INTERNAL MEDICINE

## 2020-08-13 PROCEDURE — 84145 PROCALCITONIN (PCT): CPT | Performed by: INTERNAL MEDICINE

## 2020-08-13 PROCEDURE — 80053 COMPREHEN METABOLIC PANEL: CPT | Performed by: INTERNAL MEDICINE

## 2020-08-13 PROCEDURE — 85007 BL SMEAR W/DIFF WBC COUNT: CPT | Performed by: INTERNAL MEDICINE

## 2020-08-13 PROCEDURE — 85379 FIBRIN DEGRADATION QUANT: CPT | Performed by: INTERNAL MEDICINE

## 2020-08-13 PROCEDURE — 83735 ASSAY OF MAGNESIUM: CPT | Performed by: NURSE PRACTITIONER

## 2020-08-13 PROCEDURE — 25010000002 MEROPENEM PER 100 MG: Performed by: INTERNAL MEDICINE

## 2020-08-13 PROCEDURE — 86140 C-REACTIVE PROTEIN: CPT | Performed by: INTERNAL MEDICINE

## 2020-08-13 PROCEDURE — 25010000002 DEXAMETHASONE PER 1 MG: Performed by: NURSE PRACTITIONER

## 2020-08-13 PROCEDURE — 82728 ASSAY OF FERRITIN: CPT | Performed by: INTERNAL MEDICINE

## 2020-08-13 RX ORDER — AMIODARONE HYDROCHLORIDE 200 MG/1
200 TABLET ORAL EVERY 12 HOURS SCHEDULED
Status: DISCONTINUED | OUTPATIENT
Start: 2020-08-13 | End: 2020-08-14

## 2020-08-13 RX ORDER — DIVALPROEX SODIUM 125 MG/1
125 TABLET, DELAYED RELEASE ORAL EVERY 12 HOURS SCHEDULED
Status: DISCONTINUED | OUTPATIENT
Start: 2020-08-13 | End: 2020-08-15 | Stop reason: HOSPADM

## 2020-08-13 RX ADMIN — VALPROATE SODIUM 125 MG: 100 INJECTION, SOLUTION INTRAVENOUS at 08:01

## 2020-08-13 RX ADMIN — OXYCODONE HYDROCHLORIDE AND ACETAMINOPHEN 2000 MG: 500 TABLET ORAL at 08:01

## 2020-08-13 RX ADMIN — DEXAMETHASONE SODIUM PHOSPHATE 6 MG: 4 INJECTION, SOLUTION INTRAMUSCULAR; INTRAVENOUS at 08:01

## 2020-08-13 RX ADMIN — MEROPENEM 500 MG: 500 INJECTION, POWDER, FOR SOLUTION INTRAVENOUS at 02:49

## 2020-08-13 RX ADMIN — Medication 10 ML: at 22:31

## 2020-08-13 RX ADMIN — ENOXAPARIN SODIUM 80 MG: 80 INJECTION SUBCUTANEOUS at 08:01

## 2020-08-13 RX ADMIN — Medication 4000 UNITS: at 08:05

## 2020-08-13 RX ADMIN — FLUOXETINE 30 MG: 20 CAPSULE ORAL at 08:00

## 2020-08-13 RX ADMIN — FAMOTIDINE 20 MG: 10 INJECTION INTRAVENOUS at 22:30

## 2020-08-13 RX ADMIN — ALBUTEROL SULFATE 2 PUFF: 90 AEROSOL, METERED RESPIRATORY (INHALATION) at 20:37

## 2020-08-13 RX ADMIN — DONEPEZIL HYDROCHLORIDE 10 MG: 5 TABLET, FILM COATED ORAL at 22:30

## 2020-08-13 RX ADMIN — Medication 10 ML: at 08:02

## 2020-08-13 RX ADMIN — ALBUTEROL SULFATE 2 PUFF: 90 AEROSOL, METERED RESPIRATORY (INHALATION) at 09:10

## 2020-08-13 RX ADMIN — METOPROLOL TARTRATE 12.5 MG: 25 TABLET, FILM COATED ORAL at 08:01

## 2020-08-13 RX ADMIN — REMDESIVIR 100 MG: 100 INJECTION, POWDER, LYOPHILIZED, FOR SOLUTION INTRAVENOUS at 22:28

## 2020-08-13 RX ADMIN — MELATONIN TAB 5 MG 10 MG: 5 TAB at 22:30

## 2020-08-13 RX ADMIN — DIVALPROEX SODIUM 125 MG: 125 TABLET, DELAYED RELEASE ORAL at 22:30

## 2020-08-13 RX ADMIN — ALBUTEROL SULFATE 2 PUFF: 90 AEROSOL, METERED RESPIRATORY (INHALATION) at 16:39

## 2020-08-13 RX ADMIN — AMIODARONE HYDROCHLORIDE 200 MG: 200 TABLET ORAL at 11:11

## 2020-08-13 RX ADMIN — ALBUTEROL SULFATE 2 PUFF: 90 AEROSOL, METERED RESPIRATORY (INHALATION) at 12:49

## 2020-08-13 RX ADMIN — ENOXAPARIN SODIUM 80 MG: 80 INJECTION SUBCUTANEOUS at 22:28

## 2020-08-13 RX ADMIN — MEROPENEM 500 MG: 500 INJECTION, POWDER, FOR SOLUTION INTRAVENOUS at 22:29

## 2020-08-13 RX ADMIN — MEROPENEM 500 MG: 500 INJECTION, POWDER, FOR SOLUTION INTRAVENOUS at 15:17

## 2020-08-13 RX ADMIN — LEVOTHYROXINE SODIUM 25 MCG: 25 TABLET ORAL at 05:45

## 2020-08-13 RX ADMIN — FAMOTIDINE 20 MG: 10 INJECTION INTRAVENOUS at 08:01

## 2020-08-13 RX ADMIN — POTASSIUM CHLORIDE 40 MEQ: 1.5 POWDER, FOR SOLUTION ORAL at 05:45

## 2020-08-13 RX ADMIN — ZINC SULFATE 220 MG (50 MG) CAPSULE 220 MG: CAPSULE at 08:01

## 2020-08-13 RX ADMIN — POTASSIUM CHLORIDE 40 MEQ: 1500 TABLET, EXTENDED RELEASE ORAL at 07:48

## 2020-08-13 RX ADMIN — MEROPENEM 500 MG: 500 INJECTION, POWDER, FOR SOLUTION INTRAVENOUS at 08:00

## 2020-08-13 RX ADMIN — AMIODARONE HYDROCHLORIDE 200 MG: 200 TABLET ORAL at 22:31

## 2020-08-13 NOTE — THERAPY TREATMENT NOTE
"Acute Care - Speech Language Pathology   Swallow Treatment Note  Nando     Patient Name: Day Cabrera  : 1945  MRN: [id number]  Today's Date: 2020               Admit Date: 8/10/2020    Visit Dx:      ICD-10-CM ICD-9-CM   1. Pneumonia due to 2019 novel coronavirus U07.1 480.8    J12.89    2. Severe sepsis (CMS/HCC) A41.9 038.9    R65.20 995.92   3. Urinary tract infection after immobility N39.0 599.0   4. Severe dehydration E86.0 276.51   5. Dementia with behavioral disturbance, unspecified dementia type (CMS/[facility]) F03.91 294.21     Patient Active Problem List   Diagnosis   • Chronic anxiety   • Dementia with behavioral disturbance (CMS/[facility])   • Hypertension   • Memory impairment   • Obesity   • Sleep apnea   • E. coli UTI (urinary tract infection)   • Urinary tract infection after immobility   • Delirium due to another medical condition   • Pulmonary embolus (CMS/[facility])   • Pneumonia due to 2019 novel coronavirus   • Severe sepsis (CMS/[facility])   • Severe dehydration   • Arthritis   • COVID-19 virus detected   • Encephalopathy due to COVID-19 virus   • Acute respiratory failure due to COVID-19 (CMS/HCC)       Therapy Treatment  Rehabilitation Treatment Summary     Row Name 20 1400       Treatment Time/Intention    Discipline  speech language pathologist  -MM    Document Type  therapy note (daily note)  -MM    Subjective Information  no complaints  -MM    Mode of Treatment  individual therapy  -MM    Patient/Family Observations  Patient is pleasantly confused.  CNA reports patient ate 25% of breakfast.  RN reports patient did well with meds crushed in applesauce.    -MM    Care Plan Review  care plan/treatment goals reviewed  -MM    Therapy Frequency (Swallow)  PRN  -MM    Patient Effort  good  -MM    Comment  Upon entry patient states \"I already ate\".  ST informed patient she ate breakfast and it was now time for lunch.  Patient amneable to eat some of her meal.  Patient does not open mouth very " wide to accept bolus therefore she is only getting small amounts at a time.  Slow oral transit on this date.  Patient takes multiple sips of tea by straw with no overt s/s of aspiration.  Patient ate about 10% of meal prior to not wanting anything further.  Recommend continuing current diet  -MM    Patient Response to Treatment  No overt s/s of aspiration.  Patient however is not eating much during meals  -MM    Recorded by [MM] Nicole Nielson SLP 08/13/20 1441    Row Name 08/13/20 1400       Outcome Summary/Treatment Plan (SLP)    Daily Summary of Progress (SLP)  progress toward functional goals is good  -MM    Plan for Continued Treatment (SLP)  continue current diet  -MM    Recorded by [MM] Nicole Nielson SLP 08/13/20 1441      User Key  (r) = Recorded By, (t) = Taken By, (c) = Cosigned By    Initials Name Effective Dates Discipline    MM Nicole Nielson SLP 03/01/19 -  SLP    Porsha Monreal RN 03/01/19 -  Nurse          Outcome Summary  Outcome Summary/Treatment Plan (SLP)  Daily Summary of Progress (SLP): progress toward functional goals is good (08/13/20 1400 : Nicole Nielson, SLP)  Plan for Continued Treatment (SLP): continue current diet (08/13/20 1400 : Nicole Nielson, SLP)      SLP GOALS     Row Name 08/13/20 1400          Oral Nutrition/Hydration Goal 2 (SLP)    Oral Nutrition/Hydration Goal 2, SLP  Pt will tolerate safest and L/R diet/liquids with no complications of aspiration.   -MM    Time Frame (Oral Nutrition/Hydration Goal 2, SLP)  by discharge  -MM    Barriers (Oral Nutrition/Hydration Goal 2, SLP)  see above note  -MM    Progress/Outcomes (Oral Nutrition/Hydration Goal 2, SLP)  goal ongoing  -MM       Oral Nutrition/Hydration Goal (SLP)    Oral Nutrition/Hydration Goal, SLP  Patient will be seen at a meal within 24-48 hours to assess tolerance of current diet with further recommendations to be given as indicated  -MM    Time Frame (Oral Nutrition/Hydration Goal, SLP)  2  days  -MM    Barriers (Oral Nutrition/Hydration Goal, SLP)  see above note  -MM    Progress/Outcomes (Oral Nutrition/Hydration Goal, SLP)  good progress toward goal;goal ongoing  -MM      User Key  (r) = Recorded By, (t) = Taken By, (c) = Cosigned By    Initials Name Provider Type    Lina Lr, SLP Speech and Language Pathologist    Nicole Peter, SLP Speech and Language Pathologist          EDUCATION  The patient has been educated in the following areas:   Dysphagia (Swallowing Impairment) Modified Diet Instruction.    SLP Recommendation and Plan  Daily Summary of Progress (SLP): progress toward functional goals is good     Plan for Continued Treatment (SLP): continue current diet                       Time Calculation:       Therapy Charges for Today     Code Description Service Date Service Provider Modifiers Qty    00631851540 HC ST TREATMENT SWALLOW 5 8/12/2020 Nicole Nielson SLP GN 1    [id number] HC ST TREATMENT SWALLOW 2 8/13/2020 Nicole Nielson SLP GN 1                 CHARLES Caballero  8/13/2020

## 2020-08-13 NOTE — PLAN OF CARE
Problem: Patient Care Overview  Goal: Plan of Care Review  Outcome: Ongoing (interventions implemented as appropriate)  Flowsheets  Taken 8/13/2020 0401  Progress: improving  Taken 8/13/2020 0042  Plan of Care Reviewed With: patient  Note:   Pt bradycardic overnight but not symptomatic. HR never went below 40s. Pt able to take po meds and nutrition and tolerating well. Will continue to monitor.

## 2020-08-13 NOTE — PROGRESS NOTES
[facility] HEART SPECIALISTS        LOS:  LOS: 3 days   Patient Name: Day Cabrera  Age/Sex: 74 y.o. female  : 1945  MRN: [id number]    Day of Service: 20   Length of Stay: 3  Encounter Provider: Mak Jackson MD  Place of Service: [facility] CARDIOLOGY  Patient Care Team:  Smita Howard APRN as PCP - General (Nurse Practitioner)    Subjective:     Chief Complaint: f/u afib    Subjective: Plan of care note from cardiology  Patient is taking PO now, she had some sinus bradycardia overnight with rates into the 40s.  She is on amiodarone gtt, will transition to PO    Current Medications:   Scheduled Meds:  albuterol sulfate HFA 2 puff Inhalation 4x Daily - RT   amiodarone 200 mg Oral Q12H   Cholecalciferol 4,000 Units Oral Daily   dexamethasone 6 mg Intravenous Daily   donepezil 10 mg Oral Nightly   enoxaparin 1 mg/kg Subcutaneous Q12H   famotidine 20 mg Intravenous Q12H   FLUoxetine 30 mg Oral Daily   INV [id number] remdesivir in NS IVPB 100 mg Intravenous Q24H   levothyroxine 25 mcg Oral Q AM   melatonin 10 mg Oral Nightly   meropenem 500 mg Intravenous Q6H   metoprolol tartrate 12.5 mg Oral Q12H   pharmacy 1 each Does not apply Once   potassium chloride 40 mEq Oral Once   sodium chloride 10 mL Intravenous Q12H   valproate sodium 125 mg Intravenous Q12H   vitamin C 2,000 mg Oral Daily   zinc sulfate 220 mg Oral Daily     Continuous Infusions:  Pharmacy Consult - Remdesivir     Pharmacy to Dose enoxaparin (LOVENOX)     Pharmacy to Dose meropenem (MERREM)     sodium chloride 75 mL/hr Last Rate: 75 mL/hr (20 1506)       Allergies:  Allergies   Allergen Reactions   • Ciprofloxacin Unknown (See Comments)       Review of Symptoms:  Constitutional: Patient afebrile no chills or unexpected weight changes  Respiratory: No cough, no wheezing or dyspnea  Cardiovascular: Today the patient complains of no chest pain, palpitations, dyspnea, orthopnea and no  edema  Gastrointestinal: No nausea, vomiting, constipation or diarrhea.  No melena or dark stools    All other systems reviewed and are negative but very somnolent          Objective:     Temp:  [97.3 °F (36.3 °C)-98.9 °F (37.2 °C)] 97.4 °F (36.3 °C)  Heart Rate:  [47-80] 63  Resp:  [16-20] 19  BP: (100-126)/(41-53) 109/53     Intake/Output Summary (Last 24 hours) at 8/13/2020 1002  Last data filed at 8/13/2020 0800  Gross per 24 hour   Intake 720 ml   Output 700 ml   Net 20 ml     Body mass index is 27.45 kg/m².      08/10/20  1503 08/12/20  0500 08/13/20  0500   Weight: 76.3 kg (168 lb 3.4 oz) 74.2 kg (163 lb 9.3 oz) 81.9 kg (180 lb 8.9 oz)         Physical exam  Constitutional: well-nourished, and appears stated age in no acute distress  PERRL: Conjunctiva clear, no pallor, anicteric  HENMT: normocephalic, normal dentition, no cyanosis or pallor  Neck:no bruits, or thrills and bilateral normal carotid upstroke. Normal jugular venous pressure  Cardiovascular: No parasternal heaves an non-displaced focal PMI. Normal rate and rhythm: no rub, gallop, murmur or click and normal S1 and S2; no lower or upper extremity edema.   Lungs: unlabored, no wheezing with no rales or rhonchi on auscultation.  Extremities: Warm, no clubbing, cyanosis. Full and equal peripheral pulses in extremities with no bruits appreciated.   Abdomen: soft, non-tender, non-distended  Musculoskeletal: no joint tenderness or swelling and no erythema  Skin: Warm and dry, non-erythematous   Neuro:alert and normal affect. Oriented to time, place and person.         Lab Review:   Results from last 7 days   Lab Units 08/13/20  0301 08/12/20  0419   SODIUM mmol/L 151* 157*   POTASSIUM mmol/L 3.4* 3.3*   CHLORIDE mmol/L 120* 126*   CO2 mmol/L 22.0 22.0   BUN  26* 33*   CREATININE mg/dL 0.56* 0.63   GLUCOSE mg/dL 128* 116*   CALCIUM mg/dL 7.9* 8.1*   AST (SGOT) U/L 11 12   ALT (SGPT) U/L 8 8     Results from last 7 days   Lab Units 08/10/20  0994    TROPONIN T ng/mL <0.010     Results from last 7 days   Lab Units 08/13/20  0301 08/12/20  0419   WBC 10*3/mm3 9.80 9.50   HEMOGLOBIN g/dL 10.7* 10.8*   HEMATOCRIT % 32.6* 31.8*   PLATELETS 10*3/mm3 170 154         Results from last 7 days   Lab Units 08/13/20  0301 08/12/20  0419   MAGNESIUM mg/dL 2.2 2.3           Invalid input(s): LDLCALC      Results from last 7 days   Lab Units 08/11/20  0416   TSH uIU/mL 1.220       Recent Radiology:  Imaging Results (Most Recent)     Procedure Component Value Units Date/Time    XR Chest 1 View [[id number]] Collected:  08/10/20 1659     Updated:  08/10/20 1702    Narrative:       DATE OF EXAM:  8/10/2020 4:54 PM     PROCEDURE:  XR CHEST 1 VW-     INDICATIONS:  dyspnea, covid-19; U07.1-COVID-19; J12.89-Other viral pneumonia;  A41.9-Sepsis, unspecified organism; R65.20-Severe sepsis without septic  shock; N39.0-Urinary tract infection, site not specified;  E86.0-Dehydration; F03.91-Unspecified dementia with behavioral  disturbance       COMPARISON:  AP portable chest 08/10/2020 earlier today..     TECHNIQUE:   Single radiographic view of the chest was obtained.     FINDINGS:  Small left basilar pleural effusion is present. Probable mild left  basilar atelectasis partially obscured the diaphragmatic margin. The  right lung is clear. The heart size is normal. No pneumothorax is seen.       Impression:       Small left pleural effusion with probable mild left basilar atelectasis  is new compared to earlier today..     Electronically Signed By-Dr. Leanne Lyn MD On:8/10/2020 5:00 PM  This report was finalized on [id number] by Dr. Leanne Lyn MD.    XR Chest 1 View [[id number]] Collected:  08/10/20 1042     Updated:  08/10/20 1044    Narrative:       DATE OF EXAM:  8/10/2020 10:14 AM     PROCEDURE:  XR CHEST 1 VW-     INDICATIONS:  fever cough covid exposure       COMPARISON:  AP portable chest 10/23/2019, CT chest 10/23/2019.     TECHNIQUE:   Single radiographic view of the  chest was obtained.     FINDINGS:  No acute airspace disease. Mild scarring in the lung bases, unchanged.  Normal heart size. No pleural effusion, pneumothorax or acute osseous  abnormality.       Impression:       No acute chest finding.     Electronically Signed By-Dr. Leanne Lyn MD On:8/10/2020 10:42 AM  This report was finalized on [id number] by Dr. Leanne Lyn MD.          ECHOCARDIOGRAM:           I reviewed the patient's new clinical results.    EKG:      Assessment:       COVID-19 virus detected    Chronic anxiety    Dementia with behavioral disturbance (CMS/HCC)    Hypertension    Urinary tract infection after immobility    Pulmonary embolus (CMS/HCC)    Pneumonia due to 2019 novel coronavirus    Severe sepsis (CMS/HCC)    Severe dehydration    Encephalopathy due to COVID-19 virus    Acute respiratory failure due to COVID-19 (CMS/HCC)    1) Respiratory failure  - POSITIVE COVID 19  - PNA on CXR  - elevated d-dimer  - on nasal cannula  - primary team managing     2) Atrial Fibrillation with RVR  - troponin negative  - EKG shows no acute ST abnormalities  - cardizem gtt stopped secondary to hypotension  - received 500 mcg IV digoxin, started on amio gtt  - in SR 8/11  - 2D echo pending  - TSH WNL  - K replaced  - check Mg  - Hx nuclear stress test 2017 that was low risk study with EF = 68%.     3) Altered mental status  - decreased LOC on admit  - improved     4) NENA / dehydration  - Cr 1.41 --> 0.69  - on IVFs  - Na 161 --> 157     5) Hypernatremia  - Na 161 --> 157     6) UTI  - per primary team     7) Hx dementia      8) Hx PE  - anticoagulated with eliquis at home; on hold  - on therapeutic dose lovenox     9) Hx hypothyroidism     10) HTN    Plan:   Patient now taking PO, will transition to PO amiodarone  Patient had bradycardia overnight with rates in 40s.  Will stop BB for now  Continue full dose lovenox for a/c  Patient has a h/o prior ECHO with preserved LV function and normal stress  test      Greater than 30 minutes total of face-to-face/floor  time was spent with the patient and family and nursing coordinating plan and patient management.  Of which counseling of patient with regard specifically to stability from a cardiovascular standpoint comprised 50% of this total time.

## 2020-08-13 NOTE — PROGRESS NOTES
[facility] Medicine Services Daily Progress Note          Hospitalist Team  LOS 2 days      Patient Care Team:  Smita Howard APRN as PCP - General (Nurse Practitioner)    Patient Location: 2128/1      Subjective   Subjective     Chief Complaint / Subjective  Chief Complaint   Patient presents with   • Loss of Consciousness         Brief Synopsis of Hospital Course/HPI  Ms. Cbarera is a 74 y.o. female from [facility] who presented to [facility] ER 8/10/20 with decreased responsiveness, fever, cough, and congestion. [facility] has a known outbreak of Covid-19. The patient had a fever of 101.3, HR 130s. CXR did not show acute findings per radiology report; however, ER physician felt the patient's CXR was suggestive of pneumonia from Covid-19. Covid-19 swab was positive. Her WBC was 13.2, creatinine 1.41, AST 34. UA showed large leukocytes, too numerous wbcs, and 3+ bacteria. She was given cefepime, vanc, 30cc/kg IVFs, 8mg decadron, albuterol inhaler, and tylenol suppository. Daughter Patti Cabrera was notified of the patient's condition and she is a full code.      Upon exam the patient would open her eyes and mumble but could not provide any information. She is requiring 4L O2 via nasal cannula.      Date::    8/12/2020  Patient is down to 2 L of supplemental O2.  Her mental status seems to be improving as we continue to empirically treat her UTI as well as her respiratory infection.  She does have dementia at a baseline and does not communicate much.    Review of Systems   Unable to perform ROS: dementia         Objective   Objective      Vital Signs  Temp:  [97.5 °F (36.4 °C)-99 °F (37.2 °C)] 97.9 °F (36.6 °C)  Heart Rate:  [53-84] 78  Resp:  [16-20] 16  BP: ()/(33-53) 124/48  Oxygen Therapy  SpO2: 97 %  Pulse Oximetry Type: Intermittent  Device (Oxygen Therapy): nasal cannula  Device (Oxygen Therapy): nasal cannula  Flow (L/min): 2  Oxygen Concentration (%): 21  Flowsheet  "Rows      First Filed Value   Admission Height  172.7 cm (68\") Documented at 08/10/2020 0942   Admission Weight  84.5 kg (186 lb 4.6 oz) Documented at 08/10/2020 0942        Intake & Output (last 3 days)       08/10 0701 - 08/11 0700 08/11 0701 - 08/12 0700 08/12 0701 - 08/13 0700    P.O. 0  240    I.V. (mL/kg)  1000 (13.5)     IV Piggyback 3035 210     Total Intake(mL/kg) 3035 (39.8) 1210 (16.3) 240 (3.2)    Urine (mL/kg/hr)  550 (0.3) 700 (0.7)    Total Output  550 700    Net +3035 +660 -460           Urine Unmeasured Occurrence 1 x          Lines, Drains & Airways    Active LDAs     Name:   Placement date:   Placement time:   Site:   Days:    PICC Triple Lumen 08/11/20 Left Basilic   08/11/20    1415    Basilic   less than 1    Peripheral IV 08/10/20 0940 Right Antecubital   08/10/20    0940    Antecubital   1    External Urinary Catheter   08/10/20    2310    --   less than 1                  Physical Exam:  General: well-developed and well-nourished, ill-appearing NAD  HEENT: NC/AT; EOMI; PERRLA  Heart: tachycardic rhythm. No murmur   Chest: Decreased breath sounds bilaterally. Normal respiratory effort.  Abdominal: Soft. NT/ND. Bowel sounds present  Musculoskeletal: Normal ROM.  No edema. No calf tenderness.  Neurological: More alert and awake, nonverbal  Skin: Skin is warm and dry. No rash         Wounds (last 24 hours)      LDA Wound     Row Name 08/12/20 1600 08/12/20 1200 08/12/20 0800       Wound 08/10/20 1500 midline sacral spine Pressure Injury    Wound - Properties Group Date first assessed: 08/10/20  - Time first assessed: 1500  -JH Orientation: midline  -JH Location: sacral spine  -JH Primary Wound Type: Pressure inj  -JH Stage, Pressure Injury: Stage 2  -JH    Dressing Appearance  open to air  -JH  open to air  -JH  open to air  -JH    Base  red  -JH  red  -JH  red  -JH    Edges  rolled/closed  -JH  rolled/closed  -JH  rolled/closed  -JH    Row Name 08/12/20 0420 08/12/20 0000          Wound " 08/10/20 1500 midline sacral spine Pressure Injury    Wound - Properties Group Date first assessed: 08/10/20  - Time first assessed: 1500  - Orientation: midline  - Location: sacral spine  - Primary Wound Type: Pressure inj  -JH Stage, Pressure Injury: Stage 2  -JH    Dressing Appearance  open to air  -AB  open to air  -AB     Base  red  -AB  red  -AB     Edges  rolled/closed  -AB  rolled/closed  -AB       User Key  (r) = Recorded By, (t) = Taken By, (c) = Cosigned By    Initials Name Provider Type    Porsha Monreal, RN Registered Nurse    Moriah Bowden RN Registered Nurse          Procedures:             COVID-19 LAB PANEL     COVID-19 test result  COVID19   Date Value Ref Range Status   08/10/2020 Detected (C) Not Detected - Ref. Range Final       COVID-19 Prognostic lab results  WBC with differential  Results from last 7 days   Lab Units 08/12/20  0419 08/11/20  1316 08/10/20  0945   WBC 10*3/mm3 9.50 13.00* 13.20*   PLATELETS 10*3/mm3 154 188 280   NEUTROPHIL % %  --  86.7* 70.6   LYMPHOCYTE % %  --  6.7* 16.1*   NEUTROS ABS 10*3/mm3 8.36* 11.30* 9.30*   LYMPHS ABS 10*3/mm3  --  0.90 2.10     Inflammatory markers  Results from last 7 days   Lab Units 08/12/20  0419 08/11/20  0416 08/10/20  1731 08/10/20  1409 08/10/20  0945   CRP mg/dL 7.06* 14.67*  --  16.47*  --    FERRITIN ng/mL 1,316.00* 1,402.00*  --  1,265.00*  --    D DIMER QUANT mg/L (FEU) 0.50 0.77* 0.66*  --   --    PROCALCITONIN ng/mL 0.20 0.25  --  0.26*  --    LDH U/L  --   --   --  172  --    ALK PHOS U/L 44 55  --   --  71   AST (SGOT) U/L 12 13  --   --  34*   ALT (SGPT) U/L 8 12  --   --  18   TROPONIN T ng/mL  --   --   --   --  <0.010       Poor COVID-19 outcomes associated with:  Neutrophil:Lymphocyte ratio >3.5  Thrombocytopenia  LFT's >5x upper limit of normal  LDH >400    Results Review:     I reviewed the patient's new clinical results.    Results from last 7 days   Lab Units 08/12/20  0419 08/11/20  2789  08/10/20  0945   WBC 10*3/mm3 9.50 13.00* 13.20*   HEMOGLOBIN g/dL 10.8* 11.9* 15.0   HEMATOCRIT % 31.8* 37.2 46.1   PLATELETS 10*3/mm3 154 188 280     Results from last 7 days   Lab Units 08/12/20  0419 08/11/20  0416 08/10/20  1409 08/10/20  0945   SODIUM mmol/L 157* 157*  --  161*   POTASSIUM mmol/L 3.3* 3.5  --  4.7   CHLORIDE mmol/L 126* 122*  --  121*   CO2 mmol/L 22.0 23.0  --  22.0   BUN  33* 42*  --  56*   CREATININE mg/dL 0.63 0.69 1.07* 1.41*   GLUCOSE mg/dL 116* 123*  --  130*   ALBUMIN g/dL 2.60* 2.90*  --  3.80   BILIRUBIN mg/dL 0.3 0.4  --  0.8   ALK PHOS U/L 44 55  --  71   AST (SGOT) U/L 12 13  --  34*   ALT (SGPT) U/L 8 12  --  18   CALCIUM mg/dL 8.1* 8.7  --  9.8     Cr Clearance Estimated Creatinine Clearance: 72.3 mL/min (by C-G formula based on SCr of 0.63 mg/dL).    Coag       HbA1C No results found for: HGBA1C  Blood Glucose   Results from last 7 days   Lab Units 08/12/20  1654 08/12/20  1138 08/12/20  0006 08/11/20  1702 08/11/20  1201 08/10/20  2335   GLUCOSE mg/dL 121* 97 107* 113* 122* 125*       Troponin   Results from last 7 days   Lab Units 08/10/20  0945   TROPONIN T ng/mL <0.010     Lipids    Lab Results   Component Value Date    CHOL 221 (H) 07/13/2018    TRIG 66 07/13/2018    HDL 67 07/13/2018     (H) 07/13/2018       UA    Results from last 7 days   Lab Units 08/10/20  0949   NITRITE UA  Negative   WBC UA /HPF Too Numerous to Count*   BACTERIA UA /HPF 3+*   SQUAM EPITHEL UA /HPF None Seen   URINECX  >100,000 CFU/mL Escherichia coli ESBL*       Microbiology   Results from last 7 days   Lab Units 08/10/20  0949 08/10/20  0946 08/10/20  0945   BLOODCX   --  No growth at 2 days No growth at 2 days   URINECX  >100,000 CFU/mL Escherichia coli ESBL*  --   --        ABG        EKG  ECG 12 Lead   Preliminary Result   HEART RATE= 89  bpm   RR Interval= 676  ms   SC Interval= 161  ms   P Horizontal Axis= 19  deg   P Front Axis= 49  deg   QRSD Interval= 95  ms   QT Interval= 388  ms    QRS Axis= -58  deg   T Wave Axis= 151  deg   - ABNORMAL ECG -   Sinus rhythm   Probable anterior infarct, age indeterminate   Abnormal T, consider ischemia, lateral leads   When compared with ECG of 10-Aug-2020 19:40:33,   Significant rate decrease   Significant axis, voltage or hypertrophy change   Electronically Signed By:    Date and Time of Study: 2020-08-11 04:05:25      ECG 12 Lead   Preliminary Result   HEART RATE= 133  bpm   RR Interval= 449  ms   AK Interval=   ms   P Horizontal Axis=   deg   P Front Axis=   deg   QRSD Interval= 88  ms   QT Interval= 304  ms   QRS Axis= -49  deg   T Wave Axis= 161  deg   - ABNORMAL ECG -   Atrial fibrillation   Left anterior fascicular block   LVH with secondary repolarization abnormality   Anterior Q waves, possibly due to LVH   Electronically Signed By:    Date and Time of Study: 2020-08-10 19:40:33      ECG 12 Lead   Final Result   HEART RATE= 137  bpm   RR Interval= 436  ms   AK Interval= 112  ms   P Horizontal Axis= 18  deg   P Front Axis= 46  deg   QRSD Interval= 86  ms   QT Interval= 310  ms   QRS Axis= -77  deg   T Wave Axis= 102  deg   - ABNORMAL ECG -   Sinus tachycardia   Consider left ventricular hypertrophy   Nonspecific T abnormalities, lateral leads   When compared with ECG of 25-Oct-2019 13:08:52,   Significant rate increase   Significant repolarization change   Electronically Signed By: Bruno Roach ([facility]) 12-Aug-2020 10:18:08   Date and Time of Study: 2020-08-10 09:28:48          Imaging:  Xr Chest 1 View    Result Date: 8/10/2020  Small left pleural effusion with probable mild left basilar atelectasis is new compared to earlier today..  Electronically Signed By-Dr. Leanne Lyn MD On:8/10/2020 5:00 PM This report was finalized on [id number] by Dr. Leanne Lyn MD.    Xr Chest 1 View    Result Date: 8/10/2020  No acute chest finding.  Electronically Signed By-Dr. Leanne Lyn MD On:8/10/2020 10:42 AM This report was finalized on [id number]  by Dr. Leanne Lyn MD.            Xrays, labs reviewed personally by physician.    Medication Review:   I have reviewed the patient's current medication list      Scheduled Meds    albuterol sulfate HFA 2 puff Inhalation 4x Daily - RT   Cholecalciferol 4,000 Units Oral Daily   dexamethasone 6 mg Intravenous Daily   donepezil 10 mg Oral Nightly   enoxaparin 1 mg/kg Subcutaneous Q12H   famotidine 20 mg Intravenous Q12H   FLUoxetine 30 mg Oral Daily   INV [id number] remdesivir in NS IVPB 100 mg Intravenous Q24H   levothyroxine 25 mcg Oral Q AM   melatonin 10 mg Oral Nightly   meropenem 500 mg Intravenous Q6H   metoprolol tartrate 12.5 mg Oral Q12H   pharmacy 1 each Does not apply Once   potassium chloride 40 mEq Oral Once   sodium chloride 10 mL Intravenous Q12H   valproate sodium 125 mg Intravenous Q12H   vitamin C 2,000 mg Oral Daily   zinc sulfate 220 mg Oral Daily       Meds Infusions    amiodarone 0.5 mg/min Last Rate: 0.5 mg/min (08/12/20 1505)   Pharmacy Consult - Remdesivir     Pharmacy to Dose enoxaparin (LOVENOX)     Pharmacy to Dose meropenem (MERREM)     sodium chloride 75 mL/hr Last Rate: 75 mL/hr (08/12/20 1506)       Meds PRN  •  acetaminophen **OR** acetaminophen **OR** acetaminophen  •  aluminum-magnesium hydroxide-simethicone  •  bisacodyl  •  bisacodyl  •  magnesium hydroxide  •  ondansetron **OR** ondansetron  •  Pharmacy Consult - Remdesivir  •  Pharmacy to Dose enoxaparin (LOVENOX)  •  Pharmacy to Dose meropenem (MERREM)  •  potassium chloride **OR** potassium chloride **OR** potassium chloride  •  sodium chloride  •  sodium chloride      Assessment/Plan   Assessment/Plan     Active Hospital Problems    Diagnosis  POA   • **COVID-19 virus detected [U07.1]  Yes   • Pneumonia due to 2019 novel coronavirus [U07.1, J12.89]  Yes   • Severe sepsis (CMS/HCC) [A41.9, R65.20]  Unknown   • Severe dehydration [E86.0]  Unknown   • Encephalopathy due to COVID-19 virus [U07.1, G93.49]  Yes   • Acute respiratory  failure due to COVID-19 (CMS/[facility]) [U07.1, J96.00]  Yes   • Pulmonary embolus (CMS/[facility]) [I26.99]  Unknown   • Urinary tract infection after immobility [N39.0]  Unknown   • Dementia with behavioral disturbance (CMS/[facility]) [F03.91]  Unknown   • Chronic anxiety [F41.9]  Yes   • Hypertension [I10]  Yes      Resolved Hospital Problems   No resolved problems to display.       MEDICAL DECISION MAKING COMPLEXITY BY PROBLEM:     COVID-19 infection causing pneumonia  -- supplemental O2 to keep sats >93%  -- allow permissive hypoxia to sats >86%  -- pronate patient as tolerated for better oxygenation  -- continue supplements     -- Vitamin C 500mg PO q6h     -- Vitamin D3 4000u/day     -- Zinc 100mg Daily     -- Melatonin 10mg nightly     -- Famotidine 40mg/day     -- Steroids: Decadron 8mg x1, then 6mg daily thereafter     -- Remdesivir: started today for up to 10days  -- admission labs: PCT, CRP, IL-6, BNP, Trop, Ferritin, D-dimer, Mg, Neutrophil/Lymphocyte ration  -- monitor daily labs     -- CMP, CBC, CRP, Ferritin, PCT, D-dimer  --Oxygen requirement has decreased from 4 L at the most down to 2 L  --CRP has gone from 14 down to 7 in 1 day.  She is clinically improving thus far     A.fib with RVR  -- Cardizem gtt started, now discontinued  -- Cardiology following     --Cardizem drip stopped secondary to hypotension     -- s/p 500 mcg IV digoxin, started amiodarone drip     -- 2D echo pending    Acute hypoxic respiratory failure due to Covid-19  -requiring 4L O2 initially, now down to 2 L supplemental O2  -albuterol inhaler 4x/daily     Pneumonia due to Covid-19  -CXR reviewed. Appears there is some left basilar atelectasis, Will treat as pneumonia due to acute respiratory failure requiring 4L O2, fever, and positive Covid-19 swab  -IV cefepime, vanc     Urinary tract infection with ESBL producing E. coli  -UA: large leukocytes, too numerous wbcs, and 3+ bacteria.  --Discontinued IV cefepime and vanc  --Start meropenem, plan to  treat for at least 7 days     Sepsis without hypotension  -fever, tachycardia, wbc 13.2, not hypotensive  -procalcitonin pending  -30cc/kg IVFs given in ER  -IV cefepime, vancomcyin      Acute encephalopathy due to Covid-19  -pt opens eyes but lethargic  -neuro checks  -hold sedating meds flexeril, risperdal, ultram, remeron     NENA, Dehydration   -pt with creatinine 1.41 and Na 161 s/p 30cc/kg IVFs now with creatinine 1.07  -cont IVFs  -hold lasix, but avoid fluid overload with COVID 19 infection  --Creatinine currently 0.69, sodium 157     H/o multiple PE's on eliquis     Hypertension  -bp normal but borderline normal. Hold norvasc for now     Depression  -cont home depakote, prozac     Hypothyroidism  -cont home synthroid     Dementia  -cont home aricept  -hold risperdal, ultram, remeron     VTE Prophylaxis  Mechanical Order History:      Ordered        08/10/20 1522  Place Sequential Compression Device  Once         08/10/20 1522  Maintain Sequential Compression Device  Continuous                 Pharmalogical Order History:     Ordered     Dose Route Frequency Stop    08/10/20 1836  enoxaparin (LOVENOX) syringe 80 mg      1 mg/kg SC Every 12 Hours --    08/10/20 1532  Pharmacy to Dose enoxaparin (LOVENOX)      -- XX Continuous PRN --          Code Status  Code Status and Medical Interventions:   Ordered at: 08/10/20 1852     Limited Support to NOT Include:    Intubation     Level Of Support Discussed With:    Next of Kin (If No Surrogate)     Code Status:    CPR     Medical Interventions (Level of Support Prior to Arrest):    Limited       This patient has been examined wearing appropriate Personal Protective Equipment. 08/12/20      Discharge Planning    To be determined pending clinical course but patient will return to [place] after this admission.      Electronically signed by Salena Fitch MD, 08/12/20, 20:48.    [facility] Hospitalist Team

## 2020-08-13 NOTE — PLAN OF CARE
Patient has been doing ok today. She is still confused, which is her baseline. She pulled her IV out and has taken her O2 off multiple times. Per her daughter she normally does not wear oxygen. Her O2 sats have been in the high 90s. She has been running a little good. Beta blocker was stopped. Plan is to return to [place] when stable. Will continue to monitor.    Problem: Patient Care Overview  Goal: Plan of Care Review  Outcome: Ongoing (interventions implemented as appropriate)  Flowsheets (Taken 8/13/2020 1517)  Progress: improving  Goal: Individualization and Mutuality  Outcome: Ongoing (interventions implemented as appropriate)  Goal: Discharge Needs Assessment  Outcome: Ongoing (interventions implemented as appropriate)  Goal: Interprofessional Rounds/Family Conf  Outcome: Ongoing (interventions implemented as appropriate)     Problem: Fall Risk (Adult)  Goal: Identify Related Risk Factors and Signs and Symptoms  Outcome: Ongoing (interventions implemented as appropriate)  Goal: Absence of Fall  Outcome: Ongoing (interventions implemented as appropriate)  Flowsheets (Taken 8/13/2020 1517)  Absence of Fall: making progress toward outcome     Problem: Skin Injury Risk (Adult)  Goal: Identify Related Risk Factors and Signs and Symptoms  Outcome: Ongoing (interventions implemented as appropriate)  Goal: Skin Health and Integrity  Outcome: Ongoing (interventions implemented as appropriate)  Flowsheets (Taken 8/13/2020 1517)  Skin Health and Integrity: making progress toward outcome     Problem: Pneumonia (Adult)  Goal: Signs and Symptoms of Listed Potential Problems Will be Absent, Minimized or Managed (Pneumonia)  Outcome: Ongoing (interventions implemented as appropriate)

## 2020-08-13 NOTE — PROGRESS NOTES
[facility] Medicine Services Daily Progress Note          Hospitalist Team  LOS 3 days      Patient Care Team:  Smita Howard APRN as PCP - General (Nurse Practitioner)    Patient Location: 2128/1      Subjective   Subjective     Chief Complaint / Subjective  Chief Complaint   Patient presents with   • Loss of Consciousness         Brief Synopsis of Hospital Course/HPI  Ms. Cabrera is a 74 y.o. female from [facility] who presented to [facility] ER 8/10/20 with decreased responsiveness, fever, cough, and congestion. [facility] has a known outbreak of Covid-19. The patient had a fever of 101.3, HR 130s. CXR did not show acute findings per radiology report; however, ER physician felt the patient's CXR was suggestive of pneumonia from Covid-19. Covid-19 swab was positive. Her WBC was 13.2, creatinine 1.41, AST 34. UA showed large leukocytes, too numerous wbcs, and 3+ bacteria. She was given cefepime, vanc, 30cc/kg IVFs, 8mg decadron, albuterol inhaler, and tylenol suppository. Daughter Patti Cabrera was notified of the patient's condition and she is a full code.      Upon exam the patient would open her eyes and mumble but could not provide any information. She is requiring 4L O2 via nasal cannula.     Date::    8/12/2020  Patient is down to 2 L of supplemental O2.  Her mental status seems to be improving as we continue to empirically treat her UTI as well as her respiratory infection.  She does have dementia at a baseline and does not communicate much.    8/13/2020  Patient is confused.  She apparently has pulled off her oxygen a few times and still had her O2 sats in the 90s.  She has been having issues with bradycardia and her beta-blocker has been stopped.      Review of Systems   Unable to perform ROS: dementia         Objective   Objective      Vital Signs  Temp:  [96.8 °F (36 °C)-98.9 °F (37.2 °C)] 97.4 °F (36.3 °C)  Heart Rate:  [47-80] 70  Resp:  [16-20] 18  BP:  "(109-132)/(41-57) 132/49  Oxygen Therapy  SpO2: 97 %  Pulse Oximetry Type: Intermittent  Device (Oxygen Therapy): nasal cannula  Device (Oxygen Therapy): room air  Flow (L/min): 2  Oxygen Concentration (%): 21  Flowsheet Rows      First Filed Value   Admission Height  172.7 cm (68\") Documented at 08/10/2020 0942   Admission Weight  84.5 kg (186 lb 4.6 oz) Documented at 08/10/2020 0942        Intake & Output (last 3 days)       08/11 0701 - 08/12 0700 08/12 0701 - 08/13 0700 08/13 0701 - 08/14 0700    P.O.  600 240    I.V. (mL/kg) 1000 (13.5)      IV Piggyback 210      Total Intake(mL/kg) 1210 (16.3) 600 (7.3) 240 (2.9)    Urine (mL/kg/hr) 550 (0.3) 700 (0.4)     Total Output 550 700     Net +660 -100 +240           Urine Unmeasured Occurrence   2 x        Lines, Drains & Airways    Active LDAs     Name:   Placement date:   Placement time:   Site:   Days:    PICC Triple Lumen 08/11/20 Left Basilic   08/11/20    1415    Basilic   less than 1    Peripheral IV 08/10/20 0940 Right Antecubital   08/10/20    0940    Antecubital   1    External Urinary Catheter   08/10/20    2310    --   less than 1                  Physical Exam:  General: well-developed and well-nourished, chronically ill-appearing NAD  HEENT: NC/AT, EOMI, PERRLA  Heart: RRR. No murmur   Chest: Diminished breath sounds bilaterally, normal respiratory effort  Abdominal: Soft. NT/ND. Bowel sounds present  Musculoskeletal: Normal ROM.  No edema. No calf tenderness.  Neurological: Awake and alert, confused, nonverbal,  Skin: Skin is warm and dry. No rash  Psychiatric: Normal mood and affect.       Wounds (last 24 hours)      LDA Wound     Row Name 08/13/20 1600 08/13/20 1200 08/13/20 0800       Wound 08/10/20 1500 midline sacral spine Pressure Injury    Wound - Properties Group Date first assessed: 08/10/20  - Time first assessed: 1500  - Orientation: midline  - Location: sacral spine  -JH Primary Wound Type: Pressure inj  -JH Stage, Pressure Injury: " Stage 2  -JH    Dressing Appearance  open to air  -ES  open to air  -ES  open to air  -ES    Closure  None  -ES  None  -ES  None  -ES    Row Name 08/13/20 0441 08/13/20 0042 08/12/20 2030       Wound 08/10/20 1500 midline sacral spine Pressure Injury    Wound - Properties Group Date first assessed: 08/10/20  -JH Time first assessed: 1500  -JH Orientation: midline  - Location: sacral spine  -JH Primary Wound Type: Pressure inj  -JH Stage, Pressure Injury: Stage 2  -JH    Dressing Appearance  open to air  -AB  open to air  -AB  open to air  -AB    Base  red  -AB  red  -AB  red  -AB    Edges  rolled/closed  -AB  rolled/closed  -AB  rolled/closed  -AB      User Key  (r) = Recorded By, (t) = Taken By, (c) = Cosigned By    Initials Name Provider Type    Rossi Toth RN Registered Nurse    Porsha Monreal RN Registered Nurse    Moriah Bowden RN Registered Nurse          Procedures:             COVID-19 LAB PANEL     COVID-19 test result  COVID19   Date Value Ref Range Status   08/10/2020 Detected (C) Not Detected - Ref. Range Final       COVID-19 Prognostic lab results  WBC with differential  Results from last 7 days   Lab Units 08/13/20  0301 08/12/20  0419 08/11/20  1316 08/10/20  0945   WBC 10*3/mm3 9.80 9.50 13.00* 13.20*   PLATELETS 10*3/mm3 170 154 188 280   NEUTROPHIL % %  --   --  86.7* 70.6   LYMPHOCYTE % %  --   --  6.7* 16.1*   NEUTROS ABS 10*3/mm3 8.13* 8.36* 11.30* 9.30*   LYMPHS ABS 10*3/mm3  --   --  0.90 2.10     Inflammatory markers  Results from last 7 days   Lab Units 08/13/20  0301 08/12/20  0419 08/11/20  0416 08/10/20  1731 08/10/20  1409 08/10/20  0945   CRP mg/dL 3.90* 7.06* 14.67*  --  16.47*  --    FERRITIN ng/mL 1,181.00* 1,316.00* 1,402.00*  --  1,265.00*  --    D DIMER QUANT mg/L (FEU) 0.37 0.50 0.77* 0.66*  --   --    PROCALCITONIN ng/mL 0.13 0.20 0.25  --  0.26*  --    LDH U/L  --   --   --   --  172  --    ALK PHOS U/L 40 44 55  --   --  71   AST (SGOT) U/L 11  12 13  --   --  34*   ALT (SGPT) U/L 8 8 12  --   --  18   TROPONIN T ng/mL  --   --   --   --   --  <0.010       Poor COVID-19 outcomes associated with:  Neutrophil:Lymphocyte ratio >3.5  Thrombocytopenia  LFT's >5x upper limit of normal  LDH >400    Results Review:     I reviewed the patient's new clinical results.    Results from last 7 days   Lab Units 08/13/20  0301 08/12/20  0419 08/11/20  1316 08/10/20  0945   WBC 10*3/mm3 9.80 9.50 13.00* 13.20*   HEMOGLOBIN g/dL 10.7* 10.8* 11.9* 15.0   HEMATOCRIT % 32.6* 31.8* 37.2 46.1   PLATELETS 10*3/mm3 170 154 188 280     Results from last 7 days   Lab Units 08/13/20  0301 08/12/20  0419 08/11/20  0416 08/10/20  1409 08/10/20  0945   SODIUM mmol/L 151* 157* 157*  --  161*   POTASSIUM mmol/L 3.4* 3.3* 3.5  --  4.7   CHLORIDE mmol/L 120* 126* 122*  --  121*   CO2 mmol/L 22.0 22.0 23.0  --  22.0   BUN  26* 33* 42*  --  56*   CREATININE mg/dL 0.56* 0.63 0.69 1.07* 1.41*   GLUCOSE mg/dL 128* 116* 123*  --  130*   ALBUMIN g/dL 2.60* 2.60* 2.90*  --  3.80   BILIRUBIN mg/dL 0.3 0.3 0.4  --  0.8   ALK PHOS U/L 40 44 55  --  71   AST (SGOT) U/L 11 12 13  --  34*   ALT (SGPT) U/L 8 8 12  --  18   CALCIUM mg/dL 7.9* 8.1* 8.7  --  9.8     Cr Clearance Estimated Creatinine Clearance: 69.2 mL/min (A) (by C-G formula based on SCr of 0.56 mg/dL (L)).    Coag       HbA1C No results found for: HGBA1C  Blood Glucose   Results from last 7 days   Lab Units 08/12/20  1654 08/12/20  1138 08/12/20  0006 08/11/20  1702 08/11/20  1201 08/10/20  2335   GLUCOSE mg/dL 121* 97 107* 113* 122* 125*       Troponin   Results from last 7 days   Lab Units 08/10/20  0945   TROPONIN T ng/mL <0.010     Lipids    Lab Results   Component Value Date    CHOL 221 (H) 07/13/2018    TRIG 66 07/13/2018    HDL 67 07/13/2018     (H) 07/13/2018       UA    Results from last 7 days   Lab Units 08/10/20  0949   NITRITE UA  Negative   WBC UA /HPF Too Numerous to Count*   BACTERIA UA /HPF 3+*   SQUAM EPITHEL UA  /HPF None Seen   URINECX  >100,000 CFU/mL Escherichia coli ESBL*       Microbiology   Results from last 7 days   Lab Units 08/10/20  0949 08/10/20  0946 08/10/20  0945   BLOODCX   --  No growth at 3 days No growth at 3 days   URINECX  >100,000 CFU/mL Escherichia coli ESBL*  --   --        ABG        EKG  ECG 12 Lead   Preliminary Result   HEART RATE= 89  bpm   RR Interval= 676  ms   IL Interval= 161  ms   P Horizontal Axis= 19  deg   P Front Axis= 49  deg   QRSD Interval= 95  ms   QT Interval= 388  ms   QRS Axis= -58  deg   T Wave Axis= 151  deg   - ABNORMAL ECG -   Sinus rhythm   Probable anterior infarct, age indeterminate   Abnormal T, consider ischemia, lateral leads   When compared with ECG of 10-Aug-2020 19:40:33,   Significant rate decrease   Significant axis, voltage or hypertrophy change   Electronically Signed By:    Date and Time of Study: 2020-08-11 04:05:25      ECG 12 Lead   Preliminary Result   HEART RATE= 133  bpm   RR Interval= 449  ms   IL Interval=   ms   P Horizontal Axis=   deg   P Front Axis=   deg   QRSD Interval= 88  ms   QT Interval= 304  ms   QRS Axis= -49  deg   T Wave Axis= 161  deg   - ABNORMAL ECG -   Atrial fibrillation   Left anterior fascicular block   LVH with secondary repolarization abnormality   Anterior Q waves, possibly due to LVH   Electronically Signed By:    Date and Time of Study: 2020-08-10 19:40:33      ECG 12 Lead   Final Result   HEART RATE= 137  bpm   RR Interval= 436  ms   IL Interval= 112  ms   P Horizontal Axis= 18  deg   P Front Axis= 46  deg   QRSD Interval= 86  ms   QT Interval= 310  ms   QRS Axis= -77  deg   T Wave Axis= 102  deg   - ABNORMAL ECG -   Sinus tachycardia   Consider left ventricular hypertrophy   Nonspecific T abnormalities, lateral leads   When compared with ECG of 25-Oct-2019 13:08:52,   Significant rate increase   Significant repolarization change   Electronically Signed By: Bruno Roach (Carl) 12-Aug-2020 10:18:08   Date and Time of Study:  2020-08-10 09:28:48          Imaging:  Xr Chest 1 View    Result Date: 8/10/2020  Small left pleural effusion with probable mild left basilar atelectasis is new compared to earlier today..  Electronically Signed By-Dr. Leanne Lyn MD On:8/10/2020 5:00 PM This report was finalized on [id number] by Dr. Leanne Lyn MD.    Xr Chest 1 View    Result Date: 8/10/2020  No acute chest finding.  Electronically Signed By-Dr. Leanne Lyn MD On:8/10/2020 10:42 AM This report was finalized on [id number] by Dr. Leanne Lyn MD.            Xrays, labs reviewed personally by physician.    Medication Review:   I have reviewed the patient's current medication list      Scheduled Meds    albuterol sulfate HFA 2 puff Inhalation 4x Daily - RT   amiodarone 200 mg Oral Q12H   Cholecalciferol 4,000 Units Oral Daily   dexamethasone 6 mg Intravenous Daily   divalproex 125 mg Oral Q12H   donepezil 10 mg Oral Nightly   enoxaparin 1 mg/kg Subcutaneous Q12H   famotidine 20 mg Intravenous Q12H   FLUoxetine 30 mg Oral Daily   INV [id number] remdesivir in NS IVPB 100 mg Intravenous Q24H   levothyroxine 25 mcg Oral Q AM   melatonin 10 mg Oral Nightly   meropenem 500 mg Intravenous Q6H   pharmacy 1 each Does not apply Once   potassium chloride 40 mEq Oral Once   sodium chloride 10 mL Intravenous Q12H   vitamin C 2,000 mg Oral Daily   zinc sulfate 220 mg Oral Daily       Meds Infusions    Pharmacy Consult - Remdesivir     Pharmacy to Dose enoxaparin (LOVENOX)     Pharmacy to Dose meropenem (MERREM)     sodium chloride 75 mL/hr Last Rate: 75 mL/hr (08/12/20 1506)       Meds PRN  •  acetaminophen **OR** acetaminophen **OR** acetaminophen  •  aluminum-magnesium hydroxide-simethicone  •  bisacodyl  •  bisacodyl  •  magnesium hydroxide  •  ondansetron **OR** ondansetron  •  Pharmacy Consult - Remdesivir  •  Pharmacy to Dose enoxaparin (LOVENOX)  •  Pharmacy to Dose meropenem (MERREM)  •  potassium chloride **OR** potassium chloride **OR**  potassium chloride  •  sodium chloride  •  sodium chloride      Assessment/Plan   Assessment/Plan     Active Hospital Problems    Diagnosis  POA   • **COVID-19 virus detected [U07.1]  Yes   • Pneumonia due to 2019 novel coronavirus [U07.1, J12.89]  Yes   • Severe sepsis (CMS/HCC) [A41.9, R65.20]  Unknown   • Severe dehydration [E86.0]  Unknown   • Encephalopathy due to COVID-19 virus [U07.1, G93.49]  Yes   • Acute respiratory failure due to COVID-19 (CMS/HCC) [U07.1, J96.00]  Yes   • Pulmonary embolus (CMS/[facility]) [I26.99]  Unknown   • Urinary tract infection after immobility [N39.0]  Unknown   • Dementia with behavioral disturbance (CMS/[facility]) [F03.91]  Unknown   • Chronic anxiety [F41.9]  Yes   • Hypertension [I10]  Yes      Resolved Hospital Problems   No resolved problems to display.       MEDICAL DECISION MAKING COMPLEXITY BY PROBLEM:     COVID-19 infection causing pneumonia  -- supplemental O2 to keep sats >93%  -- allow permissive hypoxia to sats >86%  -- pronate patient as tolerated for better oxygenation  -- continue supplements     -- Vitamin C 500mg PO q6h     -- Vitamin D3 4000u/day     -- Zinc 100mg Daily     -- Melatonin 10mg nightly     -- Famotidine 40mg/day     -- Steroids: Decadron 8mg x1, then 6mg daily thereafter     -- Remdesivir: started 8/10/2020 for up to 10days  -- admission labs: PCT, CRP, IL-6, BNP, Trop, Ferritin, D-dimer, Mg, Neutrophil/Lymphocyte ration  -- monitor daily labs     -- CMP, CBC, CRP, Ferritin, PCT, D-dimer  --Oxygen requirement has decreased from 4 L at the most down to 2 L; noncooperative with oxygen pulling it off at times but maintaining sats in the 90s  --CRP has gone from 14 down to 7 and now down to 3.9, ferritin is gradually improving but lagging behind.     A.fib with RVR  -- Cardizem gtt started, now discontinued  -- Cardiology following: Assistance appreciated     --Cardizem drip stopped secondary to hypotension     -- s/p 500 mcg IV digoxin, started amiodarone drip      -- 2D echo pending    Acute hypoxic respiratory failure due to Covid-19  -requiring 4L O2 initially, now pulling off oxygen and still maintaining sats in the 90s.  -albuterol inhaler 4x/daily     Pneumonia due to Covid-19  -CXR reviewed. Appears there is some left basilar atelectasis, Will treat as pneumonia due to acute respiratory failure requiring 4L O2, fever, and positive Covid-19 swab     Urinary tract infection with ESBL producing E. coli  -UA: large leukocytes, too numerous wbcs, and 3+ bacteria.  --Discontinued IV cefepime and vanc  --Continue meropenem for 7 days total     Sepsis without hypotension  -fever, tachycardia, wbc 13.2, not hypotensive  -procalcitonin pending  -30cc/kg IVFs given in ER  -IV cefepime, vancomcyin      Acute encephalopathy due to Covid-19/ acute UTI  -pt is now much more alert and awake, but confused at her baseline dementia  -hold sedating meds flexeril, risperdal, ultram, remeron     NENA, Dehydration   -pt with creatinine 1.41 and Na 161 s/p 30cc/kg IVFs now with creatinine 1.07  -cont IVFs  -hold lasix     H/o multiple PE's on eliquis     Hypertension  -bp normal but borderline normal. Hold norvasc for now     Depression  -cont home depakote, prozac     Hypothyroidism  -cont home synthroid     Dementia  -cont home aricept  -hold risperdal, ultram, remeron, may be able to gradually restart these medications     VTE Prophylaxis  Mechanical Order History:      Ordered        08/10/20 1522  Place Sequential Compression Device  Once         08/10/20 1522  Maintain Sequential Compression Device  Continuous                 Pharmalogical Order History:     Ordered     Dose Route Frequency Stop    08/10/20 1836  enoxaparin (LOVENOX) syringe 80 mg      1 mg/kg SC Every 12 Hours --    08/10/20 1532  Pharmacy to Dose enoxaparin (LOVENOX)      -- XX Continuous PRN --          Code Status  Code Status and Medical Interventions:   Ordered at: 08/10/20 1852     Limited Support to NOT Include:     Intubation     Level Of Support Discussed With:    Next of Kin (If No Surrogate)     Code Status:    CPR     Medical Interventions (Level of Support Prior to Arrest):    Limited       This patient has been examined wearing appropriate Personal Protective Equipment. 08/13/20      Discharge Planning    Patient will return to [place] pending her clinical course.  Will discharge in a few days.  I would prefer that she gets a full 5 days of antiviral medication however.      Electronically signed by Salena Fitch MD, 08/13/20, 19:57.    [facility] Hospitalist Team

## 2020-08-14 LAB
ALBUMIN SERPL-MCNC: 2.6 G/DL (ref 3.5–5.2)
ALBUMIN/GLOB SERPL: 1.1 G/DL
ALP SERPL-CCNC: 45 U/L (ref 39–117)
ALT SERPL W P-5'-P-CCNC: 11 U/L (ref 1–33)
ANION GAP SERPL CALCULATED.3IONS-SCNC: 9 MMOL/L (ref 5–15)
AST SERPL-CCNC: 23 U/L (ref 1–32)
BASOPHILS # BLD AUTO: 0 10*3/MM3 (ref 0–0.2)
BASOPHILS NFR BLD AUTO: 0.4 % (ref 0–1.5)
BILIRUB SERPL-MCNC: 0.3 MG/DL (ref 0–1.2)
BUN SERPL-MCNC: 14 MG/DL (ref 8–23)
BUN SERPL-MCNC: ABNORMAL MG/DL
BUN/CREAT SERPL: ABNORMAL
CALCIUM SPEC-SCNC: 8 MG/DL (ref 8.6–10.5)
CHLORIDE SERPL-SCNC: 111 MMOL/L (ref 98–107)
CO2 SERPL-SCNC: 24 MMOL/L (ref 22–29)
CREAT SERPL-MCNC: 0.41 MG/DL (ref 0.57–1)
CRP SERPL-MCNC: 2.08 MG/DL (ref 0–0.5)
D DIMER PPP FEU-MCNC: 0.4 MG/L (FEU) (ref 0–0.59)
DEPRECATED RDW RBC AUTO: 46.4 FL (ref 37–54)
EOSINOPHIL # BLD AUTO: 0.1 10*3/MM3 (ref 0–0.4)
EOSINOPHIL NFR BLD AUTO: 1.3 % (ref 0.3–6.2)
ERYTHROCYTE [DISTWIDTH] IN BLOOD BY AUTOMATED COUNT: 14.7 % (ref 12.3–15.4)
FERRITIN SERPL-MCNC: 1150 NG/ML (ref 13–150)
GFR SERPL CREATININE-BSD FRML MDRD: >150 ML/MIN/1.73
GLOBULIN UR ELPH-MCNC: 2.4 GM/DL
GLUCOSE SERPL-MCNC: 97 MG/DL (ref 65–99)
HCT VFR BLD AUTO: 33.1 % (ref 34–46.6)
HGB BLD-MCNC: 10.8 G/DL (ref 12–15.9)
LYMPHOCYTES # BLD AUTO: 1.4 10*3/MM3 (ref 0.7–3.1)
LYMPHOCYTES NFR BLD AUTO: 15.3 % (ref 19.6–45.3)
MAGNESIUM SERPL-MCNC: 2.1 MG/DL (ref 1.6–2.4)
MCH RBC QN AUTO: 29.9 PG (ref 26.6–33)
MCHC RBC AUTO-ENTMCNC: 32.7 G/DL (ref 31.5–35.7)
MCV RBC AUTO: 91.5 FL (ref 79–97)
MONOCYTES # BLD AUTO: 0.6 10*3/MM3 (ref 0.1–0.9)
MONOCYTES NFR BLD AUTO: 6.7 % (ref 5–12)
NEUTROPHILS NFR BLD AUTO: 6.8 10*3/MM3 (ref 1.7–7)
NEUTROPHILS NFR BLD AUTO: 76.3 % (ref 42.7–76)
NRBC BLD AUTO-RTO: 0 /100 WBC (ref 0–0.2)
PLATELET # BLD AUTO: 165 10*3/MM3 (ref 140–450)
PMV BLD AUTO: 12 FL (ref 6–12)
POTASSIUM SERPL-SCNC: 3.6 MMOL/L (ref 3.5–5.2)
PROCALCITONIN SERPL-MCNC: 0.06 NG/ML (ref 0–0.25)
PROT SERPL-MCNC: 5 G/DL (ref 6–8.5)
RBC # BLD AUTO: 3.61 10*6/MM3 (ref 3.77–5.28)
SODIUM SERPL-SCNC: 144 MMOL/L (ref 136–145)
WBC # BLD AUTO: 8.9 10*3/MM3 (ref 3.4–10.8)

## 2020-08-14 PROCEDURE — 83735 ASSAY OF MAGNESIUM: CPT | Performed by: NURSE PRACTITIONER

## 2020-08-14 PROCEDURE — 25010000002 MEROPENEM PER 100 MG: Performed by: INTERNAL MEDICINE

## 2020-08-14 PROCEDURE — 85379 FIBRIN DEGRADATION QUANT: CPT | Performed by: INTERNAL MEDICINE

## 2020-08-14 PROCEDURE — 82728 ASSAY OF FERRITIN: CPT | Performed by: INTERNAL MEDICINE

## 2020-08-14 PROCEDURE — 92526 ORAL FUNCTION THERAPY: CPT

## 2020-08-14 PROCEDURE — 94799 UNLISTED PULMONARY SVC/PX: CPT

## 2020-08-14 PROCEDURE — 25010000002 ENOXAPARIN PER 10 MG: Performed by: NURSE PRACTITIONER

## 2020-08-14 PROCEDURE — 99233 SBSQ HOSP IP/OBS HIGH 50: CPT | Performed by: INTERNAL MEDICINE

## 2020-08-14 PROCEDURE — 86140 C-REACTIVE PROTEIN: CPT | Performed by: INTERNAL MEDICINE

## 2020-08-14 PROCEDURE — 84145 PROCALCITONIN (PCT): CPT | Performed by: INTERNAL MEDICINE

## 2020-08-14 PROCEDURE — 85025 COMPLETE CBC W/AUTO DIFF WBC: CPT | Performed by: INTERNAL MEDICINE

## 2020-08-14 PROCEDURE — 25010000002 DEXAMETHASONE PER 1 MG: Performed by: NURSE PRACTITIONER

## 2020-08-14 PROCEDURE — 80053 COMPREHEN METABOLIC PANEL: CPT | Performed by: INTERNAL MEDICINE

## 2020-08-14 RX ORDER — ASCORBIC ACID 500 MG
500 TABLET ORAL 2 TIMES DAILY
Qty: 28 TABLET | Refills: 0 | Status: SHIPPED | OUTPATIENT
Start: 2020-08-14 | End: 2020-08-28

## 2020-08-14 RX ORDER — CHOLECALCIFEROL (VITAMIN D3) 125 MCG
10 CAPSULE ORAL NIGHTLY
Qty: 28 TABLET | Refills: 0 | Status: SHIPPED | OUTPATIENT
Start: 2020-08-14 | End: 2020-08-28

## 2020-08-14 RX ORDER — FAMOTIDINE 20 MG/1
20 TABLET, FILM COATED ORAL 2 TIMES DAILY
Qty: 28 TABLET | Refills: 0 | Status: SHIPPED | OUTPATIENT
Start: 2020-08-14 | End: 2020-08-28

## 2020-08-14 RX ORDER — AMIODARONE HYDROCHLORIDE 200 MG/1
200 TABLET ORAL
Qty: 30 TABLET | Refills: 1 | Status: SHIPPED | OUTPATIENT
Start: 2020-08-15

## 2020-08-14 RX ORDER — AMIODARONE HYDROCHLORIDE 200 MG/1
200 TABLET ORAL
Status: DISCONTINUED | OUTPATIENT
Start: 2020-08-15 | End: 2020-08-15 | Stop reason: HOSPADM

## 2020-08-14 RX ORDER — ZINC GLUCONATE 50 MG
75 TABLET ORAL DAILY
Qty: 21 TABLET | Refills: 0 | Status: SHIPPED | OUTPATIENT
Start: 2020-08-14 | End: 2020-08-28

## 2020-08-14 RX ORDER — DEXAMETHASONE 1 MG
6 TABLET ORAL
Qty: 42 TABLET | Refills: 0 | Status: SHIPPED | OUTPATIENT
Start: 2020-08-14 | End: 2020-09-24 | Stop reason: ALTCHOICE

## 2020-08-14 RX ADMIN — LEVOTHYROXINE SODIUM 25 MCG: 25 TABLET ORAL at 06:21

## 2020-08-14 RX ADMIN — FAMOTIDINE 20 MG: 10 INJECTION INTRAVENOUS at 21:31

## 2020-08-14 RX ADMIN — FAMOTIDINE 20 MG: 10 INJECTION INTRAVENOUS at 08:29

## 2020-08-14 RX ADMIN — ZINC SULFATE 220 MG (50 MG) CAPSULE 220 MG: CAPSULE at 08:30

## 2020-08-14 RX ADMIN — REMDESIVIR 100 MG: 100 INJECTION, POWDER, LYOPHILIZED, FOR SOLUTION INTRAVENOUS at 21:28

## 2020-08-14 RX ADMIN — Medication 4000 UNITS: at 08:26

## 2020-08-14 RX ADMIN — ALBUTEROL SULFATE 2 PUFF: 90 AEROSOL, METERED RESPIRATORY (INHALATION) at 08:10

## 2020-08-14 RX ADMIN — ALBUTEROL SULFATE 2 PUFF: 90 AEROSOL, METERED RESPIRATORY (INHALATION) at 20:36

## 2020-08-14 RX ADMIN — MEROPENEM 500 MG: 500 INJECTION, POWDER, FOR SOLUTION INTRAVENOUS at 08:26

## 2020-08-14 RX ADMIN — Medication 10 ML: at 21:32

## 2020-08-14 RX ADMIN — ENOXAPARIN SODIUM 80 MG: 80 INJECTION SUBCUTANEOUS at 21:31

## 2020-08-14 RX ADMIN — DEXAMETHASONE SODIUM PHOSPHATE 6 MG: 4 INJECTION, SOLUTION INTRAMUSCULAR; INTRAVENOUS at 08:29

## 2020-08-14 RX ADMIN — AMIODARONE HYDROCHLORIDE 200 MG: 200 TABLET ORAL at 08:29

## 2020-08-14 RX ADMIN — DIVALPROEX SODIUM 125 MG: 125 TABLET, DELAYED RELEASE ORAL at 08:29

## 2020-08-14 RX ADMIN — Medication 10 ML: at 08:30

## 2020-08-14 RX ADMIN — SODIUM CHLORIDE 75 ML/HR: 900 INJECTION, SOLUTION INTRAVENOUS at 06:21

## 2020-08-14 RX ADMIN — MEROPENEM 500 MG: 500 INJECTION, POWDER, FOR SOLUTION INTRAVENOUS at 15:35

## 2020-08-14 RX ADMIN — ENOXAPARIN SODIUM 80 MG: 80 INJECTION SUBCUTANEOUS at 08:29

## 2020-08-14 RX ADMIN — ALBUTEROL SULFATE 2 PUFF: 90 AEROSOL, METERED RESPIRATORY (INHALATION) at 16:04

## 2020-08-14 RX ADMIN — OXYCODONE HYDROCHLORIDE AND ACETAMINOPHEN 2000 MG: 500 TABLET ORAL at 08:29

## 2020-08-14 RX ADMIN — MEROPENEM 500 MG: 500 INJECTION, POWDER, FOR SOLUTION INTRAVENOUS at 04:19

## 2020-08-14 RX ADMIN — MELATONIN TAB 5 MG 10 MG: 5 TAB at 21:31

## 2020-08-14 RX ADMIN — DONEPEZIL HYDROCHLORIDE 10 MG: 5 TABLET, FILM COATED ORAL at 21:31

## 2020-08-14 RX ADMIN — MEROPENEM 500 MG: 500 INJECTION, POWDER, FOR SOLUTION INTRAVENOUS at 21:31

## 2020-08-14 RX ADMIN — DIVALPROEX SODIUM 125 MG: 125 TABLET, DELAYED RELEASE ORAL at 21:31

## 2020-08-14 RX ADMIN — FLUOXETINE 30 MG: 20 CAPSULE ORAL at 08:29

## 2020-08-14 NOTE — PLAN OF CARE
Problem: Patient Care Overview  Goal: Plan of Care Review  Outcome: Ongoing (interventions implemented as appropriate)  Flowsheets  Taken 8/14/2020 0302  Progress: improving  Outcome Summary: pt remains at baseline orientation status. tolerating meds and po intake well. still not taking in much d/t poor appetite. HR very bradycardic overnight. HR in the 30s multiple times. Pt remained asymptomatic with all other vital signs stable. Pt sats well on RA. will continue to monitor.  Taken 8/13/2020 2000  Plan of Care Reviewed With: patient

## 2020-08-14 NOTE — THERAPY TREATMENT NOTE
Acute Care - Speech Language Pathology   Swallow Treatment Note  [place]     Patient Name: Day Cabrera  : 1945  MRN: [id number]  Today's Date: 2020               Admit Date: 8/10/2020    Visit Dx:      ICD-10-CM ICD-9-CM   1. Pneumonia due to 2019 novel coronavirus U07.1 480.8    J12.89    2. Severe sepsis (CMS/HCC) A41.9 038.9    R65.20 995.92   3. Urinary tract infection after immobility N39.0 599.0   4. Severe dehydration E86.0 276.51   5. Dementia with behavioral disturbance, unspecified dementia type (CMS/HCC) F03.91 294.21     Patient Active Problem List   Diagnosis   • Chronic anxiety   • Dementia with behavioral disturbance (CMS/[facility])   • Hypertension   • Memory impairment   • Obesity   • Sleep apnea   • E. coli UTI (urinary tract infection)   • Urinary tract infection after immobility   • Delirium due to another medical condition   • Pulmonary embolus (CMS/[facility])   • Pneumonia due to 2019 novel coronavirus   • Severe sepsis (CMS/HCC)   • Severe dehydration   • Arthritis   • COVID-19 virus detected   • Encephalopathy due to COVID-19 virus   • Acute respiratory failure due to COVID-19 (CMS/HCC)       Therapy Treatment  Rehabilitation Treatment Summary     Row Name 20 [facility]          Discipline  speech language pathologist  -LF    Document Type  therapy note (daily note)  -LF    Subjective Information  no complaints  -LF    Mode of Treatment  individual therapy  -LF    Patient/Family Observations  Pt awake and confused in bed upon entry  -LF    Care Plan Review  care plan/treatment goals reviewed;patient/other agree to care plan  -LF    Patient Effort  adequate  -LF    Comment  Pt seen with lunch tray to assess diet tolerance and readiness for possible diet upgrade. Pt's lunch tray included pureed turkey, peas, mashed potatoes, and lemonade by straw. Pt required intermittent cues and assisstance from SLP with meal. Pt exhibits slow oral transit across all trials. Pt independently takes small  bites and sips. Pt only agreeable to ~25% of meal tray this date. No overt s/s of aspiration observed at any time. Due to slow oral transit and confusion, soft solids not attempted this date.  -LF2    Treatment Considerations/Comments  PPE worn: gown, gloves, N95, goggles, surgical cap  -LF    Recorded by [LF] Iza Gao, SLP 08/14/20 1335  [LF2] Iza Gao SLP 08/14/20 1337    Row Name 08/14/20 1300          Plan for Continued Treatment (SLP)  Recommend pt continue puree/thin liquid diet. ST will continue to follow and assess for possible diet upgrade when pt is deemed appropriate  -LF    Recorded by [LF] Iza Gao SLP 08/14/20 1335      User Key  (r) = Recorded By, (t) = Taken By, (c) = Cosigned By    Initials Name Effective Dates Discipline     Porsha Ledesma RN 03/01/19 -  Nurse    LF Iza Gao SLP 01/02/20 -  SLP          Outcome Summary  Outcome Summary/Treatment Plan (SLP)  Plan for Continued Treatment (SLP): Recommend pt continue puree/thin liquid diet. ST will continue to follow and assess for possible diet upgrade when pt is deemed appropriate (08/14/20 1300 : Iza Gao, SLP)      SLP GOALS     Row Name 08/14/20 1300    Oral Nutrition/Hydration Goal 2, SLP  Pt will tolerate safest and L/R diet/liquids with no complications of aspiration.   -LF    Time Frame (Oral Nutrition/Hydration Goal 2, SLP)  by discharge  -LF    Barriers (Oral Nutrition/Hydration Goal 2, SLP)  see above note  -LF    Progress/Outcomes (Oral Nutrition/Hydration Goal 2, SLP)  goal ongoing  -LF          Oral Nutrition/Hydration Goal, SLP  Patient will be seen at a meal within 24-48 hours to assess tolerance of current diet with further recommendations to be given as indicated  -LF    Time Frame (Oral Nutrition/Hydration Goal, SLP)  2 days  -LF    Barriers (Oral Nutrition/Hydration Goal, SLP)  see above note  -LF    Progress/Outcomes (Oral Nutrition/Hydration Goal, SLP)  good progress toward goal;goal  ongoing  -LF      User Key  (r) = Recorded By, (t) = Taken By, (c) = Cosigned By    Initials Name Provider Type    CP Lina Martinez, SLP Speech and Language Pathologist    MM Nicole Nielson, SLP Speech and Language Pathologist    Iza Cast, SLP Speech and Language Pathologist          EDUCATION  The patient has been educated in the following areas:   Modified Diet Instruction.    SLP Recommendation and Plan        Plan for Continued Treatment (SLP): Recommend pt continue puree/thin liquid diet. ST will continue to follow and assess for possible diet upgrade when pt is deemed appropriate                       Time Calculation:       Therapy Charges for Today     Code Description Service Date Service Provider Modifiers Qty    [id number]  ST TREATMENT SWALLOW 3 8/14/2020 Iza Gao, SLP GN 1                 CHARLES Danielle  8/14/2020

## 2020-08-14 NOTE — PROGRESS NOTES
[facility] Medicine Services Daily Progress Note          Hospitalist Team  LOS 4 days      Patient Care Team:  Smita Howard APRN as PCP - General (Nurse Practitioner)    Patient Location: 2128/1      Subjective   Subjective     Chief Complaint / Subjective  Chief Complaint   Patient presents with   • Loss of Consciousness         Brief Synopsis of Hospital Course/HPI  Ms. Cabrera is a 74 y.o. female from [facility] who presented to [facility] ER 8/10/20 with decreased responsiveness, fever, cough, and congestion. [facility] has a known outbreak of Covid-19. The patient had a fever of 101.3, HR 130s. CXR did not show acute findings per radiology report; however, ER physician felt the patient's CXR was suggestive of pneumonia from Covid-19. Covid-19 swab was positive. Her WBC was 13.2, creatinine 1.41, AST 34. UA showed large leukocytes, too numerous wbcs, and 3+ bacteria. She was given cefepime, vanc, 30cc/kg IVFs, 8mg decadron, albuterol inhaler, and tylenol suppository. Daughter Patti Cabrera was notified of the patient's condition and she is a full code.      Upon exam the patient would open her eyes and mumble but could not provide any information. She is requiring 4L O2 via nasal cannula.      Date::    8/12/2020  Patient is down to 2 L of supplemental O2.  Her mental status seems to be improving as we continue to empirically treat her UTI as well as her respiratory infection.  She does have dementia at a baseline and does not communicate much.     8/13/2020  Patient is confused.  She apparently has pulled off her oxygen a few times and still had her O2 sats in the 90s.  She has been having issues with bradycardia and her beta-blocker has been stopped.    8/14/2020  No acute events per nursing staff.  Patient remains awake and alert but confused at her baseline due to her underlying dementia.  She seems to do well on room air for the most part.      Review of Systems   Unable  "to perform ROS: dementia         Objective   Objective      Vital Signs  Temp:  [97.1 °F (36.2 °C)-97.6 °F (36.4 °C)] 97.5 °F (36.4 °C)  Heart Rate:  [58-79] 73  Resp:  [14-20] 16  BP: (120-135)/(37-54) 129/40  Oxygen Therapy  SpO2: 93 %  Pulse Oximetry Type: Continuous  Device (Oxygen Therapy): nasal cannula  Device (Oxygen Therapy): room air  Flow (L/min): 2  Oxygen Concentration (%): 21  Flowsheet Rows      First Filed Value   Admission Height  172.7 cm (68\") Documented at 08/10/2020 0942   Admission Weight  84.5 kg (186 lb 4.6 oz) Documented at 08/10/2020 0942        Intake & Output (last 3 days)       08/11 0701 - 08/12 0700 08/12 0701 - 08/13 0700 08/13 0701 - 08/14 0700 08/14 0701 - 08/15 0700    P.O.  600 240 120    I.V. (mL/kg) 1000 (13.5)       IV Piggyback 210       Total Intake(mL/kg) 1210 (16.3) 600 (7.3) 240 (2.9) 120 (1.5)    Urine (mL/kg/hr) 550 (0.3) 700 (0.4)      Total Output 550 700      Net +660 -100 +240 +120            Urine Unmeasured Occurrence   3 x 2 x    Stool Unmeasured Occurrence   1 x         Lines, Drains & Airways    Active LDAs     Name:   Placement date:   Placement time:   Site:   Days:    PICC Triple Lumen 08/11/20 Left Basilic   08/11/20    1415    Basilic   less than 1    Peripheral IV 08/10/20 0940 Right Antecubital   08/10/20    0940    Antecubital   1    External Urinary Catheter   08/10/20    2310    --   less than 1                  Physical Exam:  General: well-developed and well-nourished, likely ill-appearing, NAD  HEENT: NC/AT, EOMI, PERRLA  Heart: RRR. No murmur   Chest: CTAB, but diminished, no w/r/r, normal respiratory effort  Abdominal: Soft. NT/ND. Bowel sounds present  Musculoskeletal: Normal ROM.  No edema. No calf tenderness.  Neurological: Awake, alert, confused, nonverbal  Skin: Skin is warm and dry. No rash  Psychiatric: Normal mood and affect.       Wounds (last 24 hours)      LDA Wound     Row Name 08/14/20 1500 08/14/20 1100 08/14/20 0740       Wound " 08/10/20 1500 midline sacral spine Pressure Injury    Wound - Properties Group Date first assessed: 08/10/20  - Time first assessed: 1500  -JH Orientation: midline  - Location: sacral spine  -JH Primary Wound Type: Pressure inj  -JH Stage, Pressure Injury: Stage 2  -JH    Dressing Appearance  open to air  -KA  open to air  -KA  open to air  -KA    Base  red  -KA  red  -KA  red  -KA    Edges  rolled/closed  -KA  rolled/closed  -KA  rolled/closed  -KA    Care, Wound  --  --  cleansed with;soap and water  -KA    Row Name 08/14/20 0410 08/14/20 0015 08/13/20 2000       Wound 08/10/20 1500 midline sacral spine Pressure Injury    Wound - Properties Group Date first assessed: 08/10/20  - Time first assessed: 1500  -JH Orientation: midline  - Location: sacral spine  -JH Primary Wound Type: Pressure inj  -JH Stage, Pressure Injury: Stage 2  -JH    Dressing Appearance  open to air  -AB  open to air  -AB  open to air  -AB    Base  red  -AB  red  -AB  red  -AB    Edges  rolled/closed  -AB  rolled/closed  -AB  rolled/closed  -AB      User Key  (r) = Recorded By, (t) = Taken By, (c) = Cosigned By    Initials Name Provider Type    Katie Murillo RN Registered Nurse    Porsha Monreal RN Registered Nurse    Moriah Bowden RN Registered Nurse          Procedures:             COVID-19 LAB PANEL     COVID-19 test result  COVID19   Date Value Ref Range Status   08/10/2020 Detected (C) Not Detected - Ref. Range Final       COVID-19 Prognostic lab results  WBC with differential  Results from last 7 days   Lab Units 08/14/20  0450 08/13/20  0301 08/12/20  0419 08/11/20  1316 08/10/20  0945   WBC 10*3/mm3 8.90 9.80 9.50 13.00* 13.20*   PLATELETS 10*3/mm3 165 170 154 188 280   NEUTROPHIL % % 76.3*  --   --  86.7* 70.6   LYMPHOCYTE % % 15.3*  --   --  6.7* 16.1*   NEUTROS ABS 10*3/mm3 6.80 8.13* 8.36* 11.30* 9.30*   LYMPHS ABS 10*3/mm3 1.40  --   --  0.90 2.10     Inflammatory markers  Results from last 7 days    Lab Units 08/14/20 0450 08/13/20  0301 08/12/20  0419 08/11/20  0416 08/10/20  1731 08/10/20  1409 08/10/20  0945   CRP mg/dL 2.08* 3.90* 7.06* 14.67*  --  16.47*  --    FERRITIN ng/mL 1,150.00* 1,181.00* 1,316.00* 1,402.00*  --  1,265.00*  --    D DIMER QUANT mg/L (FEU) 0.40 0.37 0.50 0.77* 0.66*  --   --    PROCALCITONIN ng/mL 0.06 0.13 0.20 0.25  --  0.26*  --    LDH U/L  --   --   --   --   --  172  --    ALK PHOS U/L 45 40 44 55  --   --  71   AST (SGOT) U/L 23 11 12 13  --   --  34*   ALT (SGPT) U/L 11 8 8 12  --   --  18   TROPONIN T ng/mL  --   --   --   --   --   --  <0.010       Poor COVID-19 outcomes associated with:  Neutrophil:Lymphocyte ratio >3.5  Thrombocytopenia  LFT's >5x upper limit of normal  LDH >400    Results Review:     I reviewed the patient's new clinical results.    Results from last 7 days   Lab Units 08/14/20 0450 08/13/20 0301 08/12/20 0419 08/11/20  1316 08/10/20  0945   WBC 10*3/mm3 8.90 9.80 9.50 13.00* 13.20*   HEMOGLOBIN g/dL 10.8* 10.7* 10.8* 11.9* 15.0   HEMATOCRIT % 33.1* 32.6* 31.8* 37.2 46.1   PLATELETS 10*3/mm3 165 170 154 188 280     Results from last 7 days   Lab Units 08/14/20 0450 08/13/20 0301 08/12/20 0419 08/11/20  0416 08/10/20  1409 08/10/20  0945   SODIUM mmol/L 144 151* 157* 157*  --  161*   POTASSIUM mmol/L 3.6 3.4* 3.3* 3.5  --  4.7   CHLORIDE mmol/L 111* 120* 126* 122*  --  121*   CO2 mmol/L 24.0 22.0 22.0 23.0  --  22.0   BUN  14 26* 33* 42*  --  56*   CREATININE mg/dL 0.41* 0.56* 0.63 0.69 1.07* 1.41*   GLUCOSE mg/dL 97 128* 116* 123*  --  130*   ALBUMIN g/dL 2.60* 2.60* 2.60* 2.90*  --  3.80   BILIRUBIN mg/dL 0.3 0.3 0.3 0.4  --  0.8   ALK PHOS U/L 45 40 44 55  --  71   AST (SGOT) U/L 23 11 12 13  --  34*   ALT (SGPT) U/L 11 8 8 12  --  18   CALCIUM mg/dL 8.0* 7.9* 8.1* 8.7  --  9.8     Cr Clearance Estimated Creatinine Clearance: 69.2 mL/min (A) (by C-G formula based on SCr of 0.41 mg/dL (L)).    Coag       HbA1C No results found for: HGBA1C  Blood  Glucose   Results from last 7 days   Lab Units 08/12/20  1654 08/12/20  1138 08/12/20  0006 08/11/20  1702 08/11/20  1201 08/10/20  2335   GLUCOSE mg/dL 121* 97 107* 113* 122* 125*       Troponin   Results from last 7 days   Lab Units 08/10/20  0945   TROPONIN T ng/mL <0.010     Lipids    Lab Results   Component Value Date    CHOL 221 (H) 07/13/2018    TRIG 66 07/13/2018    HDL 67 07/13/2018     (H) 07/13/2018       UA    Results from last 7 days   Lab Units 08/10/20  0949   NITRITE UA  Negative   WBC UA /HPF Too Numerous to Count*   BACTERIA UA /HPF 3+*   SQUAM EPITHEL UA /HPF None Seen   URINECX  >100,000 CFU/mL Escherichia coli ESBL*       Microbiology   Results from last 7 days   Lab Units 08/10/20  0949 08/10/20  0946 08/10/20  0945   BLOODCX   --  No growth at 4 days No growth at 4 days   URINECX  >100,000 CFU/mL Escherichia coli ESBL*  --   --        ABG        EKG  ECG 12 Lead   Preliminary Result   HEART RATE= 89  bpm   RR Interval= 676  ms   WY Interval= 161  ms   P Horizontal Axis= 19  deg   P Front Axis= 49  deg   QRSD Interval= 95  ms   QT Interval= 388  ms   QRS Axis= -58  deg   T Wave Axis= 151  deg   - ABNORMAL ECG -   Sinus rhythm   Probable anterior infarct, age indeterminate   Abnormal T, consider ischemia, lateral leads   When compared with ECG of 10-Aug-2020 19:40:33,   Significant rate decrease   Significant axis, voltage or hypertrophy change   Electronically Signed By:    Date and Time of Study: 2020-08-11 04:05:25      ECG 12 Lead   Preliminary Result   HEART RATE= 133  bpm   RR Interval= 449  ms   WY Interval=   ms   P Horizontal Axis=   deg   P Front Axis=   deg   QRSD Interval= 88  ms   QT Interval= 304  ms   QRS Axis= -49  deg   T Wave Axis= 161  deg   - ABNORMAL ECG -   Atrial fibrillation   Left anterior fascicular block   LVH with secondary repolarization abnormality   Anterior Q waves, possibly due to LVH   Electronically Signed By:    Date and Time of Study: 2020-08-10  19:40:33      ECG 12 Lead   Final Result   HEART RATE= 137  bpm   RR Interval= 436  ms   KS Interval= 112  ms   P Horizontal Axis= 18  deg   P Front Axis= 46  deg   QRSD Interval= 86  ms   QT Interval= 310  ms   QRS Axis= -77  deg   T Wave Axis= 102  deg   - ABNORMAL ECG -   Sinus tachycardia   Consider left ventricular hypertrophy   Nonspecific T abnormalities, lateral leads   When compared with ECG of 25-Oct-2019 13:08:52,   Significant rate increase   Significant repolarization change   Electronically Signed By: Bruno Roach ([facility]) 12-Aug-2020 10:18:08   Date and Time of Study: 2020-08-10 09:28:48          Imaging:  Xr Chest 1 View    Result Date: 8/10/2020  Small left pleural effusion with probable mild left basilar atelectasis is new compared to earlier today..  Electronically Signed By-Dr. Leanne Lyn MD On:8/10/2020 5:00 PM This report was finalized on [id number] by Dr. Leanne Lyn MD.    Xr Chest 1 View    Result Date: 8/10/2020  No acute chest finding.  Electronically Signed By-Dr. Leanne Lyn MD On:8/10/2020 10:42 AM This report was finalized on [id number] by Dr. Leanne Lyn MD.            Xrays, labs reviewed personally by physician.    Medication Review:   I have reviewed the patient's current medication list      Scheduled Meds    albuterol sulfate HFA 2 puff Inhalation 4x Daily - RT   [START ON 8/15/2020] amiodarone 200 mg Oral Q24H   Cholecalciferol 4,000 Units Oral Daily   dexamethasone 6 mg Intravenous Daily   divalproex 125 mg Oral Q12H   donepezil 10 mg Oral Nightly   enoxaparin 1 mg/kg Subcutaneous Q12H   famotidine 20 mg Intravenous Q12H   FLUoxetine 30 mg Oral Daily   INV [id number] remdesivir in NS IVPB 100 mg Intravenous Q24H   levothyroxine 25 mcg Oral Q AM   melatonin 10 mg Oral Nightly   meropenem 500 mg Intravenous Q6H   pharmacy 1 each Does not apply Once   potassium chloride 40 mEq Oral Once   sodium chloride 10 mL Intravenous Q12H   vitamin C 2,000 mg Oral Daily    zinc sulfate 220 mg Oral Daily       Meds Infusions    Pharmacy Consult - Remdesivir     Pharmacy to Dose enoxaparin (LOVENOX)     Pharmacy to Dose meropenem (MERREM)     sodium chloride 75 mL/hr Last Rate: 75 mL/hr (08/14/20 0621)       Meds PRN  •  acetaminophen **OR** acetaminophen **OR** acetaminophen  •  aluminum-magnesium hydroxide-simethicone  •  bisacodyl  •  bisacodyl  •  magnesium hydroxide  •  ondansetron **OR** ondansetron  •  Pharmacy Consult - Remdesivir  •  Pharmacy to Dose enoxaparin (LOVENOX)  •  Pharmacy to Dose meropenem (MERREM)  •  potassium chloride **OR** potassium chloride **OR** potassium chloride  •  sodium chloride  •  sodium chloride      Assessment/Plan   Assessment/Plan     Active Hospital Problems    Diagnosis  POA   • **COVID-19 virus detected [U07.1]  Yes   • Pneumonia due to 2019 novel coronavirus [U07.1, J12.89]  Yes   • Severe sepsis (CMS/HCC) [A41.9, R65.20]  Yes   • Severe dehydration [E86.0]  Yes   • Encephalopathy due to COVID-19 virus [U07.1, G93.49]  Clinically Undetermined   • Acute respiratory failure due to COVID-19 (CMS/HCC) [U07.1, J96.00]  Yes   • Pulmonary embolus (CMS/HCC) [I26.99]  Unknown   • Urinary tract infection after immobility [N39.0]  Yes   • Dementia with behavioral disturbance (CMS/HCC) [F03.91]  Yes   • Chronic anxiety [F41.9]  Yes   • Hypertension [I10]  Yes      Resolved Hospital Problems   No resolved problems to display.       MEDICAL DECISION MAKING COMPLEXITY BY PROBLEM:     COVID-19 infection causing pneumonia  -- supplemental O2 to keep sats >93%  -- allow permissive hypoxia to sats >86%  -- pronate patient as tolerated for better oxygenation  -- continue supplements     -- Vitamin C 500mg PO q6h     -- Vitamin D3 4000u/day     -- Zinc 100mg Daily     -- Melatonin 10mg nightly     -- Famotidine 40mg/day     -- Steroids: Decadron 8mg x1, then 6mg daily thereafter     -- Remdesivir: started 8/10/2020 for up to 10days  -- admission labs: PCT, CRP,  IL-6, BNP, Trop, Ferritin, D-dimer, Mg, Neutrophil/Lymphocyte ration  -- monitor daily labs     -- CMP, CBC, CRP, Ferritin, PCT, D-dimer  --CRP is now down to 2.08, ferritin continues to decrease slowly as well  -- Pt will get her fifth dose of remdesivir tonight and discharge back to her facility tomorrow    A.fib with RVR  -- Cardizem gtt started, now discontinued  -- Cardiology following: Assistance appreciated     --Cardizem drip stopped secondary to hypotension     -- s/p 500 mcg IV digoxin, started amiodarone drip     -- 2D echo unable to be done in [facility]     --Amiodarone drip decreased due to bradycardia     --Beta-blocker has been discontinued as of 8/13/2020     --Continue anticoagulation    Acute hypoxic respiratory failure due to Covid-19  -requiring 4L O2 initially; currently on room air  -albuterol inhaler 4x/daily     Pneumonia due to Covid-19  -CXR reviewed. Appears there is some left basilar atelectasis, Will treat as pneumonia due to acute respiratory failure requiring 4L O2, fever, and positive Covid-19 swab    Urinary tract infection with ESBL producing E. coli  -UA: large leukocytes, too numerous wbcs, and 3+ bacteria.  --Discontinued IV cefepime and vanc  --Continue meropenem for 7 days total     Sepsis without hypotension  -fever, tachycardia, wbc 13.2, not hypotensive  -procalcitonin 0.06 today  -30cc/kg IVFs given in ER  -IV cefepime, vancomcyin      Acute encephalopathy due to Covid-19/acute UTI  -pt is now much more alert and awake but confused as per her baseline dementia  -hold sedating meds flexeril, risperdal, ultram, remeron     NENA, Dehydration   -pt with creatinine 1.41 and Na 161 s/p 30cc/kg IVFs   -cont IVFs  -hold lasix, avoid fluid overload with COVID 19 infection  --Current creatinine 0.41     H/o multiple PE's on eliquis     Hypertension  -bp normal but borderline normal. Hold norvasc for now     Depression  -cont home depakote, prozac     Hypothyroidism  -cont home  synthroid     Dementia  -cont home aricept  -hold risperdal, ultram, remeron     VTE Prophylaxis  Mechanical Order History:      Ordered        08/10/20 1522  Place Sequential Compression Device  Once         08/10/20 1522  Maintain Sequential Compression Device  Continuous                 Pharmalogical Order History:     Ordered     Dose Route Frequency Stop    08/10/20 1836  enoxaparin (LOVENOX) syringe 80 mg      1 mg/kg SC Every 12 Hours --    08/10/20 1532  Pharmacy to Dose enoxaparin (LOVENOX)      -- XX Continuous PRN --          Code Status  Code Status and Medical Interventions:   Ordered at: 08/10/20 1852     Limited Support to NOT Include:    Intubation     Level Of Support Discussed With:    Next of Kin (If No Surrogate)     Code Status:    CPR     Medical Interventions (Level of Support Prior to Arrest):    Limited       This patient has been examined wearing appropriate Personal Protective Equipment. 08/14/20      Discharge Planning    Patient will return to [facility] tomorrow after her fifth dose of antiviral medication tonight.      Electronically signed by Salena Fitch MD, 08/14/20, 18:28.    [facility] Hospitalist Team

## 2020-08-14 NOTE — PROGRESS NOTES
Continued Stay Note  [facility]     Patient Name: Day Cabrera  MRN: [id number]  Today's Date: 8/14/2020    Admit Date: 8/10/2020    Discharge Plan     Row Name 08/14/20 1535       Plan    Plan  DC Plan: Return to [facility] (under medicaid). No PASRR needed. No pre-cert required    Plan Comments  DC Barriers; Changed to PO amiodarone, adjusting dosage. Now on room air.          Expected Discharge Date and Time     Expected Discharge Date Expected Discharge Time    Aug 17, 2020             Porsha Nelson RN

## 2020-08-14 NOTE — PLAN OF CARE
Pt still at baseline orientation, taking meds whole in applesauce, vitals remain stable will continue to monitor

## 2020-08-14 NOTE — PROGRESS NOTES
[facility]        LOS:  LOS: 4 days   Patient Name: Day Cabrera  Age/Sex: 74 y.o. female  : 1945  MRN: [id number]    Day of Service: 20   Length of Stay: 4  Encounter Provider: CHARLOTTE Hoffman  Place of Service: [facility] CARDIOLOGY  Patient Care Team:  Smita Howard APRN as PCP - General (Nurse Practitioner)    Subjective:     Chief Complaint: f/u AFib    Subjective: PLAN OF CARE NOTE, PATIENT IS COVID POSITIVE  Tolerating PO, bradycardic into the 30-40 overnight, BB stopped yesterday, no complaints of chest pain, bradycardia self-limited not associated with hypotension.  No other complaints.    Current Medications:   Scheduled Meds:  albuterol sulfate HFA 2 puff Inhalation 4x Daily - RT   amiodarone 200 mg Oral Q12H   Cholecalciferol 4,000 Units Oral Daily   dexamethasone 6 mg Intravenous Daily   divalproex 125 mg Oral Q12H   donepezil 10 mg Oral Nightly   enoxaparin 1 mg/kg Subcutaneous Q12H   famotidine 20 mg Intravenous Q12H   FLUoxetine 30 mg Oral Daily   INV [id number] remdesivir in NS IVPB 100 mg Intravenous Q24H   levothyroxine 25 mcg Oral Q AM   melatonin 10 mg Oral Nightly   meropenem 500 mg Intravenous Q6H   pharmacy 1 each Does not apply Once   potassium chloride 40 mEq Oral Once   sodium chloride 10 mL Intravenous Q12H   vitamin C 2,000 mg Oral Daily   zinc sulfate 220 mg Oral Daily     Continuous Infusions:  Pharmacy Consult - Remdesivir     Pharmacy to Dose enoxaparin (LOVENOX)     Pharmacy to Dose meropenem (MERREM)     sodium chloride 75 mL/hr Last Rate: 75 mL/hr (20 0621)       Allergies:  Allergies   Allergen Reactions   • Ciprofloxacin Unknown (See Comments)       ROS  No chest pain    Objective:     Temp:  [96.8 °F (36 °C)-97.6 °F (36.4 °C)] 97.4 °F (36.3 °C)  Heart Rate:  [58-70] 58  Resp:  [14-20] 16  BP: (117-135)/(37-57) 120/54     Intake/Output Summary (Last 24 hours) at 2020 0931  Last data filed at 2020  0810  Gross per 24 hour   Intake 240 ml   Output --   Net 240 ml     Body mass index is 27.35 kg/m².      08/12/20  0500 08/13/20  0500 08/14/20  0421   Weight: 74.2 kg (163 lb 9.3 oz) 81.9 kg (180 lb 8.9 oz) 81.6 kg (179 lb 14.3 oz)         Physical Exam:  Physical Exam  Well-developed well-nourished  No distress  Regular rate and rhythm  Clear anteriorly  No clubbing cyanosis or edema  Neurologically grossly intact bilaterally  Normal mood and affect  No bony abnormalities grossly  Lab Review:   Results from last 7 days   Lab Units 08/14/20  0450 08/13/20  0301 08/12/20  0419   SODIUM mmol/L 144 151* 157*   POTASSIUM mmol/L 3.6 3.4* 3.3*   CHLORIDE mmol/L 111* 120* 126*   CO2 mmol/L 24.0 22.0 22.0   BUN   --  26* 33*   CREATININE mg/dL 0.41* 0.56* 0.63   GLUCOSE mg/dL 97 128* 116*   CALCIUM mg/dL 8.0* 7.9* 8.1*   AST (SGOT) U/L 23 11 12   ALT (SGPT) U/L 11 8 8     Results from last 7 days   Lab Units 08/10/20  0945   TROPONIN T ng/mL <0.010     Results from last 7 days   Lab Units 08/14/20  0450 08/13/20  0301   WBC 10*3/mm3 8.90 9.80   HEMOGLOBIN g/dL 10.8* 10.7*   HEMATOCRIT % 33.1* 32.6*   PLATELETS 10*3/mm3 165 170         Results from last 7 days   Lab Units 08/14/20  0450 08/13/20  0301   MAGNESIUM mg/dL 2.1 2.2           Invalid input(s): LDLCALC      Results from last 7 days   Lab Units 08/11/20  0416   TSH uIU/mL 1.220       Recent Radiology:  Imaging Results (Most Recent)     Procedure Component Value Units Date/Time    XR Chest 1 View [[id number]] Collected:  08/10/20 1659     Updated:  08/10/20 1702    Narrative:       DATE OF EXAM:  8/10/2020 4:54 PM     PROCEDURE:  XR CHEST 1 VW-     INDICATIONS:  dyspnea, covid-19; U07.1-COVID-19; J12.89-Other viral pneumonia;  A41.9-Sepsis, unspecified organism; R65.20-Severe sepsis without septic  shock; N39.0-Urinary tract infection, site not specified;  E86.0-Dehydration; F03.91-Unspecified dementia with behavioral  disturbance       COMPARISON:  AP portable  chest 08/10/2020 earlier today..     TECHNIQUE:   Single radiographic view of the chest was obtained.     FINDINGS:  Small left basilar pleural effusion is present. Probable mild left  basilar atelectasis partially obscured the diaphragmatic margin. The  right lung is clear. The heart size is normal. No pneumothorax is seen.       Impression:       Small left pleural effusion with probable mild left basilar atelectasis  is new compared to earlier today..     Electronically Signed By-Dr. Leanne Lny MD On:8/10/2020 5:00 PM  This report was finalized on [id number] by Dr. Leanne Lyn MD.    XR Chest 1 View [[id number]] Collected:  08/10/20 1042     Updated:  08/10/20 1044    Narrative:       DATE OF EXAM:  8/10/2020 10:14 AM     PROCEDURE:  XR CHEST 1 VW-     INDICATIONS:  fever cough covid exposure       COMPARISON:  AP portable chest 10/23/2019, CT chest 10/23/2019.     TECHNIQUE:   Single radiographic view of the chest was obtained.     FINDINGS:  No acute airspace disease. Mild scarring in the lung bases, unchanged.  Normal heart size. No pleural effusion, pneumothorax or acute osseous  abnormality.       Impression:       No acute chest finding.     Electronically Signed By-Dr. Leanne Lyn MD On:8/10/2020 10:42 AM  This report was finalized on [id number] by Dr. Leanne Lyn MD.          ECHOCARDIOGRAM:           I reviewed the patient's new clinical results.    EKG:      Assessment:       COVID-19 virus detected    Chronic anxiety    Dementia with behavioral disturbance (CMS/HCC)    Hypertension    Urinary tract infection after immobility    Pulmonary embolus (CMS/HCC)    Pneumonia due to 2019 novel coronavirus    Severe sepsis (CMS/HCC)    Severe dehydration    Encephalopathy due to COVID-19 virus    Acute respiratory failure due to COVID-19 (CMS/HCC)    1) Respiratory failure  - POSITIVE COVID 19  - PNA on CXR  - elevated d-dimer  - on nasal cannula  - primary team managing     2) Atrial  Fibrillation with RVR  - troponin negative  - EKG shows no acute ST abnormalities  - cardizem gtt stopped secondary to hypotension  - received 500 mcg IV digoxin, started on amio gtt  - in SR 8/11  - 2D echo pending  - TSH WNL  - K replaced  - check Mg  - Hx nuclear stress test 2017 that was low risk study with EF = 68%.     3) Altered mental status  - decreased LOC on admit  - improved     4) NENA / dehydration  - Cr 1.41 --> 0.69  - on IVFs  - Na 161 --> 157--> 144     5) Hypernatremia  - Na 161 --> 157-->144     6) UTI  - per primary team     7) Hx dementia      8) Hx PE  - anticoagulated with eliquis at home; on hold  - on therapeutic dose lovenox     9) Hx hypothyroidism     10) HTN    Plan:   Patient with some bradycardia into 30-40 overnight when asleep, HR 80 currently  Will decrease amiodarone to 200mg daily, not on BB (stopped 8/13) with bradycardia at night, avoid QT prolongation with bradycardia and amiodarone  Continue anticoagulation  Back to sinus rhythm  Can follow-up as outpatient  Will need 2D echo as outpatient once coronavirus has resolved, to limit contact exposures this is been deferred as inpatient with hemodynamic and electrical stability    Follow peripherally over the weekend    Balbir Munson MD, PhD        08/14/20  09:31

## 2020-08-15 VITALS
HEART RATE: 66 BPM | SYSTOLIC BLOOD PRESSURE: 151 MMHG | WEIGHT: 176.59 LBS | OXYGEN SATURATION: 96 % | HEIGHT: 68 IN | DIASTOLIC BLOOD PRESSURE: 57 MMHG | RESPIRATION RATE: 16 BRPM | TEMPERATURE: 97.3 F | BODY MASS INDEX: 26.76 KG/M2

## 2020-08-15 PROBLEM — B99.9 ENCEPHALOPATHY DUE TO INFECTION: Status: ACTIVE | Noted: 2020-08-10

## 2020-08-15 PROBLEM — E86.0 SEVERE DEHYDRATION: Status: RESOLVED | Noted: 2020-08-10 | Resolved: 2020-08-15

## 2020-08-15 PROBLEM — R65.20 SEVERE SEPSIS: Status: RESOLVED | Noted: 2020-08-10 | Resolved: 2020-08-15

## 2020-08-15 PROBLEM — B99.9 ENCEPHALOPATHY DUE TO INFECTION: Status: RESOLVED | Noted: 2020-08-10 | Resolved: 2020-08-15

## 2020-08-15 PROBLEM — J96.00 ACUTE RESPIRATORY FAILURE DUE TO COVID-19 (HCC): Status: RESOLVED | Noted: 2020-08-10 | Resolved: 2020-08-15

## 2020-08-15 PROBLEM — U07.1 ACUTE RESPIRATORY FAILURE DUE TO COVID-19: Status: RESOLVED | Noted: 2020-08-10 | Resolved: 2020-08-15

## 2020-08-15 PROBLEM — G93.49 ENCEPHALOPATHY DUE TO INFECTION: Status: RESOLVED | Noted: 2020-08-10 | Resolved: 2020-08-15

## 2020-08-15 PROBLEM — A41.9 SEVERE SEPSIS: Status: RESOLVED | Noted: 2020-08-10 | Resolved: 2020-08-15

## 2020-08-15 LAB
ALBUMIN SERPL-MCNC: 2.3 G/DL (ref 3.5–5.2)
ALBUMIN/GLOB SERPL: 1 G/DL
ALP SERPL-CCNC: 40 U/L (ref 39–117)
ALT SERPL W P-5'-P-CCNC: 16 U/L (ref 1–33)
ANION GAP SERPL CALCULATED.3IONS-SCNC: 9 MMOL/L (ref 5–15)
AST SERPL-CCNC: 22 U/L (ref 1–32)
BACTERIA SPEC AEROBE CULT: NORMAL
BACTERIA SPEC AEROBE CULT: NORMAL
BASOPHILS # BLD AUTO: 0 10*3/MM3 (ref 0–0.2)
BASOPHILS NFR BLD AUTO: 0.4 % (ref 0–1.5)
BILIRUB SERPL-MCNC: 0.3 MG/DL (ref 0–1.2)
BUN SERPL-MCNC: 12 MG/DL (ref 8–23)
BUN SERPL-MCNC: ABNORMAL MG/DL
BUN/CREAT SERPL: ABNORMAL
CALCIUM SPEC-SCNC: 7.7 MG/DL (ref 8.6–10.5)
CHLORIDE SERPL-SCNC: 109 MMOL/L (ref 98–107)
CO2 SERPL-SCNC: 23 MMOL/L (ref 22–29)
CREAT SERPL-MCNC: 0.36 MG/DL (ref 0.57–1)
CRP SERPL-MCNC: 1.14 MG/DL (ref 0–0.5)
D DIMER PPP FEU-MCNC: 0.35 MG/L (FEU) (ref 0–0.59)
DEPRECATED RDW RBC AUTO: 46.4 FL (ref 37–54)
EOSINOPHIL # BLD AUTO: 0.1 10*3/MM3 (ref 0–0.4)
EOSINOPHIL NFR BLD AUTO: 1.3 % (ref 0.3–6.2)
ERYTHROCYTE [DISTWIDTH] IN BLOOD BY AUTOMATED COUNT: 14.8 % (ref 12.3–15.4)
FERRITIN SERPL-MCNC: 1664 NG/ML (ref 13–150)
GFR SERPL CREATININE-BSD FRML MDRD: >150 ML/MIN/1.73
GLOBULIN UR ELPH-MCNC: 2.2 GM/DL
GLUCOSE SERPL-MCNC: 81 MG/DL (ref 65–99)
HCT VFR BLD AUTO: 30 % (ref 34–46.6)
HGB BLD-MCNC: 10 G/DL (ref 12–15.9)
LYMPHOCYTES # BLD AUTO: 1.8 10*3/MM3 (ref 0.7–3.1)
LYMPHOCYTES NFR BLD AUTO: 22 % (ref 19.6–45.3)
MAGNESIUM SERPL-MCNC: 1.9 MG/DL (ref 1.6–2.4)
MCH RBC QN AUTO: 30.4 PG (ref 26.6–33)
MCHC RBC AUTO-ENTMCNC: 33.5 G/DL (ref 31.5–35.7)
MCV RBC AUTO: 90.9 FL (ref 79–97)
MONOCYTES # BLD AUTO: 0.6 10*3/MM3 (ref 0.1–0.9)
MONOCYTES NFR BLD AUTO: 7.4 % (ref 5–12)
NEUTROPHILS NFR BLD AUTO: 5.8 10*3/MM3 (ref 1.7–7)
NEUTROPHILS NFR BLD AUTO: 68.9 % (ref 42.7–76)
NRBC BLD AUTO-RTO: 0.1 /100 WBC (ref 0–0.2)
PLATELET # BLD AUTO: 136 10*3/MM3 (ref 140–450)
PMV BLD AUTO: 11.5 FL (ref 6–12)
POTASSIUM SERPL-SCNC: 3.1 MMOL/L (ref 3.5–5.2)
PROCALCITONIN SERPL-MCNC: 0.06 NG/ML (ref 0–0.25)
PROT SERPL-MCNC: 4.5 G/DL (ref 6–8.5)
RBC # BLD AUTO: 3.3 10*6/MM3 (ref 3.77–5.28)
SODIUM SERPL-SCNC: 141 MMOL/L (ref 136–145)
WBC # BLD AUTO: 8.4 10*3/MM3 (ref 3.4–10.8)

## 2020-08-15 PROCEDURE — 86140 C-REACTIVE PROTEIN: CPT | Performed by: INTERNAL MEDICINE

## 2020-08-15 PROCEDURE — 84145 PROCALCITONIN (PCT): CPT | Performed by: INTERNAL MEDICINE

## 2020-08-15 PROCEDURE — 99239 HOSP IP/OBS DSCHRG MGMT >30: CPT | Performed by: INTERNAL MEDICINE

## 2020-08-15 PROCEDURE — 85025 COMPLETE CBC W/AUTO DIFF WBC: CPT | Performed by: INTERNAL MEDICINE

## 2020-08-15 PROCEDURE — 83735 ASSAY OF MAGNESIUM: CPT | Performed by: NURSE PRACTITIONER

## 2020-08-15 PROCEDURE — 85379 FIBRIN DEGRADATION QUANT: CPT | Performed by: INTERNAL MEDICINE

## 2020-08-15 PROCEDURE — 25010000002 DEXAMETHASONE PER 1 MG: Performed by: NURSE PRACTITIONER

## 2020-08-15 PROCEDURE — 25010000002 ENOXAPARIN PER 10 MG: Performed by: NURSE PRACTITIONER

## 2020-08-15 PROCEDURE — 25010000002 MEROPENEM PER 100 MG: Performed by: INTERNAL MEDICINE

## 2020-08-15 PROCEDURE — 80053 COMPREHEN METABOLIC PANEL: CPT | Performed by: INTERNAL MEDICINE

## 2020-08-15 PROCEDURE — 82728 ASSAY OF FERRITIN: CPT | Performed by: INTERNAL MEDICINE

## 2020-08-15 PROCEDURE — 94799 UNLISTED PULMONARY SVC/PX: CPT

## 2020-08-15 RX ADMIN — AMIODARONE HYDROCHLORIDE 200 MG: 200 TABLET ORAL at 09:39

## 2020-08-15 RX ADMIN — MEROPENEM 500 MG: 500 INJECTION, POWDER, FOR SOLUTION INTRAVENOUS at 04:27

## 2020-08-15 RX ADMIN — MEROPENEM 500 MG: 500 INJECTION, POWDER, FOR SOLUTION INTRAVENOUS at 09:40

## 2020-08-15 RX ADMIN — FLUOXETINE 30 MG: 20 CAPSULE ORAL at 09:40

## 2020-08-15 RX ADMIN — ENOXAPARIN SODIUM 80 MG: 80 INJECTION SUBCUTANEOUS at 09:45

## 2020-08-15 RX ADMIN — OXYCODONE HYDROCHLORIDE AND ACETAMINOPHEN 2000 MG: 500 TABLET ORAL at 09:40

## 2020-08-15 RX ADMIN — Medication 4000 UNITS: at 09:42

## 2020-08-15 RX ADMIN — MEROPENEM 500 MG: 500 INJECTION, POWDER, FOR SOLUTION INTRAVENOUS at 16:03

## 2020-08-15 RX ADMIN — ALBUTEROL SULFATE 2 PUFF: 90 AEROSOL, METERED RESPIRATORY (INHALATION) at 08:04

## 2020-08-15 RX ADMIN — ALBUTEROL SULFATE 2 PUFF: 90 AEROSOL, METERED RESPIRATORY (INHALATION) at 15:20

## 2020-08-15 RX ADMIN — SODIUM CHLORIDE 75 ML/HR: 900 INJECTION, SOLUTION INTRAVENOUS at 04:28

## 2020-08-15 RX ADMIN — ALBUTEROL SULFATE 2 PUFF: 90 AEROSOL, METERED RESPIRATORY (INHALATION) at 12:00

## 2020-08-15 RX ADMIN — FAMOTIDINE 20 MG: 10 INJECTION INTRAVENOUS at 09:40

## 2020-08-15 RX ADMIN — DIVALPROEX SODIUM 125 MG: 125 TABLET, DELAYED RELEASE ORAL at 09:42

## 2020-08-15 RX ADMIN — ZINC SULFATE 220 MG (50 MG) CAPSULE 220 MG: CAPSULE at 09:45

## 2020-08-15 RX ADMIN — LEVOTHYROXINE SODIUM 25 MCG: 25 TABLET ORAL at 09:40

## 2020-08-15 RX ADMIN — DEXAMETHASONE SODIUM PHOSPHATE 6 MG: 4 INJECTION, SOLUTION INTRAMUSCULAR; INTRAVENOUS at 09:39

## 2020-08-15 RX ADMIN — Medication 10 ML: at 09:41

## 2020-08-15 NOTE — DISCHARGE SUMMARY
[facility]  DISCHARGE SUMMARY        Prepared For PCP:  Smita Howard APRN    Patient Name: Day Cabrera  : 1945  MRN: [id number]      Date of Admission:   8/10/2020    Date of Discharge:  8/15/2020    Length of stay:  LOS: 5 days     Hospital Course     Presenting Problem:   Urinary tract infection after immobility [N39.0]  Severe dehydration [E86.0]  Severe sepsis (CMS/HCC) [A41.9, R65.20]  Dementia with behavioral disturbance, unspecified dementia type (CMS/HCC) [F03.91]  Pneumonia due to 2019 novel coronavirus [U07.1, J12.89]      Active Hospital Problems    Diagnosis  POA   • **COVID-19 virus detected [U07.1]  Yes   • Pneumonia due to 2019 novel coronavirus [U07.1, J12.89]  Yes   • Urinary tract infection after immobility [N39.0]  Yes   • Dementia with behavioral disturbance (CMS/HCC) [F03.91]  Yes   • Chronic anxiety [F41.9]  Yes   • Hypertension [I10]  Yes      Resolved Hospital Problems    Diagnosis Date Resolved POA   • Severe sepsis (CMS/HCC) [A41.9, R65.20] 08/15/2020 Yes   • Severe dehydration [E86.0] 08/15/2020 Yes   • Encephalopathy due to infection [G93.49, B99.9] 08/15/2020 Yes   • Acute respiratory failure due to COVID-19 (CMS/HCC) [U07.1, J96.00] 08/15/2020 Yes           Hospital Course:  Day Cabrera is a 74 y.o. female from [facility] who presented to [facility] ER 8/10/20 with decreased responsiveness, fever, cough, and congestion. [facility] has a known outbreak of Covid-19. The patient had a fever of 101.3, HR 130s. CXR did not show acute findings per radiology report; however, ER physician felt the patient's CXR was suggestive of pneumonia from Covid-19. Covid-19 swab was positive. Her WBC was 13.2, creatinine 1.41, AST 34. UA showed large leukocytes, too numerous wbcs, and 3+ bacteria. She was given cefepime, vanc, 30cc/kg IVFs, 8mg decadron, albuterol inhaler, and tylenol suppository. Daughter Patti Cabrera was notified of  the patient's condition and she is a full code.      Upon exam the patient would open her eyes and mumble but could not provide any information. She was requiring 4L O2 via nasal cannula.     The patient's acute and chronic illnesses were managed as follows:    COVID-19 infection causing pneumonia  -- supplemental O2 to keep sats >93%  -- allow permissive hypoxia to sats >86%  -- pronate patient as tolerated for better oxygenation  -- continue supplements     -- Vitamin C 500mg PO q6h     -- Vitamin D3 4000u/day     -- Zinc 100mg Daily     -- Melatonin 10mg nightly     -- Famotidine 40mg/day     -- Steroids: Decadron 8mg x1, then 6mg daily thereafter     -- Remdesivir: started 8/10/2020 for up to 10days  -- admission labs: PCT, CRP, IL-6, BNP, Trop, Ferritin, D-dimer, Mg, Neutrophil/Lymphocyte ration  -- monitor daily labs     -- CMP, CBC, CRP, Ferritin, PCT, D-dimer  --CRP is now down to 2.08, ferritin continues to decrease slowly as well  -- Pt will get her fifth dose of remdesivir tonight and discharge back to her facility tomorrow     A.fib with RVR  -- Cardizem gtt started, now discontinued  -- Cardiology following: Assistance appreciated     --Cardizem drip stopped secondary to hypotension     -- s/p 500 mcg IV digoxin, started amiodarone drip     -- 2D echo unable to be done in [facility]     --Amiodarone drip decreased due to bradycardia     --Beta-blocker has been discontinued as of 8/13/2020     --Continue anticoagulation     Acute hypoxic respiratory failure due to Covid-19  -requiring 4L O2 initially; currently on room air  -albuterol inhaler 4x/daily     Pneumonia due to Covid-19  -CXR reviewed. Appears there is some left basilar atelectasis, Will treat as pneumonia due to acute respiratory failure requiring 4L O2, fever, and positive Covid-19 swab     Urinary tract infection with ESBL producing E. coli  -UA: large leukocytes, too numerous wbcs, and 3+ bacteria.  --Discontinued IV cefepime and  vanc  --Continue meropenem for 7 days total     Sepsis without hypotension  -fever, tachycardia, wbc 13.2, not hypotensive  -procalcitonin 0.06 today  -30cc/kg IVFs given in ER  -IV cefepime, vancomcyin      Acute encephalopathy due to Covid-19/acute UTI  -pt is now much more alert and awake but confused as per her baseline dementia  -hold sedating meds flexeril, risperdal, ultram, remeron     NENA, Dehydration   -pt with creatinine 1.41 and Na 161 s/p 30cc/kg IVFs   -cont IVFs  -hold lasix, avoid fluid overload with COVID 19 infection  --Current creatinine 0.41     H/o multiple PE's on eliquis     Hypertension  -bp normal but borderline normal. Hold norvasc for now     Depression  -cont home depakote, prozac     Hypothyroidism  -cont home synthroid     Dementia  -cont home aricept  -hold risperdal, ultram, remeron     Patient was discharged in good condition back to [place] where she is from.  She will continue to take the supplements for COVID treatment over the course of the next 2 weeks as outlined in the med rec below.      Recommendation for Outpatient Providers:     Please continue COVID supplements for the next 2 weeks as prescribed to support the patient's immune system to fight off the last of this infection.        Reasons For Change In Medications and Indications for New Medications:        Day of Discharge     HPI: No acute events reported per staff on day of discharge.  Patient remains nonverbal.      Vital Signs:   Temp:  [97.5 °F (36.4 °C)-97.9 °F (36.6 °C)] 97.9 °F (36.6 °C)  Heart Rate:  [59-79] 59  Resp:  [15-17] 15  BP: (129-149)/(40-53) 149/53     Physical Exam:  General: well-developed and well-nourished, ill-appearing NAD  HEENT: NC/AT; EOMI; PERRLA  Heart: tachycardic rhythm. No murmur   Chest: Decreased breath sounds bilaterally. Normal respiratory effort.  Abdominal: Soft. NT/ND. Bowel sounds present  Musculoskeletal: Normal ROM.  No edema. No calf tenderness.  Neurological: Awake,  alert, confused, nonverbal  Skin: Skin is warm and dry. No rash      Pertinent  and/or Most Recent Results     Results from last 7 days   Lab Units 08/15/20  0843 08/14/20  0450 08/13/20  0301 08/12/20  0419 08/11/20  1316 08/11/20  0416 08/10/20  1409 08/10/20  0945   WBC 10*3/mm3 8.40 8.90 9.80 9.50 13.00*  --   --  13.20*   HEMOGLOBIN g/dL 10.0* 10.8* 10.7* 10.8* 11.9*  --   --  15.0   HEMATOCRIT % 30.0* 33.1* 32.6* 31.8* 37.2  --   --  46.1   PLATELETS 10*3/mm3 136* 165 170 154 188  --   --  280   SODIUM mmol/L  --  144 151* 157*  --  157*  --  161*   POTASSIUM mmol/L  --  3.6 3.4* 3.3*  --  3.5  --  4.7   CHLORIDE mmol/L  --  111* 120* 126*  --  122*  --  121*   CO2 mmol/L  --  24.0 22.0 22.0  --  23.0  --  22.0   BUN   --  14 26* 33*  --  42*  --  56*   CREATININE mg/dL  --  0.41* 0.56* 0.63  --  0.69 1.07* 1.41*   GLUCOSE mg/dL  --  97 128* 116*  --  123*  --  130*   CALCIUM mg/dL  --  8.0* 7.9* 8.1*  --  8.7  --  9.8     Results from last 7 days   Lab Units 08/14/20  0450 08/13/20  0301 08/12/20  0419 08/11/20  0416 08/10/20  0945   BILIRUBIN mg/dL 0.3 0.3 0.3 0.4 0.8   ALK PHOS U/L 45 40 44 55 71   ALT (SGPT) U/L 11 8 8 12 18   AST (SGOT) U/L 23 11 12 13 34*         COVID-19 LAB PANEL     COVID-19 test result  COVID19   Date Value Ref Range Status   08/10/2020 Detected (C) Not Detected - Ref. Range Final       COVID-19 Prognostic lab results  WBC with differential  Results from last 7 days   Lab Units 08/15/20  0843 08/14/20  0450 08/13/20  0301 08/12/20  0419 08/11/20  1316 08/10/20  0945   WBC 10*3/mm3 8.40 8.90 9.80 9.50 13.00* 13.20*   PLATELETS 10*3/mm3 136* 165 170 154 188 280   NEUTROPHIL % % 68.9 76.3*  --   --  86.7* 70.6   LYMPHOCYTE % % 22.0 15.3*  --   --  6.7* 16.1*   NEUTROS ABS 10*3/mm3 5.80 6.80 8.13* 8.36* 11.30* 9.30*   LYMPHS ABS 10*3/mm3 1.80 1.40  --   --  0.90 2.10     Inflammatory markers  Results from last 7 days   Lab Units 08/15/20  0844 08/14/20  0450 08/13/20  0301 08/12/20  0419  08/11/20  0416 08/10/20  1731 08/10/20  1409 08/10/20  0945   CRP mg/dL  --  2.08* 3.90* 7.06* 14.67*  --  16.47*  --    FERRITIN ng/mL 1,664.00* 1,150.00* 1,181.00* 1,316.00* 1,402.00*  --  1,265.00*  --    D DIMER QUANT mg/L (FEU) 0.35 0.40 0.37 0.50 0.77* 0.66*  --   --    PROCALCITONIN ng/mL  --  0.06 0.13 0.20 0.25  --  0.26*  --    LDH U/L  --   --   --   --   --   --  172  --    ALK PHOS U/L 40 45 40 44 55  --   --  71   AST (SGOT) U/L 22 23 11 12 13  --   --  34*   ALT (SGPT) U/L 16 11 8 8 12  --   --  18   TROPONIN T ng/mL  --   --   --   --   --   --   --  <0.010             Invalid input(s): TG, LDLCALC, LDLREALC  Results from last 7 days   Lab Units 08/14/20  0450 08/13/20  0301 08/12/20  0419 08/11/20  0416  08/10/20  0945   TSH uIU/mL  --   --   --  1.220  --   --    TROPONIN T ng/mL  --   --   --   --   --  <0.010   PROCALCITONIN ng/mL 0.06 0.13 0.20 0.25   < >  --     < > = values in this interval not displayed.       Brief Urine Lab Results  (Last result in the past 365 days)      Color   Clarity   Blood   Leuk Est   Nitrite   Protein   CREAT   Urine HCG        08/10/20 0949 Dark Yellow  Comment:  Possible interference with the dipstick results due to the color of the urine. Turbid Moderate (2+) Large (3+) Negative 100 mg/dL (2+)               Microbiology Results Abnormal     Procedure Component Value - Date/Time    Blood Culture - Blood, Arm, Right [[id number]] Collected:  08/10/20 0946    Lab Status:  Preliminary result Specimen:  Blood from Arm, Right Updated:  08/14/20 1015     Blood Culture No growth at 4 days    Blood Culture - Blood, Arm, Right [[id number]] Collected:  08/10/20 0945    Lab Status:  Preliminary result Specimen:  Blood from Arm, Right Updated:  08/14/20 1015     Blood Culture No growth at 4 days    Urine Culture - Urine, Urine, Catheter In/Out [[id number]]  (Abnormal)  (Susceptibility) Collected:  08/10/20 0949    Lab Status:  Final result Specimen:  Urine, Catheter In/Out  Updated:  08/12/20 0331     Urine Culture >100,000 CFU/mL Escherichia coli ESBL     Comment:   Consider infectious disease consult.  Susceptibility results may not correlate to clinical outcomes.       Susceptibility      Escherichia coli ESBL     JULIA     Ertapenem Susceptible     Gentamicin Susceptible     Levofloxacin Resistant     Meropenem Susceptible     Nitrofurantoin Susceptible     Piperacillin + Tazobactam Susceptible     Tetracycline Resistant     Trimethoprim + Sulfamethoxazole Susceptible                    MRSA Screen, PCR (Inpatient) - Swab, Nares [[id number]]  (Normal) Collected:  08/11/20 1732    Lab Status:  Final result Specimen:  Swab from Nares Updated:  08/11/20 1957     MRSA PCR No MRSA Detected    S. Pneumo Ag Urine or CSF - Urine, Urine, Catheter In/Out [[id number]]  (Normal) Collected:  08/10/20 2305    Lab Status:  Final result Specimen:  Urine, Catheter In/Out Updated:  08/11/20 0014     Strep Pneumo Ag Negative    COVID PRE-OP / PRE-PROCEDURE SCREENING ORDER (NO ISOLATION) - Swab, Nasopharynx [[id number]] Collected:  08/10/20 0951    Lab Status:  Final result Specimen:  Swab from Nasopharynx Updated:  08/10/20 1049    Narrative:       The following orders were created for panel order COVID PRE-OP / PRE-PROCEDURE SCREENING ORDER (NO ISOLATION) - Swab, Nasopharynx.  Procedure                               Abnormality         Status                     ---------                               -----------         ------                     Respiratory Panel PCR w/...[[id number]]  Abnormal            Final result                 Please view results for these tests on the individual orders.    Respiratory Panel PCR w/COVID-19(SARS-CoV-2) XANDER/BIN/BRIAN In-House, NP Swab in [facility], 3-4 Hr TAT - Swab, Nasopharynx [[id number]]  (Abnormal) Collected:  08/10/20 0951    Lab Status:  Final result Specimen:  Swab from Nasopharynx Updated:  08/10/20 1049     ADENOVIRUS, PCR Not Detected     Coronavirus 229E Not  Detected     Coronavirus HKU1 Not Detected     Coronavirus NL63 Not Detected     Coronavirus OC43 Not Detected     COVID19 Detected     Human Metapneumovirus Not Detected     Human Rhinovirus/Enterovirus Not Detected     Influenza A PCR Not Detected     Influenza A H1 Not Detected     Influenza A H1 2009 PCR Not Detected     Influenza A H3 Not Detected     Influenza B PCR Not Detected     Parainfluenza Virus 1 Not Detected     Parainfluenza Virus 2 Not Detected     Parainfluenza Virus 3 Not Detected     Parainfluenza Virus 4 Not Detected     RSV, PCR Not Detected     Bordetella pertussis pcr Not Detected     Bordetella parapertussis PCR Not Detected     Chlamydophila pneumoniae PCR Not Detected     Mycoplasma pneumo by PCR Not Detected    Narrative:       Fact sheet for providers: https://docs.[employer]/wp-content/uploads/GBE5950-2458-QY3.1-EUA-Provider-Fact-Sheet-3.pdf    Fact sheet for patients: https://docs.[employer]/wp-content/uploads/DAZ3337-6250-HR5.1-EUA-Patient-Fact-Sheet-1.pdf          Xr Chest 1 View    Result Date: 8/10/2020  Impression: Small left pleural effusion with probable mild left basilar atelectasis is new compared to earlier today..  Electronically Signed By-Dr. Leanne Lyn MD On:8/10/2020 5:00 PM This report was finalized on [id number] by Dr. Leanne Lyn MD.    Xr Chest 1 View    Result Date: 8/10/2020  Impression: No acute chest finding.  Electronically Signed By-Dr. Leanne Lyn MD On:8/10/2020 10:42 AM This report was finalized on [id number] by Dr. Leanne Lyn MD.                             Test Results Pending at Discharge   Order Current Status    C-reactive Protein In process    Comprehensive Metabolic Panel In process    Ferritin In process    Magnesium In process    Procalcitonin In process    Blood Culture - Blood, Arm, Right Preliminary result    Blood Culture - Blood, Arm, Right Preliminary result            Procedures Performed           Consults:    Consults     Date and Time Order Name Status Description    8/10/2020 2002 Inpatient Cardiology Consult Completed     8/10/2020 1310 Hospitalist (on-call MD unless specified) Completed             Discharge Details        Discharge Medications      New Medications      Instructions Start Date   amiodarone 200 MG tablet  Commonly known as:  PACERONE   200 mg, Oral, Every 24 Hours Scheduled      dexamethasone 1 MG tablet  Commonly known as:  DECADRON   6 mg, Oral, Daily With Breakfast, Start with 6mg daily for 2 days then taper by 1mg every 2 days until all pills are gone      ertapenem 500mg/50 ml solution IV  Commonly known as:  INVanz   500 mg, Intravenous, Every 24 Hours      famotidine 20 MG tablet  Commonly known as:  PEPCID   20 mg, Oral, 2 Times Daily      melatonin 5 MG tablet tablet   10 mg, Oral, Nightly         Changes to Medications      Instructions Start Date   vitamin C 500 MG tablet  Commonly known as:  ASCORBIC ACID  What changed:    how much to take  when to take this   500 mg, Oral, 2 times daily      zinc gluconate 50 MG tablet  What changed:    medication strength  how much to take   75 mg, Oral, Daily         Continue These Medications      Instructions Start Date   acetaminophen 325 MG tablet  Commonly known as:  TYLENOL   650 mg, Oral, Every 6 Hours PRN      apixaban 5 MG tablet tablet  Commonly known as:  ELIQUIS   5 mg, Oral, Every 12 Hours Scheduled      Biofreeze 4 % gel  Generic drug:  Menthol (Topical Analgesic)   1 application, Apply externally, Every 6 Hours      cholecalciferol 10 MCG (400 UNIT) tablet  Commonly known as:  VITAMIN D3   2,000 Units, Oral, Daily      cyclobenzaprine 5 MG tablet  Commonly known as:  FLEXERIL   5 mg, Oral, Every 8 Hours PRN      divalproex 125 MG DR tablet  Commonly known as:  DEPAKOTE   125 mg, Oral, 2 Times Daily      donepezil 10 MG tablet  Commonly known as:  Aricept   10 mg, Oral, Nightly      FLUoxetine 20 MG capsule  Commonly known as:  PROzac    30 mg, Oral, Daily      furosemide 20 MG tablet  Commonly known as:  LASIX   20 mg, Oral, Daily PRN      levothyroxine 25 MCG tablet  Commonly known as:  SYNTHROID, LEVOTHROID   25 mcg, Oral, Daily      mirtazapine 7.5 MG half tablet  Commonly known as:  REMERON   15 mg, Oral, Nightly      nitroglycerin 0.4 MG SL tablet  Commonly known as:  NITROSTAT   0.4 mg, Sublingual, Every 5 Minutes PRN, Take no more than 3 doses in 15 minutes.      pantoprazole 40 MG EC tablet  Commonly known as:  PROTONIX   40 mg, Oral, Daily      potassium chloride 10 MEQ CR tablet  Commonly known as:  K-DUR,KLOR-CON   30 mEq, Oral, Daily      risperiDONE 0.5 MG tablet  Commonly known as:  risperDAL   0.5 mg, Oral, Nightly      traMADol 50 MG tablet  Commonly known as:  ULTRAM   50 mg, Oral, 2 times daily         Stop These Medications    amLODIPine 10 MG tablet  Commonly known as:  NORVASC            Allergies   Allergen Reactions   • Ciprofloxacin Unknown (See Comments)         Discharge Disposition:  Skilled Nursing Facility (DC - External)    Diet:  Hospital:  Diet Order   Procedures   • Diet Texture; Pureed Diet         Discharge Activity:   Activity Instructions     Activity as Tolerated      Activity as Tolerated              CODE STATUS:    Code Status and Medical Interventions:   Ordered at: 08/10/20 1852     Limited Support to NOT Include:    Intubation     Level Of Support Discussed With:    Next of Kin (If No Surrogate)     Code Status:    CPR     Medical Interventions (Level of Support Prior to Arrest):    Limited         Follow-up Appointments  No future appointments.    Additional Instructions for the Follow-ups that You Need to Schedule     Call MD With Problems / Concerns   As directed      Instructions: Call [phone] or email [email or web address] for problems or concerns.    Order Comments:  Instructions: Call [phone] or email [email or web address] for problems or concerns.          Call MD With Problems  / Concerns   As directed      Instructions: Call [phone] or email [email or web address] for problems or concerns.    Order Comments:  Instructions: Call [phone] or email [email or web address] for problems or concerns.          Discharge Follow-up with PCP   As directed       Currently Documented PCP:    Smita Howard APRN    PCP Phone Number:    [phone]     Follow Up Details:  10- 14 days         Discharge Follow-up with PCP   As directed       Currently Documented PCP:    Smita Howard APRN    PCP Phone Number:    [phone]     Follow Up Details:  10-14 days         Discharge Follow-up with Specialty: Cardiology - Dr. Jackson/Dr. Munson   As directed      Specialty:  Cardiology - Dr. Jackson/Dr. Munson    Follow Up Details:  as per their recommendation                 Condition on Discharge:      Stable      This patient has been examined wearing appropriate Personal Protective Equipment. 08/15/20      Electronically signed by aSlena Fitch MD, 08/15/20, 9:21 AM.      Time: I spent  45  minutes on this discharge activity which included face-to-face encounter with the patient/reviewing the data in the system/coordination of the care with the nursing staff as well as consultants/documentation/entering orders.

## 2020-08-15 NOTE — PLAN OF CARE
Problem: Patient Care Overview  Goal: Plan of Care Review  Note:   Pt without s/s of distress.  Pt refuses bath  and refused to turn.   Continues to receive IV ABX.  Pt will be sent to [facility] with PICC line  to continnue ABX.   Pt also to continue  with supplements etc  given for covid x 2 weeksPer MD.  Pt to transfer back to [facility] today.

## 2020-08-15 NOTE — DISCHARGE INSTRUCTIONS
As part of COVID Management, patient will continue the following supplements for the next 2 weeks at the doses listed:  Vitamin C 500mg twice a day  Zinc 75mg daily  Melatonin 10mg nightly  Vitamin D3 2000-4000u daily  Pepcid 20mg twice a day

## 2020-08-17 PROCEDURE — 93010 ELECTROCARDIOGRAM REPORT: CPT | Performed by: INTERNAL MEDICINE

## 2020-08-17 NOTE — PROGRESS NOTES
Case Management Discharge Note           Provided Post Acute Provider List?: N/A    Destination - Selection Complete      Service Provider Request Status Selected Services Address Phone Number Fax Number    [facility] [id number] [address] [phone]        Final Discharge Disposition Code: 04 - intermediate care facility

## 2020-09-08 ENCOUNTER — APPOINTMENT (OUTPATIENT)
Dept: GENERAL RADIOLOGY | Facility: HOSPITAL | Age: 75
End: 2020-09-08

## 2020-09-08 ENCOUNTER — HOSPITAL ENCOUNTER (EMERGENCY)
Facility: HOSPITAL | Age: 75
Discharge: NURSING, REHAB OR LONG-TERM CARE | End: 2020-09-08
Admitting: EMERGENCY MEDICINE

## 2020-09-08 ENCOUNTER — APPOINTMENT (OUTPATIENT)
Dept: CT IMAGING | Facility: HOSPITAL | Age: 75
End: 2020-09-08

## 2020-09-08 VITALS
SYSTOLIC BLOOD PRESSURE: 112 MMHG | HEIGHT: 65 IN | BODY MASS INDEX: 33.32 KG/M2 | RESPIRATION RATE: 16 BRPM | TEMPERATURE: 97.7 F | DIASTOLIC BLOOD PRESSURE: 52 MMHG | HEART RATE: 74 BPM | OXYGEN SATURATION: 100 % | WEIGHT: 200 LBS

## 2020-09-08 DIAGNOSIS — R53.1 WEAKNESS: ICD-10-CM

## 2020-09-08 DIAGNOSIS — N39.0 URINARY TRACT INFECTION WITHOUT HEMATURIA, SITE UNSPECIFIED: Primary | ICD-10-CM

## 2020-09-08 LAB
ANION GAP SERPL CALCULATED.3IONS-SCNC: 8 MMOL/L (ref 5–15)
BACTERIA UR QL AUTO: ABNORMAL /HPF
BASOPHILS # BLD AUTO: 0.1 10*3/MM3 (ref 0–0.2)
BASOPHILS NFR BLD AUTO: 2 % (ref 0–1.5)
BILIRUB UR QL STRIP: NEGATIVE
BUN SERPL-MCNC: 4 MG/DL (ref 8–23)
BUN SERPL-MCNC: ABNORMAL MG/DL
BUN/CREAT SERPL: ABNORMAL
CALCIUM SPEC-SCNC: 7.8 MG/DL (ref 8.6–10.5)
CHLORIDE SERPL-SCNC: 105 MMOL/L (ref 98–107)
CLARITY UR: ABNORMAL
CO2 SERPL-SCNC: 28 MMOL/L (ref 22–29)
COLOR UR: YELLOW
CREAT SERPL-MCNC: 0.57 MG/DL (ref 0.57–1)
DEPRECATED RDW RBC AUTO: 64.8 FL (ref 37–54)
EOSINOPHIL # BLD AUTO: 0.2 10*3/MM3 (ref 0–0.4)
EOSINOPHIL NFR BLD AUTO: 3.8 % (ref 0.3–6.2)
ERYTHROCYTE [DISTWIDTH] IN BLOOD BY AUTOMATED COUNT: 19.9 % (ref 12.3–15.4)
GFR SERPL CREATININE-BSD FRML MDRD: 104 ML/MIN/1.73
GLUCOSE SERPL-MCNC: 114 MG/DL (ref 65–99)
GLUCOSE UR STRIP-MCNC: NEGATIVE MG/DL
HCT VFR BLD AUTO: 30.9 % (ref 34–46.6)
HGB BLD-MCNC: 10.3 G/DL (ref 12–15.9)
HGB UR QL STRIP.AUTO: ABNORMAL
HOLD SPECIMEN: NORMAL
HYALINE CASTS UR QL AUTO: ABNORMAL /LPF
KETONES UR QL STRIP: NEGATIVE
LEUKOCYTE ESTERASE UR QL STRIP.AUTO: ABNORMAL
LYMPHOCYTES # BLD AUTO: 0.8 10*3/MM3 (ref 0.7–3.1)
LYMPHOCYTES NFR BLD AUTO: 18.6 % (ref 19.6–45.3)
MCH RBC QN AUTO: 32.1 PG (ref 26.6–33)
MCHC RBC AUTO-ENTMCNC: 33.5 G/DL (ref 31.5–35.7)
MCV RBC AUTO: 95.8 FL (ref 79–97)
MONOCYTES # BLD AUTO: 0.4 10*3/MM3 (ref 0.1–0.9)
MONOCYTES NFR BLD AUTO: 9.4 % (ref 5–12)
NEUTROPHILS NFR BLD AUTO: 2.7 10*3/MM3 (ref 1.7–7)
NEUTROPHILS NFR BLD AUTO: 66.2 % (ref 42.7–76)
NITRITE UR QL STRIP: POSITIVE
NRBC BLD AUTO-RTO: 0.1 /100 WBC (ref 0–0.2)
PH UR STRIP.AUTO: 6.5 [PH] (ref 5–8)
PLATELET # BLD AUTO: 188 10*3/MM3 (ref 140–450)
PMV BLD AUTO: 8.1 FL (ref 6–12)
POTASSIUM SERPL-SCNC: 3.6 MMOL/L (ref 3.5–5.2)
PROT UR QL STRIP: NEGATIVE
RBC # BLD AUTO: 3.22 10*6/MM3 (ref 3.77–5.28)
RBC # UR: ABNORMAL /HPF
REF LAB TEST METHOD: ABNORMAL
SODIUM SERPL-SCNC: 141 MMOL/L (ref 136–145)
SP GR UR STRIP: 1.01 (ref 1–1.03)
SQUAMOUS #/AREA URNS HPF: ABNORMAL /HPF
UROBILINOGEN UR QL STRIP: ABNORMAL
VALPROATE SERPL-MCNC: 32.5 MCG/ML (ref 50–125)
WBC # BLD AUTO: 4.1 10*3/MM3 (ref 3.4–10.8)
WBC UR QL AUTO: ABNORMAL /HPF
WHOLE BLOOD HOLD SPECIMEN: NORMAL

## 2020-09-08 PROCEDURE — P9612 CATHETERIZE FOR URINE SPEC: HCPCS

## 2020-09-08 PROCEDURE — 87186 SC STD MICRODIL/AGAR DIL: CPT | Performed by: NURSE PRACTITIONER

## 2020-09-08 PROCEDURE — 25010000002 CEFTRIAXONE PER 250 MG: Performed by: NURSE PRACTITIONER

## 2020-09-08 PROCEDURE — 81001 URINALYSIS AUTO W/SCOPE: CPT | Performed by: NURSE PRACTITIONER

## 2020-09-08 PROCEDURE — 71045 X-RAY EXAM CHEST 1 VIEW: CPT

## 2020-09-08 PROCEDURE — 87086 URINE CULTURE/COLONY COUNT: CPT | Performed by: NURSE PRACTITIONER

## 2020-09-08 PROCEDURE — 80048 BASIC METABOLIC PNL TOTAL CA: CPT | Performed by: NURSE PRACTITIONER

## 2020-09-08 PROCEDURE — 96365 THER/PROPH/DIAG IV INF INIT: CPT

## 2020-09-08 PROCEDURE — 99285 EMERGENCY DEPT VISIT HI MDM: CPT

## 2020-09-08 PROCEDURE — 80164 ASSAY DIPROPYLACETIC ACD TOT: CPT | Performed by: NURSE PRACTITIONER

## 2020-09-08 PROCEDURE — 70450 CT HEAD/BRAIN W/O DYE: CPT

## 2020-09-08 PROCEDURE — 87077 CULTURE AEROBIC IDENTIFY: CPT | Performed by: NURSE PRACTITIONER

## 2020-09-08 PROCEDURE — 85025 COMPLETE CBC W/AUTO DIFF WBC: CPT | Performed by: NURSE PRACTITIONER

## 2020-09-08 RX ORDER — NITROFURANTOIN 25; 75 MG/1; MG/1
100 CAPSULE ORAL 2 TIMES DAILY
Qty: 10 CAPSULE | Refills: 0 | Status: SHIPPED | OUTPATIENT
Start: 2020-09-08 | End: 2020-09-24 | Stop reason: ALTCHOICE

## 2020-09-08 RX ORDER — SODIUM CHLORIDE 0.9 % (FLUSH) 0.9 %
10 SYRINGE (ML) INJECTION AS NEEDED
Status: DISCONTINUED | OUTPATIENT
Start: 2020-09-08 | End: 2020-09-08 | Stop reason: HOSPADM

## 2020-09-08 RX ADMIN — CEFTRIAXONE SODIUM 1 G: 1 INJECTION, POWDER, FOR SOLUTION INTRAMUSCULAR; INTRAVENOUS at 13:13

## 2020-09-08 NOTE — ED NOTES
Patient unsure why she was sent in, she states she does not feel unwell.  Patient is very lethargic and unable to help move her and she is only AOX2.  Facility sent in patient due to altered mental status.     Belinda Johnson RN  09/08/20 1797

## 2020-09-08 NOTE — ED PROVIDER NOTES
Subjective   Chief complaint: AMS      Context: Patient is a 74-year-old female who comes in from [facility] with a gradual change in her mental status over the last 2 days.  Patient denies any complaints.  She apparently tested positive for COVID approximately month ago.  She denies any cough nausea vomiting diarrhea fever urinary complaints.  ECF did not note any unilateral focal deficits or trauma.    Duration: 2 days    Timing:    Severity: None    Associated symptoms: Denies          PCP:millie    Full code            Review of Systems   Unable to perform ROS: Mental status change       Past Medical History:   Diagnosis Date   • Anxiety    • Arthritis    • Chest pain 10/15/2019   • Dementia (CMS/HCC)    • Hypertension    • Pulmonary embolus (CMS/HCC)        Allergies   Allergen Reactions   • Ciprofloxacin Unknown (See Comments)       Past Surgical History:   Procedure Laterality Date   • CHOLECYSTECTOMY     • COLONOSCOPY     • HYSTERECTOMY      total        Family History   Problem Relation Age of Onset   • Heart disease Mother    • Alcohol abuse Father         murdered    • Pancreatic cancer Sister    • No Known Problems Half-Sister        Social History     Socioeconomic History   • Marital status:      Spouse name: Not on file   • Number of children: 3   • Years of education: Not on file   • Highest education level: Not on file   Tobacco Use   • Smoking status: Former Smoker     Years: 30.00     Types: Cigarettes     Last attempt to quit:      Years since quittin.7   • Smokeless tobacco: Never Used   Substance and Sexual Activity   • Alcohol use: No     Frequency: Never   • Drug use: No   • Sexual activity: Defer           Objective   Physical Exam     Vital signs and triage nurse note reviewed.   Constitutional: Awake, alert; nontoxic in appearance  HEENT: Normocephalic, atraumatic; pupils are PERRL with intact EOM; oropharynx is pink and moist without exudate or erythema.    Neck: Supple, full range of motion without pain;     Cardiovascular: Regular rate and rhythm, normal S1-S2.   Pulmonary: Respiratory effort regular nonlabored, breath sounds clear to auscultation all fields.   Abdomen: Soft, nontender nondistended with normoactive bowel sounds; no rebound or guarding.   Musculoskeletal: Independent range of motion of all extremities with no palpable tenderness or edema.   Neuro: Alert oriented-patient is oriented to year but states the month is September, otherwise alert and oriented.  There are no unilateral focal deficits or weakness  Skin:  Fleshtone warm, dry, intact; no erythematous or petechial rash or lesion       Procedures           ED Course      Labs Reviewed   BASIC METABOLIC PANEL - Abnormal; Notable for the following components:       Result Value    Glucose 114 (*)     Calcium 7.8 (*)     All other components within normal limits    Narrative:     GFR Normal >60  Chronic Kidney Disease <60  Kidney Failure <15     URINALYSIS W/ CULTURE IF INDICATED - Abnormal; Notable for the following components:    Appearance, UA Slightly Cloudy (*)     Blood, UA Trace (*)     Leuk Esterase, UA Large (3+) (*)     Nitrite, UA Positive (*)     All other components within normal limits   VALPROIC ACID LEVEL, TOTAL - Abnormal; Notable for the following components:    Valproic Acid 32.5 (*)     All other components within normal limits   CBC WITH AUTO DIFFERENTIAL - Abnormal; Notable for the following components:    RBC 3.22 (*)     Hemoglobin 10.3 (*)     Hematocrit 30.9 (*)     RDW 19.9 (*)     RDW-SD 64.8 (*)     Lymphocyte % 18.6 (*)     Basophil % 2.0 (*)     All other components within normal limits   URINALYSIS, MICROSCOPIC ONLY - Abnormal; Notable for the following components:    RBC, UA 6-12 (*)     WBC, UA Too Numerous to Count (*)     Bacteria, UA 2+ (*)     All other components within normal limits   BUN - Abnormal; Notable for the following components:    BUN 4 (*)     All  other components within normal limits   URINE CULTURE   CBC AND DIFFERENTIAL    Narrative:     The following orders were created for panel order CBC & Differential.  Procedure                               Abnormality         Status                     ---------                               -----------         ------                     CBC Auto Differential[[id number]]        Abnormal            Final result                 Please view results for these tests on the individual orders.   EXTRA TUBES    Narrative:     The following orders were created for panel order Extra Tubes.  Procedure                               Abnormality         Status                     ---------                               -----------         ------                     Light Blue Top[[id number]]                                   Final result               Gold Top - SST[[id number]]                                   Final result                 Please view results for these tests on the individual orders.   LIGHT BLUE TOP   GOLD TOP - SST     Medications   sodium chloride 0.9 % flush 10 mL (has no administration in time range)   cefTRIAXone (ROCEPHIN) 1 g in sodium chloride 0.9 % 100 mL IVPB (has no administration in time range)     Ct Head Without Contrast    Result Date: 9/8/2020   1. Generalized atrophy with extensive white matter chronic ischemic changes which have progressed since the study of 2018. 2. 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 3. Sphenoid sinus disease.  Electronically Signed By-Cyrus Kessler On:9/8/2020 12:48 PM This report was finalized on [id number] by  Cyrus Kessler, .    Xr Chest Ap    Result Date: 9/8/2020  No acute chest finding. Resolution of left basilar pleural effusion since the eighth 22,020 exam.  Electronically Signed By-Dr. Leanne Lyn MD On:9/8/2020 11:58 AM This report was finalized on  [id number] by Dr. Leanne Lyn MD.                                         MDM  Number of Diagnoses or Management Options  Urinary tract infection without hematuria, site unspecified:   Diagnosis management comments: Chart review: She was admitted and discharged on August 10, 2020, COVID positive, urine culture showed E. coli ESBL      Comorbidities:  has a past medical history of Anxiety, Arthritis, Chest pain (10/15/2019), Dementia (CMS/[facility]), Hypertension, and Pulmonary embolus (CMS/[facility]).  Differentials: Pneumonia virus UTI CVA not all inclusive of differentials considered  Radiology interpretation:  X-rays reviewed by me and interpreted by radiologist,   Ct Head Without Contrast    Result Date: 9/8/2020   1. Generalized atrophy with extensive white matter chronic ischemic changes which have progressed since the study of 2018. 2. 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 3. Sphenoid sinus disease.  Electronically Signed By-Cyrus Kessler On:9/8/2020 12:48 PM This report was finalized on [id number] by  Cyrus Kessler, .    Xr Chest Ap    Result Date: 9/8/2020  No acute chest finding. Resolution of left basilar pleural effusion since the eighth 22,020 exam.  Electronically Signed By-Dr. Leanne Lyn MD On:9/8/2020 11:58 AM This report was finalized on [id number] by Dr. Leanne Lyn MD.    Lab interpretation:  Labs viewed by me significant for, UTI with culture pending    Appropriate PPE worn during exam.    i discussed findings with patient who voices understanding of discharge instructions, signs and symptoms requiring return to ED; discharged improved and in stable condition with follow up for re-evaluation.  Patient be discharged home with Macrobid  I discussed with Dr. Gonzalez      Final diagnoses:   Urinary tract infection without hematuria, site unspecified            Siobhan Steele,  APRN  09/08/20 1302

## 2020-09-10 LAB — BACTERIA SPEC AEROBE CULT: ABNORMAL

## 2020-09-11 PROCEDURE — 85025 COMPLETE CBC W/AUTO DIFF WBC: CPT

## 2020-09-11 PROCEDURE — 80053 COMPREHEN METABOLIC PANEL: CPT

## 2020-09-12 ENCOUNTER — LAB REQUISITION (OUTPATIENT)
Dept: LAB | Facility: HOSPITAL | Age: 75
End: 2020-09-12

## 2020-09-12 DIAGNOSIS — Z00.00 ROUTINE GENERAL MEDICAL EXAMINATION AT A HEALTH CARE FACILITY: ICD-10-CM

## 2020-09-12 LAB
ALBUMIN SERPL-MCNC: 2.7 G/DL (ref 3.5–5.2)
ALBUMIN/GLOB SERPL: 1.2 G/DL
ALP SERPL-CCNC: 65 U/L (ref 39–117)
ALT SERPL W P-5'-P-CCNC: 14 U/L (ref 1–33)
ANION GAP SERPL CALCULATED.3IONS-SCNC: 14.4 MMOL/L (ref 5–15)
AST SERPL-CCNC: 13 U/L (ref 1–32)
BASOPHILS # BLD AUTO: 0.02 10*3/MM3 (ref 0–0.2)
BASOPHILS NFR BLD AUTO: 0.4 % (ref 0–1.5)
BILIRUB SERPL-MCNC: 0.2 MG/DL (ref 0–1.2)
BUN SERPL-MCNC: 7 MG/DL (ref 8–23)
BUN/CREAT SERPL: 14 (ref 7–25)
CALCIUM SPEC-SCNC: 8.2 MG/DL (ref 8.6–10.5)
CHLORIDE SERPL-SCNC: 99 MMOL/L (ref 98–107)
CO2 SERPL-SCNC: 22.6 MMOL/L (ref 22–29)
CREAT SERPL-MCNC: 0.5 MG/DL (ref 0.57–1)
DEPRECATED RDW RBC AUTO: 54.2 FL (ref 37–54)
EOSINOPHIL # BLD AUTO: 0.16 10*3/MM3 (ref 0–0.4)
EOSINOPHIL NFR BLD AUTO: 3.3 % (ref 0.3–6.2)
ERYTHROCYTE [DISTWIDTH] IN BLOOD BY AUTOMATED COUNT: 16.1 % (ref 12.3–15.4)
GFR SERPL CREATININE-BSD FRML MDRD: 121 ML/MIN/1.73
GLOBULIN UR ELPH-MCNC: 2.3 GM/DL
GLUCOSE SERPL-MCNC: 79 MG/DL (ref 65–99)
HCT VFR BLD AUTO: 31.2 % (ref 34–46.6)
HGB BLD-MCNC: 10.4 G/DL (ref 12–15.9)
IMM GRANULOCYTES # BLD AUTO: 0.02 10*3/MM3 (ref 0–0.05)
IMM GRANULOCYTES NFR BLD AUTO: 0.4 % (ref 0–0.5)
LYMPHOCYTES # BLD AUTO: 1.23 10*3/MM3 (ref 0.7–3.1)
LYMPHOCYTES NFR BLD AUTO: 25.5 % (ref 19.6–45.3)
MCH RBC QN AUTO: 31 PG (ref 26.6–33)
MCHC RBC AUTO-ENTMCNC: 33.3 G/DL (ref 31.5–35.7)
MCV RBC AUTO: 93.1 FL (ref 79–97)
MONOCYTES # BLD AUTO: 0.72 10*3/MM3 (ref 0.1–0.9)
MONOCYTES NFR BLD AUTO: 14.9 % (ref 5–12)
NEUTROPHILS NFR BLD AUTO: 2.67 10*3/MM3 (ref 1.7–7)
NEUTROPHILS NFR BLD AUTO: 55.5 % (ref 42.7–76)
NRBC BLD AUTO-RTO: 0 /100 WBC (ref 0–0.2)
PLATELET # BLD AUTO: 308 10*3/MM3 (ref 140–450)
PMV BLD AUTO: 10.4 FL (ref 6–12)
POTASSIUM SERPL-SCNC: 4.4 MMOL/L (ref 3.5–5.2)
PROT SERPL-MCNC: 5 G/DL (ref 6–8.5)
RBC # BLD AUTO: 3.35 10*6/MM3 (ref 3.77–5.28)
SODIUM SERPL-SCNC: 136 MMOL/L (ref 136–145)
WBC # BLD AUTO: 4.82 10*3/MM3 (ref 3.4–10.8)

## 2020-09-16 ENCOUNTER — LAB REQUISITION (OUTPATIENT)
Dept: LAB | Facility: HOSPITAL | Age: 75
End: 2020-09-16

## 2020-09-16 DIAGNOSIS — Z00.00 ROUTINE GENERAL MEDICAL EXAMINATION AT A HEALTH CARE FACILITY: ICD-10-CM

## 2020-09-16 LAB
ALBUMIN SERPL-MCNC: 2.6 G/DL (ref 3.5–5.2)
ALBUMIN/GLOB SERPL: 1.3 G/DL
ALP SERPL-CCNC: 61 U/L (ref 39–117)
ALT SERPL W P-5'-P-CCNC: 11 U/L (ref 1–33)
ANION GAP SERPL CALCULATED.3IONS-SCNC: 9.9 MMOL/L (ref 5–15)
AST SERPL-CCNC: 13 U/L (ref 1–32)
BASOPHILS # BLD AUTO: 0.04 10*3/MM3 (ref 0–0.2)
BASOPHILS NFR BLD AUTO: 0.8 % (ref 0–1.5)
BILIRUB SERPL-MCNC: 0.2 MG/DL (ref 0–1.2)
BUN SERPL-MCNC: 10 MG/DL (ref 8–23)
BUN/CREAT SERPL: 14.1 (ref 7–25)
CALCIUM SPEC-SCNC: 7.8 MG/DL (ref 8.6–10.5)
CHLORIDE SERPL-SCNC: 101 MMOL/L (ref 98–107)
CO2 SERPL-SCNC: 29.1 MMOL/L (ref 22–29)
CREAT SERPL-MCNC: 0.71 MG/DL (ref 0.57–1)
DEPRECATED RDW RBC AUTO: 56.2 FL (ref 37–54)
EOSINOPHIL # BLD AUTO: 0.15 10*3/MM3 (ref 0–0.4)
EOSINOPHIL NFR BLD AUTO: 3 % (ref 0.3–6.2)
ERYTHROCYTE [DISTWIDTH] IN BLOOD BY AUTOMATED COUNT: 15.6 % (ref 12.3–15.4)
GFR SERPL CREATININE-BSD FRML MDRD: 80 ML/MIN/1.73
GLOBULIN UR ELPH-MCNC: 2 GM/DL
GLUCOSE SERPL-MCNC: 146 MG/DL (ref 65–99)
HCT VFR BLD AUTO: 32.1 % (ref 34–46.6)
HGB BLD-MCNC: 10.4 G/DL (ref 12–15.9)
IMM GRANULOCYTES # BLD AUTO: 0.02 10*3/MM3 (ref 0–0.05)
IMM GRANULOCYTES NFR BLD AUTO: 0.4 % (ref 0–0.5)
LYMPHOCYTES # BLD AUTO: 1.46 10*3/MM3 (ref 0.7–3.1)
LYMPHOCYTES NFR BLD AUTO: 29.3 % (ref 19.6–45.3)
MCH RBC QN AUTO: 31.8 PG (ref 26.6–33)
MCHC RBC AUTO-ENTMCNC: 32.4 G/DL (ref 31.5–35.7)
MCV RBC AUTO: 98.2 FL (ref 79–97)
MONOCYTES # BLD AUTO: 0.54 10*3/MM3 (ref 0.1–0.9)
MONOCYTES NFR BLD AUTO: 10.8 % (ref 5–12)
NEUTROPHILS NFR BLD AUTO: 2.78 10*3/MM3 (ref 1.7–7)
NEUTROPHILS NFR BLD AUTO: 55.7 % (ref 42.7–76)
NRBC BLD AUTO-RTO: 0 /100 WBC (ref 0–0.2)
PLATELET # BLD AUTO: 322 10*3/MM3 (ref 140–450)
PMV BLD AUTO: 10.7 FL (ref 6–12)
POTASSIUM SERPL-SCNC: 4.1 MMOL/L (ref 3.5–5.2)
PROT SERPL-MCNC: 4.6 G/DL (ref 6–8.5)
RBC # BLD AUTO: 3.27 10*6/MM3 (ref 3.77–5.28)
SODIUM SERPL-SCNC: 140 MMOL/L (ref 136–145)
WBC # BLD AUTO: 4.99 10*3/MM3 (ref 3.4–10.8)

## 2020-09-16 PROCEDURE — 85025 COMPLETE CBC W/AUTO DIFF WBC: CPT

## 2020-09-16 PROCEDURE — 80053 COMPREHEN METABOLIC PANEL: CPT

## 2020-09-21 ENCOUNTER — LAB REQUISITION (OUTPATIENT)
Dept: LAB | Facility: HOSPITAL | Age: 75
End: 2020-09-21

## 2020-09-21 DIAGNOSIS — Z00.00 ROUTINE GENERAL MEDICAL EXAMINATION AT A HEALTH CARE FACILITY: ICD-10-CM

## 2020-09-21 LAB
ALBUMIN SERPL-MCNC: 2.8 G/DL (ref 3.5–5.2)
ALBUMIN/GLOB SERPL: 1.4 G/DL
ALP SERPL-CCNC: 66 U/L (ref 39–117)
ALT SERPL W P-5'-P-CCNC: 10 U/L (ref 1–33)
ANION GAP SERPL CALCULATED.3IONS-SCNC: 10.2 MMOL/L (ref 5–15)
AST SERPL-CCNC: 11 U/L (ref 1–32)
BASOPHILS # BLD AUTO: 0.07 10*3/MM3 (ref 0–0.2)
BASOPHILS NFR BLD AUTO: 0.9 % (ref 0–1.5)
BILIRUB SERPL-MCNC: 0.3 MG/DL (ref 0–1.2)
BUN SERPL-MCNC: 14 MG/DL (ref 8–23)
BUN/CREAT SERPL: 29.8 (ref 7–25)
CALCIUM SPEC-SCNC: 8.1 MG/DL (ref 8.6–10.5)
CHLORIDE SERPL-SCNC: 105 MMOL/L (ref 98–107)
CO2 SERPL-SCNC: 33.8 MMOL/L (ref 22–29)
CREAT SERPL-MCNC: 0.47 MG/DL (ref 0.57–1)
DEPRECATED RDW RBC AUTO: 54.8 FL (ref 37–54)
EOSINOPHIL # BLD AUTO: 0.14 10*3/MM3 (ref 0–0.4)
EOSINOPHIL NFR BLD AUTO: 1.9 % (ref 0.3–6.2)
ERYTHROCYTE [DISTWIDTH] IN BLOOD BY AUTOMATED COUNT: 15.8 % (ref 12.3–15.4)
GFR SERPL CREATININE-BSD FRML MDRD: 130 ML/MIN/1.73
GLOBULIN UR ELPH-MCNC: 2 GM/DL
GLUCOSE SERPL-MCNC: 78 MG/DL (ref 65–99)
HCT VFR BLD AUTO: 33.3 % (ref 34–46.6)
HGB BLD-MCNC: 11.1 G/DL (ref 12–15.9)
IMM GRANULOCYTES # BLD AUTO: 0.03 10*3/MM3 (ref 0–0.05)
IMM GRANULOCYTES NFR BLD AUTO: 0.4 % (ref 0–0.5)
LYMPHOCYTES # BLD AUTO: 2.3 10*3/MM3 (ref 0.7–3.1)
LYMPHOCYTES NFR BLD AUTO: 30.6 % (ref 19.6–45.3)
MCH RBC QN AUTO: 31.8 PG (ref 26.6–33)
MCHC RBC AUTO-ENTMCNC: 33.3 G/DL (ref 31.5–35.7)
MCV RBC AUTO: 95.4 FL (ref 79–97)
MONOCYTES # BLD AUTO: 0.68 10*3/MM3 (ref 0.1–0.9)
MONOCYTES NFR BLD AUTO: 9.1 % (ref 5–12)
NEUTROPHILS NFR BLD AUTO: 4.29 10*3/MM3 (ref 1.7–7)
NEUTROPHILS NFR BLD AUTO: 57.1 % (ref 42.7–76)
NRBC BLD AUTO-RTO: 0 /100 WBC (ref 0–0.2)
PLATELET # BLD AUTO: 152 10*3/MM3 (ref 140–450)
PMV BLD AUTO: 11.4 FL (ref 6–12)
POTASSIUM SERPL-SCNC: 3.5 MMOL/L (ref 3.5–5.2)
PROT SERPL-MCNC: 4.8 G/DL (ref 6–8.5)
RBC # BLD AUTO: 3.49 10*6/MM3 (ref 3.77–5.28)
SODIUM SERPL-SCNC: 149 MMOL/L (ref 136–145)
WBC # BLD AUTO: 7.51 10*3/MM3 (ref 3.4–10.8)

## 2020-09-21 PROCEDURE — 80053 COMPREHEN METABOLIC PANEL: CPT

## 2020-09-21 PROCEDURE — 85025 COMPLETE CBC W/AUTO DIFF WBC: CPT

## 2020-09-24 ENCOUNTER — EPISODE CHANGES (OUTPATIENT)
Dept: CASE MANAGEMENT | Facility: OTHER | Age: 75
End: 2020-09-24

## 2020-09-24 ENCOUNTER — HOSPITAL ENCOUNTER (INPATIENT)
Facility: HOSPITAL | Age: 75
LOS: 1 days | Discharge: NURSING, REHAB OR LONG-TERM CARE | End: 2020-09-26
Attending: HOSPITALIST | Admitting: HOSPITALIST

## 2020-09-24 ENCOUNTER — APPOINTMENT (OUTPATIENT)
Dept: CT IMAGING | Facility: HOSPITAL | Age: 75
End: 2020-09-24

## 2020-09-24 ENCOUNTER — APPOINTMENT (OUTPATIENT)
Dept: GENERAL RADIOLOGY | Facility: HOSPITAL | Age: 75
End: 2020-09-24

## 2020-09-24 DIAGNOSIS — N39.0 RECURRENT UTI: ICD-10-CM

## 2020-09-24 DIAGNOSIS — R41.82 ALTERED MENTAL STATUS, UNSPECIFIED ALTERED MENTAL STATUS TYPE: ICD-10-CM

## 2020-09-24 DIAGNOSIS — R50.9 FEVER IN ADULT: Primary | ICD-10-CM

## 2020-09-24 DIAGNOSIS — A41.9 SEPSIS, DUE TO UNSPECIFIED ORGANISM, UNSPECIFIED WHETHER ACUTE ORGAN DYSFUNCTION PRESENT (HCC): ICD-10-CM

## 2020-09-24 PROBLEM — R79.89 ELEVATED TROPONIN: Status: ACTIVE | Noted: 2020-09-24

## 2020-09-24 PROBLEM — I48.91 ATRIAL FIBRILLATION (HCC): Chronic | Status: ACTIVE | Noted: 2020-09-24

## 2020-09-24 PROBLEM — Z86.711 HISTORY OF PULMONARY EMBOLUS (PE): Chronic | Status: ACTIVE | Noted: 2020-09-24

## 2020-09-24 PROBLEM — E87.0 HYPERNATREMIA: Status: ACTIVE | Noted: 2020-09-24

## 2020-09-24 PROBLEM — R77.8 ELEVATED TROPONIN: Status: ACTIVE | Noted: 2020-09-24

## 2020-09-24 LAB
ALBUMIN SERPL-MCNC: 2.9 G/DL (ref 3.5–5.2)
ALBUMIN/GLOB SERPL: 1 G/DL
ALP SERPL-CCNC: 74 U/L (ref 39–117)
ALT SERPL W P-5'-P-CCNC: 10 U/L (ref 1–33)
ANION GAP SERPL CALCULATED.3IONS-SCNC: 14 MMOL/L (ref 5–15)
APTT PPP: 30.3 SECONDS (ref 24–31)
AST SERPL-CCNC: 13 U/L (ref 1–32)
B PARAPERT DNA SPEC QL NAA+PROBE: NOT DETECTED
B PERT DNA SPEC QL NAA+PROBE: NOT DETECTED
BACTERIA UR QL AUTO: ABNORMAL /HPF
BASOPHILS # BLD AUTO: 0.1 10*3/MM3 (ref 0–0.2)
BASOPHILS NFR BLD AUTO: 0.8 % (ref 0–1.5)
BILIRUB SERPL-MCNC: 0.5 MG/DL (ref 0–1.2)
BILIRUB UR QL STRIP: NEGATIVE
BUN SERPL-MCNC: 14 MG/DL (ref 8–23)
BUN SERPL-MCNC: ABNORMAL MG/DL
BUN/CREAT SERPL: ABNORMAL
C PNEUM DNA NPH QL NAA+NON-PROBE: NOT DETECTED
CALCIUM SPEC-SCNC: 8.8 MG/DL (ref 8.6–10.5)
CHLORIDE SERPL-SCNC: 108 MMOL/L (ref 98–107)
CLARITY UR: ABNORMAL
CO2 SERPL-SCNC: 31 MMOL/L (ref 22–29)
COLOR UR: ABNORMAL
CREAT SERPL-MCNC: 0.81 MG/DL (ref 0.57–1)
D-LACTATE SERPL-SCNC: 1.9 MMOL/L (ref 0.5–2)
DEPRECATED RDW RBC AUTO: 60.8 FL (ref 37–54)
EOSINOPHIL # BLD AUTO: 0 10*3/MM3 (ref 0–0.4)
EOSINOPHIL NFR BLD AUTO: 0.5 % (ref 0.3–6.2)
ERYTHROCYTE [DISTWIDTH] IN BLOOD BY AUTOMATED COUNT: 18.1 % (ref 12.3–15.4)
FLUAV H1 2009 PAND RNA NPH QL NAA+PROBE: NOT DETECTED
FLUAV H1 HA GENE NPH QL NAA+PROBE: NOT DETECTED
FLUAV H3 RNA NPH QL NAA+PROBE: NOT DETECTED
FLUAV SUBTYP SPEC NAA+PROBE: NOT DETECTED
FLUBV RNA ISLT QL NAA+PROBE: NOT DETECTED
GFR SERPL CREATININE-BSD FRML MDRD: 69 ML/MIN/1.73
GLOBULIN UR ELPH-MCNC: 2.9 GM/DL
GLUCOSE SERPL-MCNC: 107 MG/DL (ref 65–99)
GLUCOSE UR STRIP-MCNC: NEGATIVE MG/DL
HADV DNA SPEC NAA+PROBE: NOT DETECTED
HCOV 229E RNA SPEC QL NAA+PROBE: NOT DETECTED
HCOV HKU1 RNA SPEC QL NAA+PROBE: NOT DETECTED
HCOV NL63 RNA SPEC QL NAA+PROBE: NOT DETECTED
HCOV OC43 RNA SPEC QL NAA+PROBE: NOT DETECTED
HCT VFR BLD AUTO: 38.7 % (ref 34–46.6)
HGB BLD-MCNC: 12.6 G/DL (ref 12–15.9)
HGB UR QL STRIP.AUTO: ABNORMAL
HMPV RNA NPH QL NAA+NON-PROBE: NOT DETECTED
HOLD SPECIMEN: NORMAL
HOLD SPECIMEN: NORMAL
HPIV1 RNA SPEC QL NAA+PROBE: NOT DETECTED
HPIV2 RNA SPEC QL NAA+PROBE: NOT DETECTED
HPIV3 RNA NPH QL NAA+PROBE: NOT DETECTED
HPIV4 P GENE NPH QL NAA+PROBE: NOT DETECTED
HYALINE CASTS UR QL AUTO: ABNORMAL /LPF
INR PPP: 1.25 (ref 0.93–1.1)
KETONES UR QL STRIP: ABNORMAL
LEUKOCYTE ESTERASE UR QL STRIP.AUTO: ABNORMAL
LYMPHOCYTES # BLD AUTO: 2.7 10*3/MM3 (ref 0.7–3.1)
LYMPHOCYTES NFR BLD AUTO: 26.5 % (ref 19.6–45.3)
M PNEUMO IGG SER IA-ACNC: NOT DETECTED
MCH RBC QN AUTO: 31.6 PG (ref 26.6–33)
MCHC RBC AUTO-ENTMCNC: 32.5 G/DL (ref 31.5–35.7)
MCV RBC AUTO: 97.1 FL (ref 79–97)
MONOCYTES # BLD AUTO: 0.9 10*3/MM3 (ref 0.1–0.9)
MONOCYTES NFR BLD AUTO: 8.9 % (ref 5–12)
MUCOUS THREADS URNS QL MICRO: ABNORMAL /HPF
NEUTROPHILS NFR BLD AUTO: 6.5 10*3/MM3 (ref 1.7–7)
NEUTROPHILS NFR BLD AUTO: 63.3 % (ref 42.7–76)
NITRITE UR QL STRIP: POSITIVE
NRBC BLD AUTO-RTO: 0.1 /100 WBC (ref 0–0.2)
PH UR STRIP.AUTO: 6.5 [PH] (ref 5–8)
PLATELET # BLD AUTO: 152 10*3/MM3 (ref 140–450)
PMV BLD AUTO: 10.5 FL (ref 6–12)
POTASSIUM SERPL-SCNC: 3.7 MMOL/L (ref 3.5–5.2)
PROT SERPL-MCNC: 5.8 G/DL (ref 6–8.5)
PROT UR QL STRIP: ABNORMAL
PROTHROMBIN TIME: 13.5 SECONDS (ref 9.6–11.7)
RBC # BLD AUTO: 3.98 10*6/MM3 (ref 3.77–5.28)
RBC # UR: ABNORMAL /HPF
REF LAB TEST METHOD: ABNORMAL
RHINOVIRUS RNA SPEC NAA+PROBE: NOT DETECTED
RSV RNA NPH QL NAA+NON-PROBE: NOT DETECTED
SARS-COV-2 RNA NPH QL NAA+NON-PROBE: NOT DETECTED
SODIUM SERPL-SCNC: 153 MMOL/L (ref 136–145)
SP GR UR STRIP: 1.02 (ref 1–1.03)
SQUAMOUS #/AREA URNS HPF: ABNORMAL /HPF
TROPONIN T SERPL-MCNC: 0.1 NG/ML (ref 0–0.03)
UROBILINOGEN UR QL STRIP: ABNORMAL
WBC # BLD AUTO: 10.2 10*3/MM3 (ref 3.4–10.8)
WBC UR QL AUTO: ABNORMAL /HPF
WHOLE BLOOD HOLD SPECIMEN: NORMAL
WHOLE BLOOD HOLD SPECIMEN: NORMAL
YEAST URNS QL MICRO: ABNORMAL /HPF

## 2020-09-24 PROCEDURE — 25010000002 MEROPENEM PER 100 MG: Performed by: NURSE PRACTITIONER

## 2020-09-24 PROCEDURE — G0378 HOSPITAL OBSERVATION PER HR: HCPCS

## 2020-09-24 PROCEDURE — 0202U NFCT DS 22 TRGT SARS-COV-2: CPT | Performed by: HOSPITALIST

## 2020-09-24 PROCEDURE — 87088 URINE BACTERIA CULTURE: CPT | Performed by: NURSE PRACTITIONER

## 2020-09-24 PROCEDURE — 93010 ELECTROCARDIOGRAM REPORT: CPT | Performed by: INTERNAL MEDICINE

## 2020-09-24 PROCEDURE — 85025 COMPLETE CBC W/AUTO DIFF WBC: CPT | Performed by: NURSE PRACTITIONER

## 2020-09-24 PROCEDURE — 83605 ASSAY OF LACTIC ACID: CPT

## 2020-09-24 PROCEDURE — 84520 ASSAY OF UREA NITROGEN: CPT | Performed by: NURSE PRACTITIONER

## 2020-09-24 PROCEDURE — 93005 ELECTROCARDIOGRAM TRACING: CPT | Performed by: NURSE PRACTITIONER

## 2020-09-24 PROCEDURE — 99285 EMERGENCY DEPT VISIT HI MDM: CPT

## 2020-09-24 PROCEDURE — P9612 CATHETERIZE FOR URINE SPEC: HCPCS

## 2020-09-24 PROCEDURE — 85610 PROTHROMBIN TIME: CPT | Performed by: NURSE PRACTITIONER

## 2020-09-24 PROCEDURE — 85730 THROMBOPLASTIN TIME PARTIAL: CPT | Performed by: NURSE PRACTITIONER

## 2020-09-24 PROCEDURE — 87086 URINE CULTURE/COLONY COUNT: CPT | Performed by: NURSE PRACTITIONER

## 2020-09-24 PROCEDURE — 84484 ASSAY OF TROPONIN QUANT: CPT | Performed by: NURSE PRACTITIONER

## 2020-09-24 PROCEDURE — 87040 BLOOD CULTURE FOR BACTERIA: CPT | Performed by: NURSE PRACTITIONER

## 2020-09-24 PROCEDURE — 80053 COMPREHEN METABOLIC PANEL: CPT | Performed by: NURSE PRACTITIONER

## 2020-09-24 PROCEDURE — 70450 CT HEAD/BRAIN W/O DYE: CPT

## 2020-09-24 PROCEDURE — 81001 URINALYSIS AUTO W/SCOPE: CPT | Performed by: NURSE PRACTITIONER

## 2020-09-24 PROCEDURE — 71045 X-RAY EXAM CHEST 1 VIEW: CPT

## 2020-09-24 PROCEDURE — 25010000002 MEROPENEM PER 100 MG: Performed by: PHYSICIAN ASSISTANT

## 2020-09-24 PROCEDURE — 87186 SC STD MICRODIL/AGAR DIL: CPT | Performed by: NURSE PRACTITIONER

## 2020-09-24 PROCEDURE — 93005 ELECTROCARDIOGRAM TRACING: CPT | Performed by: HOSPITALIST

## 2020-09-24 RX ORDER — FLUCONAZOLE 2 MG/ML
200 INJECTION, SOLUTION INTRAVENOUS EVERY 24 HOURS
Status: DISCONTINUED | OUTPATIENT
Start: 2020-09-24 | End: 2020-09-25

## 2020-09-24 RX ORDER — SODIUM CHLORIDE 0.9 % (FLUSH) 0.9 %
10 SYRINGE (ML) INJECTION AS NEEDED
Status: DISCONTINUED | OUTPATIENT
Start: 2020-09-24 | End: 2020-09-26 | Stop reason: HOSPADM

## 2020-09-24 RX ORDER — POTASSIUM CHLORIDE 20 MEQ/1
40 TABLET, EXTENDED RELEASE ORAL AS NEEDED
Status: DISCONTINUED | OUTPATIENT
Start: 2020-09-24 | End: 2020-09-26 | Stop reason: HOSPADM

## 2020-09-24 RX ORDER — DOXYCYCLINE HYCLATE 100 MG/1
100 CAPSULE ORAL 2 TIMES DAILY
COMMUNITY
Start: 2020-09-21 | End: 2020-09-26 | Stop reason: HOSPADM

## 2020-09-24 RX ORDER — ACETAMINOPHEN 325 MG/1
650 TABLET ORAL EVERY 6 HOURS PRN
Status: DISCONTINUED | OUTPATIENT
Start: 2020-09-24 | End: 2020-09-24

## 2020-09-24 RX ORDER — MAGNESIUM SULFATE HEPTAHYDRATE 40 MG/ML
2 INJECTION, SOLUTION INTRAVENOUS AS NEEDED
Status: DISCONTINUED | OUTPATIENT
Start: 2020-09-24 | End: 2020-09-26 | Stop reason: HOSPADM

## 2020-09-24 RX ORDER — BISACODYL 10 MG
10 SUPPOSITORY, RECTAL RECTAL DAILY PRN
Status: DISCONTINUED | OUTPATIENT
Start: 2020-09-24 | End: 2020-09-26 | Stop reason: HOSPADM

## 2020-09-24 RX ORDER — ACETAMINOPHEN 650 MG/1
650 SUPPOSITORY RECTAL EVERY 4 HOURS PRN
Status: DISCONTINUED | OUTPATIENT
Start: 2020-09-24 | End: 2020-09-26 | Stop reason: HOSPADM

## 2020-09-24 RX ORDER — FLUCONAZOLE 100 MG/1
100 TABLET ORAL ONCE
Status: COMPLETED | OUTPATIENT
Start: 2020-09-24 | End: 2020-09-24

## 2020-09-24 RX ORDER — ACETAMINOPHEN 650 MG/1
650 SUPPOSITORY RECTAL ONCE
Status: COMPLETED | OUTPATIENT
Start: 2020-09-24 | End: 2020-09-24

## 2020-09-24 RX ORDER — CHOLECALCIFEROL (VITAMIN D3) 125 MCG
5 CAPSULE ORAL NIGHTLY PRN
Status: DISCONTINUED | OUTPATIENT
Start: 2020-09-24 | End: 2020-09-26 | Stop reason: HOSPADM

## 2020-09-24 RX ORDER — DONEPEZIL HYDROCHLORIDE 5 MG/1
10 TABLET, FILM COATED ORAL NIGHTLY
Status: DISCONTINUED | OUTPATIENT
Start: 2020-09-24 | End: 2020-09-26 | Stop reason: HOSPADM

## 2020-09-24 RX ORDER — ACETAMINOPHEN 325 MG/1
650 TABLET ORAL EVERY 4 HOURS PRN
Status: DISCONTINUED | OUTPATIENT
Start: 2020-09-24 | End: 2020-09-26 | Stop reason: HOSPADM

## 2020-09-24 RX ORDER — MAGNESIUM SULFATE 1 G/100ML
1 INJECTION INTRAVENOUS AS NEEDED
Status: DISCONTINUED | OUTPATIENT
Start: 2020-09-24 | End: 2020-09-26 | Stop reason: HOSPADM

## 2020-09-24 RX ORDER — FUROSEMIDE 20 MG/1
20 TABLET ORAL DAILY
Status: DISCONTINUED | OUTPATIENT
Start: 2020-09-25 | End: 2020-09-26 | Stop reason: HOSPADM

## 2020-09-24 RX ORDER — LEVOTHYROXINE SODIUM 0.03 MG/1
25 TABLET ORAL
Status: DISCONTINUED | OUTPATIENT
Start: 2020-09-25 | End: 2020-09-26 | Stop reason: HOSPADM

## 2020-09-24 RX ORDER — NYSTATIN 100000 [USP'U]/G
1 POWDER TOPICAL 2 TIMES DAILY
COMMUNITY
End: 2023-03-30

## 2020-09-24 RX ORDER — ACETAMINOPHEN 160 MG/5ML
650 SOLUTION ORAL EVERY 4 HOURS PRN
Status: DISCONTINUED | OUTPATIENT
Start: 2020-09-24 | End: 2020-09-26 | Stop reason: HOSPADM

## 2020-09-24 RX ORDER — AMIODARONE HYDROCHLORIDE 200 MG/1
200 TABLET ORAL
Status: DISCONTINUED | OUTPATIENT
Start: 2020-09-25 | End: 2020-09-26 | Stop reason: HOSPADM

## 2020-09-24 RX ORDER — SODIUM CHLORIDE 0.9 % (FLUSH) 0.9 %
10 SYRINGE (ML) INJECTION EVERY 12 HOURS SCHEDULED
Status: DISCONTINUED | OUTPATIENT
Start: 2020-09-24 | End: 2020-09-26 | Stop reason: HOSPADM

## 2020-09-24 RX ORDER — CYCLOBENZAPRINE HCL 10 MG
5 TABLET ORAL EVERY 8 HOURS PRN
Status: DISCONTINUED | OUTPATIENT
Start: 2020-09-24 | End: 2020-09-24

## 2020-09-24 RX ORDER — ONDANSETRON 2 MG/ML
4 INJECTION INTRAMUSCULAR; INTRAVENOUS EVERY 6 HOURS PRN
Status: DISCONTINUED | OUTPATIENT
Start: 2020-09-24 | End: 2020-09-26 | Stop reason: HOSPADM

## 2020-09-24 RX ORDER — PANTOPRAZOLE SODIUM 40 MG/1
40 TABLET, DELAYED RELEASE ORAL
Status: DISCONTINUED | OUTPATIENT
Start: 2020-09-25 | End: 2020-09-26 | Stop reason: HOSPADM

## 2020-09-24 RX ORDER — NITROGLYCERIN 0.4 MG/1
0.4 TABLET SUBLINGUAL
Status: DISCONTINUED | OUTPATIENT
Start: 2020-09-24 | End: 2020-09-26 | Stop reason: HOSPADM

## 2020-09-24 RX ORDER — NYSTATIN 100000 [USP'U]/G
1 POWDER TOPICAL 2 TIMES DAILY
Status: DISCONTINUED | OUTPATIENT
Start: 2020-09-24 | End: 2020-09-26 | Stop reason: HOSPADM

## 2020-09-24 RX ORDER — ALUMINA, MAGNESIA, AND SIMETHICONE 2400; 2400; 240 MG/30ML; MG/30ML; MG/30ML
15 SUSPENSION ORAL EVERY 6 HOURS PRN
Status: DISCONTINUED | OUTPATIENT
Start: 2020-09-24 | End: 2020-09-26 | Stop reason: HOSPADM

## 2020-09-24 RX ORDER — SODIUM CHLORIDE 9 MG/ML
75 INJECTION, SOLUTION INTRAVENOUS CONTINUOUS
Status: DISCONTINUED | OUTPATIENT
Start: 2020-09-24 | End: 2020-09-26 | Stop reason: HOSPADM

## 2020-09-24 RX ORDER — DIVALPROEX SODIUM 125 MG/1
125 TABLET, DELAYED RELEASE ORAL 2 TIMES DAILY
Status: DISCONTINUED | OUTPATIENT
Start: 2020-09-24 | End: 2020-09-26 | Stop reason: HOSPADM

## 2020-09-24 RX ORDER — ONDANSETRON 4 MG/1
4 TABLET, FILM COATED ORAL EVERY 6 HOURS PRN
Status: DISCONTINUED | OUTPATIENT
Start: 2020-09-24 | End: 2020-09-26 | Stop reason: HOSPADM

## 2020-09-24 RX ADMIN — DONEPEZIL HYDROCHLORIDE 10 MG: 5 TABLET, FILM COATED ORAL at 20:22

## 2020-09-24 RX ADMIN — Medication 10 ML: at 20:22

## 2020-09-24 RX ADMIN — MEROPENEM 1 G: 1 INJECTION, POWDER, FOR SOLUTION INTRAVENOUS at 15:21

## 2020-09-24 RX ADMIN — MEROPENEM 500 MG: 500 INJECTION, POWDER, FOR SOLUTION INTRAVENOUS at 20:25

## 2020-09-24 RX ADMIN — FLUCONAZOLE 100 MG: 100 TABLET ORAL at 15:26

## 2020-09-24 RX ADMIN — APIXABAN 5 MG: 5 TABLET, FILM COATED ORAL at 20:22

## 2020-09-24 RX ADMIN — NYSTATIN 1 APPLICATION: 100000 POWDER TOPICAL at 22:20

## 2020-09-24 RX ADMIN — DIVALPROEX SODIUM 125 MG: 125 TABLET, DELAYED RELEASE ORAL at 20:22

## 2020-09-24 RX ADMIN — SODIUM CHLORIDE 75 ML/HR: 900 INJECTION, SOLUTION INTRAVENOUS at 20:26

## 2020-09-24 RX ADMIN — ACETAMINOPHEN 650 MG: 650 SUPPOSITORY RECTAL at 15:21

## 2020-09-24 RX ADMIN — SODIUM CHLORIDE 500 ML: 0.9 INJECTION, SOLUTION INTRAVENOUS at 13:58

## 2020-09-25 PROBLEM — J96.01 ACUTE RESPIRATORY FAILURE WITH HYPOXIA (HCC): Status: ACTIVE | Noted: 2020-09-25

## 2020-09-25 LAB
ANION GAP SERPL CALCULATED.3IONS-SCNC: 11 MMOL/L (ref 5–15)
BASOPHILS # BLD AUTO: 0.1 10*3/MM3 (ref 0–0.2)
BASOPHILS NFR BLD AUTO: 1 % (ref 0–1.5)
BUN SERPL-MCNC: 17 MG/DL (ref 8–23)
BUN SERPL-MCNC: ABNORMAL MG/DL
BUN/CREAT SERPL: ABNORMAL
CALCIUM SPEC-SCNC: 7.8 MG/DL (ref 8.6–10.5)
CHLORIDE SERPL-SCNC: 111 MMOL/L (ref 98–107)
CO2 SERPL-SCNC: 31 MMOL/L (ref 22–29)
CREAT SERPL-MCNC: 0.69 MG/DL (ref 0.57–1)
DEPRECATED RDW RBC AUTO: 60.8 FL (ref 37–54)
EOSINOPHIL # BLD AUTO: 0.2 10*3/MM3 (ref 0–0.4)
EOSINOPHIL NFR BLD AUTO: 2.8 % (ref 0.3–6.2)
ERYTHROCYTE [DISTWIDTH] IN BLOOD BY AUTOMATED COUNT: 18 % (ref 12.3–15.4)
GFR SERPL CREATININE-BSD FRML MDRD: 83 ML/MIN/1.73
GLUCOSE SERPL-MCNC: 97 MG/DL (ref 65–99)
HCT VFR BLD AUTO: 34 % (ref 34–46.6)
HGB BLD-MCNC: 10.8 G/DL (ref 12–15.9)
HOLD SPECIMEN: NORMAL
LYMPHOCYTES # BLD AUTO: 2.6 10*3/MM3 (ref 0.7–3.1)
LYMPHOCYTES NFR BLD AUTO: 33.7 % (ref 19.6–45.3)
MAGNESIUM SERPL-MCNC: 1.9 MG/DL (ref 1.6–2.4)
MCH RBC QN AUTO: 31.1 PG (ref 26.6–33)
MCHC RBC AUTO-ENTMCNC: 31.8 G/DL (ref 31.5–35.7)
MCV RBC AUTO: 97.9 FL (ref 79–97)
MONOCYTES # BLD AUTO: 0.6 10*3/MM3 (ref 0.1–0.9)
MONOCYTES NFR BLD AUTO: 7.8 % (ref 5–12)
NEUTROPHILS NFR BLD AUTO: 4.1 10*3/MM3 (ref 1.7–7)
NEUTROPHILS NFR BLD AUTO: 54.7 % (ref 42.7–76)
NRBC BLD AUTO-RTO: 0 /100 WBC (ref 0–0.2)
OSMOLALITY SERPL: 314 MOSM/KG (ref 280–300)
OSMOLALITY UR: 740 MOSM/KG (ref 300–800)
PLATELET # BLD AUTO: 100 10*3/MM3 (ref 140–450)
PMV BLD AUTO: 9.4 FL (ref 6–12)
POTASSIUM SERPL-SCNC: 3.3 MMOL/L (ref 3.5–5.2)
POTASSIUM SERPL-SCNC: 4 MMOL/L (ref 3.5–5.2)
RBC # BLD AUTO: 3.47 10*6/MM3 (ref 3.77–5.28)
SODIUM SERPL-SCNC: 153 MMOL/L (ref 136–145)
SODIUM UR-SCNC: 103 MMOL/L
TROPONIN T SERPL-MCNC: 0.03 NG/ML (ref 0–0.03)
TROPONIN T SERPL-MCNC: 0.04 NG/ML (ref 0–0.03)
TROPONIN T SERPL-MCNC: 0.05 NG/ML (ref 0–0.03)
TROPONIN T SERPL-MCNC: 0.07 NG/ML (ref 0–0.03)
WBC # BLD AUTO: 7.6 10*3/MM3 (ref 3.4–10.8)

## 2020-09-25 PROCEDURE — 25010000002 MEROPENEM PER 100 MG: Performed by: PHYSICIAN ASSISTANT

## 2020-09-25 PROCEDURE — 83735 ASSAY OF MAGNESIUM: CPT | Performed by: PHYSICIAN ASSISTANT

## 2020-09-25 PROCEDURE — 99222 1ST HOSP IP/OBS MODERATE 55: CPT | Performed by: HOSPITALIST

## 2020-09-25 PROCEDURE — 84300 ASSAY OF URINE SODIUM: CPT | Performed by: PHYSICIAN ASSISTANT

## 2020-09-25 PROCEDURE — 85025 COMPLETE CBC W/AUTO DIFF WBC: CPT | Performed by: PHYSICIAN ASSISTANT

## 2020-09-25 PROCEDURE — 80048 BASIC METABOLIC PNL TOTAL CA: CPT | Performed by: PHYSICIAN ASSISTANT

## 2020-09-25 PROCEDURE — 83935 ASSAY OF URINE OSMOLALITY: CPT | Performed by: PHYSICIAN ASSISTANT

## 2020-09-25 PROCEDURE — 84484 ASSAY OF TROPONIN QUANT: CPT | Performed by: PHYSICIAN ASSISTANT

## 2020-09-25 PROCEDURE — 84132 ASSAY OF SERUM POTASSIUM: CPT | Performed by: HOSPITALIST

## 2020-09-25 PROCEDURE — 83930 ASSAY OF BLOOD OSMOLALITY: CPT | Performed by: PHYSICIAN ASSISTANT

## 2020-09-25 PROCEDURE — 25010000002 FLUCONAZOLE PER 200 MG: Performed by: PHYSICIAN ASSISTANT

## 2020-09-25 RX ORDER — DILTIAZEM HYDROCHLORIDE 5 MG/ML
10 INJECTION INTRAVENOUS ONCE
Status: COMPLETED | OUTPATIENT
Start: 2020-09-25 | End: 2020-09-25

## 2020-09-25 RX ORDER — DILTIAZEM HYDROCHLORIDE 5 MG/ML
5 INJECTION INTRAVENOUS ONCE
Status: COMPLETED | OUTPATIENT
Start: 2020-09-25 | End: 2020-09-25

## 2020-09-25 RX ADMIN — PANTOPRAZOLE SODIUM 40 MG: 40 TABLET, DELAYED RELEASE ORAL at 08:02

## 2020-09-25 RX ADMIN — MEROPENEM 500 MG: 500 INJECTION, POWDER, FOR SOLUTION INTRAVENOUS at 08:12

## 2020-09-25 RX ADMIN — SODIUM CHLORIDE 75 ML/HR: 900 INJECTION, SOLUTION INTRAVENOUS at 21:29

## 2020-09-25 RX ADMIN — DIVALPROEX SODIUM 125 MG: 125 TABLET, DELAYED RELEASE ORAL at 21:19

## 2020-09-25 RX ADMIN — DILTIAZEM HYDROCHLORIDE 10 MG: 5 INJECTION INTRAVENOUS at 00:54

## 2020-09-25 RX ADMIN — MEROPENEM 500 MG: 500 INJECTION, POWDER, FOR SOLUTION INTRAVENOUS at 04:28

## 2020-09-25 RX ADMIN — APIXABAN 5 MG: 5 TABLET, FILM COATED ORAL at 21:19

## 2020-09-25 RX ADMIN — NYSTATIN 1 APPLICATION: 100000 POWDER TOPICAL at 08:12

## 2020-09-25 RX ADMIN — DONEPEZIL HYDROCHLORIDE 10 MG: 5 TABLET, FILM COATED ORAL at 21:19

## 2020-09-25 RX ADMIN — POTASSIUM CHLORIDE 40 MEQ: 1500 TABLET, EXTENDED RELEASE ORAL at 08:02

## 2020-09-25 RX ADMIN — FLUCONAZOLE 200 MG: 2 INJECTION, SOLUTION INTRAVENOUS at 00:39

## 2020-09-25 RX ADMIN — Medication 10 ML: at 21:20

## 2020-09-25 RX ADMIN — AMIODARONE HYDROCHLORIDE 200 MG: 200 TABLET ORAL at 08:03

## 2020-09-25 RX ADMIN — Medication 10 ML: at 08:02

## 2020-09-25 RX ADMIN — POTASSIUM CHLORIDE 40 MEQ: 1500 TABLET, EXTENDED RELEASE ORAL at 12:18

## 2020-09-25 RX ADMIN — FLUOXETINE 30 MG: 20 CAPSULE ORAL at 08:03

## 2020-09-25 RX ADMIN — MEROPENEM 500 MG: 500 INJECTION, POWDER, FOR SOLUTION INTRAVENOUS at 15:35

## 2020-09-25 RX ADMIN — APIXABAN 5 MG: 5 TABLET, FILM COATED ORAL at 08:02

## 2020-09-25 RX ADMIN — NYSTATIN 1 APPLICATION: 100000 POWDER TOPICAL at 21:20

## 2020-09-25 RX ADMIN — DILTIAZEM HYDROCHLORIDE 5 MG: 5 INJECTION INTRAVENOUS at 01:56

## 2020-09-25 RX ADMIN — MEROPENEM 500 MG: 500 INJECTION, POWDER, FOR SOLUTION INTRAVENOUS at 21:20

## 2020-09-25 RX ADMIN — LEVOTHYROXINE SODIUM 25 MCG: 25 TABLET ORAL at 05:58

## 2020-09-25 RX ADMIN — FUROSEMIDE 20 MG: 20 TABLET ORAL at 08:03

## 2020-09-25 RX ADMIN — DIVALPROEX SODIUM 125 MG: 125 TABLET, DELAYED RELEASE ORAL at 08:03

## 2020-09-26 VITALS
WEIGHT: 178.8 LBS | RESPIRATION RATE: 18 BRPM | BODY MASS INDEX: 30.52 KG/M2 | HEART RATE: 87 BPM | OXYGEN SATURATION: 92 % | DIASTOLIC BLOOD PRESSURE: 71 MMHG | HEIGHT: 64 IN | SYSTOLIC BLOOD PRESSURE: 105 MMHG | TEMPERATURE: 97.4 F

## 2020-09-26 LAB
ANION GAP SERPL CALCULATED.3IONS-SCNC: 14 MMOL/L (ref 5–15)
BACTERIA SPEC AEROBE CULT: ABNORMAL
BUN SERPL-MCNC: 10 MG/DL (ref 8–23)
BUN SERPL-MCNC: ABNORMAL MG/DL
BUN/CREAT SERPL: ABNORMAL
CALCIUM SPEC-SCNC: 8.2 MG/DL (ref 8.6–10.5)
CHLORIDE SERPL-SCNC: 110 MMOL/L (ref 98–107)
CO2 SERPL-SCNC: 26 MMOL/L (ref 22–29)
CREAT SERPL-MCNC: 0.54 MG/DL (ref 0.57–1)
GFR SERPL CREATININE-BSD FRML MDRD: 110 ML/MIN/1.73
GLUCOSE SERPL-MCNC: 62 MG/DL (ref 65–99)
MAGNESIUM SERPL-MCNC: 1.8 MG/DL (ref 1.6–2.4)
POTASSIUM SERPL-SCNC: 4 MMOL/L (ref 3.5–5.2)
SODIUM SERPL-SCNC: 150 MMOL/L (ref 136–145)

## 2020-09-26 PROCEDURE — 25010000002 MEROPENEM PER 100 MG: Performed by: PHYSICIAN ASSISTANT

## 2020-09-26 PROCEDURE — 83735 ASSAY OF MAGNESIUM: CPT | Performed by: PHYSICIAN ASSISTANT

## 2020-09-26 PROCEDURE — 80048 BASIC METABOLIC PNL TOTAL CA: CPT | Performed by: HOSPITALIST

## 2020-09-26 PROCEDURE — 99239 HOSP IP/OBS DSCHRG MGMT >30: CPT | Performed by: HOSPITALIST

## 2020-09-26 RX ORDER — NITROFURANTOIN 25; 75 MG/1; MG/1
100 CAPSULE ORAL 2 TIMES DAILY
Qty: 10 CAPSULE | Refills: 0 | Status: SHIPPED | OUTPATIENT
Start: 2020-09-26 | End: 2023-03-30

## 2020-09-26 RX ADMIN — POTASSIUM CHLORIDE 30 MEQ: 1500 TABLET, EXTENDED RELEASE ORAL at 08:01

## 2020-09-26 RX ADMIN — Medication 10 ML: at 08:02

## 2020-09-26 RX ADMIN — PANTOPRAZOLE SODIUM 40 MG: 40 TABLET, DELAYED RELEASE ORAL at 08:02

## 2020-09-26 RX ADMIN — AMIODARONE HYDROCHLORIDE 200 MG: 200 TABLET ORAL at 08:01

## 2020-09-26 RX ADMIN — APIXABAN 5 MG: 5 TABLET, FILM COATED ORAL at 08:02

## 2020-09-26 RX ADMIN — NYSTATIN 1 APPLICATION: 100000 POWDER TOPICAL at 08:03

## 2020-09-26 RX ADMIN — MEROPENEM 500 MG: 500 INJECTION, POWDER, FOR SOLUTION INTRAVENOUS at 08:02

## 2020-09-26 RX ADMIN — DIVALPROEX SODIUM 125 MG: 125 TABLET, DELAYED RELEASE ORAL at 08:02

## 2020-09-26 RX ADMIN — LEVOTHYROXINE SODIUM 25 MCG: 25 TABLET ORAL at 05:27

## 2020-09-26 RX ADMIN — MEROPENEM 500 MG: 500 INJECTION, POWDER, FOR SOLUTION INTRAVENOUS at 02:00

## 2020-09-26 RX ADMIN — FUROSEMIDE 20 MG: 20 TABLET ORAL at 08:02

## 2020-09-26 RX ADMIN — FLUOXETINE 30 MG: 20 CAPSULE ORAL at 08:01

## 2020-09-29 LAB
BACTERIA SPEC AEROBE CULT: NORMAL
BACTERIA SPEC AEROBE CULT: NORMAL

## 2023-03-29 ENCOUNTER — HOSPITAL ENCOUNTER (OUTPATIENT)
Facility: HOSPITAL | Age: 78
Discharge: NURSING, REHAB OR LONG-TERM CARE | End: 2023-03-31
Attending: EMERGENCY MEDICINE | Admitting: INTERNAL MEDICINE
Payer: MEDICARE

## 2023-03-29 DIAGNOSIS — K92.2 GASTROINTESTINAL HEMORRHAGE, UNSPECIFIED GASTROINTESTINAL HEMORRHAGE TYPE: Primary | ICD-10-CM

## 2023-03-29 DIAGNOSIS — K52.9 COLITIS: ICD-10-CM

## 2023-03-29 LAB
ALBUMIN SERPL-MCNC: 4 G/DL (ref 3.5–5.2)
ALBUMIN/GLOB SERPL: 1.6 G/DL
ALP SERPL-CCNC: 77 U/L (ref 39–117)
ALT SERPL W P-5'-P-CCNC: 11 U/L (ref 1–33)
ANION GAP SERPL CALCULATED.3IONS-SCNC: 11 MMOL/L (ref 5–15)
AST SERPL-CCNC: 18 U/L (ref 1–32)
BASOPHILS # BLD AUTO: 0.1 10*3/MM3 (ref 0–0.2)
BASOPHILS NFR BLD AUTO: 0.8 % (ref 0–1.5)
BILIRUB SERPL-MCNC: 0.3 MG/DL (ref 0–1.2)
BUN SERPL-MCNC: 21 MG/DL (ref 8–23)
BUN/CREAT SERPL: 18.6 (ref 7–25)
CALCIUM SPEC-SCNC: 9 MG/DL (ref 8.6–10.5)
CHLORIDE SERPL-SCNC: 103 MMOL/L (ref 98–107)
CO2 SERPL-SCNC: 24 MMOL/L (ref 22–29)
CREAT SERPL-MCNC: 1.13 MG/DL (ref 0.57–1)
DEPRECATED RDW RBC AUTO: 46.8 FL (ref 37–54)
EGFRCR SERPLBLD CKD-EPI 2021: 50.2 ML/MIN/1.73
EOSINOPHIL # BLD AUTO: 0.1 10*3/MM3 (ref 0–0.4)
EOSINOPHIL NFR BLD AUTO: 1.5 % (ref 0.3–6.2)
ERYTHROCYTE [DISTWIDTH] IN BLOOD BY AUTOMATED COUNT: 14.3 % (ref 12.3–15.4)
GLOBULIN UR ELPH-MCNC: 2.5 GM/DL
GLUCOSE SERPL-MCNC: 113 MG/DL (ref 65–99)
HCT VFR BLD AUTO: 42.9 % (ref 34–46.6)
HGB BLD-MCNC: 13.9 G/DL (ref 12–15.9)
LYMPHOCYTES # BLD AUTO: 2.4 10*3/MM3 (ref 0.7–3.1)
LYMPHOCYTES NFR BLD AUTO: 34.5 % (ref 19.6–45.3)
MCH RBC QN AUTO: 30.6 PG (ref 26.6–33)
MCHC RBC AUTO-ENTMCNC: 32.3 G/DL (ref 31.5–35.7)
MCV RBC AUTO: 94.5 FL (ref 79–97)
MONOCYTES # BLD AUTO: 0.6 10*3/MM3 (ref 0.1–0.9)
MONOCYTES NFR BLD AUTO: 8.2 % (ref 5–12)
NEUTROPHILS NFR BLD AUTO: 3.8 10*3/MM3 (ref 1.7–7)
NEUTROPHILS NFR BLD AUTO: 55 % (ref 42.7–76)
NRBC BLD AUTO-RTO: 0.1 /100 WBC (ref 0–0.2)
PLATELET # BLD AUTO: 184 10*3/MM3 (ref 140–450)
PMV BLD AUTO: 10.4 FL (ref 6–12)
POTASSIUM SERPL-SCNC: 4.3 MMOL/L (ref 3.5–5.2)
PROT SERPL-MCNC: 6.5 G/DL (ref 6–8.5)
RBC # BLD AUTO: 4.53 10*6/MM3 (ref 3.77–5.28)
SODIUM SERPL-SCNC: 138 MMOL/L (ref 136–145)
WBC NRBC COR # BLD: 6.8 10*3/MM3 (ref 3.4–10.8)

## 2023-03-29 PROCEDURE — 85025 COMPLETE CBC W/AUTO DIFF WBC: CPT | Performed by: NURSE PRACTITIONER

## 2023-03-29 PROCEDURE — 80053 COMPREHEN METABOLIC PANEL: CPT | Performed by: NURSE PRACTITIONER

## 2023-03-29 PROCEDURE — 99285 EMERGENCY DEPT VISIT HI MDM: CPT

## 2023-03-29 RX ORDER — SODIUM CHLORIDE 0.9 % (FLUSH) 0.9 %
10 SYRINGE (ML) INJECTION AS NEEDED
Status: DISCONTINUED | OUTPATIENT
Start: 2023-03-29 | End: 2023-03-31 | Stop reason: HOSPADM

## 2023-03-29 NOTE — Clinical Note
Level of Care: Med/Surg [1]   Admitting Physician: RAFAEL GIVENS [[id number]]   Attending Physician: RAFAEL GIVENS [[id number]]

## 2023-03-30 ENCOUNTER — ON CAMPUS - OUTPATIENT (AMBULATORY)
Dept: URBAN - METROPOLITAN AREA HOSPITAL 85 | Facility: HOSPITAL | Age: 78
End: 2023-03-30
Payer: MEDICARE

## 2023-03-30 ENCOUNTER — ANESTHESIA EVENT (OUTPATIENT)
Dept: GASTROENTEROLOGY | Facility: HOSPITAL | Age: 78
End: 2023-03-30
Payer: MEDICARE

## 2023-03-30 ENCOUNTER — ANESTHESIA (OUTPATIENT)
Dept: GASTROENTEROLOGY | Facility: HOSPITAL | Age: 78
End: 2023-03-30
Payer: MEDICARE

## 2023-03-30 ENCOUNTER — APPOINTMENT (OUTPATIENT)
Dept: CT IMAGING | Facility: HOSPITAL | Age: 78
End: 2023-03-30
Payer: MEDICARE

## 2023-03-30 DIAGNOSIS — K55.039 ACUTE (REVERSIBLE) ISCHEMIA OF LARGE INTESTINE, EXTENT UNSPE: ICD-10-CM

## 2023-03-30 DIAGNOSIS — R93.3 ABNORMAL FINDINGS ON DIAGNOSTIC IMAGING OF OTHER PARTS OF DI: ICD-10-CM

## 2023-03-30 DIAGNOSIS — K92.1 MELENA: ICD-10-CM

## 2023-03-30 DIAGNOSIS — K52.9 NONINFECTIVE GASTROENTERITIS AND COLITIS, UNSPECIFIED: ICD-10-CM

## 2023-03-30 PROBLEM — K92.2 GASTROINTESTINAL HEMORRHAGE, UNSPECIFIED GASTROINTESTINAL HEMORRHAGE TYPE: Status: ACTIVE | Noted: 2023-03-30

## 2023-03-30 PROBLEM — K62.5 RECTAL BLEEDING: Status: ACTIVE | Noted: 2023-03-30

## 2023-03-30 PROBLEM — E03.9 HYPOTHYROIDISM (ACQUIRED): Status: ACTIVE | Noted: 2023-03-30

## 2023-03-30 PROBLEM — N28.9 ACUTE RENAL INSUFFICIENCY: Status: ACTIVE | Noted: 2023-03-30

## 2023-03-30 PROBLEM — N39.0 ACUTE UTI (URINARY TRACT INFECTION): Status: ACTIVE | Noted: 2023-03-30

## 2023-03-30 LAB
ABO GROUP BLD: NORMAL
BACTERIA UR QL AUTO: ABNORMAL /HPF
BILIRUB UR QL STRIP: NEGATIVE
BLD GP AB SCN SERPL QL: NEGATIVE
CLARITY UR: ABNORMAL
COLOR UR: YELLOW
GLUCOSE UR STRIP-MCNC: NEGATIVE MG/DL
HGB UR QL STRIP.AUTO: ABNORMAL
HYALINE CASTS UR QL AUTO: ABNORMAL /LPF
KETONES UR QL STRIP: NEGATIVE
LEUKOCYTE ESTERASE UR QL STRIP.AUTO: ABNORMAL
NITRITE UR QL STRIP: POSITIVE
PH UR STRIP.AUTO: 6.5 [PH] (ref 5–8)
PROT UR QL STRIP: ABNORMAL
QT INTERVAL: 553 MS
RBC # UR STRIP: ABNORMAL /HPF
REF LAB TEST METHOD: ABNORMAL
RH BLD: POSITIVE
SP GR UR STRIP: 1.07 (ref 1–1.03)
SQUAMOUS #/AREA URNS HPF: ABNORMAL /HPF
T&S EXPIRATION DATE: NORMAL
UROBILINOGEN UR QL STRIP: ABNORMAL
WBC # UR STRIP: ABNORMAL /HPF
WBC CLUMPS # UR AUTO: ABNORMAL /HPF

## 2023-03-30 PROCEDURE — G0378 HOSPITAL OBSERVATION PER HR: HCPCS

## 2023-03-30 PROCEDURE — 74177 CT ABD & PELVIS W/CONTRAST: CPT

## 2023-03-30 PROCEDURE — 63710000001 POLYETHYLENE GLYCOL 17 G PACK: Performed by: INTERNAL MEDICINE

## 2023-03-30 PROCEDURE — 63710000001 PSYLLIUM 58.12 % PACK: Performed by: INTERNAL MEDICINE

## 2023-03-30 PROCEDURE — 96365 THER/PROPH/DIAG IV INF INIT: CPT

## 2023-03-30 PROCEDURE — 88305 TISSUE EXAM BY PATHOLOGIST: CPT | Performed by: INTERNAL MEDICINE

## 2023-03-30 PROCEDURE — 86901 BLOOD TYPING SEROLOGIC RH(D): CPT | Performed by: NURSE PRACTITIONER

## 2023-03-30 PROCEDURE — 25010000002 CEFTRIAXONE PER 250 MG: Performed by: NURSE PRACTITIONER

## 2023-03-30 PROCEDURE — 36415 COLL VENOUS BLD VENIPUNCTURE: CPT | Performed by: STUDENT IN AN ORGANIZED HEALTH CARE EDUCATION/TRAINING PROGRAM

## 2023-03-30 PROCEDURE — 86901 BLOOD TYPING SEROLOGIC RH(D): CPT

## 2023-03-30 PROCEDURE — 25010000002 LORAZEPAM PER 2 MG: Performed by: INTERNAL MEDICINE

## 2023-03-30 PROCEDURE — 87102 FUNGUS ISOLATION CULTURE: CPT | Performed by: INTERNAL MEDICINE

## 2023-03-30 PROCEDURE — 25510000001 IOPAMIDOL PER 1 ML: Performed by: EMERGENCY MEDICINE

## 2023-03-30 PROCEDURE — 86900 BLOOD TYPING SEROLOGIC ABO: CPT | Performed by: NURSE PRACTITIONER

## 2023-03-30 PROCEDURE — 87186 SC STD MICRODIL/AGAR DIL: CPT | Performed by: NURSE PRACTITIONER

## 2023-03-30 PROCEDURE — P9612 CATHETERIZE FOR URINE SPEC: HCPCS

## 2023-03-30 PROCEDURE — 43235 EGD DIAGNOSTIC BRUSH WASH: CPT | Performed by: INTERNAL MEDICINE

## 2023-03-30 PROCEDURE — 93005 ELECTROCARDIOGRAM TRACING: CPT | Performed by: NURSE PRACTITIONER

## 2023-03-30 PROCEDURE — 86900 BLOOD TYPING SEROLOGIC ABO: CPT

## 2023-03-30 PROCEDURE — 25010000002 PROPOFOL 200 MG/20ML EMULSION: Performed by: NURSE ANESTHETIST, CERTIFIED REGISTERED

## 2023-03-30 PROCEDURE — A9270 NON-COVERED ITEM OR SERVICE: HCPCS | Performed by: INTERNAL MEDICINE

## 2023-03-30 PROCEDURE — 81001 URINALYSIS AUTO W/SCOPE: CPT | Performed by: NURSE PRACTITIONER

## 2023-03-30 PROCEDURE — 87077 CULTURE AEROBIC IDENTIFY: CPT | Performed by: NURSE PRACTITIONER

## 2023-03-30 PROCEDURE — 45331 SIGMOIDOSCOPY AND BIOPSY: CPT | Performed by: INTERNAL MEDICINE

## 2023-03-30 PROCEDURE — 86850 RBC ANTIBODY SCREEN: CPT | Performed by: NURSE PRACTITIONER

## 2023-03-30 PROCEDURE — 87086 URINE CULTURE/COLONY COUNT: CPT | Performed by: NURSE PRACTITIONER

## 2023-03-30 RX ORDER — LIDOCAINE HYDROCHLORIDE 20 MG/ML
INJECTION, SOLUTION INFILTRATION; PERINEURAL AS NEEDED
Status: DISCONTINUED | OUTPATIENT
Start: 2023-03-30 | End: 2023-03-30 | Stop reason: SURG

## 2023-03-30 RX ORDER — SODIUM PHOSPHATE, DIBASIC AND SODIUM PHOSPHATE, MONOBASIC 7; 19 G/133ML; G/133ML
1 ENEMA RECTAL DAILY PRN
COMMUNITY

## 2023-03-30 RX ORDER — BISACODYL 10 MG
10 SUPPOSITORY, RECTAL RECTAL DAILY PRN
COMMUNITY

## 2023-03-30 RX ORDER — POLYETHYLENE GLYCOL 3350 17 G/17G
17 POWDER, FOR SOLUTION ORAL DAILY
Status: DISCONTINUED | OUTPATIENT
Start: 2023-03-30 | End: 2023-03-31 | Stop reason: HOSPADM

## 2023-03-30 RX ORDER — NYSTATIN 100000 U/G
1 CREAM TOPICAL 2 TIMES DAILY
COMMUNITY

## 2023-03-30 RX ORDER — LANOLIN ALCOHOL/MO/W.PET/CERES
1000 CREAM (GRAM) TOPICAL DAILY
COMMUNITY

## 2023-03-30 RX ORDER — METRONIDAZOLE 500 MG/100ML
500 INJECTION, SOLUTION INTRAVENOUS EVERY 8 HOURS
Status: DISCONTINUED | OUTPATIENT
Start: 2023-03-30 | End: 2023-03-30

## 2023-03-30 RX ORDER — PROPOFOL 10 MG/ML
INJECTION, EMULSION INTRAVENOUS AS NEEDED
Status: DISCONTINUED | OUTPATIENT
Start: 2023-03-30 | End: 2023-03-30 | Stop reason: SURG

## 2023-03-30 RX ORDER — SODIUM CHLORIDE 0.9 % (FLUSH) 0.9 %
3-10 SYRINGE (ML) INJECTION AS NEEDED
Status: DISCONTINUED | OUTPATIENT
Start: 2023-03-30 | End: 2023-03-31 | Stop reason: HOSPADM

## 2023-03-30 RX ORDER — SODIUM CHLORIDE 9 MG/ML
INJECTION, SOLUTION INTRAVENOUS CONTINUOUS PRN
Status: DISCONTINUED | OUTPATIENT
Start: 2023-03-30 | End: 2023-03-30 | Stop reason: SURG

## 2023-03-30 RX ORDER — SODIUM CHLORIDE 0.9 % (FLUSH) 0.9 %
3 SYRINGE (ML) INJECTION EVERY 12 HOURS SCHEDULED
Status: DISCONTINUED | OUTPATIENT
Start: 2023-03-30 | End: 2023-03-31 | Stop reason: HOSPADM

## 2023-03-30 RX ORDER — SODIUM CHLORIDE 9 MG/ML
40 INJECTION, SOLUTION INTRAVENOUS AS NEEDED
Status: DISCONTINUED | OUTPATIENT
Start: 2023-03-30 | End: 2023-03-31 | Stop reason: HOSPADM

## 2023-03-30 RX ORDER — PHENOL 1.4 %
10 AEROSOL, SPRAY (ML) MUCOUS MEMBRANE DAILY
COMMUNITY

## 2023-03-30 RX ORDER — LORAZEPAM 2 MG/ML
1 INJECTION INTRAMUSCULAR ONCE
Status: COMPLETED | OUTPATIENT
Start: 2023-03-30 | End: 2023-03-30

## 2023-03-30 RX ORDER — METRONIDAZOLE 500 MG/100ML
500 INJECTION, SOLUTION INTRAVENOUS ONCE
Status: COMPLETED | OUTPATIENT
Start: 2023-03-30 | End: 2023-03-30

## 2023-03-30 RX ADMIN — PSYLLIUM HUSK 1 PACKET: 3.4 POWDER ORAL at 16:41

## 2023-03-30 RX ADMIN — PROPOFOL 50 MG: 10 INJECTION, EMULSION INTRAVENOUS at 15:13

## 2023-03-30 RX ADMIN — IOPAMIDOL 100 ML: 755 INJECTION, SOLUTION INTRAVENOUS at 00:30

## 2023-03-30 RX ADMIN — PROPOFOL 25 MG: 10 INJECTION, EMULSION INTRAVENOUS at 15:16

## 2023-03-30 RX ADMIN — METRONIDAZOLE 500 MG: 500 INJECTION, SOLUTION INTRAVENOUS at 02:54

## 2023-03-30 RX ADMIN — SODIUM CHLORIDE: 0.9 INJECTION, SOLUTION INTRAVENOUS at 15:11

## 2023-03-30 RX ADMIN — LORAZEPAM 1 MG: 2 INJECTION INTRAMUSCULAR; INTRAVENOUS at 17:04

## 2023-03-30 RX ADMIN — PROPOFOL 25 MG: 10 INJECTION, EMULSION INTRAVENOUS at 15:22

## 2023-03-30 RX ADMIN — LIDOCAINE HYDROCHLORIDE 100 MG: 20 INJECTION, SOLUTION INFILTRATION; PERINEURAL at 15:13

## 2023-03-30 RX ADMIN — CEFTRIAXONE 2 G: 2 INJECTION, POWDER, FOR SOLUTION INTRAMUSCULAR; INTRAVENOUS at 04:55

## 2023-03-30 RX ADMIN — POLYETHYLENE GLYCOL 3350 17 G: 17 POWDER, FOR SOLUTION ORAL at 16:41

## 2023-03-30 RX ADMIN — METRONIDAZOLE 500 MG: 500 INJECTION, SOLUTION INTRAVENOUS at 16:43

## 2023-03-30 RX ADMIN — PROPOFOL 50 MG: 10 INJECTION, EMULSION INTRAVENOUS at 15:14

## 2023-03-30 RX ADMIN — PROPOFOL 20 MG: 10 INJECTION, EMULSION INTRAVENOUS at 15:20

## 2023-03-30 RX ADMIN — PROPOFOL 50 MG: 10 INJECTION, EMULSION INTRAVENOUS at 15:18

## 2023-03-30 RX ADMIN — METRONIDAZOLE 500 MG: 500 INJECTION, SOLUTION INTRAVENOUS at 10:39

## 2023-03-30 NOTE — PROGRESS NOTES
[facility] Medicine Services   Daily Progress Note    Patient Name: Day Cabrera  : 1945  MRN: [id number]  Primary Care Physician:  Smita Howard APRN  Date of admission: 3/29/2023  Date and Time of Service: 2023 at 0900 hrs.      Subjective      Chief Complaint: Rectal bleeding    Patient seen and examined in room.  Patient awake and alert with periods of confusion, currently unaware of whether or not she continues to have bloody stools.  Nursing staff reports no acute events.  Patient pending an EGD and possibly colonoscopy as well by GI.    ROS   All systems reviewed and negative unless otherwise indicated above.    Objective      Vitals:   Temp:  [97.6 °F (36.4 °C)-98.2 °F (36.8 °C)] 97.6 °F (36.4 °C)  Heart Rate:  [69-71] 69  Resp:  [13-18] 13  BP: (107-129)/(47-55) 128/55    Physical Exam  Vitals and nursing note reviewed.   Constitutional:       Appearance: Normal appearance. She is obese.   HENT:      Head: Normocephalic and atraumatic.      Comments: Awake and confused     Nose: Nose normal.      Mouth/Throat:      Mouth: Mucous membranes are moist.      Pharynx: Oropharynx is clear.   Eyes:      Extraocular Movements: Extraocular movements intact.      Conjunctiva/sclera: Conjunctivae normal.      Pupils: Pupils are equal, round, and reactive to light.   Cardiovascular:      Rate and Rhythm: Normal rate and regular rhythm.      Pulses: Normal pulses.      Heart sounds: Normal heart sounds.   Pulmonary:      Effort: Pulmonary effort is normal.      Breath sounds: Normal breath sounds.   Abdominal:      General: Abdomen is flat. Bowel sounds are normal.      Palpations: Abdomen is soft.   Musculoskeletal:         General: Normal range of motion.      Cervical back: Normal range of motion and neck supple.      Comments: No rectal bleeding this morning   Skin:     General: Skin is warm and dry.      Capillary Refill: Capillary refill takes less than 2 seconds.   Neurological:  "     Mental Status: She is disoriented.   Psychiatric:         Mood and Affect: Mood normal.              Result Review    Result Review:  I have personally reviewed the results from the time of this admission to 3/30/2023 10:35 EDT and agree with these findings:  [x]  Laboratory  []  Microbiology  [x]  Radiology  []  EKG/Telemetry   []  Cardiology/Vascular   []  Pathology  []  Old records  []  Other:  Most notable findings include:           Assessment & Plan      Brief Patient Summary:  Day Cabrera is a 77 y.o. female with a past medical history of dementia, chronic anxiety, atrial fibrillation, pulmonary embolism on chronic anticoagulation, who presented to [facility] from [facility] on 3/29/2023 complaining of blood in stool.  She apparently was brought in by daughter who is no longer at bedside.  Patient is awake and alert and appears in no distress.  She is mildly argumentative.  She denies any shortness of air, palpitations dizziness or abdominal pain.  Labs today show a glucose of 113 creatinine 1.13 WBC 6.8 hemoglobin 13.9, urinalysis shows 3+ blood positive nitrates moderate leukocytes too numerous to count WBCs and 4+ bacteria.  CT abdomen pelvis without contrast per radiology:     1. Distal colorectal wall thickening and mucosal hyperenhancement could reflect infectious or inflammatory colitis.  2. Urinary bladder wall thickening. Correlate for UTI or cystitis.  \"     She was started on IV Flagyl in the ED as well as IV ceftriaxone and gastroenterology has been consulted.  Patient admitted for further evaluation and treatment.       cefTRIAXone, 2 g, Intravenous, Q24H  metroNIDAZOLE, 500 mg, Intravenous, Q8H             Active Hospital Problems:  Active Hospital Problems    Diagnosis    • **Gastrointestinal hemorrhage, unspecified gastrointestinal hemorrhage type    • Rectal bleeding    • Acute renal insufficiency    • Acute UTI (urinary tract infection)    • Hypothyroidism " (acquired)    • Colitis    • Atrial fibrillation (HCC)    • History of pulmonary embolus (PE)    • Arthritis    • Dementia with behavioral disturbance    • Chronic anxiety    • Hypertension    • Obesity    • Memory impairment      Plan:   Rectal bleeding  -hematocrit stable likely secondary to infectious and or inflammatory colitis per CT report  -continue IV Flagyl for now gastroenterology consulted repeat CBC tomorrow  -GI consulted and following: Patient pending an EGD today and possibly a colonoscopy as well     Acute renal insufficiency   -creatinine 1.13>1  -normal saline at 100 cc/h   -trend renal function   -avoid nephrotoxic meds     Acute UTI   -4+ bacteria too numerous to count WBCs positive nitrates positive leukocytes  -1 g IV ceftriaxone until urine culture resulted     Atrial fibrillation/history of a pulmonary embolus  -was on home apixaban meds, hold for rectal bleeding   -EKG     Arthritis   -home meds verified this time reorder pending verification pharmacy     Dementia with behavioral disturbance   -stable on home Aricept  -Depakote and risperidone and mirtazapine reorder pending verification     Chronic anxiety   -was on home Prozac, Depakote reorder pending verification     Hypertension   -was on home Lasix reorder pending verification and if appropriate   -given renal function monitor BP will add as needed Hampe to hypertensives if needed     Hypothyroidism   -was on home Synthroid reorder pending verification     Obesity   -BMI 34.33 lifestyle management education     Memory impairment   -noted     DVT prophylaxis:  Mechanical DVT prophylaxis orders are present.    CODE STATUS:    Code Status (Patient has no pulse and is not breathing): CPR (Attempt to Resuscitate)  Medical Interventions (Patient has pulse or is breathing): Full Support      Disposition:  I expect patient to be discharged 48 to 72 hours.      Electronically signed by Derek Claire Jr, [place], 03/30/23, 10:35 EDT.  Billie Collier  Hospitalist Team

## 2023-03-30 NOTE — CONSULTS
"GI CONSULT  NOTE:    Referring Provider:  Dr. Schaeffer    Chief complaint: Rectal bleeding    Subjective . \"  I think I was having bleeding\"    History of present illness: Day Cabrera is a 77 y.o. female who has a history of dementia, anxiety, A-fib/pulmonary emboli on Eliquis and cholecystectomy.  She presents from the nursing facility with rectal bleeding.  Unfortunately, she is a poor historian and unable to give much history.  Per bedside RN, she was reported dark bloody bowel movements and no other information was provided.  No family currently at bedside.  Patient denies any nausea, vomiting or heartburn.  No abdominal pain    Endo History:  2012 colonoscopy by Dr. Cantu -adenomatous polyps, hemorrhoids    Past Medical History:  Past Medical History:   Diagnosis Date   • Anxiety    • Arthritis    • Chest pain 10/15/2019   • Dementia (HCC)    • Hypertension    • Pulmonary embolus (HCC)        Past Surgical History:  Past Surgical History:   Procedure Laterality Date   • CHOLECYSTECTOMY     • COLONOSCOPY     • HYSTERECTOMY      total        Social History:  Social History     Tobacco Use   • Smoking status: Former     Years: 30.00     Types: Cigarettes     Quit date:      Years since quittin.2   • Smokeless tobacco: Never   Substance Use Topics   • Alcohol use: No   • Drug use: No       Family History:  Family History   Problem Relation Age of Onset   • Heart disease Mother    • Alcohol abuse Father         murdered    • Pancreatic cancer Sister    • No Known Problems Half-Sister        Medications:  (Not in a hospital admission)      Scheduled Meds:cefTRIAXone, 2 g, Intravenous, Q24H  metroNIDAZOLE, 500 mg, Intravenous, Q8H      Continuous Infusions:   PRN Meds:.•  [COMPLETED] Insert Peripheral IV **AND** sodium chloride  •  sodium chloride    ALLERGIES:  Ciprofloxacin    ROS:  Unable to fully assess secondary to underlying dementia    Objective Resting in bed, no acute distress, nurse in " "room    Vital Signs:   Vitals:    03/29/23 2152 03/30/23 0046 03/30/23 0316 03/30/23 0800   BP:  109/47 129/55 128/55   BP Location:    Right arm   Patient Position:  Lying Lying Lying   Pulse:  70 69    Resp:  18 18 13   Temp:    97.6 °F (36.4 °C)   TempSrc:    Axillary   SpO2:  99% 99% 98%   Weight: 90.7 kg (200 lb)      Height: 162.6 cm (64\")          Physical Exam:       General Appearance:    Awake and alert, in no acute distress although confused   Head:    Normocephalic, without obvious abnormality, atraumatic   Throat:   No oral lesions, no thrush, oral mucosa moist   Lungs:     Respirations regular, even and unlabored   Chest Wall:    No abnormalities observed   Abdomen:     Soft, non-tender, nondistended   Rectal:     Deferred   Extremities:   Moves all extremities,  no cyanosis       Skin:   No rash, no jaundice, normal palpation       Neurologic:  Alert but confused       Results Review:   I reviewed the patient's labs and imaging.  CBC    Results from last 7 days   Lab Units 03/29/23  2305   WBC 10*3/mm3 6.80   HEMOGLOBIN g/dL 13.9   PLATELETS 10*3/mm3 184     CMP   Results from last 7 days   Lab Units 03/29/23  2305   SODIUM mmol/L 138   POTASSIUM mmol/L 4.3   CHLORIDE mmol/L 103   CO2 mmol/L 24.0   BUN mg/dL 21   CREATININE mg/dL 1.13*   GLUCOSE mg/dL 113*   ALBUMIN g/dL 4.0   BILIRUBIN mg/dL 0.3   ALK PHOS U/L 77   AST (SGOT) U/L 18   ALT (SGPT) U/L 11     Cr Clearance Estimated Creatinine Clearance: 45.5 mL/min (A) (by C-G formula based on SCr of 1.13 mg/dL (H)).  Coag     HbA1C No results found for: HGBA1C  Blood Glucose No results found for: POCGLU  Infection     UA    Results from last 7 days   Lab Units 03/30/23  0230   NITRITE UA  Positive*   WBC UA /HPF Too Numerous to Count*   BACTERIA UA /HPF 4+*   SQUAM EPITHEL UA /HPF 3-6*     Radiology(recent) CT Abdomen Pelvis With Contrast    Result Date: 3/30/2023  Impression: 1. Distal colorectal wall thickening and mucosal hyperenhancement could " reflect infectious or inflammatory colitis. 2. Urinary bladder wall thickening. Correlate for UTI or cystitis. Electronically signed by:  Balbir Molina M.D.  3/29/2023 11:54 PM Mountain Time         ASSESSMENT:  GI bleed  Abnormal CT of the colon  UTI  History of adenomatous polyps -2012  History of pulmonary emboli/A-fib -Eliquis  Dementia    PLAN:  Patient is a 77-year-old female with a history of dementia who presents from the nursing facility with GI bleeding.  Nurse reports possible black bowel movements but there is some question about some more wine or red-colored output as well.  CT suggest distal colonic thickening with hyperenhancement and she has a history of adenomatous polyps in 2012.  Unfortunately, patient is unable provide any history.  On Eliquis at home.  We will plan EGD to rule out upper GI source of possible melena as well as flexible sigmoidoscopy for evaluation of abnormality seen on CT.  Full colonoscopy can be considered if EGD flexible sigmoidoscopy is unremarkable.  Continue to monitor hemoglobin.  On Rocephin for likely UTI.  We will follow.    I discussed the patients findings and my recommendations with the patient.    We appreciate the referral    Electronically signed by CHARLOTTE Hooks, 03/30/23, 10:00 AM EDT.

## 2023-03-30 NOTE — NURSING NOTE
Nursing report ED to floor  Day Cabrera  77 y.o.  female    HPI:   Chief Complaint   Patient presents with   • Black or Bloody Stool       Admitting doctor:   Suresh Parker DO    Admitting diagnosis:   The primary encounter diagnosis was Gastrointestinal hemorrhage, unspecified gastrointestinal hemorrhage type. A diagnosis of Colitis was also pertinent to this visit.    Code status:   Current Code Status     Date Active Code Status Order ID Comments User Context       3/30/2023 0220 CPR (Attempt to Resuscitate) [id number]  Laurita Bergeron APRN ED      Question Answer    Code Status (Patient has no pulse and is not breathing) CPR (Attempt to Resuscitate)    Medical Interventions (Patient has pulse or is breathing) Full Support                Allergies:   Ciprofloxacin    Isolation:  Contact     Fall Risk:  Fall Risk Assessment was completed, and patient is at high risk for falls.   Predictive Model Details         29 (Low) Factor Value    Calculated 3/30/2023 10:15 Age 77    Risk of Fall Model [place] Coma Scale 14     Musculoskeletal Assessment WDL     Active Peripheral IV Present     Imaging order in this encounter Present     Skin Assessment WDL     Financial Class Other     Diastolic BP 55     Magnesium not on file     Number of Distinct Medication Classes administered 4     Drug Use No     Tobacco Use Quit     Rhys Scale not on file     Peripheral Vascular Assessment WDL     Calcium 9 mg/dL     Creatinine 1.13 mg/dL     Respiratory Rate 13     Gastrointestinal Assessment WDL     Days after Admission 0.521     Albumin 4 g/dL     Cardiac Assessment WDL     Chloride 103 mmol/L     Potassium 4.3 mmol/L     ALT 11 U/L     Total Bilirubin 0.3 mg/dL         Weight:       03/29/23 2152   Weight: 90.7 kg (200 lb)       Intake and Output  No intake or output data in the 24 hours ending 03/30/23 1047    Diet:   Dietary Orders (From admission, onward)     Start     Ordered    03/30/23 0909  NPO Diet NPO Type:  "Strict NPO  Diet Effective Now        Question:  NPO Type  Answer:  Strict NPO    03/30/23 0908                 Most recent vitals:   Vitals:    03/29/23 2152 03/30/23 0046 03/30/23 0316 03/30/23 0800   BP:  109/47 129/55 128/55   BP Location:    Right arm   Patient Position:  Lying Lying Lying   Pulse:  70 69    Resp:  18 18 13   Temp:    97.6 °F (36.4 °C)   TempSrc:    Axillary   SpO2:  99% 99% 98%   Weight: 90.7 kg (200 lb)      Height: 162.6 cm (64\")          Active LDAs/IV Access:   Lines, Drains & Airways     Active LDAs     Name Placement date Placement time Site Days    Peripheral IV 03/29/23 2322 Anterior;Left Forearm 03/29/23  2322  Forearm  less than 1    Peripheral IV 03/30/23 1038 Anterior;Right Forearm 03/30/23  1038  Forearm  less than 1                Skin Condition:   Skin Assessments (last day)     None           Labs (abnormal labs have a star):   Labs Reviewed   COMPREHENSIVE METABOLIC PANEL - Abnormal; Notable for the following components:       Result Value    Glucose 113 (*)     Creatinine 1.13 (*)     eGFR 50.2 (*)     All other components within normal limits    Narrative:     GFR Normal >60  Chronic Kidney Disease <60  Kidney Failure <15    The GFR formula is only valid for adults with stable renal function between ages 18 and 70.   URINALYSIS W/ CULTURE IF INDICATED - Abnormal; Notable for the following components:    Appearance, UA Cloudy (*)     Specific Gravity, UA 1.072 (*)     Blood, UA Large (3+) (*)     Protein, UA 30 mg/dL (1+) (*)     Leuk Esterase, UA Moderate (2+) (*)     Nitrite, UA Positive (*)     All other components within normal limits    Narrative:     In absence of clinical symptoms, the presence of pyuria, bacteria, and/or nitrites on the urinalysis result does not correlate with infection.   URINALYSIS, MICROSCOPIC ONLY - Abnormal; Notable for the following components:    RBC, UA 31-50 (*)     WBC, UA Too Numerous to Count (*)     Bacteria, UA 4+ (*)     Squamous " Epithelial Cells, UA 3-6 (*)     All other components within normal limits   CBC WITH AUTO DIFFERENTIAL - Normal   URINE CULTURE   TYPE AND SCREEN   BB ARMBAND CHECK   CBC AND DIFFERENTIAL    Narrative:     The following orders were created for panel order CBC & Differential.  Procedure                               Abnormality         Status                     ---------                               -----------         ------                     CBC Auto Differential[[id number]]        Normal              Final result                 Please view results for these tests on the individual orders.       LOC: Person    Telemetry:  Med/Surg    Cardiac Monitoring Ordered: no    EKG:   ECG 12 Lead Other; HX atrial fib    (Results Pending)       Medications Given in the ED:   Medications   sodium chloride 0.9 % flush 10 mL (has no administration in time range)   metroNIDAZOLE (FLAGYL) IVPB 500 mg (500 mg Intravenous New Bag 3/30/23 1039)   cefTRIAXone (ROCEPHIN) 2 g in sodium chloride 0.9 % 100 mL IVPB (2 g Intravenous New Bag 3/30/23 0455)   sodium chloride 0.9 % infusion 40 mL (has no administration in time range)   iopamidol (ISOVUE-370) 76 % injection 100 mL (100 mL Intravenous Given 3/30/23 0030)   metroNIDAZOLE (FLAGYL) IVPB 500 mg (0 mg Intravenous Stopped 3/30/23 0324)       Imaging results:  CT Abdomen Pelvis With Contrast    Result Date: 3/30/2023  Impression: 1. Distal colorectal wall thickening and mucosal hyperenhancement could reflect infectious or inflammatory colitis. 2. Urinary bladder wall thickening. Correlate for UTI or cystitis. Electronically signed by:  Balbir Molina M.D.  3/29/2023 11:54 PM Mountain Time      Social issues:   Social History     Socioeconomic History   • Marital status:    • Number of children: 3   Tobacco Use   • Smoking status: Former     Years: 30.00     Types: Cigarettes     Quit date:      Years since quittin.2   • Smokeless tobacco: Never   Substance and Sexual  Activity   • Alcohol use: No   • Drug use: No   • Sexual activity: Defer       NIH Stroke Scale:  Interval: (not recorded)  1a. Level of Consciousness: (not recorded)  1b. LOC Questions: (not recorded)  1c. LOC Commands: (not recorded)  2. Best Gaze: (not recorded)  3. Visual: (not recorded)  4. Facial Palsy: (not recorded)  5a. Motor Arm, Left: (not recorded)  5b. Motor Arm, Right: (not recorded)  6a. Motor Leg, Left: (not recorded)  6b. Motor Leg, Right: (not recorded)  7. Limb Ataxia: (not recorded)  8. Sensory: (not recorded)  9. Best Language: (not recorded)  10. Dysarthria: (not recorded)  11. Extinction and Inattention (formerly Neglect): (not recorded)    Total (NIH Stroke Scale): (not recorded)     Additional notable assessment information: EGD with sigmoidoscopy and possible colonoscopy today      Nursing report ED to floor:  HARDIK Bullard RN   03/30/23 10:47 EDT

## 2023-03-30 NOTE — OP NOTE
SIGMOIDOSCOPY, ESOPHAGOGASTRODUODENOSCOPY  Procedure Report    Patient Name:  Day Cabrera  YOB: 1945    Date of Surgery:  3/30/2023     Indications: GI bleed, reported melena, abnormal CT of the colon    Pre-op Diagnosis:   Gastrointestinal hemorrhage, unspecified gastrointestinal hemorrhage type [K92.2]  Colitis [K52.9]         Post-op Diagnosis:  Normal upper GI endoscopy  Flexible sigmoidoscopy to 55 cm with acute ischemic colitis extending from 10 cm above the dentate line to 25 cm status post biopsy        Procedure(s):  SIGMOIDOSCOPY with biopsy  ESOPHAGOGASTRODUODENOSCOPY    Staff:  Surgeon(s):  Contreras Watts MD         Anesthesia: Monitored Anesthesia Care    Estimated Blood Loss: None  Implants:    Nothing was implanted during the procedure    Specimen:          Sigmoid biopsy    Complications:  No immediate    Description of Procedure:  Informed consent was obtained from the patient.  They were placed in the left lateral decubitus position and IV conscious sedation was administered by anesthesia while being monitored continuously throughout the procedure.    Procedure 1 EGD: The video Olympus endoscope was introduced into the esophagus and was advanced under direct vision to the second portion of the duodenum which was normal.  The duodenal bulb was normal.  The pylorus is patent.  There is no ulcer disease or bleeding lesions to account for the melena reported.  Antrum body fundus and cardia were normal including retroflexion views with no gastric mucosal lesion seen.  The squamocolumnar lines at the GE junction and there is no evidence of erosive esophagitis.  The remaining esophagus was normal.  The scope was removed she tolerated the procedure well    Procedure #2 flexible sigmoidoscopy: Digital rectal exam was performed revealing no external hemorrhoid, fissure or fistula.  Digital exam of the anal canal rectal vault was unremarkable.  The video Olympus endoscope was  utilized for flexible sigmoidoscopy was introduced into the rectum and advanced to 55 cm the prep was excellent but at 55 cm we encountered more solid stool on slow withdrawal close circumferential colonic mucosal inspection was performed revealing acute ischemic colitis beginning 25 cm from the dentate line and extending to 10 cm from the dentate line.  It was not actively bleeding.  It is moderate in severity.  Biopsies were obtained to confirm the diagnosis.  Grade 2 internal hemorrhoids were found.  The scope was removed she tolerated the procedure well and returned to recovery stable condition.    Impression:  Normal EGD  Acute ischemic colitis    Recommendations:  High-fiber diet  Benefiber 1 to 2 tablespoons daily  MiraLAX 1 capful in 8 ounces water daily  I suspect constipation is her greatest risk factor for ischemic colitis.  If she has other risk factors such as hypertension, diabetes, and hyperlipidemia they should be addressed by primary care.  She may be discharged back to the nursing home tomorrow morning if she tolerates p.o. diet without any postop complication.  Call GI for any other questions, we will see as needed      Contreras Watts MD     Date: 3/30/2023  Time: 15:58 EDT

## 2023-03-30 NOTE — ED TRIAGE NOTES
Patient to ed from [facility], per facility patient has had blood in BM. Patient is on blood thinners.

## 2023-03-30 NOTE — PLAN OF CARE
Problem: Skin Injury Risk Increased  Goal: Skin Health and Integrity  Intervention: Optimize Skin Protection  Recent Flowsheet Documentation  Taken 3/30/2023 1828 by Aleah Soto RN  Head of Bed (HOB) Positioning: HOB elevated  Taken 3/30/2023 1230 by Aleah Soto RN  Pressure Reduction Techniques: weight shift assistance provided  Pressure Reduction Devices: pressure-redistributing mattress utilized     Problem: Fall Injury Risk  Goal: Absence of Fall and Fall-Related Injury  Intervention: Identify and Manage Contributors  Recent Flowsheet Documentation  Taken 3/30/2023 1828 by Aleah Soto RN  Medication Review/Management: medications reviewed  Taken 3/30/2023 1230 by Aleah Soto RN  Medication Review/Management: medications reviewed  Intervention: Promote Injury-Free Environment  Recent Flowsheet Documentation  Taken 3/30/2023 1828 by Aleah Soto RN  Safety Promotion/Fall Prevention:   assistive device/personal items within reach   clutter free environment maintained   fall prevention program maintained   lighting adjusted   nonskid shoes/slippers when out of bed   room organization consistent   safety round/check completed  Taken 3/30/2023 1604 by Aleah Soto RN  Safety Promotion/Fall Prevention: patient off unit  Taken 3/30/2023 1230 by Aleah Soto RN  Safety Promotion/Fall Prevention:   clutter free environment maintained   assistive device/personal items within reach   fall prevention program maintained   lighting adjusted   nonskid shoes/slippers when out of bed   room organization consistent   safety round/check completed  Taken 3/30/2023 1220 by Aleah Soto RN  Safety Promotion/Fall Prevention: patient off unit     Problem: Hypertension Comorbidity  Goal: Blood Pressure in Desired Range  Intervention: Maintain Blood Pressure Management  Recent Flowsheet Documentation  Taken 3/30/2023 1828 by Aleah Soto RN  Medication Review/Management: medications reviewed  Taken 3/30/2023 1230 by Charles  MELITA Bullard  Medication Review/Management: medications reviewed   Goal Outcome Evaluation:

## 2023-03-30 NOTE — H&P
"    [facility]      Patient Name: Day Cabrera  : 1945  MRN: [id number]  Primary Care Physician:  Smita Howard APRN  Date of admission: 3/29/2023      Subjective      Chief Complaint: Rectal bleeding    History of Present Illness: Day Cabrera is a 77 y.o. female with a past medical history of dementia, chronic anxiety, atrial fibrillation, pulmonary embolism on chronic anticoagulation, who presented to [facility] from [facility] on 3/29/2023 complaining of blood in stool.  She apparently was brought in by daughter who is no longer at bedside.  Patient is awake and alert and appears in no distress.  She is mildly argumentative.  She denies any shortness of air, palpitations dizziness or abdominal pain.  Labs today show a glucose of 113 creatinine 1.13 WBC 6.8 hemoglobin 13.9, urinalysis shows 3+ blood positive nitrates moderate leukocytes too numerous to count WBCs and 4+ bacteria.  CT abdomen pelvis without contrast per radiology:    \"1. Distal colorectal wall thickening and mucosal hyperenhancement could reflect infectious or inflammatory colitis.  2. Urinary bladder wall thickening. Correlate for UTI or cystitis.  \"    She was started on IV Flagyl in the ED as well as IV ceftriaxone and gastroenterology has been consulted.  She will be admitted for further evaluation and treatment.      Review of Systems   Constitutional: Negative.   HENT: Negative.    Eyes: Negative.    Cardiovascular: Negative.    Respiratory: Negative.    Endocrine: Negative.    Hematologic/Lymphatic: Negative.    Skin: Negative.    Musculoskeletal: Negative.    Gastrointestinal: Positive for hematochezia.   Genitourinary: Negative.    Neurological: Negative.    Psychiatric/Behavioral: Positive for memory loss.   Allergic/Immunologic: Negative.    All other systems reviewed and are negative.      Personal History     Past Medical History:   Diagnosis Date   • " Anxiety    • Arthritis    • Chest pain 10/15/2019   • Dementia (HCC)    • Hypertension    • Pulmonary embolus (HCC)        Past Surgical History:   Procedure Laterality Date   • CHOLECYSTECTOMY     • COLONOSCOPY     • HYSTERECTOMY      total        Family History: family history includes Alcohol abuse in her father; Heart disease in her mother; No Known Problems in her half-sister; Pancreatic cancer in her sister. Otherwise pertinent FHx was reviewed and not pertinent to current issue.    Social History:  reports that she quit smoking about 39 years ago. Her smoking use included cigarettes. She has never used smokeless tobacco. She reports that she does not drink alcohol and does not use drugs.    Home Medications:  Prior to Admission Medications     Prescriptions Last Dose Informant Patient Reported? Taking?    acetaminophen (TYLENOL) 325 MG tablet   Yes No    Take 650 mg by mouth Every 6 (Six) Hours As Needed for Mild Pain .    amiodarone (PACERONE) 200 MG tablet   No No    Take 1 tablet by mouth Daily.    apixaban (ELIQUIS) 5 MG tablet tablet   No No    Take 1 tablet by mouth Every 12 (Twelve) Hours.    divalproex (DEPAKOTE) 125 MG DR tablet   Yes No    Take 125 mg by mouth 2 (Two) Times a Day.    donepezil (ARICEPT) 10 MG tablet   No No    Take 1 tablet by mouth Every Night.    FLUoxetine (PROzac) 10 MG capsule   Yes No    Take 30 mg by mouth Daily.    furosemide (LASIX) 20 MG tablet   Yes No    Take 20 mg by mouth Daily.    levothyroxine (SYNTHROID, LEVOTHROID) 25 MCG tablet   Yes No    Take 25 mcg by mouth Daily.    mirtazapine (REMERON) 7.5 MG half tablet   Yes No    Take 15 mg by mouth Every Night.    nitrofurantoin, macrocrystal-monohydrate, (Macrobid) 100 MG capsule   No No    Take 1 capsule by mouth 2 (Two) Times a Day.    nitroglycerin (NITROSTAT) 0.4 MG SL tablet   No No    Place 1 tablet under the tongue Every 5 (Five) Minutes As Needed for Chest Pain. Take no more than 3 doses in 15 minutes.     nystatin (MYCOSTATIN) 329501 UNIT/GM powder   Yes No    Apply 1 application topically to the appropriate area as directed 2 (Two) Times a Day.    pantoprazole (PROTONIX) 40 MG EC tablet   Yes No    Take 40 mg by mouth Daily.    potassium chloride (K-DUR,KLOR-CON) 10 MEQ CR tablet   Yes No    Take 30 mEq by mouth Daily.    risperiDONE (risperDAL) 0.5 MG tablet   Yes No    Take 0.5 mg by mouth Every Night.            Allergies:  Allergies   Allergen Reactions   • Ciprofloxacin Unknown (See Comments)       Objective      Vitals:   Temp:  [98.2 °F (36.8 °C)] 98.2 °F (36.8 °C)  Heart Rate:  [69-71] 69  Resp:  [18] 18  BP: (107-129)/(47-55) 129/55    Physical Exam  Vitals reviewed.   Constitutional:       Appearance: Normal appearance. She is obese.   HENT:      Head: Normocephalic and atraumatic.      Right Ear: External ear normal.      Left Ear: External ear normal.      Nose: Nose normal.      Mouth/Throat:      Mouth: Mucous membranes are moist.   Eyes:      Extraocular Movements: Extraocular movements intact.   Cardiovascular:      Rate and Rhythm: Normal rate and regular rhythm.      Pulses: Normal pulses.      Heart sounds: Normal heart sounds.   Pulmonary:      Effort: Pulmonary effort is normal.      Breath sounds: Normal breath sounds.   Abdominal:      Palpations: Abdomen is soft.   Genitourinary:     Comments: deferred  Musculoskeletal:         General: Normal range of motion.      Cervical back: Normal range of motion and neck supple.   Skin:     General: Skin is warm and dry.   Neurological:      General: No focal deficit present.      Mental Status: She is alert.      Comments: Oriented to self and place    Psychiatric:      Comments: Argumentative and contrary          Result Review    Result Review:  I have personally reviewed the results from the time of this admission to 3/30/2023 04:00 EDT and agree with these findings:  [x]  Laboratory  []  Microbiology  [x]  Radiology  []  EKG/Telemetry   []   "Cardiology/Vascular   []  Pathology  [x]  Old records  []  Other:  Most notable findings include: Glucose 113 creatinine 1.13, WBC 6.8 hemoglobin 13.9, urinalysis shows 3+ blood positive nitrates moderate leukocytes too numerous to count WBCs and 4+ bacteria.        CT abdomen pelvis without contrast per radiology:    \"Impression:   1. Distal colorectal wall thickening and mucosal hyperenhancement could reflect infectious or inflammatory colitis.  2. Urinary bladder wall thickening. Correlate for UTI or cystitis.  \"         Assessment & Plan        Active Hospital Problems:  Active Hospital Problems    Diagnosis    • **Gastrointestinal hemorrhage, unspecified gastrointestinal hemorrhage type    • Rectal bleeding    • Acute renal insufficiency    • Acute UTI (urinary tract infection)    • Hypothyroidism (acquired)    • Atrial fibrillation (HCC)    • History of pulmonary embolus (PE)    • Arthritis    • Dementia with behavioral disturbance    • Chronic anxiety    • Hypertension    • Obesity    • Memory impairment      Plan:    Rectal bleeding, hematocrit stable likely secondary to infectious and or inflammatory colitis per CT report, continue IV Flagyl for now gastroenterology consulted repeat CBC tomorrow    Acute renal insufficiency creatinine 1.13, previously normal, normal saline at 100 cc/h trend renal function avoid nephrotoxic meds    Acute UTI 4+ bacteria too numerous to count WBCs positive nitrates positive leukocytes, 1 g IV ceftriaxone until urine culture resulted    Atrial fibrillation/history of a pulmonary embolus, was on home apixaban meds unverified at this time consider holding for rectal bleeding, EKG ordered and pending rate controlled on monitor    Arthritis, home meds verified this time reorder pending verification pharmacy    Dementia with behavioral disturbance, stable was on home Aricept, Depakote and risperidone and mirtazapine reorder pending verification    Chronic anxiety, was on home " Prozac, Depakote reorder pending verification    Hypertension, was on home Lasix reorder pending verification and if appropriate given renal function monitor BP will add as needed Hampe to hypertensives if needed    Hypothyroidism, was on home Synthroid reorder pending verification    Obesity BMI 34.33 lifestyle management education    Memory impairment, noted      DVT prophylaxis:  Mechanical DVT prophylaxis orders are present.    CODE STATUS:    Code Status (Patient has no pulse and is not breathing): CPR (Attempt to Resuscitate)  Medical Interventions (Patient has pulse or is breathing): Full Support    Admission Status:  I believe this patient meets observation  status.    I discussed the patient's findings and my recommendations with patient.    This patient has been examined wearing appropriate Personal Protective Equipment . 03/30/23      Signature: Electronically signed by CHARLOTTE Membreno, 03/30/23, 4:01 AM EDT.

## 2023-03-30 NOTE — ED PROVIDER NOTES
Subjective   History of Present Illness  Patient presents with:  Black or Bloody Stool    Smita Howard, [place]   No LMP recorded. Patient has had a hysterectomy.  Daughter at bedside provides history.  Patient 77-year-old female presents the ED from nursing facility for blood in the stool, this was noted to start today.  Patient does take Eliquis secondary to PE.  No fever chills.  No vomiting.          Review of Systems   Constitutional: Negative for chills and fever.   Cardiovascular: Negative for leg swelling.   Gastrointestinal: Positive for blood in stool and diarrhea. Negative for abdominal pain, nausea and vomiting.   Neurological: Negative for syncope.   Psychiatric/Behavioral: Negative for confusion.       Past Medical History:   Diagnosis Date   • Anxiety    • Arthritis    • Chest pain 10/15/2019   • Dementia (HCC)    • Hypertension    • Pulmonary embolus (HCC)        Allergies   Allergen Reactions   • Ciprofloxacin Unknown (See Comments)       Past Surgical History:   Procedure Laterality Date   • CHOLECYSTECTOMY     • COLONOSCOPY     • HYSTERECTOMY      total        Family History   Problem Relation Age of Onset   • Heart disease Mother    • Alcohol abuse Father         murdered    • Pancreatic cancer Sister    • No Known Problems Half-Sister        Social History     Socioeconomic History   • Marital status:    • Number of children: 3   Tobacco Use   • Smoking status: Former     Years: 30.00     Types: Cigarettes     Quit date:      Years since quittin.2   • Smokeless tobacco: Never   Substance and Sexual Activity   • Alcohol use: No   • Drug use: No   • Sexual activity: Defer           Objective   Physical Exam  Vitals and nursing note reviewed.   Constitutional:       Appearance: Normal appearance.   HENT:      Head: Normocephalic and atraumatic.      Nose: Nose normal.      Mouth/Throat:      Mouth: Mucous membranes are moist.      Pharynx: Oropharynx is clear.   Eyes:       "Extraocular Movements: Extraocular movements intact.      Conjunctiva/sclera: Conjunctivae normal.      Pupils: Pupils are equal, round, and reactive to light.   Cardiovascular:      Rate and Rhythm: Normal rate and regular rhythm.      Heart sounds: Normal heart sounds. No murmur heard.    No friction rub. No gallop.   Pulmonary:      Effort: Pulmonary effort is normal.      Breath sounds: Normal breath sounds.   Abdominal:      General: Bowel sounds are normal.      Palpations: Abdomen is soft.      Tenderness: There is no abdominal tenderness. There is no guarding or rebound.   Musculoskeletal:         General: Normal range of motion.   Skin:     General: Skin is warm and dry.      Capillary Refill: Capillary refill takes less than 2 seconds.   Neurological:      Mental Status: She is alert and oriented to person, place, and time.         Procedures           ED Course      /47 (Patient Position: Lying)   Pulse 70   Temp 98.2 °F (36.8 °C)   Resp 18   Ht 162.6 cm (64\")   Wt 90.7 kg (200 lb)   SpO2 99%   BMI 34.33 kg/m²   Labs Reviewed   COMPREHENSIVE METABOLIC PANEL - Abnormal; Notable for the following components:       Result Value    Glucose 113 (*)     Creatinine 1.13 (*)     eGFR 50.2 (*)     All other components within normal limits    Narrative:     GFR Normal >60  Chronic Kidney Disease <60  Kidney Failure <15    The GFR formula is only valid for adults with stable renal function between ages 18 and 70.   CBC WITH AUTO DIFFERENTIAL - Normal   URINALYSIS W/ CULTURE IF INDICATED   TYPE AND SCREEN   CBC AND DIFFERENTIAL    Narrative:     The following orders were created for panel order CBC & Differential.  Procedure                               Abnormality         Status                     ---------                               -----------         ------                     CBC Auto Differential[[id number]]        Normal              Final result                 Please view results for these " tests on the individual orders.     Medications   sodium chloride 0.9 % flush 10 mL (has no administration in time range)   metroNIDAZOLE (FLAGYL) IVPB 500 mg (has no administration in time range)   iopamidol (ISOVUE-370) 76 % injection 100 mL (100 mL Intravenous Given 3/30/23 0030)     CT Abdomen Pelvis With Contrast    Result Date: 3/30/2023  Impression: 1. Distal colorectal wall thickening and mucosal hyperenhancement could reflect infectious or inflammatory colitis. 2. Urinary bladder wall thickening. Correlate for UTI or cystitis. Electronically signed by:  Balbir Molina M.D.  3/29/2023 11:54 PM Mountain Time                                         Medical Decision Making  Differential diagnosis considered for patient presentation includes, but not limited to hemorrhoid, colitis, ischemic colitis  Patient workup initiated, she presented from nursing facility for blood in the stool.  On exam she does have bloody diarrhea noted.  Hemoglobin hematocrit platelets are stable, she is anticoagulated on Eliquis, she has findings concerning for colitis on CT.  Was started on Flagyl, hospitalist consulted for admission, spoke with Laurita, agrees with current plan  Disposition : I discussed with the patient their test results, work-up here in the emergency department, and need for admission and further evaluation. Patient is agreeable to the plan of care. At time of disposition patients VS are reviewed, and patient without acute distress.  Opportunity was provided for questions at the bedside, all questions and concerns were addressed.  Note Disclaimer: At [facility], we believe that sharing information builds trust and better relationships. You are receiving this note because you recently visited [facility]. It is possible you will see health information before a provider has talked with you about it. This kind of information can be easy to misunderstand. To help you fully understand what it means for your health,  we urge you to discuss this note with your provider.Note dictated utilizing Dragon Dictation. Appropriate PPE worn during patient interactions.      Colitis: acute illness or injury  Gastrointestinal hemorrhage, unspecified gastrointestinal hemorrhage type: acute illness or injury  Amount and/or Complexity of Data Reviewed  Independent Historian: caregiver  Labs: ordered. Decision-making details documented in ED Course.  Radiology: ordered. Decision-making details documented in ED Course.      Risk  Prescription drug management.  Decision regarding hospitalization.          Final diagnoses:   Gastrointestinal hemorrhage, unspecified gastrointestinal hemorrhage type   Colitis       ED Disposition  ED Disposition     ED Disposition   Decision to Admit    Condition   --    Comment   Level of Care: Med/Surg [1]   Diagnosis: Gastrointestinal hemorrhage, unspecified gastrointestinal hemorrhage type [[id number]]   Admitting Physician: RAFAEL GIVENS [[id number]]   Attending Physician: RAFAEL GIVENS [[id number]]   Bed Request Comments: cardiac monitor               No follow-up provider specified.       Medication List      No changes were made to your prescriptions during this visit.          Iza Patino, APRN  03/30/23 0224

## 2023-03-30 NOTE — PLAN OF CARE
ST received consult for swallow evaluation due to pt being on soft to chew diet at facility. Pt is currently NPO pending EGD. Will follow up next date for evaluation as appropriate.

## 2023-03-30 NOTE — ANESTHESIA POSTPROCEDURE EVALUATION
Patient: Day Cabrera    Procedure Summary     Date: 03/30/23 Room / Location: [facility] ENDOSCOPY 1 / [facility] ENDOSCOPY    Anesthesia Start: 1511 Anesthesia Stop: 1553    Procedures:       SIGMOIDOSCOPY with biopsy (Anus)      ESOPHAGOGASTRODUODENOSCOPY Diagnosis:       Gastrointestinal hemorrhage, unspecified gastrointestinal hemorrhage type      Colitis      (Gastrointestinal hemorrhage, unspecified gastrointestinal hemorrhage type [K92.2])      (Colitis [K52.9])    Surgeons: Contreras Watts MD Provider: Eric Perez MD    Anesthesia Type: general, MAC ASA Status: 3          Anesthesia Type: general, MAC    Vitals  Vitals Value Taken Time   /53 03/30/23 1610   Temp     Pulse 64 03/30/23 1617   Resp     SpO2 96 % 03/30/23 1617   Vitals shown include unvalidated device data.        Post Anesthesia Care and Evaluation    Patient location during evaluation: PACU  Patient participation: complete - patient participated  Level of consciousness: awake  Pain scale: See nurse's notes for pain score.  Pain management: adequate    Airway patency: patent  Anesthetic complications: No anesthetic complications  PONV Status: none  Cardiovascular status: acceptable  Respiratory status: acceptable and spontaneous ventilation  Hydration status: acceptable    Comments: Patient seen and examined postoperatively; vital signs stable; SpO2 greater than or equal to 90%; cardiopulmonary status stable; nausea/vomiting adequately controlled; pain adequately controlled; no apparent anesthesia complications; patient discharged from anesthesia care when discharge criteria were met

## 2023-03-30 NOTE — ANESTHESIA PREPROCEDURE EVALUATION
Anesthesia Evaluation     Patient summary reviewed and Nursing notes reviewed   no history of anesthetic complications:  NPO Solid Status: > 8 hours  NPO Liquid Status: > 2 hours           Airway   Mallampati: II  TM distance: >3 FB  Neck ROM: full  Dental - normal exam     Pulmonary - normal exam   (+) pulmonary embolism, sleep apnea,   Cardiovascular - normal exam    ECG reviewed  PT is on anticoagulation therapy    (+) hypertension, dysrhythmias Atrial Fib,     ROS comment: Stress 2017 negative    Neuro/Psych  (+) psychiatric history Anxiety and Depression, dementia,    GI/Hepatic/Renal/Endo    (+) obesity,  GI bleeding , renal disease ARF, thyroid problem hypothyroidism    Musculoskeletal     Abdominal    Substance History      OB/GYN          Other   arthritis,                    Anesthesia Plan    ASA 3     general and MAC     (Pt is very confused, but alert and follows commands.  )  intravenous induction     Anesthetic plan, risks, benefits, and alternatives have been provided, discussed and informed consent has been obtained with: patient.    Plan discussed with CRNA.        CODE STATUS:    Code Status (Patient has no pulse and is not breathing): CPR (Attempt to Resuscitate)  Medical Interventions (Patient has pulse or is breathing): Full Support

## 2023-03-31 VITALS
BODY MASS INDEX: 34.15 KG/M2 | HEIGHT: 64 IN | DIASTOLIC BLOOD PRESSURE: 59 MMHG | TEMPERATURE: 97.9 F | WEIGHT: 200 LBS | RESPIRATION RATE: 14 BRPM | HEART RATE: 78 BPM | SYSTOLIC BLOOD PRESSURE: 106 MMHG | OXYGEN SATURATION: 98 %

## 2023-03-31 PROBLEM — N28.9 ACUTE RENAL INSUFFICIENCY: Status: RESOLVED | Noted: 2023-03-30 | Resolved: 2023-03-31

## 2023-03-31 PROBLEM — K62.5 RECTAL BLEEDING: Status: RESOLVED | Noted: 2023-03-30 | Resolved: 2023-03-31

## 2023-03-31 PROBLEM — N39.0 ACUTE UTI (URINARY TRACT INFECTION): Status: RESOLVED | Noted: 2023-03-30 | Resolved: 2023-03-31

## 2023-03-31 PROBLEM — K92.2 GASTROINTESTINAL HEMORRHAGE, UNSPECIFIED GASTROINTESTINAL HEMORRHAGE TYPE: Status: RESOLVED | Noted: 2023-03-30 | Resolved: 2023-03-31

## 2023-03-31 LAB
ALBUMIN SERPL-MCNC: 3.5 G/DL (ref 3.5–5.2)
ALBUMIN/GLOB SERPL: 1.3 G/DL
ALP SERPL-CCNC: 69 U/L (ref 39–117)
ALT SERPL W P-5'-P-CCNC: 9 U/L (ref 1–33)
ANION GAP SERPL CALCULATED.3IONS-SCNC: 10 MMOL/L (ref 5–15)
AST SERPL-CCNC: 15 U/L (ref 1–32)
BASOPHILS # BLD AUTO: 0 10*3/MM3 (ref 0–0.2)
BASOPHILS NFR BLD AUTO: 0.9 % (ref 0–1.5)
BILIRUB SERPL-MCNC: 0.3 MG/DL (ref 0–1.2)
BUN SERPL-MCNC: 11 MG/DL (ref 8–23)
BUN/CREAT SERPL: 10.7 (ref 7–25)
CALCIUM SPEC-SCNC: 8.7 MG/DL (ref 8.6–10.5)
CHLORIDE SERPL-SCNC: 107 MMOL/L (ref 98–107)
CO2 SERPL-SCNC: 23 MMOL/L (ref 22–29)
CREAT SERPL-MCNC: 1.03 MG/DL (ref 0.57–1)
DEPRECATED RDW RBC AUTO: 49.4 FL (ref 37–54)
EGFRCR SERPLBLD CKD-EPI 2021: 56.1 ML/MIN/1.73
EOSINOPHIL # BLD AUTO: 0.1 10*3/MM3 (ref 0–0.4)
EOSINOPHIL NFR BLD AUTO: 1.6 % (ref 0.3–6.2)
ERYTHROCYTE [DISTWIDTH] IN BLOOD BY AUTOMATED COUNT: 14.3 % (ref 12.3–15.4)
GLOBULIN UR ELPH-MCNC: 2.6 GM/DL
GLUCOSE SERPL-MCNC: 76 MG/DL (ref 65–99)
HCT VFR BLD AUTO: 38.4 % (ref 34–46.6)
HGB BLD-MCNC: 12.8 G/DL (ref 12–15.9)
LYMPHOCYTES # BLD AUTO: 1.6 10*3/MM3 (ref 0.7–3.1)
LYMPHOCYTES NFR BLD AUTO: 30.5 % (ref 19.6–45.3)
MCH RBC QN AUTO: 31.8 PG (ref 26.6–33)
MCHC RBC AUTO-ENTMCNC: 33.4 G/DL (ref 31.5–35.7)
MCV RBC AUTO: 95.2 FL (ref 79–97)
MONOCYTES # BLD AUTO: 0.4 10*3/MM3 (ref 0.1–0.9)
MONOCYTES NFR BLD AUTO: 7.5 % (ref 5–12)
NEUTROPHILS NFR BLD AUTO: 3.2 10*3/MM3 (ref 1.7–7)
NEUTROPHILS NFR BLD AUTO: 59.5 % (ref 42.7–76)
NRBC BLD AUTO-RTO: 0.1 /100 WBC (ref 0–0.2)
PLATELET # BLD AUTO: 149 10*3/MM3 (ref 140–450)
PMV BLD AUTO: 10.7 FL (ref 6–12)
POTASSIUM SERPL-SCNC: 3.9 MMOL/L (ref 3.5–5.2)
PROT SERPL-MCNC: 6.1 G/DL (ref 6–8.5)
RBC # BLD AUTO: 4.04 10*6/MM3 (ref 3.77–5.28)
SODIUM SERPL-SCNC: 140 MMOL/L (ref 136–145)
WBC NRBC COR # BLD: 5.4 10*3/MM3 (ref 3.4–10.8)

## 2023-03-31 PROCEDURE — G0378 HOSPITAL OBSERVATION PER HR: HCPCS

## 2023-03-31 PROCEDURE — 97163 PT EVAL HIGH COMPLEX 45 MIN: CPT | Performed by: PHYSICAL THERAPIST

## 2023-03-31 PROCEDURE — 85025 COMPLETE CBC W/AUTO DIFF WBC: CPT | Performed by: INTERNAL MEDICINE

## 2023-03-31 PROCEDURE — 63710000001 PSYLLIUM 58.12 % PACK: Performed by: INTERNAL MEDICINE

## 2023-03-31 PROCEDURE — A9270 NON-COVERED ITEM OR SERVICE: HCPCS | Performed by: INTERNAL MEDICINE

## 2023-03-31 PROCEDURE — 92610 EVALUATE SWALLOWING FUNCTION: CPT

## 2023-03-31 PROCEDURE — 25010000002 CEFTRIAXONE PER 250 MG: Performed by: INTERNAL MEDICINE

## 2023-03-31 PROCEDURE — 36415 COLL VENOUS BLD VENIPUNCTURE: CPT | Performed by: INTERNAL MEDICINE

## 2023-03-31 PROCEDURE — 97166 OT EVAL MOD COMPLEX 45 MIN: CPT

## 2023-03-31 PROCEDURE — 80053 COMPREHEN METABOLIC PANEL: CPT | Performed by: INTERNAL MEDICINE

## 2023-03-31 PROCEDURE — 63710000001 POLYETHYLENE GLYCOL 17 G PACK: Performed by: INTERNAL MEDICINE

## 2023-03-31 RX ORDER — CEFDINIR 300 MG/1
300 CAPSULE ORAL 2 TIMES DAILY
Qty: 6 CAPSULE | Refills: 0 | Status: SHIPPED | OUTPATIENT
Start: 2023-03-31 | End: 2023-04-03

## 2023-03-31 RX ADMIN — POLYETHYLENE GLYCOL 3350 17 G: 17 POWDER, FOR SOLUTION ORAL at 08:02

## 2023-03-31 RX ADMIN — PSYLLIUM HUSK 1 PACKET: 3.4 POWDER ORAL at 08:02

## 2023-03-31 RX ADMIN — Medication 3 ML: at 08:03

## 2023-03-31 RX ADMIN — CEFTRIAXONE 2 G: 2 INJECTION, POWDER, FOR SOLUTION INTRAMUSCULAR; INTRAVENOUS at 04:44

## 2023-03-31 NOTE — THERAPY EVALUATION
Acute Care - Speech Language Pathology   Swallow Initial Evaluation  [place]     Patient Name: Day Cabrera  : 1945  MRN: [id number]  Today's Date: 3/31/2023               Admit Date: 3/29/2023    Visit Dx:     ICD-10-CM ICD-9-CM   1. Gastrointestinal hemorrhage, unspecified gastrointestinal hemorrhage type  K92.2 578.9   2. Colitis  K52.9 558.9     Patient Active Problem List   Diagnosis   • Chronic anxiety   • Dementia with behavioral disturbance   • Hypertension   • Memory impairment   • Obesity   • Sleep apnea   • E. coli UTI (urinary tract infection)   • Acute UTI (urinary tract infection)   • Delirium due to another medical condition   • Pneumonia due to 2019 novel coronavirus   • Sepsis (HCC)   • Arthritis   • COVID-19 virus detected   • Fever in adult   • Elevated troponin   • Altered mental status   • Hypernatremia   • Atrial fibrillation (HCC)   • History of pulmonary embolus (PE)   • Acute respiratory failure with hypoxia (HCC)   • Rectal bleeding   • Acute renal insufficiency   • Gastrointestinal hemorrhage, unspecified gastrointestinal hemorrhage type   • Acute UTI (urinary tract infection)   • Hypothyroidism (acquired)   • Colitis     Past Medical History:   Diagnosis Date   • Anxiety    • Arthritis    • Chest pain 10/15/2019   • Dementia (HCC)    • Hypertension    • Pulmonary embolus (HCC)      Past Surgical History:   Procedure Laterality Date   • CHOLECYSTECTOMY     • COLONOSCOPY     • HYSTERECTOMY      total        SLP Recommendation and Plan  SLP Swallowing Diagnosis: (P) swallow WFL/no suspected pharyngeal impairment (23 1000)  SLP Diet Recommendation: (P) soft to chew textures, thin liquids (23 1000)  Recommended Precautions and Strategies: (P) upright posture during/after eating, small bites of food and sips of liquid (23 1000)  SLP Rec. for Method of Medication Administration: (P) meds whole, meds crushed, as tolerated (23 1000)     Monitor for Signs of  "Aspiration: (P) notify SLP if any concerns (03/31/23 1000)        Anticipated Discharge Disposition (SLP): (P) skilled nursing facility (03/31/23 1000)     Therapy Frequency (Swallow): (P) evaluation only (03/31/23 1000)               SWALLOW EVALUATION (last 72 hours)     SLP Adult Swallow Evaluation     Row Name 03/31/23 1000          Document Type evaluation (P)   -AB    Subjective Information no complaints (P)   -AB    Patient Observations cooperative;alert (P)   -AB    Patient Effort good (P)   -AB    Comment Pt in bed when ST entered room, able to raise bed up to 70 degrees before pt complained of back pain. Performed evaluation in this position. Pt alert and cooperative, but confused to situation. Pt could not recall that she refused breakfast, but said she felt good now and would participate in the swallow evaluation (P)   -AB    Symptoms Noted During/After Treatment none (P)   -AB          Patient Profile Reviewed yes (P)   -AB    Pertinent History Of Current Problem  Pt is a 77 y.o. female with a past medical history of dementia, chronic anxiety, atrial fibrillation, pulmonary embolism on chronic anticoagulation, who presented to [facility] from [facility] on 3/29/2023 complaining of blood in stool.      GI endoscopy revealed:   \"Flexible sigmoidoscopy to 55 cm with acute ischemic colitis extending from 10 cm above the dentate line to 25 cm status post biopsy\"   Indicating acute ischemic colitis     GI recommends:    \"High-fiber diet  Benefiber 1 to 2 tablespoons daily  MiraLAX 1 capful in 8 ounces water daily  I suspect constipation is her greatest risk factor for ischemic colitis.  If she has other risk factors such as hypertension, diabetes, and hyperlipidemia they should be addressed by primary care.\"      Pt seen by ST this date to ensure safe swallow and to determine least restrictive diet via bedside swallow evaluation. Pt is suspected to discharge from hospital this date " and return to nursing home following acceptance of diet with no s/s of aspiration.  (P)   -AB    Current Method of Nutrition soft to chew textures;thin liquids (P)   -AB    Precautions/Limitations, Vision WFL;for purposes of eval (P)   -AB    Precautions/Limitations, Hearing WFL;for purposes of eval (P)   -AB    Plans/Goals Discussed with patient (P)   -AB    Barriers to Rehab none identified (P)   -AB          Dentition Assessment upper dentures/partial in place;lower dentures/partial in place (P)   -AB    Secretion Management WNL/WFL (P)   -AB    Mucosal Quality moist, healthy (P)   -AB          Oral Motor General Assessment WFL (P)   -AB          Respiratory Support Currently in Use room air (P)   -AB    Eating/Swallowing Skills fed by SLP (P)   -AB    Positioning During Eating semi-reclined (P)   -AB    Utensils Used spoon;straw (P)   -AB    Consistencies Trialed thin liquids;pureed;regular textures (P)   -AB          Oral Prep Phase WFL (P)   -AB    Oral Transit WFL (P)   -AB    Oral Residue WFL (P)   -AB    Pharyngeal Phase no overt signs/symptoms of pharyngeal impairment (P)   -AB    Clinical Swallow Evaluation Summary Pt seen for bedside swallow evaluation. Pt fed by SLP and given x3 sips via straw, x5 of applesauce via spoon, and x 2 bites of a cracker. Pt demonstrated no s/s of aspiration and had adequate and timely mastication and swallow initiation. Pt demonstrated safe to swallow oralpharyngeal phases with regular and thin liquid consistencies. Continue diet per GI recommendations. ST will discharge as no further skilled services necessary at this time. (P)   -AB          SLP Swallowing Diagnosis swallow WFL/no suspected pharyngeal impairment (P)   -AB    Functional Impact no impact on function (P)   -AB          Therapy Frequency (Swallow) evaluation only (P)   -AB    SLP Diet Recommendation soft to chew textures;thin liquids (P)   -AB    Recommended Precautions and Strategies upright posture  during/after eating;small bites of food and sips of liquid (P)   -AB    Oral Care Recommendations Oral Care BID/PRN (P)   -AB    SLP Rec. for Method of Medication Administration meds whole;meds crushed;as tolerated (P)   -AB    Monitor for Signs of Aspiration notify SLP if any concerns (P)   -AB    Anticipated Discharge Disposition (SLP) skilled nursing facility (P)   -AB          User Key  (r) = Recorded By, (t) = Taken By, (c) = Cosigned By    Initials Name Effective Dates    Yenifer Pool, Speech Therapy Student 01/10/23 -                 EDUCATION  The patient has been educated in the following areas:   Safe swallow compensations such as sitting upright at 90 degrees and taking small bites and sips    Patient was not wearing a face mask during this therapy encounter. Therapist used appropriate personal protective equipment including gown, mask and gloves.  Mask used was standard procedure mask. Appropriate PPE was worn during the entire therapy session. Hand hygiene was completed before and after therapy session. Patient is in enhanced droplet precautions.                    Yenifer Lares Speech Therapy Student  3/31/2023

## 2023-03-31 NOTE — CASE MANAGEMENT/SOCIAL WORK
Discharge Planning Assessment  [facility]     Patient Name: Day Cabrera  MRN: [id number]  Today's Date: 3/31/2023    Admit Date: 3/29/2023    Plan: DC Plan: Return to [facility], LT, no precert required. OK with family and facility.   Discharge Needs Assessment     Row Name 03/31/23 1217       Living Environment    People in Home facility resident    Unique Family Situation [facility]    Current Living Arrangements extended care facility    Primary Care Provided by other (see comments)    Provides Primary Care For no one, unable/limited ability to care for self    Family Caregiver Names Patti Cabrera    Quality of Family Relationships unable to assess    Able to Return to Prior Arrangements yes       Resource/Environmental Concerns    Resource/Environmental Concerns none    Transportation Concerns none       Transition Planning    Patient/Family Anticipates Transition to long-term care facility    Patient/Family Anticipated Services at Transition     Transportation Anticipated family or friend will provide;health plan transportation       Discharge Needs Assessment    Readmission Within the Last 30 Days no previous admission in last 30 days    Current Outpatient/Agency/Support Group long-term acute care facility    Equipment Needed After Discharge none    Patient's Choice of Community Agency(s) Current [facility].    Discharge Coordination/Progress DC Plan: Return to [facility], LTC, no precert required. OK with family and facility.               Discharge Plan     Row Name 03/31/23 1225       Plan    Plan DC Plan: Return to [facility], [facility], no precert required. OK with family and facility.    Plan Comments EGD, sigmoidoscopy 3/30, speech referral. PT/OT pending, transportation to be determined, pending PT recommendations.              Continued Care and Services - Admitted Since 3/29/2023    Coordination has not been started for this encounter.           Demographic Summary     Row Name 03/31/23 1213       General Information    Admission Type observation    Arrived From long-term [facility]    Required Notices Provided Observation Status Notice    Referral Source admission list    Reason for Consult discharge planning    Preferred Language English    General Information Comments Spoke to pt in room then called daughter Patti Joshi for further.               Functional Status     Row Name 03/31/23 1216       Functional Status    Usual Activity Tolerance fair    Current Activity Tolerance fair       Functional Status, IADL    Medications completely dependent    Meal Preparation completely dependent    Housekeeping completely dependent    Laundry completely dependent    Shopping completely dependent    IADL Comments Confused.       Mental Status    General Appearance WDL WDL              Met with patient in room wearing PPE: mask.      Maintained distance greater than six feet and spent less than 15 minutes in the room.    Spoke with daughter per phone.           Allen Reynoso RN

## 2023-03-31 NOTE — DISCHARGE SUMMARY
[facility] Medicine [facility]  Discharge Summary    Date of Service: 2023  Patient Name: Day Cabrera  : 1945  MRN: [id number]    Date of Admission: 3/29/2023  Discharge Diagnosis: Rectal bleeding-resolved, UTI  Date of Discharge: 2023  Primary Care Physician: Smita Howard APRN      Presenting Problem:   Colitis [K52.9]  Gastrointestinal hemorrhage, unspecified gastrointestinal hemorrhage type [K92.2]    Active and Resolved Hospital Problems:  Active Hospital Problems    Diagnosis POA   • Hypothyroidism (acquired) [E03.9] Yes   • Colitis [K52.9] Yes   • Atrial fibrillation (HCC) [I48.91] Yes   • History of pulmonary embolus (PE) [Z86.711] Yes   • Arthritis [M19.90] Yes   • Dementia with behavioral disturbance [F03.918] Yes   • Chronic anxiety [F41.9] Yes   • Hypertension [I10] Yes   • Obesity [E66.9] Yes   • Memory impairment [R41.3] Yes      Resolved Hospital Problems    Diagnosis POA   • **Gastrointestinal hemorrhage, unspecified gastrointestinal hemorrhage type [K92.2] Yes   • Rectal bleeding [K62.5] Yes   • Acute renal insufficiency [N28.9] Yes   • Acute UTI (urinary tract infection) [N39.0] Yes         Hospital Course     Hospital Course:  Day Cabrera is a 77 y.o. female with a past medical history of dementia, chronic anxiety, atrial fibrillation, pulmonary embolism on chronic anticoagulation, who presented to [facility] from [facility] on 3/29/2023 complaining of blood in stool.  She apparently was brought in by daughter who is no longer at bedside.  Patient is awake and alert and appears in no distress.  She is mildly argumentative.  She denies any shortness of air, palpitations dizziness or abdominal pain.  Labs today show a glucose of 113 creatinine 1.13 WBC 6.8 hemoglobin 13.9, urinalysis shows 3+ blood positive nitrates moderate leukocytes too numerous to count WBCs and 4+ bacteria.  CT abdomen pelvis without contrast per  radiology:  1. Distal colorectal wall thickening and mucosal hyperenhancement could reflect infectious or inflammatory colitis.  2. Urinary bladder wall thickening. Correlate for UTI or cystitis.   She was started on IV Flagyl in the ED as well as IV ceftriaxone and gastroenterology has been consulted.  Patient admitted for further evaluation and treatment.    As an inpatient she was being followed by GI and is status post upper endoscopy with no abnormal findings noted and deemed to have colitis.  Patient also found to have a UTI and was treated with antibiotics and will be transitioned over to p.o. and has since been cleared for discharge back to SNF.        DISCHARGE Follow Up Recommendations for labs and diagnostics:   Follow-up with PCP for H&H      Reasons For Change In Medications and Indications for New Medications:      Day of Discharge     Vital Signs:  Temp:  [97.9 °F (36.6 °C)-98.5 °F (36.9 °C)] 97.9 °F (36.6 °C)  Heart Rate:  [] 78  Resp:  [13-14] 14  BP: (106-153)/(51-72) 106/59    Physical Exam:  Physical Exam  Constitutional:       Appearance: Normal appearance. She is obese.      Comments: Awake and confused   HENT:      Head: Normocephalic and atraumatic.      Nose: Nose normal.      Mouth/Throat:      Mouth: Mucous membranes are moist.      Pharynx: Oropharynx is clear.   Eyes:      Extraocular Movements: Extraocular movements intact.      Conjunctiva/sclera: Conjunctivae normal.      Pupils: Pupils are equal, round, and reactive to light.   Cardiovascular:      Rate and Rhythm: Normal rate and regular rhythm.      Pulses: Normal pulses.      Heart sounds: Normal heart sounds.   Pulmonary:      Effort: Pulmonary effort is normal.      Breath sounds: Normal breath sounds.   Abdominal:      General: Abdomen is flat. Bowel sounds are normal.      Palpations: Abdomen is soft.   Musculoskeletal:         General: Normal range of motion.      Cervical back: Normal range of motion and neck supple.    Skin:     General: Skin is warm and dry.      Capillary Refill: Capillary refill takes less than 2 seconds.   Neurological:      General: No focal deficit present.      Mental Status: Mental status is at baseline. She is disoriented.   Psychiatric:         Mood and Affect: Mood normal.              Pertinent  and/or Most Recent Results     LAB RESULTS:      Lab 03/31/23  1219 03/29/23  2305   WBC 5.40 6.80   HEMOGLOBIN 12.8 13.9   HEMATOCRIT 38.4 42.9   PLATELETS 149 184   NEUTROS ABS 3.20 3.80   LYMPHS ABS 1.60 2.40   MONOS ABS 0.40 0.60   EOS ABS 0.10 0.10   MCV 95.2 94.5         Lab 03/29/23  2305   SODIUM 138   POTASSIUM 4.3   CHLORIDE 103   CO2 24.0   ANION GAP 11.0   BUN 21   CREATININE 1.13*   EGFR 50.2*   GLUCOSE 113*   CALCIUM 9.0         Lab 03/29/23  2305   TOTAL PROTEIN 6.5   ALBUMIN 4.0   GLOBULIN 2.5   ALT (SGPT) 11   AST (SGOT) 18   BILIRUBIN 0.3   ALK PHOS 77                 Lab 03/30/23  0237   ABO TYPING A   RH TYPING Positive   ANTIBODY SCREEN Negative         Brief Urine Lab Results  (Last result in the past 365 days)      Color   Clarity   Blood   Leuk Est   Nitrite   Protein   CREAT   Urine HCG        03/30/23 0230 Yellow   Cloudy   Large (3+)   Moderate (2+)   Positive   30 mg/dL (1+)               Microbiology Results (last 10 days)     Procedure Component Value - Date/Time    Urine Culture - Urine, Urine, Catheter [[id number]]  (Normal) Collected: 03/30/23 0230    Lab Status: Preliminary result Specimen: Urine, Catheter Updated: 03/31/23 1105     Urine Culture Culture in progress          CT Abdomen Pelvis With Contrast    Result Date: 3/30/2023  Impression: Impression: 1. Distal colorectal wall thickening and mucosal hyperenhancement could reflect infectious or inflammatory colitis. 2. Urinary bladder wall thickening. Correlate for UTI or cystitis. Electronically signed by:  Balbir Molina M.D.  3/29/2023 11:54 PM Mountain Time                  Labs Pending at Discharge:  Pending Labs      Order Current Status    CANDIDA AURIS SCREEN - Swab, Axilla Right, Axilla Left and Groin In process    Comprehensive Metabolic Panel In process    Tissue Pathology Exam In process    Urine Culture - Urine, Urine, Catheter Preliminary result          Procedures Performed  Procedure(s):  SIGMOIDOSCOPY with biopsy  ESOPHAGOGASTRODUODENOSCOPY  03/30 1532 UPPER GI ENDOSCOPY      Consults:   Consults     Date and Time Order Name Status Description    3/30/2023  2:20 AM Inpatient Gastroenterology Consult Completed             Discharge Details        Discharge Medications      New Medications      Instructions Start Date   cefdinir 300 MG capsule  Commonly known as: OMNICEF   300 mg, Oral, 2 Times Daily      psyllium 58.12 % packet  Commonly known as: METAMUCIL MULTIHEALTH FIBER   1 packet, Oral, Daily   Start Date: April 1, 2023        Continue These Medications      Instructions Start Date   acetaminophen 325 MG tablet  Commonly known as: TYLENOL   650 mg, Oral, Every 6 Hours PRN      amiodarone 200 MG tablet  Commonly known as: PACERONE   200 mg, Oral, Every 24 Hours Scheduled      apixaban 5 MG tablet tablet  Commonly known as: ELIQUIS   5 mg, Oral, Every 12 Hours Scheduled      bisacodyl 10 MG suppository  Commonly known as: DULCOLAX   10 mg, Rectal, Daily PRN      Cholecalciferol 50 MCG (2000 UT) tablet   2,000 Units, Oral, Daily      fleet enema 7-19 GM/118ML enema   1 enema, Rectal, Daily PRN      furosemide 20 MG tablet  Commonly known as: LASIX   20 mg, Oral, Daily      levothyroxine 100 MCG tablet  Commonly known as: SYNTHROID, LEVOTHROID   100 mcg, Oral, Daily      magnesium hydroxide 400 MG/5ML suspension  Commonly known as: MILK OF MAGNESIA   30 mL, Oral, Daily PRN      Melatonin 10 MG tablet   10 mg, Oral, Daily      nitroglycerin 0.4 MG SL tablet  Commonly known as: NITROSTAT   0.4 mg, Sublingual, Every 5 Minutes PRN, Take no more than 3 doses in 15 minutes.      nystatin 183719 UNIT/GM cream  Commonly  known as: MYCOSTATIN   1 application, Topical, 2 Times Daily      pantoprazole 40 MG EC tablet  Commonly known as: PROTONIX   40 mg, Oral, Daily      potassium chloride 10 MEQ CR tablet  Commonly known as: K-DUR,KLOR-CON   30 mEq, Oral, Daily      vitamin B-12 1000 MCG tablet  Commonly known as: CYANOCOBALAMIN   1,000 mcg, Oral, Daily             Allergies   Allergen Reactions   • Ciprofloxacin Unknown (See Comments)         Discharge Disposition: Patient stable for discharge back to [facility] (DC - External)    Diet:  Hospital:  Diet Order   Procedures   • Diet: Cardiac Diets; Healthy Heart (2-3 Na+); Texture: Regular Texture (IDDSI 7); Fluid Consistency: Thin (IDDSI 0)         Discharge Activity:   Activity Instructions     Activity as Tolerated              CODE STATUS:  Code Status and Medical Interventions:   Ordered at: 03/30/23 0220     Code Status (Patient has no pulse and is not breathing):    CPR (Attempt to Resuscitate)     Medical Interventions (Patient has pulse or is breathing):    Full Support         No future appointments.    Additional Instructions for the Follow-ups that You Need to Schedule     Call MD With Problems / Concerns   As directed      Instructions: Call [phone] or email [email or web address] for problems or concerns.    Order Comments: Instructions: Call [phone] or email [email or web address] for problems or concerns.          Discharge Follow-up with PCP   As directed       Currently Documented PCP:    Smita Howard APRN    PCP Phone Number:    [phone]     Follow Up Details: f/u PCP for H&H               Time spent on Discharge including face to face service: 35 minutes    This patient has been examined wearing appropriate Personal Protective Equipment and discussed with Patient and nurse. 03/31/23      Signature: Electronically signed by Derek Claire Jr, APRN, 03/31/23, 13:30 EDT.  [facility] Hospitalist Team

## 2023-03-31 NOTE — PLAN OF CARE
Goal Outcome Evaluation:               Pt resting comfortably with no complaints. Pt alert and oriented to self only. Will continue to monitor...

## 2023-03-31 NOTE — DISCHARGE PLACEMENT REQUEST
"Gene Cabrera (77 y.o. Female)     Date of Birth   1945    Social Security Number       Address   [facility] [address] [place] [address]    Home Phone   [phone]    MRN   [id number]       [place]   [employer]    Marital Status                               Admission Date   3/29/23    Admission Type   Emergency    Admitting Provider   Hermelindo Schaeffer DO    Attending Provider   Hermelindo Schaeffer DO    Department, Room/Bed   [facility] 2C MEDICAL INPATIENT, 249/1       Discharge Date       Discharge Disposition       Discharge Destination                               Attending Provider: Hermelindo Schaeffer DO    Allergies: Ciprofloxacin    Isolation: Contact   Infection: ESBL E coli (08/12/20), Candida Auris (rule out) (03/30/23)   Code Status: CPR    Ht: 162.6 cm (64\")   Wt: 90.7 kg (200 lb)    Admission Cmt: None   Principal Problem: Gastrointestinal hemorrhage, unspecified gastrointestinal hemorrhage type [K92.2]                 Active Insurance as of 3/29/2023     Primary Coverage     Payor Plan Insurance Group Employer/Plan Group    [facility] MEDICARE REPLACEMENT [facility] MEDICARE REPLACEMENT [id number]     Payor Plan Address Payor Plan Phone Number Payor Plan Fax Number Effective Dates    [address] [address]   1/1/2019 - None Entered    [facility] [id number]       Subscriber Name Subscriber Birth Date Member ID       Gene Cabrera 1945 [id number]           Secondary Coverage     Payor Plan Insurance Group Employer/Plan Group    [place]      Payor Plan Address Payor Plan Phone Number Payor Plan Fax Number Effective Dates    [address]   9/24/2020 - None Entered    [place] [address]       Subscriber Name Subscriber Birth Date Member ID       GENE CABRERA 1945 [id number]                 Emergency Contacts      (Rel.) Home Phone Work Phone Mobile Phone    SHANON CABRERA (Daughter) -- -- " [phone]            Insurance Information                [facility] MEDICARE REPLACEMENT/[facility] MEDICARE REPLACEMENT Phone: --    Subscriber: Day Cabrera Subscriber#: [id number]    Group#: [id number] Precert#: --        [place] Phone: --    Subscriber: Day Cabrera Subscriber#: [id number]    Group#: -- Precert#: --

## 2023-03-31 NOTE — PLAN OF CARE
Goal Outcome Evaluation:  Plan of Care Reviewed With: patient           Outcome Evaluation: Patient is a 78 y/o F who [facility].  Pt was admitted due to blood in her stool.  CT revealed colitis and gastrointestinal hemorrhage.  During today's evaluation pt was very confused.  She was only oriented to self.  She had difficulty with her , current year, and location.  She required redirection throughout treatment.  Baseline function unable to be obtained due to pt's cognitive status; however, she is a resident at [facility].  She required max Ax2 for supine to sit this date.  Once sitting EOB pt initially required mod to max A to maintain balance, but then once position improved she was able to sit with SBA/CGA.  Pt had difficulty rolling in bed requiring mod to max A.  Pt max A with kenji care, and bed was wet with urine.  Pt rolled during bed change and getting cleaned up.  At this time PT will continue to follow pt in acute setting. Recommend pt d/c back to [facility] for skilled PT treatment.

## 2023-03-31 NOTE — PLAN OF CARE
Pt demonstrated no s/s of aspiration and had adequate and timely mastication and swallow initiation with all trials and consistencies. Pt demonstrated safe to swallow oralpharyngeal phases with regular and thin liquid consistencies. Continue diet per GI recommendations. ST will discharge as no further skilled services necessary at this time

## 2023-03-31 NOTE — PLAN OF CARE
Goal Outcome Evaluation:  Plan of Care Reviewed With: patient        Progress: no change  Outcome Evaluation: Pt is a 78 y/o F admitted to [facility] 3/29/23 with c/o GI hemorrhage. PMHx significant for chronic anxiety, dementia, and A. fib. Ct (+) colitis and gastrointestinal hemorrhage. At baseline pt resides at [facility]. Pt with increased cognitive deficits. therefore PLOF was unable to be determined. Pt alert to self only, as she was not able to recall her , current year,month, place or situation. Pt required max verbal cues to attend to task and follow commands. Pt often participated in conversation from previous years ago. Pt required mod-maxx2 for bed mobility. Pt tolerated sitting edge of bed, initially requiring mod-max for seated balance, however able to progress to SBA-CGA. Pt not safe to progress to transfers this date, as pt demos difficulty following commands. Pt with soiled linens, required full bed change and kenji hygiene, requiring max A. Pt will require max A for ADLs and increased prompts and cues to participate in ADL routine. OT to follow while admitted. OT recommend pt to return to LTC with continued skilled OT intervention.

## 2023-03-31 NOTE — THERAPY EVALUATION
Patient Name: Day Cabrera  : 1945    MRN: [id number]                              Today's Date: 3/31/2023       Admit Date: 3/29/2023    Visit Dx:     ICD-10-CM ICD-9-CM   1. Gastrointestinal hemorrhage, unspecified gastrointestinal hemorrhage type  K92.2 578.9   2. Colitis  K52.9 558.9     Patient Active Problem List   Diagnosis   • Chronic anxiety   • Dementia with behavioral disturbance   • Hypertension   • Memory impairment   • Obesity   • Sleep apnea   • E. coli UTI (urinary tract infection)   • Acute UTI (urinary tract infection)   • Delirium due to another medical condition   • Pneumonia due to 2019 novel coronavirus   • Sepsis (HCC)   • Arthritis   • COVID-19 virus detected   • Fever in adult   • Elevated troponin   • Altered mental status   • Hypernatremia   • Atrial fibrillation (HCC)   • History of pulmonary embolus (PE)   • Acute respiratory failure with hypoxia (HCC)   • Hypothyroidism (acquired)   • Colitis     Past Medical History:   Diagnosis Date   • Anxiety    • Arthritis    • Chest pain 10/15/2019   • Dementia (HCC)    • Hypertension    • Pulmonary embolus (HCC)      Past Surgical History:   Procedure Laterality Date   • CHOLECYSTECTOMY     • COLONOSCOPY     • ENDOSCOPY N/A 3/30/2023    Procedure: ESOPHAGOGASTRODUODENOSCOPY;  Surgeon: Contreras Watts MD;  Location: [facility] ENDOSCOPY;  Service: Gastroenterology;  Laterality: N/A;   • HYSTERECTOMY      total    • SIGMOIDOSCOPY N/A 3/30/2023    Procedure: SIGMOIDOSCOPY with biopsy;  Surgeon: Contreras Watts MD;  Location: [facility] ENDOSCOPY;  Service: Gastroenterology;  Laterality: N/A;  ischemia      General Information     Row Name 23 1312          OT Time and Intention    Document Type evaluation  -MS     Mode of Treatment occupational therapy  -MS     Row Name 23 1312          General Information    Patient Profile Reviewed yes  -MS     Prior Level of Function --  pt admitted from [facility]; unable to  obtain PLOF d/t cognitive status  -MS     Existing Precautions/Restrictions fall  -MS     Barriers to Rehab medically complex;cognitive status  -MS     Row Name 23 1312          Occupational Profile    Reason for Services/Referral (Occupational Profile) Pt is a 76 y/o F admitted to [facility] 3/29/23 with c/o GI hemorrhage. PMHx significant for chronic anxiety, dementia, and A. fib. Ct (+) colitis and gastrointestinal hemorrhage. At baseline pt resides at [facility].  -MS     Row Name 23 1312          Living Environment    People in Home facility resident  -MS     Row Name 23 1312          Cognition    Orientation Status (Cognition) oriented to;person  Pt disoriented to , current month and year; scored 22/28 on SBT indicating cognitive impairment  -MS     Row Name 23 1312          Safety Issues, Functional Mobility    Safety Issues Affecting Function (Mobility) ability to follow commands;awareness of need for assistance;impulsivity;insight into deficits/self-awareness;judgment;problem-solving  -MS     Impairments Affecting Function (Mobility) balance;cognition;endurance/activity tolerance;pain;postural/trunk control;strength  -MS     Cognitive Impairments, Mobility Safety/Performance attention;awareness, need for assistance;impulsivity;insight into deficits/self-awareness;judgment;problem-solving/reasoning  -MS           User Key  (r) = Recorded By, (t) = Taken By, (c) = Cosigned By    Initials Name Provider Type    Venus Cox OT Occupational Therapist                 Mobility/ADL's     Row Name 23 1314          Bed Mobility    Bed Mobility rolling left;rolling right;supine-sit;sit-supine  -MS     Rolling Left [place] (Bed Mobility) moderate assist (50% patient effort);maximum assist (25% patient effort);2 person assist  -MS     Rolling Right [place] (Bed Mobility) moderate assist (50% patient effort);maximum assist (25% patient effort);2 person assist  -MS      Scooting/Bridging [place] (Bed Mobility) maximum assist (25% patient effort);2 person assist  -MS     Supine-Sit [place] (Bed Mobility) maximum assist (25% patient effort);2 person assist  -MS     Sit-Supine [place] (Bed Mobility) maximum assist (25% patient effort);2 person assist  -MS     Bed Mobility, Safety Issues cognitive deficits limit understanding;decreased use of legs for bridging/pushing;decreased use of arms for pushing/pulling  -MS     Assistive Device (Bed Mobility) bed rails;draw sheet;head of bed elevated  -MS     Row Name 03/31/23 1314          Transfers    Comment, (Transfers) not safe to progress to transfers this date  -MS     Row Name 03/31/23 1314          Bed-Chair Transfer    Bed-Chair [place] (Transfers) not tested  -MS     Row Name 03/31/23 1314          Sit-Stand Transfer    Sit-Stand [place] (Transfers) not tested  -MS     Row Name 03/31/23 1314          Activities of Daily Living    BADL Assessment/Intervention toileting  -MS     Row Name 03/31/23 1314          Toileting Assessment/Training    [place] Level (Toileting) perform perineal hygiene;maximum assist (25% patient effort)  -MS     Position (Toileting) supine  -MS     Comment, (Toileting) linens soiled requiring max A for kenji hygiene  -MS           User Key  (r) = Recorded By, (t) = Taken By, (c) = Cosigned By    Initials Name Provider Type    Venus Cox OT Occupational Therapist               Obj/Interventions     Row Name 03/31/23 1316          Sensory Assessment (Somatosensory)    Sensory Assessment (Somatosensory) unable/difficult to assess  -MS     Row Name 03/31/23 1316          Vision Assessment/Intervention    Visual Impairment/Limitations unable/difficult to assess  -MS     Row Name 03/31/23 1316          Range of Motion Comprehensive    Comment, General Range of Motion pt unable to follow commands; unable to formally assess  -MS     Row Name 03/31/23 1316          Strength  Comprehensive (MMT)    Comment, General Manual Muscle Testing (MMT) Assessment cognitive deficits limited assessment, however noted to have global weakness  -MS     Row Name 03/31/23 1316          Balance    Balance Assessment sitting static balance;sitting dynamic balance  -MS     Static Sitting Balance moderate assist;maximum assist;standby assist;contact guard  initally mod-max; progressed to CGA-SBA sitting edge of bed  -MS     Dynamic Sitting Balance moderate assist;2-person assist  -MS     Position, Sitting Balance supported;sitting edge of bed  -MS           User Key  (r) = Recorded By, (t) = Taken By, (c) = Cosigned By    Initials Name Provider Type    Venus Cox, OT Occupational Therapist               Goals/Plan     Row Name 03/31/23 1323          Bed Mobility Goal 1 (OT)    Activity/Assistive Device (Bed Mobility Goal 1, OT) bed mobility activities, all  -MS     [place] Level/Cues Needed (Bed Mobility Goal 1, OT) minimum assist (75% or more patient effort)  -MS     Time Frame (Bed Mobility Goal 1, OT) long term goal (LTG);2 weeks  -MS     Progress/Outcomes (Bed Mobility Goal 1, OT) new goal  -MS     Row Name 03/31/23 1323          Transfer Goal 1 (OT)    Activity/Assistive Device (Transfer Goal 1, OT) transfers, all  -MS     [place] Level/Cues Needed (Transfer Goal 1, OT) moderate assist (50-74% patient effort)  -MS     Time Frame (Transfer Goal 1, OT) long term goal (LTG);2 weeks  -MS     Progress/Outcome (Transfer Goal 1, OT) new goal  -MS     Row Name 03/31/23 1323          Bathing Goal 1 (OT)    Activity/Device (Bathing Goal 1, OT) bathing skills, all  -MS     [place] Level/Cues Needed (Bathing Goal 1, OT) moderate assist (50-74% patient effort)  -MS     Time Frame (Bathing Goal 1, OT) long term goal (LTG);2 weeks  -MS     Progress/Outcomes (Bathing Goal 1, OT) new goal  -MS     Row Name 03/31/23 1323          Dressing Goal 1 (OT)    Activity/Device (Dressing Goal 1, OT) dressing  skills, all  -MS     [place]/Cues Needed (Dressing Goal 1, OT) moderate assist (50-74% patient effort)  -MS     Time Frame (Dressing Goal 1, OT) long term goal (LTG);2 weeks  -MS     Progress/Outcome (Dressing Goal 1, OT) new goal  -MS     Row Name 03/31/23 1323          Toileting Goal 1 (OT)    Activity/Device (Toileting Goal 1, OT) toileting skills, all  -MS     [place] Level/Cues Needed (Toileting Goal 1, OT) moderate assist (50-74% patient effort)  -MS     Time Frame (Toileting Goal 1, OT) long term goal (LTG);2 weeks  -MS     Progress/Outcome (Toileting Goal 1, OT) new goal  -MS     Row Name 03/31/23 1323          Therapy Assessment/Plan (OT)    Planned Therapy Interventions (OT) activity tolerance training;adaptive equipment training;BADL retraining;cognitive/visual perception retraining;functional balance retraining;IADL retraining;patient/caregiver education/training;transfer/mobility retraining;strengthening exercise;occupation/activity based interventions  -MS           User Key  (r) = Recorded By, (t) = Taken By, (c) = Cosigned By    Initials Name Provider Type    MS Venus Rivas, KIMBERLEY Occupational Therapist               Clinical Impression     Row Name 03/31/23 1317          Pain Assessment    Pain Intervention(s) Repositioned;Therapeutic presence;Emotional support  -MS     Row Name 03/31/23 1317          Pain Scale: FACES Pre/Post-Treatment    Pain: FACES Scale, Pretreatment 2-->hurts little bit  -MS     Posttreatment Pain Rating 2-->hurts little bit  -MS     Pain Location lower  -MS     Pain Location - back  -MS     Row Name 03/31/23 1317          Plan of Care Review    Plan of Care Reviewed With patient  -MS     Progress no change  -MS     Outcome Evaluation Pt is a 76 y/o F admitted to [facility] 3/29/23 with c/o GI hemorrhage. PMHx significant for chronic anxiety, dementia, and A. fib. Ct (+) colitis and gastrointestinal hemorrhage. At baseline pt resides at [facility]. Pt with increased  cognitive deficits. therefore PLOF was unable to be determined. Pt alert to self only, as she was not able to recall her , current year,month, place or situation. Pt required max verbal cues to attend to task and follow commands. Pt often participated in conversation from previous years ago. Pt required mod-maxx2 for bed mobility. Pt tolerated sitting edge of bed, initially requiring mod-max for seated balance, however able to progress to SBA-CGA. Pt not safe to progress to transfers this date, as pt demos difficulty following commands. Pt with soiled linens, required full bed change and kenji hygiene, requiring max A. Pt will require max A for ADLs and increased prompts and cues to participate in ADL routine. OT to follow while admitted. OT recommend pt to return to LTC with continued skilled OT intervention.  -MS     Row Name 23          Therapy Assessment/Plan (OT)    Rehab Potential (OT) fair, will monitor progress closely  -MS     Criteria for Skilled Therapeutic Interventions Met (OT) yes;meets criteria;skilled treatment is necessary  -MS     Therapy Frequency (OT) 3 times/wk  -MS     Predicted Duration of Therapy Intervention (OT) until d/c  -MS     Row Name 23          Therapy Plan Review/Discharge Plan (OT)    Anticipated Discharge Disposition (OT) skilled nursing facility  -MS     Row Name 23          Vital Signs    O2 Delivery Pre Treatment room air  -MS     O2 Delivery Intra Treatment room air  -MS     O2 Delivery Post Treatment room air  -MS     Pre Patient Position Supine  -MS     Intra Patient Position Sitting  -MS     Post Patient Position Supine  -MS     Row Name 23          Positioning and Restraints    Pre-Treatment Position in bed  -MS     Post Treatment Position bed  -MS     In Bed notified nsg;supine;call light within reach;encouraged to call for assist;exit alarm on  -MS           User Key  (r) = Recorded By, (t) = Taken By, (c) = Cosigned By     Initials Name Provider Type    Venus Cox OT Occupational Therapist               Outcome Measures     Row Name 03/31/23 1324          How much help from another is currently needed...    Putting on and taking off regular lower body clothing? 1  -MS     Bathing (including washing, rinsing, and drying) 1  -MS     Toileting (which includes using toilet bed pan or urinal) 1  -MS     Putting on and taking off regular upper body clothing 1  -MS     Taking care of personal grooming (such as brushing teeth) 1  -MS     Eating meals 1  -MS     AM-PAC 6 Clicks Score (OT) 6  -MS     Row Name 03/31/23 1308 03/31/23 0851       How much help from another person do you currently need...    Turning from your back to your side while in flat bed without using bedrails? 2  -EJ 3  -JW    Moving from lying on back to sitting on the side of a flat bed without bedrails? 2  -EJ 3  -JW    Moving to and from a bed to a chair (including a wheelchair)? 2  -EJ 3  -JW    Standing up from a chair using your arms (e.g., wheelchair, bedside chair)? 2  -EJ 3  -JW    Climbing 3-5 steps with a railing? 1  -EJ 2  -JW    To walk in hospital room? 1  -EJ 2  -JW    AM-PAC 6 Clicks Score (PT) 10  -EJ 16  -JW    Highest level of mobility 4 --> Transferred to chair/commode  -EJ 5 --> Static standing  -JW    Row Name 03/31/23 1324 03/31/23 1308       Functional Assessment    Outcome Measure Options AM-PAC 6 Clicks Daily Activity (OT)  -MS AM-PAC 6 Clicks Basic Mobility (PT)  -EJ          User Key  (r) = Recorded By, (t) = Taken By, (c) = Cosigned By    Initials Name Provider Type    EJ Jaylene Amos, PT Physical Therapist    Deandre Thomas, RN Registered Nurse    Venus Cox OT Occupational Therapist                Occupational Therapy Education     Title: PT OT SLP Therapies (In Progress)     Topic: Occupational Therapy (Done)     Point: ADL training (Done)     Description:   Instruct learner(s) on proper safety adaptation and remediation  techniques during self care or transfers.   Instruct in proper use of assistive devices.              Learning Progress Summary           Patient Acceptance, E,TB, VU by MS at 3/31/2023 1324                   Point: Body mechanics (Done)     Description:   Instruct learner(s) on proper positioning and spine alignment during self-care, functional mobility activities and/or exercises.              Learning Progress Summary           Patient Acceptance, E,TB, VU by MS at 3/31/2023 1324                               User Key     Initials Effective Dates Name Provider Type Discipline    MS 22 -  Venus Rivas, KIMBERLEY Occupational Therapist OT              OT Recommendation and Plan  Planned Therapy Interventions (OT): activity tolerance training, adaptive equipment training, BADL retraining, cognitive/visual perception retraining, functional balance retraining, IADL retraining, patient/caregiver education/training, transfer/mobility retraining, strengthening exercise, occupation/activity based interventions  Therapy Frequency (OT): 3 times/wk  Plan of Care Review  Plan of Care Reviewed With: patient  Progress: no change  Outcome Evaluation: Pt is a 78 y/o F admitted to [facility] 3/29/23 with c/o GI hemorrhage. PMHx significant for chronic anxiety, dementia, and A. fib. Ct (+) colitis and gastrointestinal hemorrhage. At baseline pt resides at [facility]. Pt with increased cognitive deficits. therefore PLOF was unable to be determined. Pt alert to self only, as she was not able to recall her , current year,month, place or situation. Pt required max verbal cues to attend to task and follow commands. Pt often participated in conversation from previous years ago. Pt required mod-maxx2 for bed mobility. Pt tolerated sitting edge of bed, initially requiring mod-max for seated balance, however able to progress to SBA-CGA. Pt not safe to progress to transfers this date, as pt demos difficulty following commands. Pt with  soiled linens, required full bed change and kenji hygiene, requiring max A. Pt will require max A for ADLs and increased prompts and cues to participate in ADL routine. OT to follow while admitted. OT recommend pt to return to LTC with continued skilled OT intervention.     Time Calculation:              Venus Rivas OT  3/31/2023

## 2023-03-31 NOTE — PROGRESS NOTES
[facility] Medicine Services   Daily Progress Note    Patient Name: Day Cabrera  : 1945  MRN: [id number]  Primary Care Physician:  Smita Howard APRN  Date of admission: 3/29/2023  Date and Time of Service: 2023 at 0900 hrs.      Subjective      Chief Complaint: Rectal bleeding    Patient seen and examined in room this morning.  Patient awake and confused and unaware if she has had any bloody stools.  Nursing staff reports patient had a small bloody BM last night, otherwise no acute events.  She is status post upper endoscopy yesterday and reportedly with no acute findings and possibly ischemic colitis.    ROS   Unable to fully review due to current altered mental status.      Objective      Vitals:   Temp:  [97.9 °F (36.6 °C)-98.6 °F (37 °C)] 97.9 °F (36.6 °C)  Heart Rate:  [] 78  Resp:  [13-16] 14  BP: (106-153)/(51-72) 106/59    Physical Exam  Vitals reviewed.   Constitutional:       Appearance: Normal appearance.      Comments: Awake and confused   HENT:      Head: Normocephalic and atraumatic.      Nose: Nose normal.      Mouth/Throat:      Mouth: Mucous membranes are moist.      Pharynx: Oropharynx is clear.   Eyes:      Extraocular Movements: Extraocular movements intact.      Conjunctiva/sclera: Conjunctivae normal.      Pupils: Pupils are equal, round, and reactive to light.   Cardiovascular:      Rate and Rhythm: Normal rate and regular rhythm.      Pulses: Normal pulses.      Heart sounds: Normal heart sounds.   Pulmonary:      Effort: Pulmonary effort is normal.      Breath sounds: Normal breath sounds.   Abdominal:      General: Abdomen is flat. Bowel sounds are normal.      Palpations: Abdomen is soft.   Musculoskeletal:         General: Normal range of motion.      Cervical back: Normal range of motion and neck supple.   Skin:     General: Skin is warm and dry.      Capillary Refill: Capillary refill takes less than 2 seconds.   Neurological:      Mental  Status: She is alert. She is disoriented.      Comments: Awake and confused   Psychiatric:      Comments: Unable to fully assess due to altered mental status               Result Review    Result Review:  I have personally reviewed the results from the time of this admission to 3/31/2023 09:49 EDT and agree with these findings:  [x]  Laboratory  []  Microbiology  [x]  Radiology  []  EKG/Telemetry   []  Cardiology/Vascular   []  Pathology  []  Old records  []  Other:  Most notable findings include:           Assessment & Plan      Brief Patient Summary:  Day Cabrera is a 77 y.o. female with a past medical history of dementia, chronic anxiety, atrial fibrillation, pulmonary embolism on chronic anticoagulation, who presented to [facility] from [facility] on 3/29/2023 complaining of blood in stool.  She apparently was brought in by daughter who is no longer at bedside.  Patient is awake and alert and appears in no distress.  She is mildly argumentative.  She denies any shortness of air, palpitations dizziness or abdominal pain.  Labs today show a glucose of 113 creatinine 1.13 WBC 6.8 hemoglobin 13.9, urinalysis shows 3+ blood positive nitrates moderate leukocytes too numerous to count WBCs and 4+ bacteria.  CT abdomen pelvis without contrast per radiology:  1. Distal colorectal wall thickening and mucosal hyperenhancement could reflect infectious or inflammatory colitis.  2. Urinary bladder wall thickening. Correlate for UTI or cystitis.     She was started on IV Flagyl in the ED as well as IV ceftriaxone and gastroenterology has been consulted.  Patient admitted for further evaluation and treatment.      cefTRIAXone, 2 g, Intravenous, Q24H  polyethylene glycol, 17 g, Oral, Daily  psyllium, 1 packet, Oral, Daily  sodium chloride, 3 mL, Intravenous, Q12H             Active Hospital Problems:  Active Hospital Problems    Diagnosis    • **Gastrointestinal hemorrhage, unspecified  gastrointestinal hemorrhage type    • Rectal bleeding    • Acute renal insufficiency    • Acute UTI (urinary tract infection)    • Hypothyroidism (acquired)    • Colitis    • Atrial fibrillation (HCC)    • History of pulmonary embolus (PE)    • Arthritis    • Dementia with behavioral disturbance    • Chronic anxiety    • Hypertension    • Obesity    • Memory impairment      Plan:     Rectal bleeding  -hematocrit stable likely secondary to infectious and or inflammatory colitis per CT report  -Monitor H&H  -continue IV Flagyl for now gastroenterology consulted repeat CBC tomorrow  -GI consulted and following: Status post upper endoscopy done yesterday with no abnormal findings and suggestive of possible ischemic colitis    Acute renal insufficiency   -creatinine 1.13>1  -normal saline at 100 cc/h   -trend renal function   -avoid nephrotoxic meds     Acute UTI   -4+ bacteria too numerous to count WBCs positive nitrates positive leukocytes  -1 g IV ceftriaxone until urine culture resulted     Atrial fibrillation/history of a pulmonary embolus  -was on home apixaban meds, hold for rectal bleeding   -EKG monitoring  -Considering placing back on apixaban when she is no longer having any bloody BMs     Arthritis   -home meds verified this time reorder pending verification pharmacy     Dementia with behavioral disturbance   -stable on home Aricept  -Depakote and risperidone and mirtazapine reorder pending verification     Chronic anxiety   -was on home Prozac, Depakote reorder pending verification     Hypertension   -was on home Lasix reorder pending verification and if appropriate   -given renal function monitor BP will add as needed [place] to hypertensives if needed     Hypothyroidism   -was on home Synthroid reorder pending verification     Obesity   -BMI 34.33 lifestyle management education     Memory impairment   -noted      DVT prophylaxis:  -Mechanical DVT prophylaxis orders are present.   -Considering placing back on  apixaban when she is no longer having any bloody BMs    CODE STATUS:    Code Status (Patient has no pulse and is not breathing): CPR (Attempt to Resuscitate)  Medical Interventions (Patient has pulse or is breathing): Full Support      Disposition:  I expect patient to be discharged 24 hours pending no more bloody BMs.    This patient has been examined wearing appropriate Personal Protective Equipment and discussed with Patient and nurse. 03/31/23      Electronically signed by Derek Claire Jr, APRN, 03/31/23, 09:49 EDT.  [facility] Hospitalist Team

## 2023-03-31 NOTE — THERAPY EVALUATION
Patient Name: Day Cabrera  : 1945    MRN: [id number]                              Today's Date: 3/31/2023       Admit Date: 3/29/2023    Visit Dx:     ICD-10-CM ICD-9-CM   1. Gastrointestinal hemorrhage, unspecified gastrointestinal hemorrhage type  K92.2 578.9   2. Colitis  K52.9 558.9     Patient Active Problem List   Diagnosis   • Chronic anxiety   • Dementia with behavioral disturbance   • Hypertension   • Memory impairment   • Obesity   • Sleep apnea   • E. coli UTI (urinary tract infection)   • Acute UTI (urinary tract infection)   • Delirium due to another medical condition   • Pneumonia due to 2019 novel coronavirus   • Sepsis (HCC)   • Arthritis   • COVID-19 virus detected   • Fever in adult   • Elevated troponin   • Altered mental status   • Hypernatremia   • Atrial fibrillation (HCC)   • History of pulmonary embolus (PE)   • Acute respiratory failure with hypoxia (HCC)   • Hypothyroidism (acquired)   • Colitis     Past Medical History:   Diagnosis Date   • Anxiety    • Arthritis    • Chest pain 10/15/2019   • Dementia (HCC)    • Hypertension    • Pulmonary embolus (HCC)      Past Surgical History:   Procedure Laterality Date   • CHOLECYSTECTOMY     • COLONOSCOPY     • ENDOSCOPY N/A 3/30/2023    Procedure: ESOPHAGOGASTRODUODENOSCOPY;  Surgeon: Contreras Watts MD;  Location: [facility] ENDOSCOPY;  Service: Gastroenterology;  Laterality: N/A;   • HYSTERECTOMY      total    • SIGMOIDOSCOPY N/A 3/30/2023    Procedure: SIGMOIDOSCOPY with biopsy;  Surgeon: Contreras Watts MD;  Location: [facility] ENDOSCOPY;  Service: Gastroenterology;  Laterality: N/A;  ischemia      General Information     Row Name 23 1251          Physical Therapy Time and Intention    Document Type evaluation  -EJ     Mode of Treatment physical therapy;co-treatment  -EJ     Row Name 23 1251          General Information    Patient Profile Reviewed yes  -EJ     Prior Level of Function --  Prior level of function  unable to obtain due to pt confusion.  Pt is from [facility].  -EJ     Existing Precautions/Restrictions fall  confusion  -EJ     Barriers to Rehab medically complex;cognitive status  -EJ     Row Name 03/31/23 1251          Living Environment    People in Home facility resident  [place]  -     Row Name 03/31/23 1251          Cognition    Orientation Status (Cognition) oriented to;person;disoriented to;place;situation;time  -EJ     Row Name 03/31/23 1251          Safety Issues, Functional Mobility    Safety Issues Affecting Function (Mobility) awareness of need for assistance;judgment;insight into deficits/self-awareness;problem-solving;safety precaution awareness;safety precautions follow-through/compliance;sequencing abilities  -EJ     Impairments Affecting Function (Mobility) balance;cognition;endurance/activity tolerance;strength;postural/trunk control;pain  -EJ     Cognitive Impairments, Mobility Safety/Performance awareness, need for assistance;insight into deficits/self-awareness;judgment;safety precaution awareness;safety precaution follow-through;problem-solving/reasoning;sequencing abilities  -EJ           User Key  (r) = Recorded By, (t) = Taken By, (c) = Cosigned By    Initials Name Provider Type    EJ Jaylene Amos, PT Physical Therapist               Mobility     Row Name 03/31/23 1259          Bed Mobility    Bed Mobility rolling left;rolling right;scooting/bridging;supine-sit;sit-supine  -EJ     Rolling Left Millen (Bed Mobility) moderate assist (50% patient effort);maximum assist (25% patient effort);2 person assist  -EJ     Rolling Right Millen (Bed Mobility) moderate assist (50% patient effort);maximum assist (25% patient effort);2 person assist  -EJ     Scooting/Bridging Millen (Bed Mobility) maximum assist (25% patient effort);2 person assist  bed placed in trendelenberg position to assist in scooting pt up in bed.  -EJ     Supine-Sit Millen (Bed  Mobility) maximum assist (25% patient effort);2 person assist  -EJ     Sit-Supine Evans (Bed Mobility) maximum assist (25% patient effort);2 person assist  -EJ     Assistive Device (Bed Mobility) bed rails;draw sheet;head of bed elevated  -EJ     Row Name 03/31/23 1259          Bed-Chair Transfer    Bed-Chair Evans (Transfers) not tested;unable to assess  -EJ     Row Name 03/31/23 1259          Sit-Stand Transfer    Sit-Stand Evans (Transfers) not tested;unable to assess  -EJ     Row Name 03/31/23 1259          Gait/Stairs (Locomotion)    Evans Level (Gait) not tested;unable to assess  -EJ           User Key  (r) = Recorded By, (t) = Taken By, (c) = Cosigned By    Initials Name Provider Type    Jaylene Beauchamp, PT Physical Therapist               Obj/Interventions     Row Name 03/31/23 1300          Range of Motion Comprehensive    Comment, General Range of Motion BLE AROM due to weakness, and pt unable to follow commands well to perform AROM.  -EJ     Row Name 03/31/23 1300          Strength Comprehensive (MMT)    Comment, General Manual Muscle Testing (MMT) Assessment Global weakness present.  -EJ     Row Name 03/31/23 1300          Balance    Balance Assessment sitting static balance;sitting dynamic balance  -EJ     Static Sitting Balance other (see comments)  Initially pt was mod-max A to maintain sitting balance.  ONce assisted in proper postion pt was SBA to CGA  -EJ     Dynamic Sitting Balance moderate assist;2-person assist  -EJ     Position, Sitting Balance supported;sitting edge of bed  Initially supported by therapist, then pt was able to hold to bed/bed rail to hold herself up.  -EJ     Balance Interventions sitting;static;supported;dynamic;minimal challenge  -EJ           User Key  (r) = Recorded By, (t) = Taken By, (c) = Cosigned By    Initials Name Provider Type    Jaylene Beauchamp, PT Physical Therapist               Goals/Plan     Row Name 03/31/23 1307          Bed  Mobility Goal 1 (PT)    Activity/Assistive Device (Bed Mobility Goal 1, PT) bed mobility activities, all  -EJ     [place] Level/Cues Needed (Bed Mobility Goal 1, PT) minimum assist (75% or more patient effort);moderate assist (50-74% patient effort)  -EJ     Time Frame (Bed Mobility Goal 1, PT) long term goal (LTG);2 weeks  -     Row Name 03/31/23 1307          Transfer Goal 1 (PT)    Activity/Assistive Device (Transfer Goal 1, PT) transfers, all;sit-to-stand/stand-to-sit;bed-to-chair/chair-to-bed;walker, rolling  -EJ     [place] Level/Cues Needed (Transfer Goal 1, PT) moderate assist (50-74% patient effort);maximum assist (25-49% patient effort)  -EJ     Time Frame (Transfer Goal 1, PT) long term goal (LTG);2 weeks  -     Row Name 03/31/23 1307          Gait Training Goal 1 (PT)    Activity/Assistive Device (Gait Training Goal 1, PT) gait (walking locomotion);walker, rolling  -EJ     [place] Level (Gait Training Goal 1, PT) moderate assist (50-74% patient effort)  -EJ     Distance (Gait Training Goal 1, PT) 3  -EJ     Time Frame (Gait Training Goal 1, PT) long term goal (LTG);2 weeks  -[facility] Name 03/31/23 1307          Therapy Assessment/Plan (PT)    Planned Therapy Interventions (PT) balance training;bed mobility training;gait training;postural re-education;patient/family education;neuromuscular re-education;ROM (range of motion);strengthening;transfer training  -EJ           User Key  (r) = Recorded By, (t) = Taken By, (c) = Cosigned By    Initials Name Provider Type    EJ Jaylene Amos, PT Physical Therapist               Clinical Impression     Row Name 03/31/23 1302          Pain    Pain Intervention(s) Therapeutic presence;Repositioned  -EJ     Additional Documentation Pain Scale: FACES Pre/Post-Treatment (Group)  -     Row Name 03/31/23 1302          Pain Scale: FACES Pre/Post-Treatment    Pain: FACES Scale, Pretreatment 2-->hurts little bit  -EJ     Posttreatment Pain Rating  2-->hurts little bit  -EJ     Pain Location lower  -EJ     Pain Location - back  -EJ     Row Name 23 1302          Plan of Care Review    Plan of Care Reviewed With patient  -EJ     Outcome Evaluation Patient is a 76 y/o F who was admitted 3/29 from [facility].  Pt was admitted due to blood in her stool.  CT revealed colitis and gastrointestinal hemorrhage.  During today's evaluation pt was very confused.  She was only oriented to self.  She had difficulty with her , current year, and location.  She required redirection throughout treatment.  Baseline function unable to be obtained due to pt's cognitive status; however, she is a resident at [facility].  She required max Ax2 for supine to sit this date.  Once sitting EOB pt initially required mod to max A to maintain balance, but then once position improved she was able to sit with SBA/CGA.  Pt had difficulty rolling in bed requiring mod to max A.  Pt max A with kenji care, and bed was wet with urine.  Pt rolled during bed change and getting cleaned up.  At this time PT will continue to follow pt in acute setting. Recommend pt d/c back to [facility] for skilled PT treatment.  -EJ     Row Name 23 1302          Therapy Assessment/Plan (PT)    Criteria for Skilled Interventions Met (PT) yes;skilled treatment is necessary  -EJ     Therapy Frequency (PT) 3 times/wk  -EJ     Predicted Duration of Therapy Intervention (PT) until discharge  -     Row Name 23 1302          Positioning and Restraints    Pre-Treatment Position in bed  -EJ     Post Treatment Position bed  -EJ     In Bed supine;call light within reach;encouraged to call for assist;notified nsg;exit alarm on  -EJ           User Key  (r) = Recorded By, (t) = Taken By, (c) = Cosigned By    Initials Name Provider Type    EJ Jaylene Amos, PT Physical Therapist               Outcome Measures     Row Name 23 1308 23 0809       How much help from another person do you  currently need...    Turning from your back to your side while in flat bed without using bedrails? 2  -EJ 3  -JW    Moving from lying on back to sitting on the side of a flat bed without bedrails? 2  -EJ 3  -JW    Moving to and from a bed to a chair (including a wheelchair)? 2  -EJ 3  -JW    Standing up from a chair using your arms (e.g., wheelchair, bedside chair)? 2  -EJ 3  -JW    Climbing 3-5 steps with a railing? 1  -EJ 2  -JW    To walk in hospital room? 1  -EJ 2  -JW    AM-PAC 6 Clicks Score (PT) 10  -EJ 16  -JW    Highest level of mobility 4 --> Transferred to chair/commode  -EJ 5 --> Static standing  -JW    Row Name 03/31/23 1308          Functional Assessment    Outcome Measure Options AM-PAC 6 Clicks Basic Mobility (PT)  -           User Key  (r) = Recorded By, (t) = Taken By, (c) = Cosigned By    Initials Name Provider Type     Jaylene Amos, PT Physical Therapist    Deandre Thomas RN Registered Nurse                             Physical Therapy Education     Title: PT OT SLP Therapies (In Progress)     Topic: Physical Therapy (In Progress)     Point: Mobility training (In Progress)     Learning Progress Summary           Patient Acceptance, E, NR by  at 3/31/2023 1311                   Point: Body mechanics (In Progress)     Learning Progress Summary           Patient Acceptance, E, NR by  at 3/31/2023 1311                   Point: Precautions (In Progress)     Learning Progress Summary           Patient Acceptance, E, NR by  at 3/31/2023 1311                               User Key     Initials Effective Dates Name Provider Type [facility] 06/16/21 -  Jaylene Amos, PT Physical Therapist PT              PT Recommendation and Plan  Planned Therapy Interventions (PT): balance training, bed mobility training, gait training, postural re-education, patient/family education, neuromuscular re-education, ROM (range of motion), strengthening, transfer training  Plan of Care Reviewed With:  patient  Outcome Evaluation: Patient is a 76 y/o F who was admitted 3/29 from [facility].  Pt was admitted due to blood in her stool.  CT revealed colitis and gastrointestinal hemorrhage.  During today's evaluation pt was very confused.  She was only oriented to self.  She had difficulty with her , current year, and location.  She required redirection throughout treatment.  Baseline function unable to be obtained due to pt's cognitive status; however, she is a resident at [facility].  She required max Ax2 for supine to sit this date.  Once sitting EOB pt initially required mod to max A to maintain balance, but then once position improved she was able to sit with SBA/CGA.  Pt had difficulty rolling in bed requiring mod to max A.  Pt max A with kenji care, and bed was wet with urine.  Pt rolled during bed change and getting cleaned up.  At this time PT will continue to follow pt in acute setting. Recommend pt d/c back to [facility] for skilled PT treatment.     Time Calculation:    PT Charges     Row Name 23 1311             Time Calculation    Start Time 1125  -EJ      Stop Time 1158  -EJ      Time Calculation (min) 33 min  -EJ      PT Received On 23  -EJ      PT - Next Appointment 23  -EJ      PT Goal Re-Cert Due Date 23  -EJ         Time Calculation- PT    Total Timed Code Minutes- PT 0 minute(s)  -EJ            User Key  (r) = Recorded By, (t) = Taken By, (c) = Cosigned By    Initials Name Provider Type    EJ Jaylene Amos, PT Physical Therapist              Therapy Charges for Today     Code Description Service Date Service Provider Modifiers Qty    33826151691 HC PT EVAL HIGH COMPLEXITY 4 3/31/2023 Jaylene Amos, PT GP 1          PT G-Codes  Outcome Measure Options: AM-PAC 6 Clicks Basic Mobility (PT)  AM-PAC 6 Clicks Score (PT): 10  PT Discharge Summary  Anticipated Discharge Disposition (PT): skilled nursing facility    Jaylene Amos, ARIC  3/31/2023

## 2023-04-02 LAB
BACTERIA SPEC AEROBE CULT: ABNORMAL
BACTERIA SPEC AEROBE CULT: ABNORMAL

## 2023-04-03 LAB
LAB AP CASE REPORT: NORMAL
PATH REPORT.FINAL DX SPEC: NORMAL
PATH REPORT.GROSS SPEC: NORMAL

## 2023-04-04 LAB — BACTERIA ISLT: NORMAL

## 2023-04-04 NOTE — CASE MANAGEMENT/SOCIAL WORK
Case Management Discharge Note      Final Note: DISCHARGED TO LONGTERM NURSING AT [facility]         Selected Continued Care - Discharged on 3/31/2023 Admission date: 3/29/2023 - Discharge disposition: Skilled Nursing Facility (DC - External)    Destination Coordination complete.    Service Provider Selected Services Address Phone Fax Patient Preferred    [facility] - [facility] [address] [place] [address] [phone] --           Final Discharge Disposition Code: 04 - intermediate care facility

## 2024-01-16 ENCOUNTER — ANESTHESIA EVENT (OUTPATIENT)
Dept: PERIOP | Facility: HOSPITAL | Age: 79
End: 2024-01-16
Payer: MEDICARE

## 2024-01-16 ENCOUNTER — ANESTHESIA (OUTPATIENT)
Dept: PERIOP | Facility: HOSPITAL | Age: 79
End: 2024-01-16
Payer: MEDICARE

## 2024-01-16 ENCOUNTER — APPOINTMENT (OUTPATIENT)
Dept: GENERAL RADIOLOGY | Facility: HOSPITAL | Age: 79
End: 2024-01-16
Payer: MEDICARE

## 2024-01-16 ENCOUNTER — APPOINTMENT (OUTPATIENT)
Dept: CT IMAGING | Facility: HOSPITAL | Age: 79
End: 2024-01-16
Payer: MEDICARE

## 2024-01-16 ENCOUNTER — HOSPITAL ENCOUNTER (INPATIENT)
Facility: HOSPITAL | Age: 79
LOS: 7 days | Discharge: NURSING, REHAB OR LONG-TERM CARE | End: 2024-01-23
Attending: EMERGENCY MEDICINE | Admitting: INTERNAL MEDICINE
Payer: MEDICARE

## 2024-01-16 DIAGNOSIS — N17.9 ACUTE RENAL FAILURE, UNSPECIFIED ACUTE RENAL FAILURE TYPE: ICD-10-CM

## 2024-01-16 DIAGNOSIS — E86.0 DEHYDRATION: ICD-10-CM

## 2024-01-16 DIAGNOSIS — R41.82 ALTERED MENTAL STATUS, UNSPECIFIED ALTERED MENTAL STATUS TYPE: Primary | ICD-10-CM

## 2024-01-16 DIAGNOSIS — N39.0 URINARY TRACT INFECTION WITH HEMATURIA, SITE UNSPECIFIED: ICD-10-CM

## 2024-01-16 DIAGNOSIS — R31.9 URINARY TRACT INFECTION WITH HEMATURIA, SITE UNSPECIFIED: ICD-10-CM

## 2024-01-16 PROBLEM — N30.01 ACUTE CYSTITIS WITH HEMATURIA: Status: ACTIVE | Noted: 2024-01-16

## 2024-01-16 LAB
ALBUMIN SERPL-MCNC: 3.9 G/DL (ref 3.5–5.2)
ALBUMIN/GLOB SERPL: 1.1 G/DL
ALP SERPL-CCNC: 98 U/L (ref 39–117)
ALT SERPL W P-5'-P-CCNC: 20 U/L (ref 1–33)
AMMONIA BLD-SCNC: 42 UMOL/L (ref 11–51)
ANION GAP SERPL CALCULATED.3IONS-SCNC: 15 MMOL/L (ref 5–15)
ANION GAP SERPL CALCULATED.3IONS-SCNC: 19 MMOL/L (ref 5–15)
AST SERPL-CCNC: 19 U/L (ref 1–32)
BACTERIA UR QL AUTO: ABNORMAL /HPF
BASOPHILS # BLD AUTO: 0.1 10*3/MM3 (ref 0–0.2)
BASOPHILS NFR BLD AUTO: 0.5 % (ref 0–1.5)
BILIRUB SERPL-MCNC: 0.7 MG/DL (ref 0–1.2)
BILIRUB UR QL STRIP: ABNORMAL
BUN SERPL-MCNC: 86 MG/DL (ref 8–23)
BUN SERPL-MCNC: 89 MG/DL (ref 8–23)
BUN/CREAT SERPL: 24.2 (ref 7–25)
BUN/CREAT SERPL: 26.1 (ref 7–25)
CALCIUM SPEC-SCNC: 8.8 MG/DL (ref 8.6–10.5)
CALCIUM SPEC-SCNC: 9.9 MG/DL (ref 8.6–10.5)
CHLORIDE SERPL-SCNC: 120 MMOL/L (ref 98–107)
CHLORIDE SERPL-SCNC: 124 MMOL/L (ref 98–107)
CK SERPL-CCNC: 54 U/L (ref 20–180)
CK SERPL-CCNC: 60 U/L (ref 20–180)
CLARITY UR: ABNORMAL
CO2 SERPL-SCNC: 18 MMOL/L (ref 22–29)
CO2 SERPL-SCNC: 21 MMOL/L (ref 22–29)
COLOR UR: ABNORMAL
CREAT SERPL-MCNC: 3.29 MG/DL (ref 0.57–1)
CREAT SERPL-MCNC: 3.68 MG/DL (ref 0.57–1)
D-LACTATE SERPL-SCNC: 1.1 MMOL/L (ref 0.3–2)
DEPRECATED RDW RBC AUTO: 49.4 FL (ref 37–54)
EGFRCR SERPLBLD CKD-EPI 2021: 12.1 ML/MIN/1.73
EGFRCR SERPLBLD CKD-EPI 2021: 13.8 ML/MIN/1.73
EOSINOPHIL # BLD AUTO: 0 10*3/MM3 (ref 0–0.4)
EOSINOPHIL NFR BLD AUTO: 0.1 % (ref 0.3–6.2)
ERYTHROCYTE [DISTWIDTH] IN BLOOD BY AUTOMATED COUNT: 14.8 % (ref 12.3–15.4)
GLOBULIN UR ELPH-MCNC: 3.7 GM/DL
GLUCOSE SERPL-MCNC: 103 MG/DL (ref 65–99)
GLUCOSE SERPL-MCNC: 126 MG/DL (ref 65–99)
GLUCOSE UR STRIP-MCNC: ABNORMAL MG/DL
HCT VFR BLD AUTO: 48.8 % (ref 34–46.6)
HGB BLD-MCNC: 16 G/DL (ref 12–15.9)
HGB UR QL STRIP.AUTO: ABNORMAL
HOLD SPECIMEN: NORMAL
HYALINE CASTS UR QL AUTO: ABNORMAL /LPF
KETONES UR QL STRIP: ABNORMAL
LEUKOCYTE ESTERASE UR QL STRIP.AUTO: ABNORMAL
LIPASE SERPL-CCNC: 42 U/L (ref 13–60)
LYMPHOCYTES # BLD AUTO: 3.2 10*3/MM3 (ref 0.7–3.1)
LYMPHOCYTES NFR BLD AUTO: 13 % (ref 19.6–45.3)
MCH RBC QN AUTO: 31.6 PG (ref 26.6–33)
MCHC RBC AUTO-ENTMCNC: 32.9 G/DL (ref 31.5–35.7)
MCV RBC AUTO: 96 FL (ref 79–97)
MONOCYTES # BLD AUTO: 1.4 10*3/MM3 (ref 0.1–0.9)
MONOCYTES NFR BLD AUTO: 5.8 % (ref 5–12)
NEUTROPHILS NFR BLD AUTO: 20.1 10*3/MM3 (ref 1.7–7)
NEUTROPHILS NFR BLD AUTO: 80.6 % (ref 42.7–76)
NITRITE UR QL STRIP: NEGATIVE
NRBC BLD AUTO-RTO: 0 /100 WBC (ref 0–0.2)
PH UR STRIP.AUTO: 6 [PH] (ref 5–8)
PLATELET # BLD AUTO: 271 10*3/MM3 (ref 140–450)
PMV BLD AUTO: 11.5 FL (ref 6–12)
POTASSIUM SERPL-SCNC: 4.4 MMOL/L (ref 3.5–5.2)
POTASSIUM SERPL-SCNC: 4.7 MMOL/L (ref 3.5–5.2)
PROCALCITONIN SERPL-MCNC: 0.55 NG/ML (ref 0–0.25)
PROT SERPL-MCNC: 7.6 G/DL (ref 6–8.5)
PROT UR QL STRIP: ABNORMAL
RBC # BLD AUTO: 5.08 10*6/MM3 (ref 3.77–5.28)
RBC # UR STRIP: ABNORMAL /HPF
REF LAB TEST METHOD: ABNORMAL
SODIUM SERPL-SCNC: 157 MMOL/L (ref 136–145)
SODIUM SERPL-SCNC: 160 MMOL/L (ref 136–145)
SP GR UR STRIP: 1.01 (ref 1–1.03)
SQUAMOUS #/AREA URNS HPF: ABNORMAL /HPF
TSH SERPL DL<=0.05 MIU/L-ACNC: 0.44 UIU/ML (ref 0.27–4.2)
UROBILINOGEN UR QL STRIP: ABNORMAL
WBC # UR STRIP: ABNORMAL /HPF
WBC NRBC COR # BLD AUTO: 24.9 10*3/MM3 (ref 3.4–10.8)
WHOLE BLOOD HOLD COAG: NORMAL

## 2024-01-16 PROCEDURE — 25810000003 SODIUM CHLORIDE 0.9 % SOLUTION: Performed by: INTERNAL MEDICINE

## 2024-01-16 PROCEDURE — 25010000002 PROPOFOL 200 MG/20ML EMULSION: Performed by: ANESTHESIOLOGY

## 2024-01-16 PROCEDURE — 82140 ASSAY OF AMMONIA: CPT | Performed by: EMERGENCY MEDICINE

## 2024-01-16 PROCEDURE — 25810000003 SODIUM CHLORIDE 0.9 % SOLUTION: Performed by: EMERGENCY MEDICINE

## 2024-01-16 PROCEDURE — 70450 CT HEAD/BRAIN W/O DYE: CPT

## 2024-01-16 PROCEDURE — 83690 ASSAY OF LIPASE: CPT | Performed by: EMERGENCY MEDICINE

## 2024-01-16 PROCEDURE — 82550 ASSAY OF CK (CPK): CPT | Performed by: INTERNAL MEDICINE

## 2024-01-16 PROCEDURE — 0T768DZ DILATION OF RIGHT URETER WITH INTRALUMINAL DEVICE, VIA NATURAL OR ARTIFICIAL OPENING ENDOSCOPIC: ICD-10-PCS | Performed by: UROLOGY

## 2024-01-16 PROCEDURE — 83605 ASSAY OF LACTIC ACID: CPT

## 2024-01-16 PROCEDURE — 74176 CT ABD & PELVIS W/O CONTRAST: CPT

## 2024-01-16 PROCEDURE — C2617 STENT, NON-COR, TEM W/O DEL: HCPCS | Performed by: UROLOGY

## 2024-01-16 PROCEDURE — 80053 COMPREHEN METABOLIC PANEL: CPT | Performed by: EMERGENCY MEDICINE

## 2024-01-16 PROCEDURE — 87086 URINE CULTURE/COLONY COUNT: CPT | Performed by: EMERGENCY MEDICINE

## 2024-01-16 PROCEDURE — 84145 PROCALCITONIN (PCT): CPT | Performed by: INTERNAL MEDICINE

## 2024-01-16 PROCEDURE — 81001 URINALYSIS AUTO W/SCOPE: CPT | Performed by: EMERGENCY MEDICINE

## 2024-01-16 PROCEDURE — 71045 X-RAY EXAM CHEST 1 VIEW: CPT

## 2024-01-16 PROCEDURE — 84443 ASSAY THYROID STIM HORMONE: CPT | Performed by: EMERGENCY MEDICINE

## 2024-01-16 PROCEDURE — 87040 BLOOD CULTURE FOR BACTERIA: CPT | Performed by: EMERGENCY MEDICINE

## 2024-01-16 PROCEDURE — C1769 GUIDE WIRE: HCPCS | Performed by: UROLOGY

## 2024-01-16 PROCEDURE — 25010000002 CEFTRIAXONE PER 250 MG: Performed by: EMERGENCY MEDICINE

## 2024-01-16 PROCEDURE — 76000 FLUOROSCOPY <1 HR PHYS/QHP: CPT

## 2024-01-16 PROCEDURE — 36415 COLL VENOUS BLD VENIPUNCTURE: CPT

## 2024-01-16 PROCEDURE — 93005 ELECTROCARDIOGRAM TRACING: CPT | Performed by: EMERGENCY MEDICINE

## 2024-01-16 PROCEDURE — 85025 COMPLETE CBC W/AUTO DIFF WBC: CPT | Performed by: EMERGENCY MEDICINE

## 2024-01-16 PROCEDURE — 99285 EMERGENCY DEPT VISIT HI MDM: CPT

## 2024-01-16 DEVICE — STNT PERCUFLX NO GW 6X24: Type: IMPLANTABLE DEVICE | Site: URETER | Status: FUNCTIONAL

## 2024-01-16 RX ORDER — OXYCODONE HYDROCHLORIDE 5 MG/1
5 TABLET ORAL ONCE AS NEEDED
Status: DISCONTINUED | OUTPATIENT
Start: 2024-01-16 | End: 2024-01-17 | Stop reason: HOSPADM

## 2024-01-16 RX ORDER — ONDANSETRON 2 MG/ML
4 INJECTION INTRAMUSCULAR; INTRAVENOUS EVERY 6 HOURS PRN
Status: DISCONTINUED | OUTPATIENT
Start: 2024-01-16 | End: 2024-01-17

## 2024-01-16 RX ORDER — AMIODARONE HYDROCHLORIDE 200 MG/1
200 TABLET ORAL
Status: DISCONTINUED | OUTPATIENT
Start: 2024-01-16 | End: 2024-01-23 | Stop reason: HOSPADM

## 2024-01-16 RX ORDER — NALBUPHINE HYDROCHLORIDE 10 MG/ML
5 INJECTION, SOLUTION INTRAMUSCULAR; INTRAVENOUS; SUBCUTANEOUS ONCE AS NEEDED
Status: DISCONTINUED | OUTPATIENT
Start: 2024-01-16 | End: 2024-01-17 | Stop reason: HOSPADM

## 2024-01-16 RX ORDER — NALBUPHINE HYDROCHLORIDE 10 MG/ML
2 INJECTION, SOLUTION INTRAMUSCULAR; INTRAVENOUS; SUBCUTANEOUS ONCE AS NEEDED
Status: DISCONTINUED | OUTPATIENT
Start: 2024-01-16 | End: 2024-01-17 | Stop reason: HOSPADM

## 2024-01-16 RX ORDER — POLYMYXIN B SULFATE AND TRIMETHOPRIM 1; 10000 MG/ML; [USP'U]/ML
1 SOLUTION OPHTHALMIC 3 TIMES DAILY
COMMUNITY
Start: 2024-01-13 | End: 2024-01-23 | Stop reason: HOSPADM

## 2024-01-16 RX ORDER — DROPERIDOL 2.5 MG/ML
0.62 INJECTION, SOLUTION INTRAMUSCULAR; INTRAVENOUS ONCE AS NEEDED
Status: DISCONTINUED | OUTPATIENT
Start: 2024-01-16 | End: 2024-01-17 | Stop reason: HOSPADM

## 2024-01-16 RX ORDER — OMEPRAZOLE 20 MG/1
20 CAPSULE, DELAYED RELEASE ORAL DAILY
COMMUNITY

## 2024-01-16 RX ORDER — SODIUM CHLORIDE 9 MG/ML
175 INJECTION, SOLUTION INTRAVENOUS CONTINUOUS
Status: DISCONTINUED | OUTPATIENT
Start: 2024-01-16 | End: 2024-01-17

## 2024-01-16 RX ORDER — PROPOFOL 10 MG/ML
INJECTION, EMULSION INTRAVENOUS AS NEEDED
Status: DISCONTINUED | OUTPATIENT
Start: 2024-01-16 | End: 2024-01-17 | Stop reason: SURG

## 2024-01-16 RX ORDER — IPRATROPIUM BROMIDE AND ALBUTEROL SULFATE 2.5; .5 MG/3ML; MG/3ML
3 SOLUTION RESPIRATORY (INHALATION) ONCE AS NEEDED
Status: DISCONTINUED | OUTPATIENT
Start: 2024-01-16 | End: 2024-01-17 | Stop reason: HOSPADM

## 2024-01-16 RX ORDER — PROMETHAZINE HYDROCHLORIDE 25 MG/1
25 TABLET ORAL ONCE AS NEEDED
Status: DISCONTINUED | OUTPATIENT
Start: 2024-01-16 | End: 2024-01-17 | Stop reason: HOSPADM

## 2024-01-16 RX ORDER — SODIUM CHLORIDE 0.9 % (FLUSH) 0.9 %
10 SYRINGE (ML) INJECTION EVERY 12 HOURS SCHEDULED
Status: DISCONTINUED | OUTPATIENT
Start: 2024-01-16 | End: 2024-01-17

## 2024-01-16 RX ORDER — BISACODYL 5 MG/1
5 TABLET, DELAYED RELEASE ORAL DAILY PRN
Status: DISCONTINUED | OUTPATIENT
Start: 2024-01-16 | End: 2024-01-17

## 2024-01-16 RX ORDER — SODIUM CHLORIDE 0.9 % (FLUSH) 0.9 %
10 SYRINGE (ML) INJECTION AS NEEDED
Status: DISCONTINUED | OUTPATIENT
Start: 2024-01-16 | End: 2024-01-23 | Stop reason: HOSPADM

## 2024-01-16 RX ORDER — ONDANSETRON 2 MG/ML
4 INJECTION INTRAMUSCULAR; INTRAVENOUS ONCE AS NEEDED
Status: DISCONTINUED | OUTPATIENT
Start: 2024-01-16 | End: 2024-01-17 | Stop reason: HOSPADM

## 2024-01-16 RX ORDER — DIPHENHYDRAMINE HYDROCHLORIDE 50 MG/ML
12.5 INJECTION INTRAMUSCULAR; INTRAVENOUS
Status: DISCONTINUED | OUTPATIENT
Start: 2024-01-16 | End: 2024-01-17 | Stop reason: HOSPADM

## 2024-01-16 RX ORDER — LIDOCAINE HYDROCHLORIDE 10 MG/ML
INJECTION, SOLUTION EPIDURAL; INFILTRATION; INTRACAUDAL; PERINEURAL AS NEEDED
Status: DISCONTINUED | OUTPATIENT
Start: 2024-01-16 | End: 2024-01-17 | Stop reason: SURG

## 2024-01-16 RX ORDER — AMOXICILLIN 250 MG
2 CAPSULE ORAL 2 TIMES DAILY
Status: DISCONTINUED | OUTPATIENT
Start: 2024-01-16 | End: 2024-01-17

## 2024-01-16 RX ORDER — ONDANSETRON 4 MG/1
4 TABLET, ORALLY DISINTEGRATING ORAL EVERY 6 HOURS PRN
Status: DISCONTINUED | OUTPATIENT
Start: 2024-01-16 | End: 2024-01-17

## 2024-01-16 RX ORDER — ACETAMINOPHEN 500 MG
1000 TABLET ORAL ONCE AS NEEDED
Status: DISCONTINUED | OUTPATIENT
Start: 2024-01-16 | End: 2024-01-17 | Stop reason: HOSPADM

## 2024-01-16 RX ORDER — CHOLECALCIFEROL (VITAMIN D3) 125 MCG
10 CAPSULE ORAL NIGHTLY
Status: DISCONTINUED | OUTPATIENT
Start: 2024-01-16 | End: 2024-01-17

## 2024-01-16 RX ORDER — BISACODYL 10 MG
10 SUPPOSITORY, RECTAL RECTAL DAILY PRN
Status: DISCONTINUED | OUTPATIENT
Start: 2024-01-16 | End: 2024-01-17

## 2024-01-16 RX ORDER — HYDRALAZINE HYDROCHLORIDE 20 MG/ML
5 INJECTION INTRAMUSCULAR; INTRAVENOUS
Status: DISCONTINUED | OUTPATIENT
Start: 2024-01-16 | End: 2024-01-17 | Stop reason: HOSPADM

## 2024-01-16 RX ORDER — LABETALOL HYDROCHLORIDE 5 MG/ML
5 INJECTION, SOLUTION INTRAVENOUS
Status: DISCONTINUED | OUTPATIENT
Start: 2024-01-16 | End: 2024-01-17 | Stop reason: HOSPADM

## 2024-01-16 RX ORDER — FLUMAZENIL 0.1 MG/ML
0.1 INJECTION INTRAVENOUS AS NEEDED
Status: DISCONTINUED | OUTPATIENT
Start: 2024-01-16 | End: 2024-01-17 | Stop reason: HOSPADM

## 2024-01-16 RX ORDER — ACETAMINOPHEN 650 MG/1
325 SUPPOSITORY RECTAL ONCE AS NEEDED
Status: DISCONTINUED | OUTPATIENT
Start: 2024-01-16 | End: 2024-01-17 | Stop reason: HOSPADM

## 2024-01-16 RX ORDER — OXYCODONE HYDROCHLORIDE 5 MG/1
10 TABLET ORAL ONCE AS NEEDED
Status: DISCONTINUED | OUTPATIENT
Start: 2024-01-16 | End: 2024-01-17 | Stop reason: HOSPADM

## 2024-01-16 RX ORDER — NALOXONE HCL 0.4 MG/ML
0.2 VIAL (ML) INJECTION AS NEEDED
Status: DISCONTINUED | OUTPATIENT
Start: 2024-01-16 | End: 2024-01-17 | Stop reason: HOSPADM

## 2024-01-16 RX ORDER — PANTOPRAZOLE SODIUM 40 MG/1
40 TABLET, DELAYED RELEASE ORAL
Status: DISCONTINUED | OUTPATIENT
Start: 2024-01-16 | End: 2024-01-17

## 2024-01-16 RX ORDER — LEVOTHYROXINE SODIUM 0.1 MG/1
100 TABLET ORAL
Status: DISCONTINUED | OUTPATIENT
Start: 2024-01-16 | End: 2024-01-17

## 2024-01-16 RX ORDER — PROMETHAZINE HYDROCHLORIDE 25 MG/1
25 SUPPOSITORY RECTAL ONCE AS NEEDED
Status: DISCONTINUED | OUTPATIENT
Start: 2024-01-16 | End: 2024-01-17 | Stop reason: HOSPADM

## 2024-01-16 RX ORDER — SODIUM CHLORIDE 9 MG/ML
40 INJECTION, SOLUTION INTRAVENOUS AS NEEDED
Status: DISCONTINUED | OUTPATIENT
Start: 2024-01-16 | End: 2024-01-23 | Stop reason: HOSPADM

## 2024-01-16 RX ORDER — POLYETHYLENE GLYCOL 3350 17 G/17G
17 POWDER, FOR SOLUTION ORAL DAILY PRN
Status: DISCONTINUED | OUTPATIENT
Start: 2024-01-16 | End: 2024-01-17

## 2024-01-16 RX ADMIN — PROPOFOL 40 MG: 10 INJECTION, EMULSION INTRAVENOUS at 22:56

## 2024-01-16 RX ADMIN — SODIUM CHLORIDE 175 ML/HR: 9 INJECTION, SOLUTION INTRAVENOUS at 15:12

## 2024-01-16 RX ADMIN — SODIUM CHLORIDE 2000 ML: 9 INJECTION, SOLUTION INTRAVENOUS at 12:01

## 2024-01-16 RX ADMIN — Medication 10 ML: at 22:54

## 2024-01-16 RX ADMIN — SODIUM CHLORIDE: 9 INJECTION, SOLUTION INTRAVENOUS at 23:01

## 2024-01-16 RX ADMIN — CEFTRIAXONE 2000 MG: 2 INJECTION, POWDER, FOR SOLUTION INTRAMUSCULAR; INTRAVENOUS at 11:33

## 2024-01-16 RX ADMIN — LIDOCAINE HYDROCHLORIDE 50 MG: 10 INJECTION, SOLUTION EPIDURAL; INFILTRATION; INTRACAUDAL; PERINEURAL at 22:56

## 2024-01-16 NOTE — H&P
History and Physical   Day Cabrera : 1945 MRN:[id number] LOS:0     Reason for admission: Acute cystitis with hematuria     Assessment / Plan     Severe sepsis  Acute cystitis with hematuria  -Urine and blood cultures pending  -Continue IV Rocephin 2 g every 24 hours  -Will get CT of the abdomen and pelvis without contrast to evaluate for pyelo/hydro/stone and consult urology if needed      NENA superimposed on CKD 2/2 pre-renal azotemia  Profound dehydration and hypernatremia  -Patient has received no fluids at time of being seen  -Will give 2 L normal saline bolus followed by normal saline at 175 mL/h  -CPK within normal limits  -EF in 2017 was >60%  -Nephrology, Dr. Grover, consulted      Acute encephalopathy  -2/2 baseline dementia with sepsis and dehydration superimposed  -Monitor      Chronic & Stable:  Dementia  HTN  Hx of PE  -Continue home meds when able to take po--If needed, will get NGT            Nutrition: Diet: Cardiac Diets; Healthy Heart (2-3 Na+); Texture: Regular Texture (IDDSI 7); Fluid Consistency: Thin (IDDSI 0)     DVT Prophylaxis:   Mechanical Order History:       None          Pharmalogical Order History:        Ordered     Dose Route Frequency Stop    24 1141  apixaban (ELIQUIS) tablet 5 mg         5 mg PO Every 12 Hours Scheduled --                     History of Present illness     A 78 y.o. old female patient who resides at a NH, who was brought into the ER on  for AMS.  Minimal can be obtained 2/2 pt's mentation.    In the ER, the following was found.  Patient afebrile and tachycardic, blood pressure within normal limits.  Sodium significantly elevated at 160 consistent with dehydration, CO2 minimally low at 21, BUN 89, creatinine significantly elevated at 3.68 (baseline is right around 1.0).  Procalcitonin minimally elevated at 0.55, lactate within normal limits.  White blood cell count significantly elevated at 24.9 with 81% neutrophils.  Chest x-ray was  unremarkable.  CT of the head without contrast was negative for acute issue.  Urinalysis noted for large hematuria and white blood cells too numerous to count with 2+ bacteria.  Patient was given Rocephin and hospitalist consulted for admission.    Subjective / Review of systems     Review of Systems   Unable to be obtained 2/2 pt's current condition.    Past Medical/Surgical/Social/Family History & Allergies     Past Medical History:   Diagnosis Date    Anxiety     Arthritis     Chest pain 10/15/2019    Dementia     Hypertension     Pulmonary embolus       Past Surgical History:   Procedure Laterality Date    CHOLECYSTECTOMY      COLONOSCOPY      ENDOSCOPY N/A 3/30/2023    Procedure: ESOPHAGOGASTRODUODENOSCOPY;  Surgeon: Contreras Watts MD;  Location: [facility] ENDOSCOPY;  Service: Gastroenterology;  Laterality: N/A;    HYSTERECTOMY      total     SIGMOIDOSCOPY N/A 3/30/2023    Procedure: SIGMOIDOSCOPY with biopsy;  Surgeon: Contreras Watts MD;  Location: [facility] ENDOSCOPY;  Service: Gastroenterology;  Laterality: N/A;  ischemia      Social History     Socioeconomic History    Marital status:     Number of children: 3   Tobacco Use    Smoking status: Former     Years: 30     Types: Cigarettes     Quit date:      Years since quittin.0    Smokeless tobacco: Never   Vaping Use    Vaping Use: Never used   Substance and Sexual Activity    Alcohol use: No    Drug use: No    Sexual activity: Defer      Family History   Problem Relation Age of Onset    Heart disease Mother     Alcohol abuse Father         murdered     Pancreatic cancer Sister     No Known Problems Half-Sister       Allergies   Allergen Reactions    Ciprofloxacin Unknown (See Comments)        Home Medications     Prior to Admission medications    Medication Sig Start Date End Date Taking? Authorizing Provider   acetaminophen (TYLENOL) 325 MG tablet Take 2 tablets by mouth Every 6 (Six) Hours As Needed for Mild Pain.    Provider,  MD Keyana   amiodarone (PACERONE) 200 MG tablet Take 1 tablet by mouth Daily. 8/15/20   Salena Fitch MD   apixaban (ELIQUIS) 5 MG tablet tablet Take 1 tablet by mouth Every 12 (Twelve) Hours. 11/1/19   Fabrice Kiser MD   bisacodyl (DULCOLAX) 10 MG suppository Insert 1 suppository into the rectum Daily As Needed for Constipation.    Keyana Selby MD   Cholecalciferol 50 MCG (2000 UT) tablet Take 1 tablet by mouth Daily.    Keyana Selby MD   furosemide (LASIX) 20 MG tablet Take 1 tablet by mouth Daily.    Keyana Selby MD   levothyroxine (SYNTHROID, LEVOTHROID) 100 MCG tablet Take 1 tablet by mouth Daily.    Keyana Selby MD   magnesium hydroxide (MILK OF MAGNESIA) 400 MG/5ML suspension Take 30 mL by mouth Daily As Needed for Constipation.    Keyana Selby MD   melatonin 5 MG tablet tablet Take 1 tablet by mouth Daily.    Keyana Selby MD   nitroglycerin (NITROSTAT) 0.4 MG SL tablet Place 1 tablet under the tongue Every 5 (Five) Minutes As Needed for Chest Pain. Take no more than 3 doses in 15 minutes. 10/25/19   Fabrice Kiser MD   nystatin (MYCOSTATIN) 705811 UNIT/GM cream Apply 1 application topically to the appropriate area as directed 2 (Two) Times a Day.    Keyana Selby MD   omeprazole (priLOSEC) 20 MG capsule Take 1 capsule by mouth Daily.    Keyana Selby MD   potassium chloride (K-DUR,KLOR-CON) 10 MEQ CR tablet Take 3 tablets by mouth Daily.    Keyana Selby MD   Sodium Phosphates (fleet enema) 7-19 GM/118ML enema Insert 1 enema into the rectum Daily As Needed.    Keyana Selby MD   trimethoprim-polymyxin b (Polytrim) 93030-4.1 UNIT/ML-% ophthalmic solution Administer 1 drop into the left eye 3 times a day. 1/13/24 1/20/24  Keyana Selby MD   vitamin B-12 (CYANOCOBALAMIN) 1000 MCG tablet Take 1 tablet by mouth Daily.    Keyana Selby MD   pantoprazole (PROTONIX) 40 MG EC tablet Take 1  tablet by mouth Daily.  1/16/24  Provider, MD Keyana      Objective / Physical Exam   Vital signs:  Temp: 98.2 °F (36.8 °C)  BP: 125/60  Heart Rate: 112  Resp: 20  SpO2: 94 %  Weight: 89.8 kg (198 lb)    Admission Weight: Weight: 89.8 kg (198 lb)    Physical Exam     GEN:    Obese, elderly woman, acute on chronically ill-appearing, very lethargic, dehydrated-appearing, lying in bed on nasal cannula.  NAD.  NEURO:  Brainstem reflexes intact.  No obvious focal deficit.  Moves all 4 ext.  HEENT:  N/AT.  PERRL.  MM's exquisitely dry.  Oropharynx non-erythematous.  No drainage from the eyes/ears/nose.  No conjunctival petechiae.  No oral thrush.    NECK:  Supple, NT, trachea midline.  No meningismus.  No ROM limitation.    CHEST/LUNGS:  Breath sounds are clear and equal bilaterally.  No w/r/r.  Chest excursion equal bilaterally.    CARDIOVASCULAR:  Tachycardic, RR w/o murmur noted.    GI:  Abdomen soft, grimaces and moans with palpation over both kidneys, ND, dim BS.  :  Normal external genitalia.  No veloz cath in place.  EXTREMITIES:  No deformity or amputation.  No cyanosis, edema, or asymmetry.  Pulses 2+ and equal in BLE's.    SKIN:  Warm, dry, and pink.  No rash, breakdown, or track marks noted.  LYMPHATICS/HEME:  No overt LAD or abnormal bruising.  No lymphedema.  MSK:  No joint abnormalities noted.    PSYCH:  Lethargic, doesn't follow commands        Labs     Results from last 7 days   Lab Units 01/16/24  0956   WBC 10*3/mm3 24.90*   HEMATOCRIT % 48.8*   PLATELETS 10*3/mm3 271      Results from last 7 days   Lab Units 01/16/24  0956   SODIUM mmol/L 160*   POTASSIUM mmol/L 4.7   CHLORIDE mmol/L 120*   CO2 mmol/L 21.0*   BUN mg/dL 89*   CREATININE mg/dL 3.68*        Imaging     Chest X ray: My independent assessment showed no infiltrates or effusions    EKG: My independent evaluation showed normal sinus rhythm, no ST -T changes    Current Medications   Scheduled Meds:amiodarone, 200 mg, Oral, Q24H  apixaban,  5 mg, Oral, Q12H  lactated ringers, 1,000 mL, Intravenous, Once  levothyroxine, 100 mcg, Oral, Q AM  melatonin, 10 mg, Oral, Nightly  pantoprazole, 40 mg, Oral, Q AM  senna-docusate sodium, 2 tablet, Oral, BID  sodium chloride, 10 mL, Intravenous, Q12H         Continuous Infusions:sodium chloride, 175 mL/hr         Mian Lee DO  Critical Care  01/16/24   13:14 EST

## 2024-01-16 NOTE — Clinical Note
Level of Care: Telemetry [5]   Admitting Physician: CLAIXTO EAGLE [[id number]]   Attending Physician: CALIXTO EAGLE [[id number]]

## 2024-01-16 NOTE — LETTER
EMS Transport Request  For use at [facility], [place], [place], [place], and [place] only   Patient Name: Day Cabrera : 1945   Weight:85.2 kg (187 lb 13.3 oz) Pick-up Location: [facility] BLS/ALS: BLS/ALS: BLS   Insurance: [employer] MEDICARE REPLACEMENT Auth End Date: 24   Pre-Cert #: D/C Summary complete:    Destination: [facility]   Contact Precautions: None   Equipment (O2, Fluids, etc.): O2, settings 2L   Arrive By Date/Time: 24 Stretcher/WC: Stretcher   CM Requesting: Vira Meier RN Ext: [phone]   Notes/Medical Necessity: Ambulance/stretcher     ______________________________________________________________________    *Only 2 patient bags OR 1 carry-on size bag are permitted.  Wheelchairs and walkers CANNOT transported with the patient. Acknowledge: Yes

## 2024-01-16 NOTE — ED NOTES
Patient has a scapular (black necklace with brown squares that go in the back and front of neck) on and patients daughter asked that we do not take it off of her under any circumstances.

## 2024-01-16 NOTE — CONSULTS
Nephrology Consult Note                                                [facility]      Patient Identification:  Name: Day Cabrera  Age: 78 y.o.  Sex: female  :  1945  MRN: [id number]               Requesting Physician: Mian Lee DO  Reason for Consultation: management recommendations      History of Present Illness:    Patient is a 78-year-old white male patient who is a nursing home resident with history of hypertension previous PE and previously normal kidney function presented to the emergency room due to altered mental status was found to be tachycardic and to be in acute kidney injury creatinine 3.5 and hyponatremia sodium 160 prompting renal consultation  Problem List:    Acute cystitis with hematuria     Past Medical History:  Past Medical History:   Diagnosis Date    Anxiety     Arthritis     Chest pain 10/15/2019    Dementia     Hypertension     Pulmonary embolus      Past Surgical History:  Past Surgical History:   Procedure Laterality Date    CHOLECYSTECTOMY      COLONOSCOPY      ENDOSCOPY N/A 3/30/2023    Procedure: ESOPHAGOGASTRODUODENOSCOPY;  Surgeon: Contreras Watts MD;  Location: [facility] ENDOSCOPY;  Service: Gastroenterology;  Laterality: N/A;    HYSTERECTOMY      total     SIGMOIDOSCOPY N/A 3/30/2023    Procedure: SIGMOIDOSCOPY with biopsy;  Surgeon: Contreras Watts MD;  Location: [facility] ENDOSCOPY;  Service: Gastroenterology;  Laterality: N/A;  ischemia      Home Meds:  (Not in a hospital admission)    Current Meds:     Current Facility-Administered Medications:     amiodarone (PACERONE) tablet 200 mg, 200 mg, Oral, Q24H, Mian Lee DO    apixaban (ELIQUIS) tablet 5 mg, 5 mg, Oral, Q12H, Mian Lee DO    sennosides-docusate (PERICOLACE) 8.6-50 MG per tablet 2 tablet, 2 tablet, Oral, BID **AND** polyethylene glycol (MIRALAX) packet 17 g, 17 g, Oral, Daily PRN **AND** bisacodyl (DULCOLAX) EC tablet 5 mg, 5 mg,  Oral, Daily PRN **AND** bisacodyl (DULCOLAX) suppository 10 mg, 10 mg, Rectal, Daily PRN, Mian Lee DO    lactated ringers bolus 1,000 mL, 1,000 mL, Intravenous, Once, Cyrus Lee MD    levothyroxine (SYNTHROID, LEVOTHROID) tablet 100 mcg, 100 mcg, Oral, Q AM, Mian Lee DO    melatonin tablet 10 mg, 10 mg, Oral, Nightly, Mian Lee DO    ondansetron ODT (ZOFRAN-ODT) disintegrating tablet 4 mg, 4 mg, Oral, Q6H PRN **OR** ondansetron (ZOFRAN) injection 4 mg, 4 mg, Intravenous, Q6H PRN, Mian Lee DO    pantoprazole (PROTONIX) EC tablet 40 mg, 40 mg, Oral, Q AM, Mian Lee DO    [COMPLETED] Insert Peripheral IV, , , Once **AND** sodium chloride 0.9 % flush 10 mL, 10 mL, Intravenous, PRN, Cyrus Lee MD    sodium chloride 0.9 % flush 10 mL, 10 mL, Intravenous, Q12H, Mian Lee DO    sodium chloride 0.9 % flush 10 mL, 10 mL, Intravenous, PRN, Mian Lee DO    sodium chloride 0.9 % infusion 40 mL, 40 mL, Intravenous, PRN, Mian Lee DO    sodium chloride 0.9 % infusion, 175 mL/hr, Intravenous, Continuous, Mian Lee DO    Current Outpatient Medications:     acetaminophen (TYLENOL) 325 MG tablet, Take 2 tablets by mouth Every 6 (Six) Hours As Needed for Mild Pain., Disp: , Rfl:     amiodarone (PACERONE) 200 MG tablet, Take 1 tablet by mouth Daily., Disp: 30 tablet, Rfl: 1    apixaban (ELIQUIS) 5 MG tablet tablet, Take 1 tablet by mouth Every 12 (Twelve) Hours., Disp: 60 tablet, Rfl: 0    bisacodyl (DULCOLAX) 10 MG suppository, Insert 1 suppository into the rectum Daily As Needed for Constipation., Disp: , Rfl:     Cholecalciferol 50 MCG (2000 UT) tablet, Take 1 tablet by mouth Daily., Disp: , Rfl:     furosemide (LASIX) 20 MG tablet, Take 1 tablet by mouth Daily., Disp: , Rfl:     levothyroxine (SYNTHROID, LEVOTHROID) 100 MCG tablet, Take 1 tablet by mouth Daily., Disp: , Rfl:     magnesium hydroxide (MILK  "OF MAGNESIA) 400 MG/5ML suspension, Take 30 mL by mouth Daily As Needed for Constipation., Disp: , Rfl:     melatonin 5 MG tablet tablet, Take 1 tablet by mouth Daily., Disp: , Rfl:     nitroglycerin (NITROSTAT) 0.4 MG SL tablet, Place 1 tablet under the tongue Every 5 (Five) Minutes As Needed for Chest Pain. Take no more than 3 doses in 15 minutes., Disp: 30 tablet, Rfl: 12    nystatin (MYCOSTATIN) 760266 UNIT/GM cream, Apply 1 application topically to the appropriate area as directed 2 (Two) Times a Day., Disp: , Rfl:     omeprazole (priLOSEC) 20 MG capsule, Take 1 capsule by mouth Daily., Disp: , Rfl:     potassium chloride (K-DUR,KLOR-CON) 10 MEQ CR tablet, Take 3 tablets by mouth Daily., Disp: , Rfl:     Sodium Phosphates (fleet enema) 7-19 GM/118ML enema, Insert 1 enema into the rectum Daily As Needed., Disp: , Rfl:     trimethoprim-polymyxin b (Polytrim) 03570-5.1 UNIT/ML-% ophthalmic solution, Administer 1 drop into the left eye 3 times a day., Disp: , Rfl:     vitamin B-12 (CYANOCOBALAMIN) 1000 MCG tablet, Take 1 tablet by mouth Daily., Disp: , Rfl:     Allergies:  Allergies   Allergen Reactions    Ciprofloxacin Unknown (See Comments)     Social History:   Social History     Tobacco Use    Smoking status: Former     Years: 30     Types: Cigarettes     Quit date:      Years since quittin.0    Smokeless tobacco: Never   Substance Use Topics    Alcohol use: No      Family History:  Family History   Problem Relation Age of Onset    Heart disease Mother     Alcohol abuse Father         murdered     Pancreatic cancer Sister     No Known Problems Half-Sister         Review of Systems  unobtainable due to mental status    Objective:  Vitals:   /71   Pulse 92   Temp 98.2 °F (36.8 °C) (Axillary)   Resp 20   Ht 162.6 cm (64\")   Wt 89.8 kg (198 lb)   SpO2 94%   BMI 33.99 kg/m²   I/O:     Intake/Output Summary (Last 24 hours) at 2024 1420  Last data filed at 2024 1157  Gross per 24 hour "   Intake 100 ml   Output --   Net 100 ml       Exam:  General Appearance: This very lethargic  Head:  Normocephalic, without obvious abnormality, atraumatic  Eyes:  PERRL, conjunctiva/corneas clear     Neck:  Supple,  no adenopathy;      Lungs:  Decreased BS occasion ronchi  Heart:  Regular rate and rhythm, S1 and S2 normal  Abdomen:  Soft, non-tender, bowel sounds active   Extremities: trace edema  Pulses: 2+ and symmetric all extremities  Skin:  No rashes or lesions  Data Review:  All labs (24hrs):   Recent Results (from the past 24 hour(s))   ECG 12 Lead Altered Mental Status    Collection Time: 01/16/24  9:34 AM   Result Value Ref Range    QT Interval 360 ms    QTC Interval 488 ms   Urinalysis With Microscopic If Indicated (No Culture) - Straight Cath    Collection Time: 01/16/24  9:55 AM    Specimen: Straight Cath; Urine   Result Value Ref Range    Color, UA Red (A) Yellow, Straw    Appearance, UA Turbid (A) Clear    pH, UA 6.0 5.0 - 8.0    Specific Gravity, UA 1.015 1.005 - 1.030    Glucose,  mg/dL (Trace) (A) Negative    Ketones, UA 15 mg/dL (1+) (A) Negative    Bilirubin, UA Large (3+) (A) Negative    Blood, UA Large (3+) (A) Negative    Protein, UA >=300 mg/dL (3+) (A) Negative    Leuk Esterase, UA Large (3+) (A) Negative    Nitrite, UA Negative Negative    Urobilinogen, UA 4.0 E.U./dL (A) 0.2 - 1.0 E.U./dL   Urinalysis, Microscopic Only - Straight Cath    Collection Time: 01/16/24  9:55 AM    Specimen: Straight Cath; Urine   Result Value Ref Range    RBC, UA Too Numerous to Count (A) None Seen, 0-2 /HPF    WBC, UA Too Numerous to Count (A) None Seen, 0-2 /HPF    Bacteria, UA 2+ (A) None Seen /HPF    Squamous Epithelial Cells, UA 3-6 (A) None Seen, 0-2 /HPF    Hyaline Casts, UA None Seen None Seen /LPF    Methodology Automated Microscopy    Comprehensive Metabolic Panel    Collection Time: 01/16/24  9:56 AM    Specimen: Blood   Result Value Ref Range    Glucose 126 (H) 65 - 99 mg/dL    BUN 89 (H) 8 -  23 mg/dL    Creatinine 3.68 (H) 0.57 - 1.00 mg/dL    Sodium 160 (H) 136 - 145 mmol/L    Potassium 4.7 3.5 - 5.2 mmol/L    Chloride 120 (H) 98 - 107 mmol/L    CO2 21.0 (L) 22.0 - 29.0 mmol/L    Calcium 9.9 8.6 - 10.5 mg/dL    Total Protein 7.6 6.0 - 8.5 g/dL    Albumin 3.9 3.5 - 5.2 g/dL    ALT (SGPT) 20 1 - 33 U/L    AST (SGOT) 19 1 - 32 U/L    Alkaline Phosphatase 98 39 - 117 U/L    Total Bilirubin 0.7 0.0 - 1.2 mg/dL    Globulin 3.7 gm/dL    A/G Ratio 1.1 g/dL    BUN/Creatinine Ratio 24.2 7.0 - 25.0    Anion Gap 19.0 (H) 5.0 - 15.0 mmol/L    eGFR 12.1 (L) >60.0 mL/min/1.73   Lipase    Collection Time: 01/16/24  9:56 AM    Specimen: Blood   Result Value Ref Range    Lipase 42 13 - 60 U/L   CBC Auto Differential    Collection Time: 01/16/24  9:56 AM    Specimen: Blood   Result Value Ref Range    WBC 24.90 (H) 3.40 - 10.80 10*3/mm3    RBC 5.08 3.77 - 5.28 10*6/mm3    Hemoglobin 16.0 (H) 12.0 - 15.9 g/dL    Hematocrit 48.8 (H) 34.0 - 46.6 %    MCV 96.0 79.0 - 97.0 fL    MCH 31.6 26.6 - 33.0 pg    MCHC 32.9 31.5 - 35.7 g/dL    RDW 14.8 12.3 - 15.4 %    RDW-SD 49.4 37.0 - 54.0 fl    MPV 11.5 6.0 - 12.0 fL    Platelets 271 140 - 450 10*3/mm3    Neutrophil % 80.6 (H) 42.7 - 76.0 %    Lymphocyte % 13.0 (L) 19.6 - 45.3 %    Monocyte % 5.8 5.0 - 12.0 %    Eosinophil % 0.1 (L) 0.3 - 6.2 %    Basophil % 0.5 0.0 - 1.5 %    Neutrophils, Absolute 20.10 (H) 1.70 - 7.00 10*3/mm3    Lymphocytes, Absolute 3.20 (H) 0.70 - 3.10 10*3/mm3    Monocytes, Absolute 1.40 (H) 0.10 - 0.90 10*3/mm3    Eosinophils, Absolute 0.00 0.00 - 0.40 10*3/mm3    Basophils, Absolute 0.10 0.00 - 0.20 10*3/mm3    nRBC 0.0 0.0 - 0.2 /100 WBC   TSH    Collection Time: 01/16/24  9:56 AM    Specimen: Blood   Result Value Ref Range    TSH 0.436 0.270 - 4.200 uIU/mL   Ammonia    Collection Time: 01/16/24  9:56 AM    Specimen: Blood   Result Value Ref Range    Ammonia 42 11 - 51 umol/L   Gold Top - SST    Collection Time: 01/16/24  9:56 AM   Result Value Ref Range     Extra Tube Hold for add-ons.    Light Blue Top    Collection Time: 01/16/24  9:56 AM   Result Value Ref Range    Extra Tube Hold for add-ons.    Procalcitonin    Collection Time: 01/16/24  9:56 AM    Specimen: Blood   Result Value Ref Range    Procalcitonin 0.55 (H) 0.00 - 0.25 ng/mL   CK    Collection Time: 01/16/24  9:56 AM    Specimen: Blood   Result Value Ref Range    Creatine Kinase 60 20 - 180 U/L   POC Lactate    Collection Time: 01/16/24  9:59 AM    Specimen: Blood   Result Value Ref Range    Lactate 1.1 0.3 - 2.0 mmol/L       Current Facility-Administered Medications:     amiodarone (PACERONE) tablet 200 mg, 200 mg, Oral, Q24H, Mian Lee DO    apixaban (ELIQUIS) tablet 5 mg, 5 mg, Oral, Q12H, Mian Lee DO    sennosides-docusate (PERICOLACE) 8.6-50 MG per tablet 2 tablet, 2 tablet, Oral, BID **AND** polyethylene glycol (MIRALAX) packet 17 g, 17 g, Oral, Daily PRN **AND** bisacodyl (DULCOLAX) EC tablet 5 mg, 5 mg, Oral, Daily PRN **AND** bisacodyl (DULCOLAX) suppository 10 mg, 10 mg, Rectal, Daily PRN, Mian Lee DO    lactated ringers bolus 1,000 mL, 1,000 mL, Intravenous, Once, Cyrus Lee MD    levothyroxine (SYNTHROID, LEVOTHROID) tablet 100 mcg, 100 mcg, Oral, Q AM, Mian Lee DO    melatonin tablet 10 mg, 10 mg, Oral, Nightly, Mian Lee DO    ondansetron ODT (ZOFRAN-ODT) disintegrating tablet 4 mg, 4 mg, Oral, Q6H PRN **OR** ondansetron (ZOFRAN) injection 4 mg, 4 mg, Intravenous, Q6H PRN, Mian Lee DO    pantoprazole (PROTONIX) EC tablet 40 mg, 40 mg, Oral, Q AM, Mian Lee DO    [COMPLETED] Insert Peripheral IV, , , Once **AND** sodium chloride 0.9 % flush 10 mL, 10 mL, Intravenous, PRN, Cyrus Lee MD    sodium chloride 0.9 % flush 10 mL, 10 mL, Intravenous, Q12H, Mian Lee DO    sodium chloride 0.9 % flush 10 mL, 10 mL, Intravenous, PRN, Mian Lee DO    sodium chloride 0.9 %  infusion 40 mL, 40 mL, Intravenous, PRN, Mian Lee DO    sodium chloride 0.9 % infusion, 175 mL/hr, Intravenous, Continuous, Mian Lee DO    Current Outpatient Medications:     acetaminophen (TYLENOL) 325 MG tablet, Take 2 tablets by mouth Every 6 (Six) Hours As Needed for Mild Pain., Disp: , Rfl:     amiodarone (PACERONE) 200 MG tablet, Take 1 tablet by mouth Daily., Disp: 30 tablet, Rfl: 1    apixaban (ELIQUIS) 5 MG tablet tablet, Take 1 tablet by mouth Every 12 (Twelve) Hours., Disp: 60 tablet, Rfl: 0    bisacodyl (DULCOLAX) 10 MG suppository, Insert 1 suppository into the rectum Daily As Needed for Constipation., Disp: , Rfl:     Cholecalciferol 50 MCG (2000 UT) tablet, Take 1 tablet by mouth Daily., Disp: , Rfl:     furosemide (LASIX) 20 MG tablet, Take 1 tablet by mouth Daily., Disp: , Rfl:     levothyroxine (SYNTHROID, LEVOTHROID) 100 MCG tablet, Take 1 tablet by mouth Daily., Disp: , Rfl:     magnesium hydroxide (MILK OF MAGNESIA) 400 MG/5ML suspension, Take 30 mL by mouth Daily As Needed for Constipation., Disp: , Rfl:     melatonin 5 MG tablet tablet, Take 1 tablet by mouth Daily., Disp: , Rfl:     nitroglycerin (NITROSTAT) 0.4 MG SL tablet, Place 1 tablet under the tongue Every 5 (Five) Minutes As Needed for Chest Pain. Take no more than 3 doses in 15 minutes., Disp: 30 tablet, Rfl: 12    nystatin (MYCOSTATIN) 031730 UNIT/GM cream, Apply 1 application topically to the appropriate area as directed 2 (Two) Times a Day., Disp: , Rfl:     omeprazole (priLOSEC) 20 MG capsule, Take 1 capsule by mouth Daily., Disp: , Rfl:     potassium chloride (K-DUR,KLOR-CON) 10 MEQ CR tablet, Take 3 tablets by mouth Daily., Disp: , Rfl:     Sodium Phosphates (fleet enema) 7-19 GM/118ML enema, Insert 1 enema into the rectum Daily As Needed., Disp: , Rfl:     trimethoprim-polymyxin b (Polytrim) 51262-8.1 UNIT/ML-% ophthalmic solution, Administer 1 drop into the left eye 3 times a day., Disp: , Rfl:      vitamin B-12 (CYANOCOBALAMIN) 1000 MCG tablet, Take 1 tablet by mouth Daily., Disp: , Rfl:     Assessment:  Hyponatremia  Acute kidney injury on chronic kidney disease  Azotemia  Dehydration  Encephalopathy  Hypertension  History of PE  Dementia  UTI    Recommendations:  Patient with severe dehydration acute kidney injury likely from dehydration  Agree with normal saline for now  Follow repeat labs to avoid rapid correction  Rule out other reversible causes of acute kidney injury  Noted Rocephin  Follow urine culture  Thank you for the consult we will follow along with you      Donna Grover MD  1/16/2024  14:20 EST

## 2024-01-16 NOTE — ED PROVIDER NOTES
Subjective   History of Present Illness  Below is from the EMS and nursing home patient has dementia    Chief complaint is altered mental status    History of present illness this is a 70-year-old female history of hypertension and dementia who lives at [facility] who was last known normal was yesterday evening this morning's been found to be confused not getting out of bed and altered she normally is orientated for self but she will get up walk around and she will read and talk and she is not doing that today.  No reported fall or injury.  No reported change in medications or recent hospitalization.  Patient hears very lethargic she denies any specific pain but again very confused lethargic and difficult to get accurate history no current family with her.      Review of Systems   Unable to perform ROS: Dementia       Past Medical History:   Diagnosis Date    Anxiety     Arthritis     Chest pain 10/15/2019    Dementia     Hypertension     Pulmonary embolus        Allergies   Allergen Reactions    Ciprofloxacin Unknown (See Comments)       Past Surgical History:   Procedure Laterality Date    CHOLECYSTECTOMY      COLONOSCOPY      ENDOSCOPY N/A 3/30/2023    Procedure: ESOPHAGOGASTRODUODENOSCOPY;  Surgeon: Contreras Watts MD;  Location: [facility] ENDOSCOPY;  Service: Gastroenterology;  Laterality: N/A;    HYSTERECTOMY      total     SIGMOIDOSCOPY N/A 3/30/2023    Procedure: SIGMOIDOSCOPY with biopsy;  Surgeon: Contreras Watts MD;  Location: [facility] ENDOSCOPY;  Service: Gastroenterology;  Laterality: N/A;  ischemia       Family History   Problem Relation Age of Onset    Heart disease Mother     Alcohol abuse Father         murdered     Pancreatic cancer Sister     No Known Problems Half-Sister        Social History     Socioeconomic History    Marital status:     Number of children: 3   Tobacco Use    Smoking status: Former     Years: 30     Types: Cigarettes     Quit date: 1984      Years since quittin.0    Smokeless tobacco: Never   Vaping Use    Vaping Use: Never used   Substance and Sexual Activity    Alcohol use: No    Drug use: No    Sexual activity: Defer     Prior to Admission medications    Medication Sig Start Date End Date Taking? Authorizing Provider   acetaminophen (TYLENOL) 325 MG tablet Take 2 tablets by mouth Every 6 (Six) Hours As Needed for Mild Pain.    Keyana Selby MD   amiodarone (PACERONE) 200 MG tablet Take 1 tablet by mouth Daily. 8/15/20   Salena Fitch MD   apixaban (ELIQUIS) 5 MG tablet tablet Take 1 tablet by mouth Every 12 (Twelve) Hours. 19   Fabrice Kiser MD   bisacodyl (DULCOLAX) 10 MG suppository Insert 1 suppository into the rectum Daily As Needed for Constipation.    Keyana Selby MD   Cholecalciferol 50 MCG (2000 UT) tablet Take 1 tablet by mouth Daily.    Keyana Selby MD   furosemide (LASIX) 20 MG tablet Take 1 tablet by mouth Daily.    Keyana Selby MD   levothyroxine (SYNTHROID, LEVOTHROID) 100 MCG tablet Take 1 tablet by mouth Daily.    Keyana Selby MD   magnesium hydroxide (MILK OF MAGNESIA) 400 MG/5ML suspension Take 30 mL by mouth Daily As Needed for Constipation.    Keyana Selby MD   melatonin 5 MG tablet tablet Take 1 tablet by mouth Daily.    Keyana Selby MD   nitroglycerin (NITROSTAT) 0.4 MG SL tablet Place 1 tablet under the tongue Every 5 (Five) Minutes As Needed for Chest Pain. Take no more than 3 doses in 15 minutes. 10/25/19   Fabrice Kiser MD   nystatin (MYCOSTATIN) 103705 UNIT/GM cream Apply 1 application topically to the appropriate area as directed 2 (Two) Times a Day.    Keyana Selby MD   omeprazole (priLOSEC) 20 MG capsule Take 1 capsule by mouth Daily.    Keyana Selby MD   potassium chloride (K-DUR,KLOR-CON) 10 MEQ CR tablet Take 3 tablets by mouth Daily.    Keyana Selby MD   Sodium Phosphates (fleet enema) 7-19 GM/118ML  enema Insert 1 enema into the rectum Daily As Needed.    Keyana Selby MD   trimethoprim-polymyxin b (Polytrim) 91594-2.1 UNIT/ML-% ophthalmic solution Administer 1 drop into the left eye 3 times a day. 1/13/24 1/20/24  Keyana Selby MD   vitamin B-12 (CYANOCOBALAMIN) 1000 MCG tablet Take 1 tablet by mouth Daily.    Keyana Selby MD   pantoprazole (PROTONIX) 40 MG EC tablet Take 1 tablet by mouth Daily.  1/16/24  Keyana Selby MD          Objective   Physical Exam  Constitutional is a 78-year-old somewhat lethargic but arouses to voice painful stimulus triage vital signs reviewed.  HEENT extraocular muscles are intact pupils equal reactive mouth is clear but dry neck supple no adenopathy no JV no bruits lungs clear no retraction heart regular without murmur abdomen soft nontender good bowel sounds no peritoneal findings or pulsatile masses extremities pulses are equal throughout upper and lower extremities no edema no cords no Homans' sign no evidence of DVT skin warm dry without rashes or cellulitic changes.  No rash or signs of cellulitis or necrotizing fasciitis at the perineal or buttock area.  Neurologic she is sleepy but arouses to voice she will say no when you ask her if anything is bothering her she will follow commands she is not oriented to anything does not know her name where she is at month or year.  He does move everything to painful stimulus there is no focal deficits toes downgoing no clonus  Procedures           ED Course      Results for orders placed or performed during the hospital encounter of 01/16/24   Comprehensive Metabolic Panel    Specimen: Blood   Result Value Ref Range    Glucose 126 (H) 65 - 99 mg/dL    BUN 89 (H) 8 - 23 mg/dL    Creatinine 3.68 (H) 0.57 - 1.00 mg/dL    Sodium 160 (H) 136 - 145 mmol/L    Potassium 4.7 3.5 - 5.2 mmol/L    Chloride 120 (H) 98 - 107 mmol/L    CO2 21.0 (L) 22.0 - 29.0 mmol/L    Calcium 9.9 8.6 - 10.5 mg/dL    Total Protein  7.6 6.0 - 8.5 g/dL    Albumin 3.9 3.5 - 5.2 g/dL    ALT (SGPT) 20 1 - 33 U/L    AST (SGOT) 19 1 - 32 U/L    Alkaline Phosphatase 98 39 - 117 U/L    Total Bilirubin 0.7 0.0 - 1.2 mg/dL    Globulin 3.7 gm/dL    A/G Ratio 1.1 g/dL    BUN/Creatinine Ratio 24.2 7.0 - 25.0    Anion Gap 19.0 (H) 5.0 - 15.0 mmol/L    eGFR 12.1 (L) >60.0 mL/min/1.73   Urinalysis With Microscopic If Indicated (No Culture) - Straight Cath    Specimen: Straight Cath; Urine   Result Value Ref Range    Color, UA Red (A) Yellow, Straw    Appearance, UA Turbid (A) Clear    pH, UA 6.0 5.0 - 8.0    Specific Gravity, UA 1.015 1.005 - 1.030    Glucose,  mg/dL (Trace) (A) Negative    Ketones, UA 15 mg/dL (1+) (A) Negative    Bilirubin, UA Large (3+) (A) Negative    Blood, UA Large (3+) (A) Negative    Protein, UA >=300 mg/dL (3+) (A) Negative    Leuk Esterase, UA Large (3+) (A) Negative    Nitrite, UA Negative Negative    Urobilinogen, UA 4.0 E.U./dL (A) 0.2 - 1.0 E.U./dL   Lipase    Specimen: Blood   Result Value Ref Range    Lipase 42 13 - 60 U/L   CBC Auto Differential    Specimen: Blood   Result Value Ref Range    WBC 24.90 (H) 3.40 - 10.80 10*3/mm3    RBC 5.08 3.77 - 5.28 10*6/mm3    Hemoglobin 16.0 (H) 12.0 - 15.9 g/dL    Hematocrit 48.8 (H) 34.0 - 46.6 %    MCV 96.0 79.0 - 97.0 fL    MCH 31.6 26.6 - 33.0 pg    MCHC 32.9 31.5 - 35.7 g/dL    RDW 14.8 12.3 - 15.4 %    RDW-SD 49.4 37.0 - 54.0 fl    MPV 11.5 6.0 - 12.0 fL    Platelets 271 140 - 450 10*3/mm3    Neutrophil % 80.6 (H) 42.7 - 76.0 %    Lymphocyte % 13.0 (L) 19.6 - 45.3 %    Monocyte % 5.8 5.0 - 12.0 %    Eosinophil % 0.1 (L) 0.3 - 6.2 %    Basophil % 0.5 0.0 - 1.5 %    Neutrophils, Absolute 20.10 (H) 1.70 - 7.00 10*3/mm3    Lymphocytes, Absolute 3.20 (H) 0.70 - 3.10 10*3/mm3    Monocytes, Absolute 1.40 (H) 0.10 - 0.90 10*3/mm3    Eosinophils, Absolute 0.00 0.00 - 0.40 10*3/mm3    Basophils, Absolute 0.10 0.00 - 0.20 10*3/mm3    nRBC 0.0 0.0 - 0.2 /100 WBC   TSH    Specimen: Blood    Result Value Ref Range    TSH 0.436 0.270 - 4.200 uIU/mL   Ammonia    Specimen: Blood   Result Value Ref Range    Ammonia 42 11 - 51 umol/L   Urinalysis, Microscopic Only - Straight Cath    Specimen: Straight Cath; Urine   Result Value Ref Range    RBC, UA Too Numerous to Count (A) None Seen, 0-2 /HPF    WBC, UA Too Numerous to Count (A) None Seen, 0-2 /HPF    Bacteria, UA 2+ (A) None Seen /HPF    Squamous Epithelial Cells, UA 3-6 (A) None Seen, 0-2 /HPF    Hyaline Casts, UA None Seen None Seen /LPF    Methodology Automated Microscopy    Procalcitonin    Specimen: Blood   Result Value Ref Range    Procalcitonin 0.55 (H) 0.00 - 0.25 ng/mL   CK    Specimen: Blood   Result Value Ref Range    Creatine Kinase 60 20 - 180 U/L   POC Lactate    Specimen: Blood   Result Value Ref Range    Lactate 1.1 0.3 - 2.0 mmol/L   ECG 12 Lead Altered Mental Status   Result Value Ref Range    QT Interval 360 ms    QTC Interval 488 ms   Gold Top - SST   Result Value Ref Range    Extra Tube Hold for add-ons.    Light Blue Top   Result Value Ref Range    Extra Tube Hold for add-ons.      CT Head Without Contrast    Result Date: 1/16/2024  Impression: Chronic findings. No acute process. Electronically Signed: Gail Bone MD  1/16/2024 11:07 AM EST  Workstation ID: [id number]    XR Chest 1 View    Result Date: 1/16/2024  Impression: No new chest disease. Electronically Signed: Oscar Lezama MD  1/16/2024 10:53 AM EST  Workstation ID: [id number]   Medications   sodium chloride 0.9 % flush 10 mL (has no administration in time range)   lactated ringers bolus 1,000 mL (has no administration in time range)   sodium chloride 0.9 % flush 10 mL (10 mL Intravenous Not Given 1/16/24 1206)   sodium chloride 0.9 % flush 10 mL (has no administration in time range)   sodium chloride 0.9 % infusion 40 mL (has no administration in time range)   sennosides-docusate (PERICOLACE) 8.6-50 MG per tablet 2 tablet (0 tablets Oral Hold 1/16/24 1208)     And    polyethylene glycol (MIRALAX) packet 17 g (has no administration in time range)     And   bisacodyl (DULCOLAX) EC tablet 5 mg (has no administration in time range)     And   bisacodyl (DULCOLAX) suppository 10 mg (has no administration in time range)   ondansetron ODT (ZOFRAN-ODT) disintegrating tablet 4 mg (has no administration in time range)     Or   ondansetron (ZOFRAN) injection 4 mg (has no administration in time range)   sodium chloride 0.9 % infusion (has no administration in time range)   apixaban (ELIQUIS) tablet 5 mg (0 mg Oral Hold 1/16/24 1208)   amiodarone (PACERONE) tablet 200 mg (0 mg Oral Hold 1/16/24 1207)   levothyroxine (SYNTHROID, LEVOTHROID) tablet 100 mcg (0 mcg Oral Hold 1/16/24 1207)   melatonin tablet 10 mg (has no administration in time range)   pantoprazole (PROTONIX) EC tablet 40 mg (0 mg Oral Hold 1/16/24 1207)   cefTRIAXone (ROCEPHIN) 2,000 mg in sodium chloride 0.9 % 100 mL IVPB (0 mg Intravenous Stopped 1/16/24 1157)   sodium chloride 0.9 % bolus 2,000 mL (2,000 mL Intravenous New Bag 1/16/24 1201)                                  EKG my interpretation sinus tachycardia rate of 110 left anterior fascicular block LVH anterior Q waves nonspecific T wave changes noted lateral abnormal EKG no significant change from previous EKG other than a faster rate.  Compared to 3/30/2023     Record reviewed patient had a urine culture on 3/30/2023 Citrobacter and E. coli sensitive to cephalosporins  3/31/2023 creatinine was 1.03       Medical Decision Making  Medical decision making.  Patient IV established monitor placement review of sinus tach.  EKG obtained my independent review sinus tachycardia rate of 110 left anterior fascicular block some LVH anterior Q waves patient has really no change from previous EKG on 3/30/2023.  Chest x-ray my dependent review I will see pneumonia pneumothorax or failure acute findings.  Radiology unremarkable head CT without my independent review no tumors masses  hemorrhage or other acute findings radiology unremarkable.  Labs obtained independent reviewed by me the patient had a competence metabolic profile was remarkable blood sugar 126 a BUN of 89 a creatinine of 3.6 CO2 of 21.  CBC white count was 24.9 hemoglobin 16 TSH ammonia level normal lactic acid was 1.1 CK normal.  We did a cath urine sample which showed leukocyte too numerous to count red cells too numerous to count white cells and 2+ bacteria I did culture that blood cultures pending as well.  Record review shows back in March 2023 she was in the hospital and had a creatinine at that time on 31 March of 1.03 and she had grown out Citrobacter and E. coli in her urine on 30 March sensitive to cephalosporins.  Patient was started on 2 g Rocephin IV here.  Her vital signs remained stable.  She was started on 1 L lactated Ringer's.  I will see evidence of an acute stroke meningitis or encephalitis or acute intra-abdominal process as her abdomen soft without tenderness no skin change or cellulitic changes.  Not complete list of all possibilities but she has acute renal failure with dehydration will need significant IV fluids she has a urinary tract infection she has been started on antibiotics lactate is normal blood pressures been stable.  We talked about the findings I talked to the hospitalist we discussed the case and the patient will be admitted for further workup and care stable otherwise unremarkable ER course.    Problems Addressed:  Acute renal failure, unspecified acute renal failure type: complicated acute illness or injury  Altered mental status, unspecified altered mental status type: complicated acute illness or injury  Dehydration: complicated acute illness or injury  Urinary tract infection with hematuria, site unspecified: complicated acute illness or injury    Amount and/or Complexity of Data Reviewed  Labs: ordered. Decision-making details documented in ED Course.  Radiology: ordered and independent  interpretation performed. Decision-making details documented in ED Course.  ECG/medicine tests: ordered and independent interpretation performed. Decision-making details documented in ED Course.    Risk  Decision regarding hospitalization.        Final diagnoses:   Altered mental status, unspecified altered mental status type   Acute renal failure, unspecified acute renal failure type   Urinary tract infection with hematuria, site unspecified   Dehydration       ED Disposition  ED Disposition       ED Disposition   Decision to Admit    Condition   --    Comment   Level of Care: Telemetry [5]   Diagnosis: Acute cystitis with hematuria [062405]   Admitting Physician: CALIXTO LEE [[id number]]   Attending Physician: CALIXTO LEE [[id number]]   Certification: I Certify That Inpatient Hospital Services Are Medically Necessary For Greater Than 2 Midnights                 No follow-up provider specified.       Medication List      No changes were made to your prescriptions during this visit.            Cyrus Lee MD  01/16/24 7800       Cyrus Lee MD  01/16/24 [id number]

## 2024-01-17 LAB
ANION GAP SERPL CALCULATED.3IONS-SCNC: 10 MMOL/L (ref 5–15)
ANION GAP SERPL CALCULATED.3IONS-SCNC: 12 MMOL/L (ref 5–15)
BASOPHILS # BLD AUTO: 0.1 10*3/MM3 (ref 0–0.2)
BASOPHILS NFR BLD AUTO: 0.5 % (ref 0–1.5)
BUN SERPL-MCNC: 76 MG/DL (ref 8–23)
BUN SERPL-MCNC: 82 MG/DL (ref 8–23)
BUN/CREAT SERPL: 29.1 (ref 7–25)
BUN/CREAT SERPL: 33 (ref 7–25)
CALCIUM SPEC-SCNC: 8.9 MG/DL (ref 8.6–10.5)
CALCIUM SPEC-SCNC: 9 MG/DL (ref 8.6–10.5)
CHLORIDE SERPL-SCNC: 127 MMOL/L (ref 98–107)
CHLORIDE SERPL-SCNC: 130 MMOL/L (ref 98–107)
CO2 SERPL-SCNC: 21 MMOL/L (ref 22–29)
CO2 SERPL-SCNC: 24 MMOL/L (ref 22–29)
CREAT SERPL-MCNC: 2.3 MG/DL (ref 0.57–1)
CREAT SERPL-MCNC: 2.82 MG/DL (ref 0.57–1)
DEPRECATED RDW RBC AUTO: 52.5 FL (ref 37–54)
EGFRCR SERPLBLD CKD-EPI 2021: 16.7 ML/MIN/1.73
EGFRCR SERPLBLD CKD-EPI 2021: 21.3 ML/MIN/1.73
EOSINOPHIL # BLD AUTO: 0 10*3/MM3 (ref 0–0.4)
EOSINOPHIL NFR BLD AUTO: 0.3 % (ref 0.3–6.2)
ERYTHROCYTE [DISTWIDTH] IN BLOOD BY AUTOMATED COUNT: 15.4 % (ref 12.3–15.4)
GLUCOSE SERPL-MCNC: 88 MG/DL (ref 65–99)
GLUCOSE SERPL-MCNC: 89 MG/DL (ref 65–99)
HCT VFR BLD AUTO: 43.4 % (ref 34–46.6)
HGB BLD-MCNC: 13.7 G/DL (ref 12–15.9)
LYMPHOCYTES # BLD AUTO: 1.8 10*3/MM3 (ref 0.7–3.1)
LYMPHOCYTES NFR BLD AUTO: 13.7 % (ref 19.6–45.3)
MCH RBC QN AUTO: 30.9 PG (ref 26.6–33)
MCHC RBC AUTO-ENTMCNC: 31.7 G/DL (ref 31.5–35.7)
MCV RBC AUTO: 97.7 FL (ref 79–97)
MONOCYTES # BLD AUTO: 0.8 10*3/MM3 (ref 0.1–0.9)
MONOCYTES NFR BLD AUTO: 5.8 % (ref 5–12)
NEUTROPHILS NFR BLD AUTO: 10.8 10*3/MM3 (ref 1.7–7)
NEUTROPHILS NFR BLD AUTO: 79.7 % (ref 42.7–76)
NRBC BLD AUTO-RTO: 0 /100 WBC (ref 0–0.2)
PLATELET # BLD AUTO: 181 10*3/MM3 (ref 140–450)
PMV BLD AUTO: 11.6 FL (ref 6–12)
POTASSIUM SERPL-SCNC: 4 MMOL/L (ref 3.5–5.2)
POTASSIUM SERPL-SCNC: 4.1 MMOL/L (ref 3.5–5.2)
QT INTERVAL: 360 MS
QTC INTERVAL: 488 MS
RBC # BLD AUTO: 4.44 10*6/MM3 (ref 3.77–5.28)
SODIUM SERPL-SCNC: 161 MMOL/L (ref 136–145)
SODIUM SERPL-SCNC: 163 MMOL/L (ref 136–145)
WBC NRBC COR # BLD AUTO: 13.5 10*3/MM3 (ref 3.4–10.8)

## 2024-01-17 PROCEDURE — 80048 BASIC METABOLIC PNL TOTAL CA: CPT | Performed by: INTERNAL MEDICINE

## 2024-01-17 PROCEDURE — 25010000002 CEFTRIAXONE PER 250 MG: Performed by: INTERNAL MEDICINE

## 2024-01-17 PROCEDURE — 36415 COLL VENOUS BLD VENIPUNCTURE: CPT | Performed by: INTERNAL MEDICINE

## 2024-01-17 PROCEDURE — 85025 COMPLETE CBC W/AUTO DIFF WBC: CPT | Performed by: INTERNAL MEDICINE

## 2024-01-17 RX ORDER — PANTOPRAZOLE SODIUM 40 MG/1
40 TABLET, DELAYED RELEASE ORAL DAILY
Status: DISCONTINUED | OUTPATIENT
Start: 2024-01-17 | End: 2024-01-23 | Stop reason: HOSPADM

## 2024-01-17 RX ORDER — POLYETHYLENE GLYCOL 3350 17 G/17G
17 POWDER, FOR SOLUTION ORAL DAILY PRN
Status: DISCONTINUED | OUTPATIENT
Start: 2024-01-17 | End: 2024-01-23 | Stop reason: HOSPADM

## 2024-01-17 RX ORDER — AMOXICILLIN 250 MG
2 CAPSULE ORAL 2 TIMES DAILY
Status: DISCONTINUED | OUTPATIENT
Start: 2024-01-17 | End: 2024-01-23 | Stop reason: HOSPADM

## 2024-01-17 RX ORDER — CHOLECALCIFEROL (VITAMIN D3) 125 MCG
10 CAPSULE ORAL NIGHTLY
Status: DISCONTINUED | OUTPATIENT
Start: 2024-01-17 | End: 2024-01-23 | Stop reason: HOSPADM

## 2024-01-17 RX ORDER — BISACODYL 10 MG
10 SUPPOSITORY, RECTAL RECTAL DAILY PRN
Status: DISCONTINUED | OUTPATIENT
Start: 2024-01-17 | End: 2024-01-23 | Stop reason: HOSPADM

## 2024-01-17 RX ORDER — LEVOTHYROXINE SODIUM 0.1 MG/1
100 TABLET ORAL
Status: DISCONTINUED | OUTPATIENT
Start: 2024-01-17 | End: 2024-01-23 | Stop reason: HOSPADM

## 2024-01-17 RX ORDER — ONDANSETRON 2 MG/ML
4 INJECTION INTRAMUSCULAR; INTRAVENOUS EVERY 6 HOURS PRN
Status: DISCONTINUED | OUTPATIENT
Start: 2024-01-17 | End: 2024-01-23 | Stop reason: HOSPADM

## 2024-01-17 RX ORDER — ONDANSETRON 4 MG/1
4 TABLET, ORALLY DISINTEGRATING ORAL EVERY 6 HOURS PRN
Status: DISCONTINUED | OUTPATIENT
Start: 2024-01-17 | End: 2024-01-23 | Stop reason: HOSPADM

## 2024-01-17 RX ORDER — BISACODYL 5 MG/1
5 TABLET, DELAYED RELEASE ORAL DAILY PRN
Status: DISCONTINUED | OUTPATIENT
Start: 2024-01-17 | End: 2024-01-23 | Stop reason: HOSPADM

## 2024-01-17 RX ORDER — SODIUM CHLORIDE 0.9 % (FLUSH) 0.9 %
10 SYRINGE (ML) INJECTION EVERY 12 HOURS
Status: DISCONTINUED | OUTPATIENT
Start: 2024-01-17 | End: 2024-01-23 | Stop reason: HOSPADM

## 2024-01-17 RX ORDER — SODIUM CHLORIDE 450 MG/100ML
75 INJECTION, SOLUTION INTRAVENOUS CONTINUOUS
Status: DISCONTINUED | OUTPATIENT
Start: 2024-01-17 | End: 2024-01-19

## 2024-01-17 RX ORDER — SODIUM CHLORIDE 0.9 % (FLUSH) 0.9 %
10 SYRINGE (ML) INJECTION AS NEEDED
Status: DISCONTINUED | OUTPATIENT
Start: 2024-01-17 | End: 2024-01-23 | Stop reason: HOSPADM

## 2024-01-17 RX ADMIN — SODIUM CHLORIDE 150 ML/HR: 450 INJECTION, SOLUTION INTRAVENOUS at 06:58

## 2024-01-17 RX ADMIN — AMIODARONE HYDROCHLORIDE 200 MG: 200 TABLET ORAL at 08:04

## 2024-01-17 RX ADMIN — APIXABAN 5 MG: 5 TABLET, FILM COATED ORAL at 08:04

## 2024-01-17 RX ADMIN — DOCUSATE SODIUM AND SENNOSIDES 2 TABLET: 8.6; 5 TABLET, FILM COATED ORAL at 21:31

## 2024-01-17 RX ADMIN — DOCUSATE SODIUM AND SENNOSIDES 2 TABLET: 8.6; 5 TABLET, FILM COATED ORAL at 08:04

## 2024-01-17 RX ADMIN — SODIUM CHLORIDE 150 ML/HR: 450 INJECTION, SOLUTION INTRAVENOUS at 14:04

## 2024-01-17 RX ADMIN — Medication 10 ML: at 21:32

## 2024-01-17 RX ADMIN — Medication 10 MG: at 21:31

## 2024-01-17 RX ADMIN — APIXABAN 5 MG: 5 TABLET, FILM COATED ORAL at 21:31

## 2024-01-17 RX ADMIN — LEVOTHYROXINE SODIUM 100 MCG: 0.1 TABLET ORAL at 08:04

## 2024-01-17 RX ADMIN — PANTOPRAZOLE SODIUM 40 MG: 40 TABLET, DELAYED RELEASE ORAL at 08:04

## 2024-01-17 RX ADMIN — CEFTRIAXONE 2000 MG: 2 INJECTION, POWDER, FOR SOLUTION INTRAMUSCULAR; INTRAVENOUS at 10:24

## 2024-01-17 NOTE — ANESTHESIA PREPROCEDURE EVALUATION
Anesthesia Evaluation     Patient summary reviewed and Nursing notes reviewed   no history of anesthetic complications:   NPO Solid Status: Waived due to emergency  NPO Liquid Status: Waived due to emergency           Airway   Dental      Pulmonary    (+) pneumonia , pulmonary embolism, a smoker Former,sleep apnea  Cardiovascular     ECG reviewed  PT is on anticoagulation therapy    (+) hypertension, dysrhythmias Atrial Fib, Tachycardia      Neuro/Psych  (+) psychiatric history Anxiety, dementia  GI/Hepatic/Renal/Endo    (+) obesity, renal disease- ARF and CRI, thyroid problem hypothyroidism    Musculoskeletal     Abdominal    Substance History      OB/GYN          Other   arthritis,     ROS/Med Hx Other: Additional History:  LAFB, hypernatremia, UTI, cystitis, hematuria, acute respiratory failure with hypoxemia, colitis, encephalopathy, delirium, dehydration, sepsis    Stress:  CONCLUSION:  Probably negative myocardial perfusion study.  Inferior apical and apical defects described above most likely  secondary to significant gastrointestinal uptake has normal wall  motion in these areas would indicate no prior MI.  Normal left ventricular systolic function wall motion and thickening  with a calculated ejection fraction of 68%.  Low risk scan.  Significant gastrointestinal uptake makes definitive  analysis of inferior wall difficult.  Clinical correlation suggested.      PSH:  HYSTERECTOMY CHOLECYSTECTOMY  COLONOSCOPY SIGMOIDOSCOPY  ENDOSCOPY               Anesthesia Plan    ASA 4 - emergent     general     (Patient identified; pre-operative vital signs, all relevant labs/studies, complete medical/surgical/anesthetic history, full medication list, full allergy list, and NPO status obtained/reviewed; physical assessment performed; anesthetic options, side effects, potential complications, risks, and benefits discussed; questions answered; written anesthesia consent obtained; patient cleared for procedure; anesthesia  machine and equipment checked and functioning)  intravenous induction     Anesthetic plan, risks, benefits, and alternatives have been provided, discussed and informed consent has been obtained with: patient.    Plan discussed with CRNA and CAA.    CODE STATUS:

## 2024-01-17 NOTE — PROGRESS NOTES
Urology Progress Note    Patient Identification:  Name:  Day Cabrera  Age:  78 y.o.  Sex:  female  :  1945  MRN:  [id number]    Subjective:  Status post cystoscopy right stent placement last night with Dr. Narvaez    Problem List:    Acute cystitis with hematuria    Past Medical History:  Past Medical History:   Diagnosis Date    Anxiety     Arthritis     Chest pain 10/15/2019    Dementia     Hypertension     Pulmonary embolus      Past Surgical History:  Past Surgical History:   Procedure Laterality Date    CHOLECYSTECTOMY      COLONOSCOPY      ENDOSCOPY N/A 3/30/2023    Procedure: ESOPHAGOGASTRODUODENOSCOPY;  Surgeon: Contreras Watts MD;  Location: [facility] ENDOSCOPY;  Service: Gastroenterology;  Laterality: N/A;    HYSTERECTOMY      total     SIGMOIDOSCOPY N/A 3/30/2023    Procedure: SIGMOIDOSCOPY with biopsy;  Surgeon: Contreras Watts MD;  Location: [facility] ENDOSCOPY;  Service: Gastroenterology;  Laterality: N/A;  ischemia     Home Meds:  Medications Prior to Admission   Medication Sig Dispense Refill Last Dose    acetaminophen (TYLENOL) 325 MG tablet Take 2 tablets by mouth Every 6 (Six) Hours As Needed for Mild Pain.       amiodarone (PACERONE) 200 MG tablet Take 1 tablet by mouth Daily. 30 tablet 1     apixaban (ELIQUIS) 5 MG tablet tablet Take 1 tablet by mouth Every 12 (Twelve) Hours. 60 tablet 0     bisacodyl (DULCOLAX) 10 MG suppository Insert 1 suppository into the rectum Daily As Needed for Constipation.       Cholecalciferol 50 MCG (2000 UT) tablet Take 1 tablet by mouth Daily.       furosemide (LASIX) 20 MG tablet Take 1 tablet by mouth Daily.       levothyroxine (SYNTHROID, LEVOTHROID) 100 MCG tablet Take 1 tablet by mouth Daily.       magnesium hydroxide (MILK OF MAGNESIA) 400 MG/5ML suspension Take 30 mL by mouth Daily As Needed for Constipation.       melatonin 5 MG tablet tablet Take 1 tablet by mouth Daily.       nitroglycerin (NITROSTAT) 0.4 MG SL tablet Place 1 tablet  under the tongue Every 5 (Five) Minutes As Needed for Chest Pain. Take no more than 3 doses in 15 minutes. 30 tablet 12     nystatin (MYCOSTATIN) 498131 UNIT/GM cream Apply 1 application topically to the appropriate area as directed 2 (Two) Times a Day.       omeprazole (priLOSEC) 20 MG capsule Take 1 capsule by mouth Daily.       potassium chloride (K-DUR,KLOR-CON) 10 MEQ CR tablet Take 3 tablets by mouth Daily.       Sodium Phosphates (fleet enema) 7-19 GM/118ML enema Insert 1 enema into the rectum Daily As Needed.       trimethoprim-polymyxin b (Polytrim) 76997-7.1 UNIT/ML-% ophthalmic solution Administer 1 drop into the left eye 3 times a day.       vitamin B-12 (CYANOCOBALAMIN) 1000 MCG tablet Take 1 tablet by mouth Daily.        Current Meds:    Current Facility-Administered Medications:     amiodarone (PACERONE) tablet 200 mg, 200 mg, Oral, Q24H, Mian Lee DO    apixaban (ELIQUIS) tablet 5 mg, 5 mg, Oral, Q12H, Fer Wilburn MD    bisacodyl (DULCOLAX) EC tablet 5 mg, 5 mg, Oral, Daily PRN, Fer Wilburn MD    bisacodyl (DULCOLAX) suppository 10 mg, 10 mg, Rectal, Daily PRN, Fer Wilburn MD    [MAR Hold] lactated ringers bolus 1,000 mL, 1,000 mL, Intravenous, Once, Cyrus Lee MD    levothyroxine (SYNTHROID, LEVOTHROID) tablet 100 mcg, 100 mcg, Oral, Q AM, Fer Wilburn MD    melatonin tablet 10 mg, 10 mg, Oral, Nightly, Fer Wilburn MD    ondansetron (ZOFRAN) injection 4 mg, 4 mg, Intravenous, Q6H PRN, Fer Wilburn MD    ondansetron ODT (ZOFRAN-ODT) disintegrating tablet 4 mg, 4 mg, Oral, Q6H PRN, Fer Wilburn MD    pantoprazole (PROTONIX) EC tablet 40 mg, 40 mg, Oral, Daily, Fer Wilburn MD    polyethylene glycol (MIRALAX) packet 17 g, 17 g, Oral, Daily PRN, Fer Wilburn MD    sennosides-docusate (PERICOLACE) 8.6-50 MG per tablet 2 tablet, 2 tablet, Oral, BID, Fer Wilburn MD    sodium chloride 0.45 % infusion, 150 mL/hr, Intravenous,  "Continuous, Donna Grover MD, Last Rate: 150 mL/hr at 24, 150 mL/hr at 24    [COMPLETED] Insert Peripheral IV, , , Once **AND** [MAR Hold] sodium chloride 0.9 % flush 10 mL, 10 mL, Intravenous, PRN, Cyrus Lee MD    [MAR Hold] sodium chloride 0.9 % flush 10 mL, 10 mL, Intravenous, PRN, Mian Lee, DO    sodium chloride 0.9 % flush 10 mL, 10 mL, Intravenous, Q12H, Fer Wilburn MD    [MAR Hold] sodium chloride 0.9 % infusion 40 mL, 40 mL, Intravenous, PRN, Mian Lee, DO  Allergies:  Ciprofloxacin    Review of Systems:  Negative 12 point system review except for what is in HPI    Objective:  tMax 24 hours:  Temp (24hrs), Av.5 °F (36.4 °C), Min:97 °F (36.1 °C), Max:98.2 °F (36.8 °C)    Vital Sign Ranges:  Temp:  [97 °F (36.1 °C)-98.2 °F (36.8 °C)] 97.3 °F (36.3 °C)  Heart Rate:  [] 116  Resp:  [12-20] 13  BP: ()/(51-80) 101/61  Intake and Output Last 3 Shifts:  I/O last 3 completed shifts:  In: 2950 [I.V.:850; IV Piggyback:2100]  Out: 500 [Urine:500]    Exam:  /61 (BP Location: Right arm, Patient Position: Lying)   Pulse 116   Temp 97.3 °F (36.3 °C) (Axillary)   Resp 13   Ht 162.6 cm (64\")   Wt 68.4 kg (150 lb 12.7 oz)   SpO2 97%   BMI 25.88 kg/m²    General Appearance:    Alert, cooperative, no acute distress, general       appearance is normal   Head:    Normocephalic, without obvious abnormality, atraumatic   Eyes:            Pupils/irises normal. Exterior inspection conjunctivae       and lids normal.   Ears:    Normal external inspection   Nose:   Exterior inspection of nose is normal   Throat:   Lips, mucosa, and tongue normal   Neck:   No adenopathy, supple, symmetrical, trachea midline   Back:     No CVA tenderness   Lungs:     Respirations unlabored; normal effort, no audible     abnormality   CV:   Regular rhythm and normal rate, no edema   Abdomen:     No hernia, examination of the abdomen is normal with     no masses, " tenderness, or distension    Female : Normal   Musculoskeletal:   Inspection of the nails and digits reveal no abnormalities   Skin:   Inspection normal, palpation normal   Neurologic/Psych:   Orientation normal; Mood/Affect normal     Data Review:  All labs (24hrs):    Lab Results (last 24 hours)       Procedure Component Value Units Date/Time    Basic Metabolic Panel [[id number]]  (Abnormal) Collected: 01/17/24 0451    Specimen: Blood Updated: 01/17/24 0601     Glucose 88 mg/dL      BUN 82 mg/dL      Creatinine 2.82 mg/dL      Sodium 163 mmol/L      Potassium 4.1 mmol/L      Chloride 130 mmol/L      CO2 21.0 mmol/L      Calcium 9.0 mg/dL      BUN/Creatinine Ratio 29.1     Anion Gap 12.0 mmol/L      eGFR 16.7 mL/min/1.73     Narrative:      GFR Normal >60  Chronic Kidney Disease <60  Kidney Failure <15    The GFR formula is only valid for adults with stable renal function between ages 18 and 70.    CBC & Differential [[id number]]  (Abnormal) Collected: 01/17/24 0451    Specimen: Blood Updated: 01/17/24 0543    Narrative:      The following orders were created for panel order CBC & Differential.  Procedure                               Abnormality         Status                     ---------                               -----------         ------                     CBC Auto Differential[[id number]]        Abnormal            Final result                 Please view results for these tests on the individual orders.    CBC Auto Differential [[id number]]  (Abnormal) Collected: 01/17/24 0451    Specimen: Blood Updated: 01/17/24 0543     WBC 13.50 10*3/mm3      RBC 4.44 10*6/mm3      Hemoglobin 13.7 g/dL      Hematocrit 43.4 %      MCV 97.7 fL      MCH 30.9 pg      MCHC 31.7 g/dL      RDW 15.4 %      RDW-SD 52.5 fl      MPV 11.6 fL      Platelets 181 10*3/mm3      Neutrophil % 79.7 %      Lymphocyte % 13.7 %      Monocyte % 5.8 %      Eosinophil % 0.3 %      Basophil % 0.5 %      Neutrophils, Absolute 10.80 10*3/mm3       "Lymphocytes, Absolute 1.80 10*3/mm3      Monocytes, Absolute 0.80 10*3/mm3      Eosinophils, Absolute 0.00 10*3/mm3      Basophils, Absolute 0.10 10*3/mm3      nRBC 0.0 /100 WBC     Basic Metabolic Panel [[id number]]  (Abnormal) Collected: 01/16/24 1727    Specimen: Blood Updated: 01/16/24 1801     Glucose 103 mg/dL      BUN 86 mg/dL      Creatinine 3.29 mg/dL      Sodium 157 mmol/L      Potassium 4.4 mmol/L      Comment: Slight hemolysis detected by analyzer. Result may be falsely elevated.        Chloride 124 mmol/L      CO2 18.0 mmol/L      Calcium 8.8 mg/dL      BUN/Creatinine Ratio 26.1     Anion Gap 15.0 mmol/L      eGFR 13.8 mL/min/1.73      Comment: <15 Indicative of kidney failure       Narrative:      GFR Normal >60  Chronic Kidney Disease <60  Kidney Failure <15    The GFR formula is only valid for adults with stable renal function between ages 18 and 70.    CK [[id number]]  (Normal) Collected: 01/16/24 1727    Specimen: Blood Updated: 01/16/24 1801     Creatine Kinase 54 U/L     Procalcitonin [[id number]]  (Abnormal) Collected: 01/16/24 0956    Specimen: Blood Updated: 01/16/24 1216     Procalcitonin 0.55 ng/mL     Narrative:      As a Marker for Sepsis (Non-Neonates):    1. <0.5 ng/mL represents a low risk of severe sepsis and/or septic shock.  2. >2 ng/mL represents a high risk of severe sepsis and/or septic shock.    As a Marker for Lower Respiratory Tract Infections that require antibiotic therapy:    PCT on Admission    Antibiotic Therapy       6-12 Hrs later    >0.5                Strongly Recommended  >0.25 - <0.5        Recommended   0.1 - 0.25          Discouraged              Remeasure/reassess PCT  <0.1                Strongly Discouraged     Remeasure/reassess PCT    As 28 day mortality risk marker: \"Change in Procalcitonin Result\" (>80% or <=80%) if Day 0 (or Day 1) and Day 4 values are available. Refer to http://www.[employer]-pct-calculator.com    Change in PCT <=80%  A decrease of PCT levels " below or equal to 80% defines a positive change in PCT test result representing a higher risk for 28-day all-cause mortality of patients diagnosed with severe sepsis for septic shock.    Change in PCT >80%  A decrease of PCT levels of more than 80% defines a negative change in PCT result representing a lower risk for 28-day all-cause mortality of patients diagnosed with severe sepsis or septic shock.       CK [[id number]]  (Normal) Collected: 01/16/24 0956    Specimen: Blood Updated: 01/16/24 1213     Creatine Kinase 60 U/L     Urine Culture - Urine, Straight Cath [[id number]] Collected: 01/16/24 0955    Specimen: Urine from Straight Cath Updated: 01/16/24 1117    Extra Tubes [[id number]] Collected: 01/16/24 0956    Specimen: Blood, Venous Line Updated: 01/16/24 1102    Narrative:      The following orders were created for panel order Extra Tubes.  Procedure                               Abnormality         Status                     ---------                               -----------         ------                     Gold Top - SST[[id number]]                                   Final result               Light Blue Top[[id number]]                                   Final result                 Please view results for these tests on the individual orders.    Gold Top - SST [[id number]] Collected: 01/16/24 0956    Specimen: Blood Updated: 01/16/24 1102     Extra Tube Hold for add-ons.     Comment: Auto resulted.       Light Blue Top [[id number]] Collected: 01/16/24 0956    Specimen: Blood Updated: 01/16/24 1102     Extra Tube Hold for add-ons.     Comment: Auto resulted       TSH [[id number]]  (Normal) Collected: 01/16/24 0956    Specimen: Blood Updated: 01/16/24 1041     TSH 0.436 uIU/mL     Comprehensive Metabolic Panel [[id number]]  (Abnormal) Collected: 01/16/24 0956    Specimen: Blood Updated: 01/16/24 1034     Glucose 126 mg/dL      BUN 89 mg/dL      Creatinine 3.68 mg/dL      Sodium 160 mmol/L      Potassium 4.7 mmol/L       Chloride 120 mmol/L      CO2 21.0 mmol/L      Calcium 9.9 mg/dL      Total Protein 7.6 g/dL      Albumin 3.9 g/dL      ALT (SGPT) 20 U/L      AST (SGOT) 19 U/L      Alkaline Phosphatase 98 U/L      Total Bilirubin 0.7 mg/dL      Globulin 3.7 gm/dL      A/G Ratio 1.1 g/dL      BUN/Creatinine Ratio 24.2     Anion Gap 19.0 mmol/L      eGFR 12.1 mL/min/1.73      Comment: <15 Indicative of kidney failure       Narrative:      GFR Normal >60  Chronic Kidney Disease <60  Kidney Failure <15    The GFR formula is only valid for adults with stable renal function between ages 18 and 70.    Lipase [[id number]]  (Normal) Collected: 01/16/24 0956    Specimen: Blood Updated: 01/16/24 1034     Lipase 42 U/L     Blood Culture - Blood, Arm, Right [[id number]] Collected: 01/16/24 1028    Specimen: Blood from Arm, Right Updated: 01/16/24 1034    Ammonia [[id number]]  (Normal) Collected: 01/16/24 0956    Specimen: Blood Updated: 01/16/24 1026     Ammonia 42 umol/L     Urinalysis, Microscopic Only - Straight Cath [[id number]]  (Abnormal) Collected: 01/16/24 0955    Specimen: Urine from Straight Cath Updated: 01/16/24 1016     RBC, UA Too Numerous to Count /HPF      WBC, UA Too Numerous to Count /HPF      Bacteria, UA 2+ /HPF      Squamous Epithelial Cells, UA 3-6 /HPF      Hyaline Casts, UA None Seen /LPF      Methodology Automated Microscopy    CBC & Differential [[id number]]  (Abnormal) Collected: 01/16/24 0956    Specimen: Blood Updated: 01/16/24 1010    Narrative:      The following orders were created for panel order CBC & Differential.  Procedure                               Abnormality         Status                     ---------                               -----------         ------                     CBC Auto Differential[[id number]]        Abnormal            Final result                 Please view results for these tests on the individual orders.    CBC Auto Differential [[id number]]  (Abnormal) Collected: 01/16/24 0956     Specimen: Blood Updated: 01/16/24 1010     WBC 24.90 10*3/mm3      RBC 5.08 10*6/mm3      Hemoglobin 16.0 g/dL      Hematocrit 48.8 %      MCV 96.0 fL      MCH 31.6 pg      MCHC 32.9 g/dL      RDW 14.8 %      RDW-SD 49.4 fl      MPV 11.5 fL      Platelets 271 10*3/mm3      Neutrophil % 80.6 %      Lymphocyte % 13.0 %      Monocyte % 5.8 %      Eosinophil % 0.1 %      Basophil % 0.5 %      Neutrophils, Absolute 20.10 10*3/mm3      Lymphocytes, Absolute 3.20 10*3/mm3      Monocytes, Absolute 1.40 10*3/mm3      Eosinophils, Absolute 0.00 10*3/mm3      Basophils, Absolute 0.10 10*3/mm3      nRBC 0.0 /100 WBC     Urinalysis With Microscopic If Indicated (No Culture) - Straight Cath [[id number]]  (Abnormal) Collected: 01/16/24 0955    Specimen: Urine from Straight Cath Updated: 01/16/24 1009     Color, UA Red     Comment: Any Substance that causes an abnormal urine color can alter the accuracy of the chemical reactions.        Appearance, UA Turbid     Comment: Result checked          pH, UA 6.0     Specific Gravity, UA 1.015     Glucose,  mg/dL (Trace)     Ketones, UA 15 mg/dL (1+)     Bilirubin, UA Large (3+)     Comment: Confirmation testing is unavailable.  A serum bilirubin is recommended for further assessment.        Blood, UA Large (3+)     Protein, UA >=300 mg/dL (3+)     Leuk Esterase, UA Large (3+)     Nitrite, UA Negative     Urobilinogen, UA 4.0 E.U./dL    Blood Culture - Blood, Arm, Left [[id number]] Collected: 01/16/24 0956    Specimen: Blood from Arm, Left Updated: 01/16/24 1002    POC Lactate [[id number]]  (Normal) Collected: 01/16/24 0959    Specimen: Blood Updated: 01/16/24 1001     Lactate 1.1 mmol/L      Comment: Serial Number: [id number]:  [id number]             Radiology:   Imaging Results (Last 72 Hours)       Procedure Component Value Units Date/Time    FL < 1 Hour [[id number]] Resulted: 01/16/24 2317     Updated: 01/16/24 2317    Narrative:      This procedure was auto-finalized with no  dictation required.    CT Abdomen Pelvis Without Contrast [[id number]] Collected: 01/16/24 1408     Updated: 01/16/24 1419    Narrative:      CT ABDOMEN PELVIS WO CONTRAST    Date of Exam: 1/16/2024 1:51 PM EST    Indication: Eval for hydro, pyelo, stone.    Comparison: 3/30/2023    Technique: Axial CT images were obtained of the abdomen and pelvis without the administration of contrast. Sagittal and coronal reconstructions were performed.  Automated exposure control and iterative reconstruction methods were used.      Findings:  History indicates acute cystitis with hematuria.    Today's study shows a small focus of peribronchial thickening and interstitial change in the right lower lobe, whether bronchitis, or mild aspiration. Lung bases otherwise appear clear. Gallbladder is surgically absent. No significant abnormalities are   seen of the liver, spleen, pancreas, adrenal glands, or left kidney. No left nephrolithiasis, cyst, mass or hydronephrosis is seen. Left ureter appears normal.    On the right, there are several calculi in the renal pelvis, possibly 1 very irregular, elongated calculus extending over a roughly 15 mm length, but only approximately 5 mm in width. Right renal collecting system appears upper limits of normal. There is   mild fat stranding in the right renal pelvis. The right ureter, however, appears normal and no ureteral calculi are seen. Bladder is normally distended and thick walled with mild inflammation. A single air bubble in the bladder may reflect recent in and   out catheterization.    Elsewhere on the scan, no free air ascites or adenopathy is seen. Bowel loops are normal in caliber. Terminal ileum cecum and appendix appear normal. No intrapelvic mass or adenopathy is seen. Additional note is made of a few surgical clips and minimal   fat stranding in the presacral space, but no visible associated mass or inflammation. Bony structures appear to be intact.     .      Impression:       Impression:    1. Multiple calculi or single irregular elongated calculus in the right renal pelvis, with low level obstructive uropathy. No visible ureteral calculus. Minimal renal pelvic inflammation.    2. Normally distended, but mildly inflamed-appearing urinary bladder.    3. Small focus of new right lower lobe pulmonary disease, suspicious for focal bronchitis, possibly aspiration.      Electronically Signed: Oscar Lezama MD    1/16/2024 2:17 PM EST    Workstation ID: [id number]    CT Head Without Contrast [[id number]] Collected: 01/16/24 1106     Updated: 01/16/24 1109    Narrative:      CT HEAD WO CONTRAST    Date of Exam: 1/16/2024 11:00 AM EST    Indication: Unresponsive.    Comparison: 9/24/2020.    Technique: Axial CT images were obtained of the head without contrast administration.  Coronal reconstructions were performed.  Automated exposure control and iterative reconstruction methods were used.      Findings:  There is moderately severe atrophy. Ventricular dilatation is compatible with atrophy. Periventricular hypodensity appears similar and may relate to chronic small vessel ischemic insult. There is no acute mass effect or edema. There are no findings   suspicious for acute CVA or hemorrhage. Paranasal sinuses are clear.      Impression:      Impression:  Chronic findings. No acute process.      Electronically Signed: Gail Bone MD    1/16/2024 11:07 AM EST    Workstation ID: [id number]    XR Chest 1 View [[id number]] Collected: 01/16/24 1052     Updated: 01/16/24 1055    Narrative:      XR CHEST 1 VW    Date of Exam: 1/16/2024 10:44 AM EST    Indication: Unresponsive recent COVID    Comparison: 9/24/2020    Findings:  Heart shadow is normal in size. Pulmonary vasculature appears normal. There is mild coarsening of the perihilar interstitial markings which appears essentially unchanged from 9/8/2020. No lung consolidation, effusion or pneumothorax is seen. Bony   structures appear to be intact.       Impression:      Impression:  No new chest disease.      Electronically Signed: Oscar Lezama MD    1/16/2024 10:53 AM EST    Workstation ID: [id number]            Assessment:  Right renal calculi and UTI    Status post cystoscopy right stent placement last night with Dr. Narvaez.    Patient will follow-up with Dr. Hook as outpatient for stone treatment.  Send patient home on culture specific antibiotics      Fer Wilburn MD  1/17/2024  07:32 EST

## 2024-01-17 NOTE — NURSING NOTE
70-year-old female with a history of dementia presents from her [facility] with altered mental status changes.  A consult was received to assess the patient's bilateral lower extremities.  Patient has some mild edema noted.  She has a palpable pedal pulse.  No warmth erythema noted.  No hemosiderin staining noted.  Heels are pink and blanchable.  There is an area to the right shin that has a silicone border foam dressing in place.  I did lift this there appears to be a blister which is pretty much reabsorbed.  There is no fluid in it.  It is not open it is not draining.  I reapplied the silicone border foam and would recommend leaving it in place for 7 days and not disturbing the area.  No other recommendations at this time.  Will follow as needed

## 2024-01-17 NOTE — POST-PROCEDURE NOTE
Operative Report    [facility] MAIN OR    Patient: Day Cabrera  Age:      78 y.o.  :     1945  Sex:      female    Medical Record:  [id number]    Date of Operation/Procedure:  2024    Pre-operative Diagnosis:  jessica sepsis r renal stone r hydro    Post-operative Diagnosis:  same    Surgeon:  joseph    Name of Operation/Procedure:  cysto r stent insertion    Findings/Complications: none    Description of procedure:see op note    Estimated Blood Loss: min    Specimens: none    Fluids/Drains: r stent    Aldo Narvaez MD  2024  21:12 EST

## 2024-01-17 NOTE — OP NOTE
Operative Report    [facility] MAIN OR    Patient: Day Cabrera  Age:      78 y.o.  :     1945  Sex:      female    Medical Record:  [id number]    Date of Operation/Procedure:  2024    Pre-operative Diagnosis:  jessica sepsis r renal stone hydro    Post-operative Diagnosis:  same    Surgeon:  josehp    Name of Operation/Procedure:  cystoscopy r stent insertion    Findings/Complications:  none    Description of procedure: Pt was identified brought to the or having received broad spectrum abx.  Following anesthesia the pressure points were padded and the pt was placed in the dorsolithotomy position.  The genitals were prepped and draped sterilly.  The  22 f cystoscopy was placed into the bladder, the right uo was engaged with a wire.  A wire was placed into the kidney and a stent was advanced with help of the pusher, with proper positioning confirmed under fluoroscopy.  The bladder was drained the the scope removed.          Estimated Blood Loss: min    Specimens: * No orders in the log *    Fluids/Drains: r stent    Aldo Narvaez MD  2024  21:13 EST

## 2024-01-17 NOTE — DISCHARGE PLACEMENT REQUEST
"Gene Cabrera (78 y.o. Female)       Date of Birth   1945    Social Security Number       Address   [facility] [address] [facility] [place] [address]    Home Phone   [phone]    MRN   [id number]       Gnosticist   Hoahaoism    Marital Status                               Admission Date   1/16/24    Admission Type   Emergency    Admitting Provider   Mian Lee DO    Attending Provider   Mian Lee DO    Department, Room/Bed   [facility] SURGICAL INPATIENT, 4132/1       Discharge Date       Discharge Disposition       Discharge Destination                                 Attending Provider: Mian Lee DO    Allergies: Ciprofloxacin    Isolation: None   Infection: None   Code Status: Prior    Ht: 162.6 cm (64\")   Wt: 68.4 kg (150 lb 12.7 oz)    Admission Cmt: None   Principal Problem: Acute cystitis with hematuria [N30.01]                   Active Insurance as of 1/16/2024       Primary Coverage       Payor Plan Insurance Group Employer/Plan Group    HUMANA MEDICARE REPLACEMENT HUMANA MEDICARE REPLACEMENT [id number]       Payor Plan Address Payor Plan Phone Number Payor Plan Fax Number Effective Dates    [address] [address] [phone]  1/1/2024 - None Entered    [facility] [address]         Subscriber Name Subscriber Birth Date Member ID       GENE CABRERA 1945 [id number]               Secondary Coverage       Payor Plan Insurance Group Employer/Plan Group    [place]        Payor Plan Address Payor Plan Phone Number Payor Plan Fax Number Effective Dates    [address]   9/24/2020 - None Entered    [place] [address]         Subscriber Name Subscriber Birth Date Member ID       GENE CABRERA 1945 [id number]                     Emergency Contacts        (Rel.) Home Phone Work Phone Mobile Phone    SHANON CABRERA (Daughter) -- -- [phone]                "

## 2024-01-17 NOTE — PROGRESS NOTES
"                                                                                                                                      Nephrology  Progress Note                                        [facility]    Patient Identification    Name: Day Cabrera  Age: 78 y.o.  Sex: female  :  1945  MRN: [id number]      DATE OF SERVICE:  2024        Subective    Still groggy awake but lethargic     Objective   Scheduled Meds:amiodarone, 200 mg, Oral, Q24H  [MAR Hold] apixaban, 5 mg, Oral, Q12H  [MAR Hold] lactated ringers, 1,000 mL, Intravenous, Once  [MAR Hold] levothyroxine, 100 mcg, Oral, Q AM  [MAR Hold] melatonin, 10 mg, Oral, Nightly  [MAR Hold] pantoprazole, 40 mg, Oral, Q AM  [MAR Hold] senna-docusate sodium, 2 tablet, Oral, BID  [MAR Hold] sodium chloride, 10 mL, Intravenous, Q12H          Continuous Infusions:sodium chloride, 150 mL/hr        PRN Meds:  [MAR Hold] senna-docusate sodium **AND** [MAR Hold] polyethylene glycol **AND** [MAR Hold] bisacodyl **AND** [MAR Hold] bisacodyl    [MAR Hold] ondansetron ODT **OR** [MAR Hold] ondansetron    [COMPLETED] Insert Peripheral IV **AND** [MAR Hold] sodium chloride    [MAR Hold] sodium chloride    [MAR Hold] sodium chloride     Exam:  /61 (BP Location: Right arm, Patient Position: Lying)   Pulse 116   Temp 97.3 °F (36.3 °C) (Axillary)   Resp 13   Ht 162.6 cm (64\")   Wt 68.4 kg (150 lb 12.7 oz)   SpO2 97%   BMI 25.88 kg/m²     Intake/Output last 3 shifts:  I/O last 3 completed shifts:  In:  [IV Piggyback:]  Out: -     Intake/Output this shift:  I/O this shift:  In: 850 [I.V.:850]  Out: 500 [Urine:500]    Physical exam:  General Appearance: Awake but lethargic  Head:  Normocephalic, without obvious abnormality, atraumatic  Eyes:  PERRL, conjunctiva/corneas clear     Neck:  Supple,  no adenopathy;      Lungs:  Decreased BS occasion ronchi  Heart:  Regular rate and rhythm, S1 and S2 normal  Abdomen:  Soft, " non-tender, bowel sounds active   Extremities: trace edema  Pulses: 2+ and symmetric all extremities  Skin:  No rashes or lesions       Data Review:  All labs (24hrs):   Recent Results (from the past 24 hour(s))   ECG 12 Lead Altered Mental Status    Collection Time: 01/16/24  9:34 AM   Result Value Ref Range    QT Interval 360 ms    QTC Interval 488 ms   Urinalysis With Microscopic If Indicated (No Culture) - Straight Cath    Collection Time: 01/16/24  9:55 AM    Specimen: Straight Cath; Urine   Result Value Ref Range    Color, UA Red (A) Yellow, Straw    Appearance, UA Turbid (A) Clear    pH, UA 6.0 5.0 - 8.0    Specific Gravity, UA 1.015 1.005 - 1.030    Glucose,  mg/dL (Trace) (A) Negative    Ketones, UA 15 mg/dL (1+) (A) Negative    Bilirubin, UA Large (3+) (A) Negative    Blood, UA Large (3+) (A) Negative    Protein, UA >=300 mg/dL (3+) (A) Negative    Leuk Esterase, UA Large (3+) (A) Negative    Nitrite, UA Negative Negative    Urobilinogen, UA 4.0 E.U./dL (A) 0.2 - 1.0 E.U./dL   Urinalysis, Microscopic Only - Straight Cath    Collection Time: 01/16/24  9:55 AM    Specimen: Straight Cath; Urine   Result Value Ref Range    RBC, UA Too Numerous to Count (A) None Seen, 0-2 /HPF    WBC, UA Too Numerous to Count (A) None Seen, 0-2 /HPF    Bacteria, UA 2+ (A) None Seen /HPF    Squamous Epithelial Cells, UA 3-6 (A) None Seen, 0-2 /HPF    Hyaline Casts, UA None Seen None Seen /LPF    Methodology Automated Microscopy    Comprehensive Metabolic Panel    Collection Time: 01/16/24  9:56 AM    Specimen: Blood   Result Value Ref Range    Glucose 126 (H) 65 - 99 mg/dL    BUN 89 (H) 8 - 23 mg/dL    Creatinine 3.68 (H) 0.57 - 1.00 mg/dL    Sodium 160 (H) 136 - 145 mmol/L    Potassium 4.7 3.5 - 5.2 mmol/L    Chloride 120 (H) 98 - 107 mmol/L    CO2 21.0 (L) 22.0 - 29.0 mmol/L    Calcium 9.9 8.6 - 10.5 mg/dL    Total Protein 7.6 6.0 - 8.5 g/dL    Albumin 3.9 3.5 - 5.2 g/dL    ALT (SGPT) 20 1 - 33 U/L    AST (SGOT) 19 1 -  32 U/L    Alkaline Phosphatase 98 39 - 117 U/L    Total Bilirubin 0.7 0.0 - 1.2 mg/dL    Globulin 3.7 gm/dL    A/G Ratio 1.1 g/dL    BUN/Creatinine Ratio 24.2 7.0 - 25.0    Anion Gap 19.0 (H) 5.0 - 15.0 mmol/L    eGFR 12.1 (L) >60.0 mL/min/1.73   Lipase    Collection Time: 01/16/24  9:56 AM    Specimen: Blood   Result Value Ref Range    Lipase 42 13 - 60 U/L   CBC Auto Differential    Collection Time: 01/16/24  9:56 AM    Specimen: Blood   Result Value Ref Range    WBC 24.90 (H) 3.40 - 10.80 10*3/mm3    RBC 5.08 3.77 - 5.28 10*6/mm3    Hemoglobin 16.0 (H) 12.0 - 15.9 g/dL    Hematocrit 48.8 (H) 34.0 - 46.6 %    MCV 96.0 79.0 - 97.0 fL    MCH 31.6 26.6 - 33.0 pg    MCHC 32.9 31.5 - 35.7 g/dL    RDW 14.8 12.3 - 15.4 %    RDW-SD 49.4 37.0 - 54.0 fl    MPV 11.5 6.0 - 12.0 fL    Platelets 271 140 - 450 10*3/mm3    Neutrophil % 80.6 (H) 42.7 - 76.0 %    Lymphocyte % 13.0 (L) 19.6 - 45.3 %    Monocyte % 5.8 5.0 - 12.0 %    Eosinophil % 0.1 (L) 0.3 - 6.2 %    Basophil % 0.5 0.0 - 1.5 %    Neutrophils, Absolute 20.10 (H) 1.70 - 7.00 10*3/mm3    Lymphocytes, Absolute 3.20 (H) 0.70 - 3.10 10*3/mm3    Monocytes, Absolute 1.40 (H) 0.10 - 0.90 10*3/mm3    Eosinophils, Absolute 0.00 0.00 - 0.40 10*3/mm3    Basophils, Absolute 0.10 0.00 - 0.20 10*3/mm3    nRBC 0.0 0.0 - 0.2 /100 WBC   TSH    Collection Time: 01/16/24  9:56 AM    Specimen: Blood   Result Value Ref Range    TSH 0.436 0.270 - 4.200 uIU/mL   Ammonia    Collection Time: 01/16/24  9:56 AM    Specimen: Blood   Result Value Ref Range    Ammonia 42 11 - 51 umol/L   Gold Top - SST    Collection Time: 01/16/24  9:56 AM   Result Value Ref Range    Extra Tube Hold for add-ons.    Light Blue Top    Collection Time: 01/16/24  9:56 AM   Result Value Ref Range    Extra Tube Hold for add-ons.    Procalcitonin    Collection Time: 01/16/24  9:56 AM    Specimen: Blood   Result Value Ref Range    Procalcitonin 0.55 (H) 0.00 - 0.25 ng/mL   CK    Collection Time: 01/16/24  9:56 AM     Specimen: Blood   Result Value Ref Range    Creatine Kinase 60 20 - 180 U/L   POC Lactate    Collection Time: 01/16/24  9:59 AM    Specimen: Blood   Result Value Ref Range    Lactate 1.1 0.3 - 2.0 mmol/L   Basic Metabolic Panel    Collection Time: 01/16/24  5:27 PM    Specimen: Blood   Result Value Ref Range    Glucose 103 (H) 65 - 99 mg/dL    BUN 86 (H) 8 - 23 mg/dL    Creatinine 3.29 (H) 0.57 - 1.00 mg/dL    Sodium 157 (H) 136 - 145 mmol/L    Potassium 4.4 3.5 - 5.2 mmol/L    Chloride 124 (H) 98 - 107 mmol/L    CO2 18.0 (L) 22.0 - 29.0 mmol/L    Calcium 8.8 8.6 - 10.5 mg/dL    BUN/Creatinine Ratio 26.1 (H) 7.0 - 25.0    Anion Gap 15.0 5.0 - 15.0 mmol/L    eGFR 13.8 (L) >60.0 mL/min/1.73   CK    Collection Time: 01/16/24  5:27 PM    Specimen: Blood   Result Value Ref Range    Creatine Kinase 54 20 - 180 U/L   Basic Metabolic Panel    Collection Time: 01/17/24  4:51 AM    Specimen: Blood   Result Value Ref Range    Glucose 88 65 - 99 mg/dL    BUN 82 (H) 8 - 23 mg/dL    Creatinine 2.82 (H) 0.57 - 1.00 mg/dL    Sodium 163 (C) 136 - 145 mmol/L    Potassium 4.1 3.5 - 5.2 mmol/L    Chloride 130 (H) 98 - 107 mmol/L    CO2 21.0 (L) 22.0 - 29.0 mmol/L    Calcium 9.0 8.6 - 10.5 mg/dL    BUN/Creatinine Ratio 29.1 (H) 7.0 - 25.0    Anion Gap 12.0 5.0 - 15.0 mmol/L    eGFR 16.7 (L) >60.0 mL/min/1.73   CBC Auto Differential    Collection Time: 01/17/24  4:51 AM    Specimen: Blood   Result Value Ref Range    WBC 13.50 (H) 3.40 - 10.80 10*3/mm3    RBC 4.44 3.77 - 5.28 10*6/mm3    Hemoglobin 13.7 12.0 - 15.9 g/dL    Hematocrit 43.4 34.0 - 46.6 %    MCV 97.7 (H) 79.0 - 97.0 fL    MCH 30.9 26.6 - 33.0 pg    MCHC 31.7 31.5 - 35.7 g/dL    RDW 15.4 12.3 - 15.4 %    RDW-SD 52.5 37.0 - 54.0 fl    MPV 11.6 6.0 - 12.0 fL    Platelets 181 140 - 450 10*3/mm3    Neutrophil % 79.7 (H) 42.7 - 76.0 %    Lymphocyte % 13.7 (L) 19.6 - 45.3 %    Monocyte % 5.8 5.0 - 12.0 %    Eosinophil % 0.3 0.3 - 6.2 %    Basophil % 0.5 0.0 - 1.5 %    Neutrophils,  Absolute 10.80 (H) 1.70 - 7.00 10*3/mm3    Lymphocytes, Absolute 1.80 0.70 - 3.10 10*3/mm3    Monocytes, Absolute 0.80 0.10 - 0.90 10*3/mm3    Eosinophils, Absolute 0.00 0.00 - 0.40 10*3/mm3    Basophils, Absolute 0.10 0.00 - 0.20 10*3/mm3    nRBC 0.0 0.0 - 0.2 /100 WBC          Imaging:  CT Abdomen Pelvis Without Contrast    Result Date: 1/16/2024  Impression: 1. Multiple calculi or single irregular elongated calculus in the right renal pelvis, with low level obstructive uropathy. No visible ureteral calculus. Minimal renal pelvic inflammation. 2. Normally distended, but mildly inflamed-appearing urinary bladder. 3. Small focus of new right lower lobe pulmonary disease, suspicious for focal bronchitis, possibly aspiration. Electronically Signed: Oscar Lezama MD  1/16/2024 2:17 PM EST  Workstation ID: [id number]    CT Head Without Contrast    Result Date: 1/16/2024  Impression: Chronic findings. No acute process. Electronically Signed: Gail Bone MD  1/16/2024 11:07 AM EST  Workstation ID: [id number]    XR Chest 1 View    Result Date: 1/16/2024  Impression: No new chest disease. Electronically Signed: Oscar Lezama MD  1/16/2024 10:53 AM EST  Workstation ID: [id number]     Assessment/Plan:     Acute cystitis with hematuria         Hyponatremia  Acute kidney injury on chronic kidney disease  Azotemia  Dehydration  Encephalopathy  Hypertension  History of PE  Dementia  UTI     Recommendations:  Creatinine trending down to 2.8  Sodium has gone up to 163  Adjust IV fluids to half-normal saline  Recheck labs in few hours  Avoid rapid correction

## 2024-01-17 NOTE — CASE MANAGEMENT/SOCIAL WORK
Discharge Planning Assessment  [facility]     Patient Name: Day Cabrera  MRN: [id number]  Today's Date: 1/17/2024    Admit Date: 1/16/2024        Discharge Needs Assessment       Row Name 01/17/24 1259       Living Environment    People in Home other (see comments)    Unique Family Situation [facility]    Current Living Arrangements extended care facility    Potentially Unsafe Housing Conditions none    Provides Primary Care For no one    Family Caregiver if Needed other (see comments)    Quality of Family Relationships helpful;involved;supportive    Able to Return to Prior Arrangements yes       Resource/Environmental Concerns    Resource/Environmental Concerns none    Transportation Concerns none       Transition Planning    Patient/Family Anticipates Transition to long-term care facility    Patient/Family Anticipated Services at Transition none    Transportation Anticipated health plan transportation;family or friend will provide       Discharge Needs Assessment    Readmission Within the Last 30 Days no previous admission in last 30 days    Equipment Currently Used at Home wheelchair    Concerns to be Addressed discharge planning    Anticipated Changes Related to Illness none    Equipment Needed After Discharge none                   Discharge Plan       Row Name 01/17/24 1259       Plan    Plan Comments Patient is current resident at [place] - approved to return per liaison Leonora. PCP and pharmacy confirmed.  Patient uses WC at facility and is not Oriented x4.  DC Barriers:  IV Abxs, IVF, 2L O2, abnormal labs, Urology/Nephrology/wound care following.                 Expected Discharge Date and Time       Expected Discharge Date Expected Discharge Time    Jan 19, 2024            Demographic Summary       Row Name 01/17/24 1256       General Information    Admission Type inpatient    Arrived From emergency department    Required Notices Provided Important Message from Medicare    Referral Source  admission list    Reason for Consult discharge planning    Preferred Language English                   Functional Status       Row Name 01/17/24 7513       Functional Status    Usual Activity Tolerance poor    Current Activity Tolerance poor       Functional Status, IADL    Medications assistive equipment and person    Meal Preparation assistive equipment and person    Housekeeping assistive equipment and person    Laundry assistive equipment and person    Shopping assistive equipment and person       Mental Status    General Appearance WDL WDL       Mental Status Summary    Recent Changes in Mental Status/Cognitive Functioning no changes             ODenita Meier RN  SIPS/ICU   O: [phone]  C: [phone]  [email or web address]

## 2024-01-17 NOTE — PROGRESS NOTES
Hospitalist Progress Note   Day Cabrera : 1945 MRN:[id number] LOS:1     Principal Problem: Acute cystitis with hematuria     Reason for follow up: All the medical problems listed below    Summary     A 78 y.o. female admitted with a principal diagnosis of Acute cystitis with hematuria.      Significant events     24 : Patient remains afebrile, all vital signs are stable.  Blood and urine cultures remain pending.  Patient was taken to the OR last night for cystoscopy with right ureteral stent placement.  Creatinine has improved from 3.68 down to 2.82.  White blood cell count is down from 25-13.5.  Sodium again elevated at 163 today.  Fluids changed to half-normal saline.  Patient's mentation is improving with treatment.    Assessment / Plan     Severe sepsis  Acute cystitis with hematuria  -Urine and blood cultures pending  -Continue IV Rocephin 2 g every 24 hours  -CT of the abdomen and pelvis without contrast noted for multiple calculi or single irregular elongated calculus in the right renal pelvis, with low level obstructive uropathy. No visible ureteral calculus. Minimal renal pelvic inflammation. Normally distended, but mildly inflamed-appearing urinary bladder.  -S/p cystoscopy with R ureteral stent placement on         NENA superimposed on CKD 2/2 pre-renal azotemia  Profound dehydration and hypernatremia  -Patient has received no fluids at time of being seen  -Will give 2 L normal saline bolus followed by normal saline at 175 mL/h  -CPK within normal limits  -EF in 2017 was >60%  -Nephrology, Dr. Grover, following  -IVF's changed to 1/2 NS on         Acute encephalopathy; improving with treatment of infection & dehydration  -2/2 baseline dementia with sepsis and dehydration superimposed  -Monitor        Chronic & Stable:  Dementia  HTN  Hx of PE  -Continue home meds when able to take po--If needed, will get NGT          Code status:         Nutrition: Diet: Regular/House Diet; Texture:  Regular Texture (IDDSI 7); Fluid Consistency: Thin (IDDSI 0)   Patient isn't on Tube Feeding    DVT prophylaxis:  Medical DVT prophylaxis orders are present.     Subjective / Review of systems     Review of Systems   Patient denies chest pain or shortness of breath.  States that she is hungry.  Admits to mild continued, generalized abdominal pain.  Denies fevers, chills, sweats.  No nausea or vomiting.  Objective / Physical Exam   Vital signs:  Temp: 97.6 °F (36.4 °C)  BP: 122/72  Heart Rate: 74  Resp: 14  SpO2: 100 %  Weight: 68.4 kg (150 lb 12.7 oz)    Admission Weight: Weight: 89.8 kg (198 lb)  Current Weight: Weight: 68.4 kg (150 lb 12.7 oz)    Input/Output in last 24 hours:    Intake/Output Summary (Last 24 hours) at 1/17/2024 1215  Last data filed at 1/17/2024 1153  Gross per 24 hour   Intake 2850 ml   Output 700 ml   Net 2150 ml      Physical Exam     GEN:    Obese, elderly woman, still dehydrated-appearing, lying in bed on nasal cannula.  More responsive today. NAD.  NEURO:  Brainstem reflexes intact.  No obvious focal deficit.  Moves all 4 ext.  HEENT:  N/AT.  PERRL.  MM's exquisitely dry.  Oropharynx non-erythematous.  No drainage from the eyes/ears/nose.  No conjunctival petechiae.  No oral thrush.    NECK:  Supple, NT, trachea midline.  No meningismus.  No ROM limitation.    CHEST/LUNGS:  Breath sounds are clear and equal bilaterally.  No w/r/r.  Chest excursion equal bilaterally.    CARDIOVASCULAR:  RRR w/o murmur noted.    GI:  Abdomen soft, complains of mild pain and my cold hands generalized over abdomen, ND, dim BS.  :  No veloz cath in place.  EXTREMITIES:  No deformity or amputation.  No cyanosis, edema, or asymmetry.  Pulses 2+ and equal in BLE's.    SKIN:  Warm, dry, and pink.  No rash, breakdown, or track marks noted.  LYMPHATICS/HEME:  No overt LAD or abnormal bruising.  No lymphedema.  MSK:  No joint abnormalities noted.    PSYCH:  Follows simple commands.  A&Ox2.  Still fairly sleepy, but  not as bad as yesterday.       Radiology and Labs     Results from last 7 days   Lab Units 01/17/24  0451 01/16/24  0956   WBC 10*3/mm3 13.50* 24.90*   HEMATOCRIT % 43.4 48.8*   PLATELETS 10*3/mm3 181 271      Results from last 7 days   Lab Units 01/17/24  0451 01/16/24  1727 01/16/24  0956   SODIUM mmol/L 163* 157* 160*   POTASSIUM mmol/L 4.1 4.4 4.7   CHLORIDE mmol/L 130* 124* 120*   CO2 mmol/L 21.0* 18.0* 21.0*   BUN mg/dL 82* 86* 89*   CREATININE mg/dL 2.82* 3.29* 3.68*      Current medications   Scheduled Meds: amiodarone, 200 mg, Oral, Q24H  apixaban, 5 mg, Oral, Q12H  cefTRIAXone, 2,000 mg, Intravenous, Q24H  [MAR Hold] lactated ringers, 1,000 mL, Intravenous, Once  levothyroxine, 100 mcg, Oral, Q AM  melatonin, 10 mg, Oral, Nightly  pantoprazole, 40 mg, Oral, Daily  senna-docusate sodium, 2 tablet, Oral, BID  sodium chloride, 10 mL, Intravenous, Q12H      Continuous Infusions: sodium chloride, 150 mL/hr, Last Rate: 150 mL/hr (01/17/24 0658)        Plan discussed with RN. Reviewed all other data in the last 24 hours, including but not limited to vitals, labs, microbiology, imaging and pertinent notes from other providers.     Mian Lee, DO   Critical Care  01/17/24   12:15 EST

## 2024-01-17 NOTE — CONSULTS
FIRST UROLOGY CONSULT      Patient Identification:  NAME:  Day Cabrera  Age:  78 y.o.   Sex:  female   :  1945   MRN:  [id number]     Chief complaint:ms changes    History of present illness:    Pt admitted with sepsis jessica uti r renal pelvic stone        Past medical history:  Past Medical History:   Diagnosis Date    Anxiety     Arthritis     Chest pain 10/15/2019    Dementia     Hypertension     Pulmonary embolus        Past surgical history:  Past Surgical History:   Procedure Laterality Date    CHOLECYSTECTOMY      COLONOSCOPY      ENDOSCOPY N/A 3/30/2023    Procedure: ESOPHAGOGASTRODUODENOSCOPY;  Surgeon: Contreras Watts MD;  Location: [facility] ENDOSCOPY;  Service: Gastroenterology;  Laterality: N/A;    HYSTERECTOMY      total     SIGMOIDOSCOPY N/A 3/30/2023    Procedure: SIGMOIDOSCOPY with biopsy;  Surgeon: Contreras Watts MD;  Location: [facility] ENDOSCOPY;  Service: Gastroenterology;  Laterality: N/A;  ischemia       Allergies:  Ciprofloxacin    Home medications:  (Not in a hospital admission)       Hospital medications:  amiodarone, 200 mg, Oral, Q24H  apixaban, 5 mg, Oral, Q12H  lactated ringers, 1,000 mL, Intravenous, Once  levothyroxine, 100 mcg, Oral, Q AM  melatonin, 10 mg, Oral, Nightly  pantoprazole, 40 mg, Oral, Q AM  senna-docusate sodium, 2 tablet, Oral, BID  sodium chloride, 10 mL, Intravenous, Q12H      sodium chloride, 175 mL/hr, Last Rate: 175 mL/hr (24)        senna-docusate sodium **AND** polyethylene glycol **AND** bisacodyl **AND** bisacodyl    ondansetron ODT **OR** ondansetron    [COMPLETED] Insert Peripheral IV **AND** sodium chloride    sodium chloride    sodium chloride    Family history:  Family History   Problem Relation Age of Onset    Heart disease Mother     Alcohol abuse Father         murdered     Pancreatic cancer Sister     No Known Problems Half-Sister        Social history:  Social History     Tobacco Use    Smoking status: Former      Years: 30     Types: Cigarettes     Quit date:      Years since quittin.0    Smokeless tobacco: Never   Vaping Use    Vaping Use: Never used   Substance Use Topics    Alcohol use: No    Drug use: No       Review of systems:      Positive for:  nothing  Negative for:  chest pain, cough, sob, o/w neg    Objective:  TMax 24 hours:   Temp (24hrs), Av.5 °F (36.4 °C), Min:97 °F (36.1 °C), Max:98.2 °F (36.8 °C)      Vitals Ranges:   Temp:  [97 °F (36.1 °C)-98.2 °F (36.8 °C)] 97.2 °F (36.2 °C)  Heart Rate:  [] 85  Resp:  [15-20] 16  BP: (104-136)/(52-77) 109/52    Intake/Output Last 3 shifts:  I/O last 3 completed shifts:  In: 2100 [IV Piggyback:2100]  Out: -      Physical Exam:    General Appearance:    NAD   Back:     No CVA tenderness   Lungs:     Respirations unlabored, no wheezing       Abdomen:     Soft, NDNT, no masses, no guarding               Results review:   I reviewed the patient's new clinical results.    Data review:  Lab Results (last 24 hours)       Procedure Component Value Units Date/Time    Basic Metabolic Panel [[id number]]  (Abnormal) Collected: 24    Specimen: Blood Updated: 24     Glucose 103 mg/dL      BUN 86 mg/dL      Creatinine 3.29 mg/dL      Sodium 157 mmol/L      Potassium 4.4 mmol/L      Comment: Slight hemolysis detected by analyzer. Result may be falsely elevated.        Chloride 124 mmol/L      CO2 18.0 mmol/L      Calcium 8.8 mg/dL      BUN/Creatinine Ratio 26.1     Anion Gap 15.0 mmol/L      eGFR 13.8 mL/min/1.73      Comment: <15 Indicative of kidney failure       Narrative:      GFR Normal >60  Chronic Kidney Disease <60  Kidney Failure <15    The GFR formula is only valid for adults with stable renal function between ages 18 and 70.    CK [[id number]]  (Normal) Collected: 24    Specimen: Blood Updated: 24     Creatine Kinase 54 U/L     Procalcitonin [[id number]]  (Abnormal) Collected: 24 0956    Specimen: Blood Updated:  "01/16/24 1216     Procalcitonin 0.55 ng/mL     Narrative:      As a Marker for Sepsis (Non-Neonates):    1. <0.5 ng/mL represents a low risk of severe sepsis and/or septic shock.  2. >2 ng/mL represents a high risk of severe sepsis and/or septic shock.    As a Marker for Lower Respiratory Tract Infections that require antibiotic therapy:    PCT on Admission    Antibiotic Therapy       6-12 Hrs later    >0.5                Strongly Recommended  >0.25 - <0.5        Recommended   0.1 - 0.25          Discouraged              Remeasure/reassess PCT  <0.1                Strongly Discouraged     Remeasure/reassess PCT    As 28 day mortality risk marker: \"Change in Procalcitonin Result\" (>80% or <=80%) if Day 0 (or Day 1) and Day 4 values are available. Refer to http://www.[employer] [email or web address] [email or web address] [facility] [email or web address]cal[email or web address]    Change in PCT <=80%  A decrease of PCT levels below or equal to 80% defines a positive change in PCT test result representing a higher risk for 28-day all-cause mortality of patients diagnosed with severe sepsis for septic shock.    Change in PCT >80%  A decrease of PCT levels of more than 80% defines a negative change in PCT result representing a lower risk for 28-day all-cause mortality of patients diagnosed with severe sepsis or septic shock.       CK [[id number]]  (Normal) Collected: 01/16/24 0956    Specimen: Blood Updated: 01/16/24 1213     Creatine Kinase 60 U/L     Urine Culture - Urine, Straight Cath [[id number]] Collected: 01/16/24 0955    Specimen: Urine from Straight Cath Updated: 01/16/24 1117    Extra Tubes [[id number]] Collected: 01/16/24 0956    Specimen: Blood, Venous Line Updated: 01/16/24 1102    Narrative:      The following orders were created for panel order Extra Tubes.  Procedure                               Abnormality         Status                     ---------                               -----------         ------                     Gold Top - [facility][[id number]]                                   Final " result               Light Blue Top[[id number]]                                   Final result                 Please view results for these tests on the individual orders.    Gold Top - SST [[id number]] Collected: 01/16/24 0956    Specimen: Blood Updated: 01/16/24 1102     Extra Tube Hold for add-ons.     Comment: Auto resulted.       Light Blue Top [[id number]] Collected: 01/16/24 0956    Specimen: Blood Updated: 01/16/24 1102     Extra Tube Hold for add-ons.     Comment: Auto resulted       TSH [[id number]]  (Normal) Collected: 01/16/24 0956    Specimen: Blood Updated: 01/16/24 1041     TSH 0.436 uIU/mL     Comprehensive Metabolic Panel [[id number]]  (Abnormal) Collected: 01/16/24 0956    Specimen: Blood Updated: 01/16/24 1034     Glucose 126 mg/dL      BUN 89 mg/dL      Creatinine 3.68 mg/dL      Sodium 160 mmol/L      Potassium 4.7 mmol/L      Chloride 120 mmol/L      CO2 21.0 mmol/L      Calcium 9.9 mg/dL      Total Protein 7.6 g/dL      Albumin 3.9 g/dL      ALT (SGPT) 20 U/L      AST (SGOT) 19 U/L      Alkaline Phosphatase 98 U/L      Total Bilirubin 0.7 mg/dL      Globulin 3.7 gm/dL      A/G Ratio 1.1 g/dL      BUN/Creatinine Ratio 24.2     Anion Gap 19.0 mmol/L      eGFR 12.1 mL/min/1.73      Comment: <15 Indicative of kidney failure       Narrative:      GFR Normal >60  Chronic Kidney Disease <60  Kidney Failure <15    The GFR formula is only valid for adults with stable renal function between ages 18 and 70.    Lipase [[id number]]  (Normal) Collected: 01/16/24 0956    Specimen: Blood Updated: 01/16/24 1034     Lipase 42 U/L     Blood Culture - Blood, Arm, Right [[id number]] Collected: 01/16/24 1028    Specimen: Blood from Arm, Right Updated: 01/16/24 1034    Ammonia [[id number]]  (Normal) Collected: 01/16/24 0956    Specimen: Blood Updated: 01/16/24 1026     Ammonia 42 umol/L     Urinalysis, Microscopic Only - Straight Cath [[id number]]  (Abnormal) Collected: 01/16/24 0955    Specimen: Urine from Straight Cath  Updated: 01/16/24 1016     RBC, UA Too Numerous to Count /HPF      WBC, UA Too Numerous to Count /HPF      Bacteria, UA 2+ /HPF      Squamous Epithelial Cells, UA 3-6 /HPF      Hyaline Casts, UA None Seen /LPF      Methodology Automated Microscopy    CBC & Differential [[id number]]  (Abnormal) Collected: 01/16/24 0956    Specimen: Blood Updated: 01/16/24 1010    Narrative:      The following orders were created for panel order CBC & Differential.  Procedure                               Abnormality         Status                     ---------                               -----------         ------                     CBC Auto Differential[[id number]]        Abnormal            Final result                 Please view results for these tests on the individual orders.    CBC Auto Differential [[id number]]  (Abnormal) Collected: 01/16/24 0956    Specimen: Blood Updated: 01/16/24 1010     WBC 24.90 10*3/mm3      RBC 5.08 10*6/mm3      Hemoglobin 16.0 g/dL      Hematocrit 48.8 %      MCV 96.0 fL      MCH 31.6 pg      MCHC 32.9 g/dL      RDW 14.8 %      RDW-SD 49.4 fl      MPV 11.5 fL      Platelets 271 10*3/mm3      Neutrophil % 80.6 %      Lymphocyte % 13.0 %      Monocyte % 5.8 %      Eosinophil % 0.1 %      Basophil % 0.5 %      Neutrophils, Absolute 20.10 10*3/mm3      Lymphocytes, Absolute 3.20 10*3/mm3      Monocytes, Absolute 1.40 10*3/mm3      Eosinophils, Absolute 0.00 10*3/mm3      Basophils, Absolute 0.10 10*3/mm3      nRBC 0.0 /100 WBC     Urinalysis With Microscopic If Indicated (No Culture) - Straight Cath [[id number]]  (Abnormal) Collected: 01/16/24 0955    Specimen: Urine from Straight Cath Updated: 01/16/24 1009     Color, UA Red     Comment: Any Substance that causes an abnormal urine color can alter the accuracy of the chemical reactions.        Appearance, UA Turbid     Comment: Result checked          pH, UA 6.0     Specific Gravity, UA 1.015     Glucose,  mg/dL (Trace)     Ketones, UA 15 mg/dL  (1+)     Bilirubin, UA Large (3+)     Comment: Confirmation testing is unavailable.  A serum bilirubin is recommended for further assessment.        Blood, UA Large (3+)     Protein, UA >=300 mg/dL (3+)     Leuk Esterase, UA Large (3+)     Nitrite, UA Negative     Urobilinogen, UA 4.0 E.U./dL    Blood Culture - Blood, Arm, Left [[id number]] Collected: 01/16/24 0956    Specimen: Blood from Arm, Left Updated: 01/16/24 1002    POC Lactate [[id number]]  (Normal) Collected: 01/16/24 0959    Specimen: Blood Updated: 01/16/24 1001     Lactate 1.1 mmol/L      Comment: Serial Number: [id number]:  [id number]                Imaging:  Imaging Results (Last 24 Hours)       Procedure Component Value Units Date/Time    CT Abdomen Pelvis Without Contrast [[id number]] Collected: 01/16/24 1408     Updated: 01/16/24 1419    Narrative:      CT ABDOMEN PELVIS WO CONTRAST    Date of Exam: 1/16/2024 1:51 PM EST    Indication: Eval for hydro, pyelo, stone.    Comparison: 3/30/2023    Technique: Axial CT images were obtained of the abdomen and pelvis without the administration of contrast. Sagittal and coronal reconstructions were performed.  Automated exposure control and iterative reconstruction methods were used.      Findings:  History indicates acute cystitis with hematuria.    Today's study shows a small focus of peribronchial thickening and interstitial change in the right lower lobe, whether bronchitis, or mild aspiration. Lung bases otherwise appear clear. Gallbladder is surgically absent. No significant abnormalities are   seen of the liver, spleen, pancreas, adrenal glands, or left kidney. No left nephrolithiasis, cyst, mass or hydronephrosis is seen. Left ureter appears normal.    On the right, there are several calculi in the renal pelvis, possibly 1 very irregular, elongated calculus extending over a roughly 15 mm length, but only approximately 5 mm in width. Right renal collecting system appears upper limits of normal.  There is   mild fat stranding in the right renal pelvis. The right ureter, however, appears normal and no ureteral calculi are seen. Bladder is normally distended and thick walled with mild inflammation. A single air bubble in the bladder may reflect recent in and   out catheterization.    Elsewhere on the scan, no free air ascites or adenopathy is seen. Bowel loops are normal in caliber. Terminal ileum cecum and appendix appear normal. No intrapelvic mass or adenopathy is seen. Additional note is made of a few surgical clips and minimal   fat stranding in the presacral space, but no visible associated mass or inflammation. Bony structures appear to be intact.     .      Impression:      Impression:    1. Multiple calculi or single irregular elongated calculus in the right renal pelvis, with low level obstructive uropathy. No visible ureteral calculus. Minimal renal pelvic inflammation.    2. Normally distended, but mildly inflamed-appearing urinary bladder.    3. Small focus of new right lower lobe pulmonary disease, suspicious for focal bronchitis, possibly aspiration.      Electronically Signed: Oscar Lezama MD    1/16/2024 2:17 PM EST    Workstation ID: [id number]    CT Head Without Contrast [[id number]] Collected: 01/16/24 1106     Updated: 01/16/24 1109    Narrative:      CT HEAD WO CONTRAST    Date of Exam: 1/16/2024 11:00 AM EST    Indication: Unresponsive.    Comparison: 9/24/2020.    Technique: Axial CT images were obtained of the head without contrast administration.  Coronal reconstructions were performed.  Automated exposure control and iterative reconstruction methods were used.      Findings:  There is moderately severe atrophy. Ventricular dilatation is compatible with atrophy. Periventricular hypodensity appears similar and may relate to chronic small vessel ischemic insult. There is no acute mass effect or edema. There are no findings   suspicious for acute CVA or hemorrhage. Paranasal sinuses are  clear.      Impression:      Impression:  Chronic findings. No acute process.      Electronically Signed: Gail Bone MD    1/16/2024 11:07 AM EST    Workstation ID: [id number]    XR Chest 1 View [[id number]] Collected: 01/16/24 1052     Updated: 01/16/24 1055    Narrative:      XR CHEST 1 VW    Date of Exam: 1/16/2024 10:44 AM EST    Indication: Unresponsive recent COVID    Comparison: 9/24/2020    Findings:  Heart shadow is normal in size. Pulmonary vasculature appears normal. There is mild coarsening of the perihilar interstitial markings which appears essentially unchanged from 9/8/2020. No lung consolidation, effusion or pneumothorax is seen. Bony   structures appear to be intact.      Impression:      Impression:  No new chest disease.      Electronically Signed: Oscar Lezama MD    1/16/2024 10:53 AM EST    Workstation ID: [id number]             Assessment: sepsis  Luciano  Uti  R renal stone  R hydro    Plan:     Npo  Abx  Cysto r stent insertion  R/b/a explained bleeding infection anesthetic risks need for pnt     Aldo Narvaez MD  01/16/24  20:39 EST

## 2024-01-17 NOTE — ANESTHESIA POSTPROCEDURE EVALUATION
Patient: Day Cabrera    Procedure Summary       Date: 01/16/24 Room / [facility]: [facility] OR 07 / [facility] MA[facility]    Anesthesia Start: 2250 Anesthesia Stop: 2316    Procedure: CYSTOSCOPY STENT INSERTION (Right) Diagnosis:     Surgeons: Aldo Narvaez MD Provider: Bruno Fine MD    Anesthesia Type: general ASA Status: 4 - Emergent            Anesthesia Type: general    Vitals  Vitals Value Taken Time   /77 01/17/24 0000   Temp 97.4 °F (36.3 °C) 01/17/24 0000   Pulse 108 01/17/24 0001   Resp 14 01/17/24 0000   SpO2 100 % 01/17/24 0001   Vitals shown include unfiled device data.        Post Anesthesia Care and Evaluation    Patient location during evaluation: PACU  Patient participation: complete - patient participated  Level of consciousness: awake  Pain scale: See nurse's notes for pain score.  Pain management: adequate    Airway patency: patent  Anesthetic complications: No anesthetic complications  PONV Status: none  Cardiovascular status: acceptable  Respiratory status: acceptable and spontaneous ventilation  Hydration status: acceptable    Comments: Patient seen and examined postoperatively; vital signs stable; SpO2 greater than or equal to 90%; cardiopulmonary status stable; nausea/vomiting adequately controlled; pain adequately controlled; no apparent anesthesia complications; patient discharged from anesthesia care when discharge criteria were met

## 2024-01-17 NOTE — PLAN OF CARE
Goal Outcome Evaluation:  Plan of Care Reviewed With: patient           Outcome Evaluation: Abed at this time with eyes closed. Responds to voice. Remains disoriented x 4. Abldwin cath removed and is waiting on patient to void. Remains on IV antibiotics. Able to take morning meds with applesauce. Will continue to monitor frequently d/t patient not being able to use call light.

## 2024-01-17 NOTE — PLAN OF CARE
Goal Outcome Evaluation:               Orders received for a swallow evaluation. Pt too lethargic to participate in bedside evaluation today. Discussed w/RN. ST to follow up next date.

## 2024-01-18 LAB
ANION GAP SERPL CALCULATED.3IONS-SCNC: 10 MMOL/L (ref 5–15)
ANION GAP SERPL CALCULATED.3IONS-SCNC: 7 MMOL/L (ref 5–15)
BACTERIA SPEC AEROBE CULT: NORMAL
BASOPHILS # BLD AUTO: 0 10*3/MM3 (ref 0–0.2)
BASOPHILS # BLD AUTO: 0 10*3/MM3 (ref 0–0.2)
BASOPHILS NFR BLD AUTO: 0.6 % (ref 0–1.5)
BASOPHILS NFR BLD AUTO: 0.6 % (ref 0–1.5)
BUN SERPL-MCNC: 42 MG/DL (ref 8–23)
BUN SERPL-MCNC: 61 MG/DL (ref 8–23)
BUN/CREAT SERPL: 32.6 (ref 7–25)
BUN/CREAT SERPL: 34.3 (ref 7–25)
CALCIUM SPEC-SCNC: 8 MG/DL (ref 8.6–10.5)
CALCIUM SPEC-SCNC: 8.5 MG/DL (ref 8.6–10.5)
CHLORIDE SERPL-SCNC: 119 MMOL/L (ref 98–107)
CHLORIDE SERPL-SCNC: 125 MMOL/L (ref 98–107)
CO2 SERPL-SCNC: 21 MMOL/L (ref 22–29)
CO2 SERPL-SCNC: 21 MMOL/L (ref 22–29)
CREAT SERPL-MCNC: 1.29 MG/DL (ref 0.57–1)
CREAT SERPL-MCNC: 1.78 MG/DL (ref 0.57–1)
DEPRECATED RDW RBC AUTO: 51.2 FL (ref 37–54)
DEPRECATED RDW RBC AUTO: 51.6 FL (ref 37–54)
EGFRCR SERPLBLD CKD-EPI 2021: 28.9 ML/MIN/1.73
EGFRCR SERPLBLD CKD-EPI 2021: 42.6 ML/MIN/1.73
EOSINOPHIL # BLD AUTO: 0.1 10*3/MM3 (ref 0–0.4)
EOSINOPHIL # BLD AUTO: 0.2 10*3/MM3 (ref 0–0.4)
EOSINOPHIL NFR BLD AUTO: 1.1 % (ref 0.3–6.2)
EOSINOPHIL NFR BLD AUTO: 2 % (ref 0.3–6.2)
ERYTHROCYTE [DISTWIDTH] IN BLOOD BY AUTOMATED COUNT: 14.5 % (ref 12.3–15.4)
ERYTHROCYTE [DISTWIDTH] IN BLOOD BY AUTOMATED COUNT: 15.1 % (ref 12.3–15.4)
GLUCOSE SERPL-MCNC: 114 MG/DL (ref 65–99)
GLUCOSE SERPL-MCNC: 72 MG/DL (ref 65–99)
HCT VFR BLD AUTO: 34 % (ref 34–46.6)
HCT VFR BLD AUTO: 37.5 % (ref 34–46.6)
HGB BLD-MCNC: 11.5 G/DL (ref 12–15.9)
HGB BLD-MCNC: 12.2 G/DL (ref 12–15.9)
LYMPHOCYTES # BLD AUTO: 1.6 10*3/MM3 (ref 0.7–3.1)
LYMPHOCYTES # BLD AUTO: 1.8 10*3/MM3 (ref 0.7–3.1)
LYMPHOCYTES NFR BLD AUTO: 21.8 % (ref 19.6–45.3)
LYMPHOCYTES NFR BLD AUTO: 23.8 % (ref 19.6–45.3)
MCH RBC QN AUTO: 31.6 PG (ref 26.6–33)
MCH RBC QN AUTO: 32.2 PG (ref 26.6–33)
MCHC RBC AUTO-ENTMCNC: 32.5 G/DL (ref 31.5–35.7)
MCHC RBC AUTO-ENTMCNC: 33.9 G/DL (ref 31.5–35.7)
MCV RBC AUTO: 94.9 FL (ref 79–97)
MCV RBC AUTO: 97.1 FL (ref 79–97)
MONOCYTES # BLD AUTO: 0.4 10*3/MM3 (ref 0.1–0.9)
MONOCYTES # BLD AUTO: 0.7 10*3/MM3 (ref 0.1–0.9)
MONOCYTES NFR BLD AUTO: 6.2 % (ref 5–12)
MONOCYTES NFR BLD AUTO: 9.2 % (ref 5–12)
NEUTROPHILS NFR BLD AUTO: 4.8 10*3/MM3 (ref 1.7–7)
NEUTROPHILS NFR BLD AUTO: 5.1 10*3/MM3 (ref 1.7–7)
NEUTROPHILS NFR BLD AUTO: 64.4 % (ref 42.7–76)
NEUTROPHILS NFR BLD AUTO: 70.3 % (ref 42.7–76)
NRBC BLD AUTO-RTO: 0.1 /100 WBC (ref 0–0.2)
NRBC BLD AUTO-RTO: 0.1 /100 WBC (ref 0–0.2)
PLATELET # BLD AUTO: 122 10*3/MM3 (ref 140–450)
PLATELET # BLD AUTO: 128 10*3/MM3 (ref 140–450)
PMV BLD AUTO: 11 FL (ref 6–12)
PMV BLD AUTO: 11.8 FL (ref 6–12)
POTASSIUM SERPL-SCNC: 3.4 MMOL/L (ref 3.5–5.2)
POTASSIUM SERPL-SCNC: 3.5 MMOL/L (ref 3.5–5.2)
RBC # BLD AUTO: 3.59 10*6/MM3 (ref 3.77–5.28)
RBC # BLD AUTO: 3.86 10*6/MM3 (ref 3.77–5.28)
SODIUM SERPL-SCNC: 147 MMOL/L (ref 136–145)
SODIUM SERPL-SCNC: 156 MMOL/L (ref 136–145)
WBC NRBC COR # BLD AUTO: 7.2 10*3/MM3 (ref 3.4–10.8)
WBC NRBC COR # BLD AUTO: 7.4 10*3/MM3 (ref 3.4–10.8)

## 2024-01-18 PROCEDURE — 25010000002 CEFTRIAXONE PER 250 MG: Performed by: INTERNAL MEDICINE

## 2024-01-18 PROCEDURE — 80048 BASIC METABOLIC PNL TOTAL CA: CPT | Performed by: INTERNAL MEDICINE

## 2024-01-18 PROCEDURE — 85025 COMPLETE CBC W/AUTO DIFF WBC: CPT | Performed by: INTERNAL MEDICINE

## 2024-01-18 PROCEDURE — 92610 EVALUATE SWALLOWING FUNCTION: CPT

## 2024-01-18 RX ADMIN — LEVOTHYROXINE SODIUM 100 MCG: 0.1 TABLET ORAL at 05:09

## 2024-01-18 RX ADMIN — APIXABAN 5 MG: 5 TABLET, FILM COATED ORAL at 08:01

## 2024-01-18 RX ADMIN — AMIODARONE HYDROCHLORIDE 200 MG: 200 TABLET ORAL at 08:01

## 2024-01-18 RX ADMIN — SODIUM CHLORIDE 150 ML/HR: 450 INJECTION, SOLUTION INTRAVENOUS at 10:19

## 2024-01-18 RX ADMIN — SODIUM CHLORIDE 150 ML/HR: 450 INJECTION, SOLUTION INTRAVENOUS at 03:47

## 2024-01-18 RX ADMIN — Medication 10 ML: at 21:20

## 2024-01-18 RX ADMIN — Medication 10 MG: at 21:20

## 2024-01-18 RX ADMIN — CEFTRIAXONE 2000 MG: 2 INJECTION, POWDER, FOR SOLUTION INTRAMUSCULAR; INTRAVENOUS at 10:19

## 2024-01-18 RX ADMIN — APIXABAN 5 MG: 5 TABLET, FILM COATED ORAL at 21:20

## 2024-01-18 RX ADMIN — DOCUSATE SODIUM AND SENNOSIDES 2 TABLET: 8.6; 5 TABLET, FILM COATED ORAL at 08:01

## 2024-01-18 RX ADMIN — DOCUSATE SODIUM AND SENNOSIDES 2 TABLET: 8.6; 5 TABLET, FILM COATED ORAL at 21:20

## 2024-01-18 RX ADMIN — SODIUM CHLORIDE 150 ML/HR: 450 INJECTION, SOLUTION INTRAVENOUS at 17:35

## 2024-01-18 RX ADMIN — Medication 10 ML: at 08:01

## 2024-01-18 RX ADMIN — PANTOPRAZOLE SODIUM 40 MG: 40 TABLET, DELAYED RELEASE ORAL at 08:01

## 2024-01-18 NOTE — CONSULTS
"Nutrition Services    Patient Name: Day Cabrera  YOB: 1945  MRN: [id number]  Admission date: 1/16/2024    Comment:    Pt unable to answer assessment questions.    RD to order new scale weight, recent readings seem inconsistent.    CLINICAL NUTRITION ASSESSMENT      Reason for Assessment 1/18 - MST/SHEILA     H&P      Past Medical History:   Diagnosis Date    Anxiety     Arthritis     Chest pain 10/15/2019    Dementia     Hypertension     Pulmonary embolus        Past Surgical History:   Procedure Laterality Date    CHOLECYSTECTOMY      COLONOSCOPY      ENDOSCOPY N/A 3/30/2023    Procedure: ESOPHAGOGASTRODUODENOSCOPY;  Surgeon: Contreras Watts MD;  Location: [facility] ENDOSCOPY;  Service: Gastroenterology;  Laterality: N/A;    HYSTERECTOMY      total     SIGMOIDOSCOPY N/A 3/30/2023    Procedure: SIGMOIDOSCOPY with biopsy;  Surgeon: Contreras Watts MD;  Location: [facility] ENDOSCOPY;  Service: Gastroenterology;  Laterality: N/A;  ischemia        Current Problems   Severe sepsis  Acute cystitis with hematuria  (Urology following)    NENA     Dehydration and hypernatremia    Dementia       Encounter Information        Trending Narrative     1/18 - Pt nursing home resident presented to ED with NENA and hyponatremia. Pt currently has 33.3% PO intake since admission. Dietetic intern entered room for assessment. Patient not awake at the time, was unable to answer questions. Grandson had limited information on pts nutrition background and current status. Grandson reported pt had not been eating very well (reflected in 33.3% PO intake since admission), and historically has only been taking a few bites at each meal. Nurse stated pt ate well at breakfast. Family reported pt weighed 300 lbs \"a while ago\", Pt's weight history shows a weight fluctuation of roughly 10-40 lbs within the past 5 years. Cannot assess recent weight changes due to inconsistent weight measurements (48 lb difference in measurements " "from 1/16-1/17). Pt may be appropriate for Boost Glucose Control BID (Provides 380 kcals, 32 g protein if consumed). RD will continue to monitor.      Anthropometrics        Current Height, Weight Height: 162.6 cm (64\")  Weight: 68.4 kg (150 lb 12.7 oz) (01/17/24 0402)       Usual Body Weight (UBW) Unable to assess       Trending Weight Hx     This admission: 1/17 - 150 lbs bed scale weight and 175 lbs bed scale weight both noted. RD to order new scale weight.             PTA: Unable to assess d/t scale weight uncertainty    Wt Readings from Last 30 Encounters:   01/17/24 0402 68.4 kg (150 lb 12.7 oz)   01/17/24 0027 79.8 kg (175 lb 14.8 oz)   01/16/24 0917 89.8 kg (198 lb)   03/29/23 2152 90.7 kg (200 lb)   09/26/20 0422 81.1 kg (178 lb 12.8 oz)   09/25/20 0422 75.7 kg (166 lb 12.8 oz)   09/24/20 1317 90.7 kg (200 lb)   09/08/20 1124 90.7 kg (200 lb)   08/15/20 0500 80.1 kg (176 lb 9.4 oz)   08/14/20 0421 81.6 kg (179 lb 14.3 oz)   08/13/20 0500 81.9 kg (180 lb 8.9 oz)   08/12/20 0500 74.2 kg (163 lb 9.3 oz)   08/10/20 1503 76.3 kg (168 lb 3.4 oz)   08/10/20 0942 84.5 kg (186 lb 4.6 oz)   10/24/19 1449 89.8 kg (197 lb 15.6 oz)   10/24/19 0713 89.8 kg (198 lb)   10/23/19 1032 83.5 kg (184 lb)   10/20/19 2100 87 kg (191 lb 12.8 oz)   10/17/19 0936 86.2 kg (190 lb)   10/15/19 1045 91.6 kg (201 lb 15.1 oz)   10/15/19 0939 91.7 kg (202 lb 3.2 oz)   09/18/19 2237 94.3 kg (208 lb)   09/11/19 0935 94.5 kg (208 lb 6.4 oz)      BMI kg/m2 Body mass index is 25.88 kg/m².       Labs        Pertinent Labs    Results from last 7 days   Lab Units 01/18/24  0506 01/17/24  1414 01/17/24  0451 01/16/24  1727 01/16/24  0956   SODIUM mmol/L 156* 161* 163*   < > 160*   POTASSIUM mmol/L 3.5 4.0 4.1   < > 4.7   CHLORIDE mmol/L 125* 127* 130*   < > 120*   CO2 mmol/L 21.0* 24.0 21.0*   < > 21.0*   BUN mg/dL 61* 76* 82*   < > 89*   CREATININE mg/dL 1.78* 2.30* 2.82*   < > 3.68*   CALCIUM mg/dL 8.5* 8.9 9.0   < > 9.9   BILIRUBIN mg/dL  --   " "--   --   --  0.7   ALK PHOS U/L  --   --   --   --  98   ALT (SGPT) U/L  --   --   --   --  20   AST (SGOT) U/L  --   --   --   --  19   GLUCOSE mg/dL 72 89 88   < > 126*    < > = values in this interval not displayed.     Results from last 7 days   Lab Units 01/18/24  0506   HEMOGLOBIN g/dL 12.2   HEMATOCRIT % 37.5     No results found for: \"HGBA1C\"     Medications    Scheduled Medications amiodarone, 200 mg, Oral, Q24H  apixaban, 5 mg, Oral, Q12H  cefTRIAXone, 2,000 mg, Intravenous, Q24H  [MAR Hold] lactated ringers, 1,000 mL, Intravenous, Once  levothyroxine, 100 mcg, Oral, Q AM  melatonin, 10 mg, Oral, Nightly  pantoprazole, 40 mg, Oral, Daily  senna-docusate sodium, 2 tablet, Oral, BID  sodium chloride, 10 mL, Intravenous, Q12H        Infusions sodium chloride, 150 mL/hr, Last Rate: 150 mL/hr (01/18/24 0347)        PRN Medications   bisacodyl    bisacodyl    ondansetron    ondansetron ODT    polyethylene glycol    [COMPLETED] Insert Peripheral IV **AND** [MAR Hold] sodium chloride    [MAR Hold] sodium chloride    sodium chloride    [MAR Hold] sodium chloride     Physical Findings        Trending Physical   Appearance, NFPE 1/18 - unable to complete NFPE today.   --  Edema  Moderate 2+ edema in both legs, ankles, and feet.     Bowel Function Last documented BM 1/16     Tubes No feeding tube placed.     Chewing/Swallowing No documented chewing/swallowing issues     Skin Stage 1 pressure injury on midline coccyx     --  Current Nutrition Orders & Evaluation of Intake       Oral Nutrition     Food Allergies NKFA   Current PO Diet Diet: Regular/House Diet; Feeding Assistance - Nursing; Texture: Mechanical Ground (NDD 2); Fluid Consistency: Thin (IDDSI 0)   Supplement No supplement ordered   PO Evaluation     Trending % PO Intake 1/16- average 33.3% PO intake since admission 1/16   --  Nutritional Risk Screening        NRS-2002 Score          Nutrition Diagnosis         Nutrition Dx Problem 1 Inadequate oral intake " related to diminished appetite with acute illness/hospitalization as evidenced by average 33.3% PO intake since admission on 1/16.    Nutrition Dx Problem 2        Intervention Goal         Intervention Goal(s) Increase PO intake to at least 50% per meal,      Nutrition Intervention        RD Action Continue with diet as tolerated, RD to monitor per protocol     Nutrition Prescription          Diet Prescription Regular/house - mechanical ground: feeding assistance   Supplement Prescription No supplement ordered at this time.    --  Monitor/Evaluation        Monitor Per protocol, PO intake, Weight       Electronically signed by:  Jolene Kuhn  01/18/24 10:08 EST

## 2024-01-18 NOTE — PROGRESS NOTES
[facility] MEDICINE SERVICE  DAILY PROGRESS NOTE    NAME: Day Cabrera  : 1945  MRN: [id number]      LOS: 2 days     PROVIDER OF SERVICE: Michoacano Sorto MD    Chief Complaint: Acute cystitis with hematuria    Subjective:     Interval History: Patient is more alert and responding to simple questions but is oriented to person only, which is close to her baseline    Review of Systems:   Review of Systems    Objective:     Vital Signs  Temp:  [96.2 °F (35.7 °C)-98 °F (36.7 °C)] 97.3 °F (36.3 °C)  Heart Rate:  [63-83] 68  Resp:  [13-18] 18  BP: ()/(48-67) 141/67  Flow (L/min):  [2] 2   Body mass index is 25.88 kg/m².    Physical Exam  Physical Exam  General Appearance: Alert but lethargic, responsive to simple questions  Head:  Normocephalic, without obvious abnormality, atraumatic  Eyes:  PERRL, conjunctiva/corneas clear, EOM's intact, fundi benign, both eyes  Ears:  Normal TM's and external ear canals, both ears  Nose: Nares normal, septum midline, mucosa normal, no drainage or sinus tenderness  Throat: Lips, mucosa, and tongue normal; teeth and gums normal  Neck: Supple, symmetrical, trachea midline, no adenopathy, thyroid: not enlarged, symmetric, no tenderness/mass/nodules, no carotid bruit or JVD  Lungs:   Clear to auscultation bilaterally, respirations unlabored  Heart:  Regular rate and rhythm, S1, S2 normal, no murmur, rub or gallop  Abdomen:  Soft, non-tender, bowel sounds active all four quadrants,  no masses, no organomegaly  Extremities: Extremities normal, atraumatic, no cyanosis or edema  Pulses: 2+ and symmetric  Skin: Skin color, texture, turgor normal, no rashes or lesions  Neurologic: Difficult to assess      Scheduled Meds   amiodarone, 200 mg, Oral, Q24H  apixaban, 5 mg, Oral, Q12H  [MAR Hold] lactated ringers, 1,000 mL, Intravenous, Once  levothyroxine, 100 mcg, Oral, Q AM  melatonin, 10 mg, Oral, Nightly  pantoprazole, 40 mg, Oral, Daily  senna-docusate sodium, 2 tablet,  Oral, BID  sodium chloride, 10 mL, Intravenous, Q12H       PRN Meds     bisacodyl    bisacodyl    ondansetron    ondansetron ODT    polyethylene glycol    [COMPLETED] Insert Peripheral IV **AND** [MAR Hold] sodium chloride    [MAR Hold] sodium chloride    sodium chloride    [MAR Hold] sodium chloride   Infusions  sodium chloride, 150 mL/hr, Last Rate: 150 mL/hr (01/18/24 1019)          Diagnostic Data    Results from last 7 days   Lab Units 01/18/24  0506 01/16/24  1727 01/16/24  0956   WBC 10*3/mm3 7.20   < > 24.90*   HEMOGLOBIN g/dL 12.2   < > 16.0*   HEMATOCRIT % 37.5   < > 48.8*   PLATELETS 10*3/mm3 128*   < > 271   GLUCOSE mg/dL 72   < > 126*   CREATININE mg/dL 1.78*   < > 3.68*   BUN mg/dL 61*   < > 89*   SODIUM mmol/L 156*   < > 160*   POTASSIUM mmol/L 3.5   < > 4.7   AST (SGOT) U/L  --   --  19   ALT (SGPT) U/L  --   --  20   ALK PHOS U/L  --   --  98   BILIRUBIN mg/dL  --   --  0.7   ANION GAP mmol/L 10.0   < > 19.0*    < > = values in this interval not displayed.       CT Abdomen Pelvis Without Contrast    Result Date: 1/16/2024  Impression: 1. Multiple calculi or single irregular elongated calculus in the right renal pelvis, with low level obstructive uropathy. No visible ureteral calculus. Minimal renal pelvic inflammation. 2. Normally distended, but mildly inflamed-appearing urinary bladder. 3. Small focus of new right lower lobe pulmonary disease, suspicious for focal bronchitis, possibly aspiration. Electronically Signed: Oscar Lezama MD  1/16/2024 2:17 PM EST  Workstation ID: [id number]       I reviewed the patient's new clinical results.    Assessment/Plan:     Active and Resolved Problems  Active Hospital Problems    Diagnosis  POA    **Acute cystitis with hematuria [N30.01]  Yes      Resolved Hospital Problems   No resolved problems to display.       Severe sepsis secondary to acute UTI with hematuria  -Concern for possible UVJ stone infection as well  -Patient underwent cystoscopy with stent  placement on 1/16  -Continue IV Rocephin for 3-day course  Follow-up urine and blood cultures  -Tachycardia and leukocytosis improved    Acute renal failure on CKD stage II  -Likely prerenal in the setting of sepsis  -Initial creatinine was elevated at 3.6 but now improving with baseline creatinine around 1.0   -Continue IV fluids  -Monitor renal function and urine output    Acute hypernatremia  -Likely hypovolemic hypernatremia  -Nephrology following; started on half-normal saline with improvement of sodium  -Continue IV fluids    Acute metabolic encephalopathy with underlying dementia  -Likely multifactorial with severe sepsis and severe hyponatremia  -Status improving and close to baseline  -Continue treatment for infection as well as hyponatremia as above    Hypertension  -Resume home BP medications once more hemodynamically stable    Chronic A-fib  -Continue amiodarone, Eliquis    Hypothyroidism  -Continue Synthroid        DVT prophylaxis:  Medical DVT prophylaxis orders are present.     Code status is   Code Status and Medical Interventions:   Ordered at: 01/17/24 1724     Level Of Support Discussed With:    Next of Kin (If No Surrogate)     Code Status (Patient has no pulse and is not breathing):    CPR (Attempt to Resuscitate)     Medical Interventions (Patient has pulse or is breathing):    Full Support       Plan for disposition: Pending clinical course, hopefully DC back to long-term care facility in 2 to 3 days    Time: 30 minutes    Signature: Electronically signed by Michoacano Sorto MD, 01/18/24, 13:57 EST.  [facility] Hospitalist Team

## 2024-01-18 NOTE — CASE MANAGEMENT/SOCIAL WORK
Continued Stay Note  ROSY Collier     Patient Name: Day Cabrera  MRN: [id number]  Today's Date: 1/18/2024    Admit Date: 1/16/2024    Plan: Plan to return to [facility].   Discharge Plan       Row Name 01/18/24 1505       Plan    Plan Plan to return to [facility].    Plan Comments DC Barriers: IV Abxs, IVF, 2L O2, abnormal labs, Urology/Nephrology/wound care following.                 Expected Discharge Date and Time       Expected Discharge Date Expected Discharge Time    Jan 19, 2024               OLEKSANDR Meier RN  SIPS/ICU   O: [phone]  C: [phone]  [email or web address]

## 2024-01-18 NOTE — THERAPY EVALUATION
Acute Care - Speech Language Pathology   Swallow Initial Evaluation [facility]     Patient Name: Day Cabrera  : 1945  MRN: [id number]  Today's Date: 2024               Admit Date: 2024    Visit Dx:     ICD-10-CM ICD-9-CM   1. Altered mental status, unspecified altered mental status type  R41.82 780.97   2. Acute renal failure, unspecified acute renal failure type  N17.9 584.9   3. Urinary tract infection with hematuria, site unspecified  N39.0 599.0    R31.9 599.70   4. Dehydration  E86.0 276.51     Patient Active Problem List   Diagnosis    Chronic anxiety    Dementia with behavioral disturbance    Hypertension    Memory impairment    Obesity    Sleep apnea    E. coli UTI (urinary tract infection)    Acute UTI (urinary tract infection)    Delirium due to another medical condition    Pneumonia due to 2019 novel coronavirus    Sepsis    Arthritis    COVID-19 virus detected    Fever in adult    Elevated troponin    Altered mental status    Hypernatremia    Atrial fibrillation    History of pulmonary embolus (PE)    Acute respiratory failure with hypoxia    Hypothyroidism (acquired)    Colitis    Acute cystitis with hematuria     Past Medical History:   Diagnosis Date    Anxiety     Arthritis     Chest pain 10/15/2019    Dementia     Hypertension     Pulmonary embolus      Past Surgical History:   Procedure Laterality Date    CHOLECYSTECTOMY      COLONOSCOPY      ENDOSCOPY N/A 3/30/2023    Procedure: ESOPHAGOGASTRODUODENOSCOPY;  Surgeon: Contreras Watts MD;  Location: [facility] ENDOSCOPY;  Service: Gastroenterology;  Laterality: N/A;    HYSTERECTOMY      total     SIGMOIDOSCOPY N/A 3/30/2023    Procedure: SIGMOIDOSCOPY with biopsy;  Surgeon: Contreras Watts MD;  Location: [facility] ENDOSCOPY;  Service: Gastroenterology;  Laterality: N/A;  ischemia       SLP Recommendation and Plan  SLP Swallowing Diagnosis: mild, oral dysphagia, functional pharyngeal phase (24 1000)  SLP Diet  Recommendation: mechanical ground textures, thin liquids (01/18/24 1000)  Recommended Precautions and Strategies: upright posture during/after eating, general aspiration precautions, fatigue precautions, assist with feeding (01/18/24 1000)  SLP Rec. for Method of Medication Administration: as tolerated (01/18/24 1000)     Monitor for Signs of Aspiration: yes, notify SLP if any concerns, cough, right lower lobe infiltrates (01/18/24 1000)  Recommended Diagnostics: reassess via clinical swallow evaluation (01/18/24 1000)  Swallow Criteria for Skilled Therapeutic Interventions Met: demonstrates skilled criteria (01/18/24 1000)  Anticipated Discharge Disposition (SLP): unknown (01/18/24 1000)  Rehab Potential/Prognosis, Swallowing: good, to achieve stated therapy goals (01/18/24 1000)  Therapy Frequency (Swallow): PRN (01/18/24 1000)  Predicted Duration Therapy Intervention (Days): until discharge (01/18/24 1000)  Oral Care Recommendations: Oral Care BID/PRN (01/18/24 1000)                                      Oral Care Recommendations: Oral Care BID/PRN (01/18/24 1000)           SWALLOW EVALUATION (last 72 hours)       SLP Adult Swallow Evaluation       Row Name 01/18/24 1000       Rehab Evaluation    Document Type evaluation  -EC    Subjective Information no complaints  -EC    Patient Observations lethargic;cooperative  -EC    Patient Effort adequate  -EC    Symptoms Noted During/After Treatment fatigue  -EC       General Information    Patient Profile Reviewed yes  -EC    Pertinent History Of Current Problem Patient is a 78-year-old white male patient who is a nursing home resident with history of hypertension previous PE and previously normal kidney function presented to the emergency room due to altered mental status was found to be tachycardic and to be in acute kidney injury. Pt is status post cystoscopy right stent placement 1/16. ST consulted for swallow evaluation.  -EC    Current Method of Nutrition regular  textures;thin liquids  -EC    Precautions/Limitations, Vision difficult to assess  -EC    Precautions/Limitations, Hearing difficult to assess  -EC    Prior Level of Function-Communication unknown  -EC    Prior Level of Function-Swallowing soft to chew;thin liquids  per report from [place]  -EC    Plans/Goals Discussed with patient  -EC       Pain    Additional Documentation Pain Scale: Numbers Pre/Post-Treatment (Group)  -EC       Pain Scale: Numbers Pre/Post-Treatment    Pretreatment Pain Rating 0/10 - no pain  -EC    Posttreatment Pain Rating 0/10 - no pain  -EC       Oral Motor Structure and Function    Dentition Assessment natural, present and adequate  -EC    Secretion Management WNL/WFL  -EC       Oral Musculature and Cranial Nerve Assessment    Oral Motor General Assessment unable to assess  Pt does not follow simple body commands w/verbal or visual cues  -EC       General Eating/Swallowing Observations    Respiratory Support Currently in Use nasal cannula  -EC    O2 Liters 2L  -EC    Eating/Swallowing Skills fed by SLP;unable to perform self-feeding  -EC    Positioning During Eating upright in bed  -EC    Utensils Used spoon;straw  -EC    Consistencies Trialed soft to chew textures;pureed;thin liquids  -EC       Clinical Swallow Eval    Clinical Swallow Evaluation Summary Bedside swallow evaluation completed this date at pt's breakfast meal. Pt awake though minimally verbally responsive to simple questions and does not follow simple body commands despite max cueing. Pt lethargic during evaluation, intermittently closes eyes during though responds to verbal cues to open eyes. Pt consumes bites of oatmeal, biscuit and thin juice via straw. Pt unable to feed herself, accepts feeding assistance. Pt w/severely prolonged oral transit for small bites of biscuit. Slow but functional oral transit for oatmeal and thin liquid via straw. No overt clinical s/s of aspiration demonstrated at anytime. Recommend change  diet to mechanical ground with thin liquids, meds as tolerated. ST to continue to follow for diet tolerance w/further recommendations as indicated.  -EC       SLP Evaluation Clinical Impression    SLP Swallowing Diagnosis mild;oral dysphagia;functional pharyngeal phase  -EC    Functional Impact risk of malnutrition  -EC    Rehab Potential/Prognosis, Swallowing good, to achieve stated therapy goals  -EC    Swallow Criteria for Skilled Therapeutic Interventions Met demonstrates skilled criteria  -EC       SLP Treatment Clinical Impressions    Care Plan Review evaluation/treatment results reviewed  -EC       Recommendations    Therapy Frequency (Swallow) PRN  -EC    Predicted Duration Therapy Intervention (Days) until discharge  -EC    SLP Diet Recommendation mechanical ground textures;thin liquids  -EC    Recommended Diagnostics reassess via clinical swallow evaluation  -EC    Recommended Precautions and Strategies upright posture during/after eating;general aspiration precautions;fatigue precautions;assist with feeding  -EC    Oral Care Recommendations Oral Care BID/PRN  -EC    SLP Rec. for Method of Medication Administration as tolerated  -EC    Monitor for Signs of Aspiration yes;notify SLP if any concerns;cough;right lower lobe infiltrates  -EC    Anticipated Discharge Disposition (SLP) unknown  -EC       Swallow Goals (SLP)    Swallow LTGs Swallow Long Term Goal (free text)  -EC    Swallow STGs diet tolerance goal selection (SLP)  -EC    Diet Tolerance Goal Selection (SLP) Swallow Short Term Goal 1  -EC       (LTG) Swallow    (LTG) Swallow The patient will maximize swallow function for least restrictive po diet, exhibiting no complications associated with dysphagia, adequate po intake, and demonstrating independent use of safe swallow compensations.  -EC    [place] (Swallow Long Term Goal) with minimal cues (75-90% accuracy)  -EC    Time Frame (Swallow Long Term Goal) by discharge  -EC    Progress/Outcomes  (Swallow Long Term Goal) new goal  -EC       (STG) Swallow 1    (STG) Swallow 1 The patient will participate in a follow up assessment to determine safety and adequacy of recommended diet, independent use of safe swallow compensations, and additional goals/recommendations to follow  -EC    [place] (Swallow Short Term Goal 1) with minimal cues (75-90% accuracy)  -EC    Time Frame (Swallow Short Term Goal 1) 1 week  -EC    Progress/Outcomes (Swallow Short Term Goal 1) new goal  -EC              User Key  (r) = Recorded By, (t) = Taken By, (c) = Cosigned By      Initials Name Effective Dates    EC Becky Salazar 06/16/21 -                     EDUCATION  The patient has been educated in the following areas:   Dysphagia (Swallowing Impairment).        SLP GOALS       Row Name 01/18/24 1000             (LTG) Swallow    (LTG) Swallow The patient will maximize swallow function for least restrictive po diet, exhibiting no complications associated with dysphagia, adequate po intake, and demonstrating independent use of safe swallow compensations.  -EC      [place] (Swallow Long Term Goal) with minimal cues (75-90% accuracy)  -EC      Time Frame (Swallow Long Term Goal) by discharge  -EC      Progress/Outcomes (Swallow Long Term Goal) new goal  -EC         (STG) Swallow 1    (STG) Swallow 1 The patient will participate in a follow up assessment to determine safety and adequacy of recommended diet, independent use of safe swallow compensations, and additional goals/recommendations to follow  -EC      [place] (Swallow Short Term Goal 1) with minimal cues (75-90% accuracy)  -EC      Time Frame (Swallow Short Term Goal 1) 1 week  -EC      Progress/Outcomes (Swallow Short Term Goal 1) new goal  -EC                User Key  (r) = Recorded By, (t) = Taken By, (c) = Cosigned By      Initials Name Provider Type    EC Becky Salazar Speech and Language Pathologist                       Time Calculation:                 Becky Salazar  1/18/2024

## 2024-01-18 NOTE — PROGRESS NOTES
"                                                                                                                                      Nephrology  Progress Note                                        [facility]    Patient Identification    Name: Day Cabrera  Age: 78 y.o.  Sex: female  :  1945  MRN: [id number]      DATE OF SERVICE:  2024        Subective    She might be a little bit more awake opens her eyes to voice and try to answer but does not really answer  Grandson at bedside  Objective   Scheduled Meds:amiodarone, 200 mg, Oral, Q24H  apixaban, 5 mg, Oral, Q12H  cefTRIAXone, 2,000 mg, Intravenous, Q24H  [MAR Hold] lactated ringers, 1,000 mL, Intravenous, Once  levothyroxine, 100 mcg, Oral, Q AM  melatonin, 10 mg, Oral, Nightly  pantoprazole, 40 mg, Oral, Daily  senna-docusate sodium, 2 tablet, Oral, BID  sodium chloride, 10 mL, Intravenous, Q12H          Continuous Infusions:sodium chloride, 150 mL/hr, Last Rate: 150 mL/hr (24 0347)        PRN Meds:  bisacodyl    bisacodyl    ondansetron    ondansetron ODT    polyethylene glycol    [COMPLETED] Insert Peripheral IV **AND** [MAR Hold] sodium chloride    [MAR Hold] sodium chloride    sodium chloride    [MAR Hold] sodium chloride     Exam:  /66 (BP Location: Right leg, Patient Position: Lying)   Pulse 83   Temp 98 °F (36.7 °C) (Axillary)   Resp 16   Ht 162.6 cm (64\")   Wt 68.4 kg (150 lb 12.7 oz)   SpO2 99%   BMI 25.88 kg/m²     Intake/Output last 3 shifts:  I/O last 3 completed shifts:  In: 2609 [I.V.:2609]  Out: 700 [Urine:700]    Intake/Output this shift:  No intake/output data recorded.    Physical exam:  General Appearance: Awake but lethargic  Head:  Normocephalic, without obvious abnormality, atraumatic  Eyes:  PERRL, conjunctiva/corneas clear     Neck:  Supple,  no adenopathy;      Lungs:  Decreased BS occasion ronchi  Heart:  Regular rate and rhythm, S1 and S2 normal  Abdomen:  Soft, non-tender, bowel " sounds active   Extremities: trace edema  Pulses: 2+ and symmetric all extremities  Skin:  No rashes or lesions       Data Review:  All labs (24hrs):   Recent Results (from the past 24 hour(s))   Basic Metabolic Panel    Collection Time: 01/17/24  2:14 PM    Specimen: Arm, Right; Blood   Result Value Ref Range    Glucose 89 65 - 99 mg/dL    BUN 76 (H) 8 - 23 mg/dL    Creatinine 2.30 (H) 0.57 - 1.00 mg/dL    Sodium 161 (C) 136 - 145 mmol/L    Potassium 4.0 3.5 - 5.2 mmol/L    Chloride 127 (H) 98 - 107 mmol/L    CO2 24.0 22.0 - 29.0 mmol/L    Calcium 8.9 8.6 - 10.5 mg/dL    BUN/Creatinine Ratio 33.0 (H) 7.0 - 25.0    Anion Gap 10.0 5.0 - 15.0 mmol/L    eGFR 21.3 (L) >60.0 mL/min/1.73   Basic Metabolic Panel    Collection Time: 01/18/24  5:06 AM    Specimen: Blood   Result Value Ref Range    Glucose 72 65 - 99 mg/dL    BUN 61 (H) 8 - 23 mg/dL    Creatinine 1.78 (H) 0.57 - 1.00 mg/dL    Sodium 156 (H) 136 - 145 mmol/L    Potassium 3.5 3.5 - 5.2 mmol/L    Chloride 125 (H) 98 - 107 mmol/L    CO2 21.0 (L) 22.0 - 29.0 mmol/L    Calcium 8.5 (L) 8.6 - 10.5 mg/dL    BUN/Creatinine Ratio 34.3 (H) 7.0 - 25.0    Anion Gap 10.0 5.0 - 15.0 mmol/L    eGFR 28.9 (L) >60.0 mL/min/1.73   CBC Auto Differential    Collection Time: 01/18/24  5:06 AM    Specimen: Blood   Result Value Ref Range    WBC 7.20 3.40 - 10.80 10*3/mm3    RBC 3.86 3.77 - 5.28 10*6/mm3    Hemoglobin 12.2 12.0 - 15.9 g/dL    Hematocrit 37.5 34.0 - 46.6 %    MCV 97.1 (H) 79.0 - 97.0 fL    MCH 31.6 26.6 - 33.0 pg    MCHC 32.5 31.5 - 35.7 g/dL    RDW 15.1 12.3 - 15.4 %    RDW-SD 51.6 37.0 - 54.0 fl    MPV 11.8 6.0 - 12.0 fL    Platelets 128 (L) 140 - 450 10*3/mm3    Neutrophil % 70.3 42.7 - 76.0 %    Lymphocyte % 21.8 19.6 - 45.3 %    Monocyte % 6.2 5.0 - 12.0 %    Eosinophil % 1.1 0.3 - 6.2 %    Basophil % 0.6 0.0 - 1.5 %    Neutrophils, Absolute 5.10 1.70 - 7.00 10*3/mm3    Lymphocytes, Absolute 1.60 0.70 - 3.10 10*3/mm3    Monocytes, Absolute 0.40 0.10 - 0.90 10*3/mm3     Eosinophils, Absolute 0.10 0.00 - 0.40 10*3/mm3    Basophils, Absolute 0.00 0.00 - 0.20 10*3/mm3    nRBC 0.1 0.0 - 0.2 /100 WBC          Imaging:  CT Abdomen Pelvis Without Contrast    Result Date: 1/16/2024  Impression: 1. Multiple calculi or single irregular elongated calculus in the right renal pelvis, with low level obstructive uropathy. No visible ureteral calculus. Minimal renal pelvic inflammation. 2. Normally distended, but mildly inflamed-appearing urinary bladder. 3. Small focus of new right lower lobe pulmonary disease, suspicious for focal bronchitis, possibly aspiration. Electronically Signed: Oscar Lezama MD  1/16/2024 2:17 PM EST  Workstation ID: [id number]    CT Head Without Contrast    Result Date: 1/16/2024  Impression: Chronic findings. No acute process. Electronically Signed: Gail Bone MD  1/16/2024 11:07 AM EST  Workstation ID: [id number]    XR Chest 1 View    Result Date: 1/16/2024  Impression: No new chest disease. Electronically Signed: Oscar Lezama MD  1/16/2024 10:53 AM EST  Workstation ID: [id number]     Assessment/Plan:     Acute cystitis with hematuria         Hyponatremia  Acute kidney injury on chronic kidney disease  Azotemia  Dehydration  Encephalopathy  Hypertension  History of PE  Dementia  UTI     Recommendations:  Creatinine is down to 1.7 and sodium is down to 156   continue same fluids

## 2024-01-18 NOTE — PLAN OF CARE
Goal Outcome Evaluation:  Plan of Care Reviewed With: patient           Outcome Evaluation: Abed at this time resting with eyes closed. Visitor at bedside. Was awake this morning and was able to eat breakfast and take morning meds without issues. Continue to turn every 2 hours to protect skin. Periwick in place to try to measure output d/t incontinence otherwise. Will continue to monitor d/t patient not being able to use call light

## 2024-01-18 NOTE — PLAN OF CARE
Goal Outcome Evaluation:           Progress: no change                  Patient has appeared to be asleep with eyes closed. Staff has assisted patient to turn every two hours, She was able to take her medication in applesauce and has tolerated well. Encouraged fluids. Alert and oriented X1. VSS at this time. Daughter was at bedside early on in the night but went home. Call light is in reach and bed is in lowest position.

## 2024-01-18 NOTE — PLAN OF CARE
Goal Outcome Evaluation:                 Bedside swallow evaluation completed this date at pt's breakfast meal. Pt awake though minimally verbally responsive to simple questions and does not follow simple body commands despite max cueing. Pt lethargic during evaluation, intermittently closes eyes during though responds to verbal cues to open eyes. Pt consumes bites of oatmeal, biscuit and thin juice via straw. Pt unable to feed herself, accepts feeding assistance. Pt w/severely prolonged oral transit for small bites of biscuit. Slow but functional oral transit for oatmeal and thin liquid via straw. No overt clinical s/s of aspiration demonstrated at anytime.     Recommend change diet to mechanical ground with thin liquids, meds as tolerated. ST to continue to follow for diet tolerance w/further recommendations as indicated.    Anticipated Discharge Disposition (SLP): unknown          SLP Swallowing Diagnosis: mild, oral dysphagia, functional pharyngeal phase (01/18/24 1000)

## 2024-01-19 LAB
ANION GAP SERPL CALCULATED.3IONS-SCNC: 8 MMOL/L (ref 5–15)
BUN SERPL-MCNC: 36 MG/DL (ref 8–23)
BUN/CREAT SERPL: 31.6 (ref 7–25)
CALCIUM SPEC-SCNC: 7.8 MG/DL (ref 8.6–10.5)
CHLORIDE SERPL-SCNC: 116 MMOL/L (ref 98–107)
CO2 SERPL-SCNC: 20 MMOL/L (ref 22–29)
CREAT SERPL-MCNC: 1.14 MG/DL (ref 0.57–1)
EGFRCR SERPLBLD CKD-EPI 2021: 49.4 ML/MIN/1.73
GLUCOSE SERPL-MCNC: 123 MG/DL (ref 65–99)
POTASSIUM SERPL-SCNC: 3.2 MMOL/L (ref 3.5–5.2)
SODIUM SERPL-SCNC: 144 MMOL/L (ref 136–145)

## 2024-01-19 PROCEDURE — 92526 ORAL FUNCTION THERAPY: CPT

## 2024-01-19 PROCEDURE — 80048 BASIC METABOLIC PNL TOTAL CA: CPT | Performed by: INTERNAL MEDICINE

## 2024-01-19 RX ORDER — POTASSIUM CHLORIDE 1.5 G/1.58G
20 POWDER, FOR SOLUTION ORAL ONCE
Status: COMPLETED | OUTPATIENT
Start: 2024-01-19 | End: 2024-01-19

## 2024-01-19 RX ADMIN — AMIODARONE HYDROCHLORIDE 200 MG: 200 TABLET ORAL at 08:06

## 2024-01-19 RX ADMIN — Medication 10 MG: at 21:06

## 2024-01-19 RX ADMIN — DOCUSATE SODIUM AND SENNOSIDES 2 TABLET: 8.6; 5 TABLET, FILM COATED ORAL at 21:06

## 2024-01-19 RX ADMIN — Medication 10 ML: at 21:07

## 2024-01-19 RX ADMIN — SODIUM CHLORIDE 150 ML/HR: 450 INJECTION, SOLUTION INTRAVENOUS at 00:21

## 2024-01-19 RX ADMIN — LEVOTHYROXINE SODIUM 100 MCG: 0.1 TABLET ORAL at 05:55

## 2024-01-19 RX ADMIN — POTASSIUM CHLORIDE 20 MEQ: 1.5 POWDER, FOR SOLUTION ORAL at 11:32

## 2024-01-19 RX ADMIN — Medication 10 ML: at 08:06

## 2024-01-19 RX ADMIN — PANTOPRAZOLE SODIUM 40 MG: 40 TABLET, DELAYED RELEASE ORAL at 08:06

## 2024-01-19 RX ADMIN — APIXABAN 5 MG: 5 TABLET, FILM COATED ORAL at 08:06

## 2024-01-19 RX ADMIN — APIXABAN 5 MG: 5 TABLET, FILM COATED ORAL at 21:07

## 2024-01-19 RX ADMIN — DOCUSATE SODIUM AND SENNOSIDES 2 TABLET: 8.6; 5 TABLET, FILM COATED ORAL at 08:06

## 2024-01-19 NOTE — PLAN OF CARE
Goal Outcome Evaluation:  Plan of Care Reviewed With: patient        Progress: improving  Outcome Evaluation: Abed at this time resting with eyes closed. Tolerating diet well eating 100% of breakfast and lunch. Blood work values improving. Periwick remains in place d/t needing accurate outputs. Remains confused but pleasant. Will continue to monitor

## 2024-01-19 NOTE — PROGRESS NOTES
"                                                                                                                                      Nephrology  Progress Note                                        [facility]    Patient Identification    Name: Day Cabrera  Age: 78 y.o.  Sex: female  :  1945  MRN: [id number]      DATE OF SERVICE:  2024        Subective    She might be a little bit more awake opens her eyes to voice and try to answer but does not really answer  Grandson at bedside  Objective   Scheduled Meds:amiodarone, 200 mg, Oral, Q24H  apixaban, 5 mg, Oral, Q12H  [MAR Hold] lactated ringers, 1,000 mL, Intravenous, Once  levothyroxine, 100 mcg, Oral, Q AM  melatonin, 10 mg, Oral, Nightly  pantoprazole, 40 mg, Oral, Daily  senna-docusate sodium, 2 tablet, Oral, BID  sodium chloride, 10 mL, Intravenous, Q12H          Continuous Infusions:sodium chloride, 150 mL/hr, Last Rate: 150 mL/hr (24 0021)        PRN Meds:  bisacodyl    bisacodyl    ondansetron    ondansetron ODT    polyethylene glycol    [COMPLETED] Insert Peripheral IV **AND** [MAR Hold] sodium chloride    [MAR Hold] sodium chloride    sodium chloride    [MAR Hold] sodium chloride     Exam:  /65 (BP Location: Right arm, Patient Position: Lying)   Pulse 70   Temp 98.1 °F (36.7 °C) (Axillary)   Resp 17   Ht 162.6 cm (64\")   Wt 83.8 kg (184 lb 11.9 oz)   SpO2 99%   BMI 31.71 kg/m²     Intake/Output last 3 shifts:  I/O last 3 completed shifts:  In: 6838 [P.O.:1500; I.V.:5338]  Out: 625 [Urine:625]    Intake/Output this shift:  I/O this shift:  In: 240 [P.O.:240]  Out: 100 [Urine:100]    Physical exam:  General Appearance: Awake but lethargic  Head:  Normocephalic, without obvious abnormality, atraumatic  Eyes:  PERRL, conjunctiva/corneas clear     Neck:  Supple,  no adenopathy;      Lungs:  Decreased BS occasion ronchi  Heart:  Regular rate and rhythm, S1 and S2 normal  Abdomen:  Soft, non-tender, bowel " sounds active   Extremities: trace edema  Pulses: 2+ and symmetric all extremities  Skin:  No rashes or lesions       Data Review:  All labs (24hrs):   Recent Results (from the past 24 hour(s))   Basic Metabolic Panel    Collection Time: 01/18/24 11:28 PM    Specimen: Arm, Right; Blood   Result Value Ref Range    Glucose 114 (H) 65 - 99 mg/dL    BUN 42 (H) 8 - 23 mg/dL    Creatinine 1.29 (H) 0.57 - 1.00 mg/dL    Sodium 147 (H) 136 - 145 mmol/L    Potassium 3.4 (L) 3.5 - 5.2 mmol/L    Chloride 119 (H) 98 - 107 mmol/L    CO2 21.0 (L) 22.0 - 29.0 mmol/L    Calcium 8.0 (L) 8.6 - 10.5 mg/dL    BUN/Creatinine Ratio 32.6 (H) 7.0 - 25.0    Anion Gap 7.0 5.0 - 15.0 mmol/L    eGFR 42.6 (L) >60.0 mL/min/1.73   CBC Auto Differential    Collection Time: 01/18/24 11:28 PM    Specimen: Arm, Right; Blood   Result Value Ref Range    WBC 7.40 3.40 - 10.80 10*3/mm3    RBC 3.59 (L) 3.77 - 5.28 10*6/mm3    Hemoglobin 11.5 (L) 12.0 - 15.9 g/dL    Hematocrit 34.0 34.0 - 46.6 %    MCV 94.9 79.0 - 97.0 fL    MCH 32.2 26.6 - 33.0 pg    MCHC 33.9 31.5 - 35.7 g/dL    RDW 14.5 12.3 - 15.4 %    RDW-SD 51.2 37.0 - 54.0 fl    MPV 11.0 6.0 - 12.0 fL    Platelets 122 (L) 140 - 450 10*3/mm3    Neutrophil % 64.4 42.7 - 76.0 %    Lymphocyte % 23.8 19.6 - 45.3 %    Monocyte % 9.2 5.0 - 12.0 %    Eosinophil % 2.0 0.3 - 6.2 %    Basophil % 0.6 0.0 - 1.5 %    Neutrophils, Absolute 4.80 1.70 - 7.00 10*3/mm3    Lymphocytes, Absolute 1.80 0.70 - 3.10 10*3/mm3    Monocytes, Absolute 0.70 0.10 - 0.90 10*3/mm3    Eosinophils, Absolute 0.20 0.00 - 0.40 10*3/mm3    Basophils, Absolute 0.00 0.00 - 0.20 10*3/mm3    nRBC 0.1 0.0 - 0.2 /100 WBC          Imaging:  CT Abdomen Pelvis Without Contrast    Result Date: 1/16/2024  Impression: 1. Multiple calculi or single irregular elongated calculus in the right renal pelvis, with low level obstructive uropathy. No visible ureteral calculus. Minimal renal pelvic inflammation. 2. Normally distended, but mildly  inflamed-appearing urinary bladder. 3. Small focus of new right lower lobe pulmonary disease, suspicious for focal bronchitis, possibly aspiration. Electronically Signed: Oscar Lezama MD  1/16/2024 2:17 PM EST  Workstation ID: [id number]    CT Head Without Contrast    Result Date: 1/16/2024  Impression: Chronic findings. No acute process. Electronically Signed: Gail Bone MD  1/16/2024 11:07 AM EST  Workstation ID: [id number]    XR Chest 1 View    Result Date: 1/16/2024  Impression: No new chest disease. Electronically Signed: Oscar Lezama MD  1/16/2024 10:53 AM EST  Workstation ID: [id number]     Assessment/Plan:     Acute cystitis with hematuria         Hyponatremia  Acute kidney injury on chronic kidney disease  Azotemia  Dehydration  Encephalopathy  Hypertension  History of PE  Dementia  UTI     Recommendations:  Creatinine is down to 1.2 from 1.7 sodium is down to 147  Replace potassium  Gentle hydration

## 2024-01-19 NOTE — PLAN OF CARE
Goal Outcome Evaluation:           Progress: no change               Patient is alert and oriented X1. She has been more awake this evening. Her daughter was at bedside for a while at the beginning of the night. She would like a call before the patient is being discharged. Labs have been improving. Call light is in reach and bed is in lowest position.

## 2024-01-19 NOTE — PROGRESS NOTES
[facility] MEDICINE SERVICE  DAILY PROGRESS NOTE    NAME: Day Cabrera  : 1945  MRN: [id number]      LOS: 3 days     PROVIDER OF SERVICE: Michoacano Sorto MD    Chief Complaint: Acute cystitis with hematuria    Subjective:     Interval History: Patient continues to have improvement in her mental status and appears to be back to her baseline currently.  She is tolerating p.o. intake well.    Review of Systems:   Review of Systems    Objective:     Vital Signs  Temp:  [97.5 °F (36.4 °C)-98.2 °F (36.8 °C)] 97.5 °F (36.4 °C)  Heart Rate:  [70-82] 70  Resp:  [16-20] 20  BP: (102-123)/(63-69) 123/69  Flow (L/min):  [2] 2   Body mass index is 32.81 kg/m².    Physical Exam  Physical Exam  General Appearance: Alert but lethargic, responsive to simple questions  Head:  Normocephalic, without obvious abnormality, atraumatic  Eyes:  PERRL, conjunctiva/corneas clear, EOM's intact, fundi benign, both eyes  Ears:  Normal TM's and external ear canals, both ears  Nose: Nares normal, septum midline, mucosa normal, no drainage or sinus tenderness  Throat: Lips, mucosa, and tongue normal; teeth and gums normal  Neck: Supple, symmetrical, trachea midline, no adenopathy, thyroid: not enlarged, symmetric, no tenderness/mass/nodules, no carotid bruit or JVD  Lungs:   Clear to auscultation bilaterally, respirations unlabored  Heart:  Regular rate and rhythm, S1, S2 normal, no murmur, rub or gallop  Abdomen:  Soft, non-tender, bowel sounds active all four quadrants,  no masses, no organomegaly  Extremities: 1+ BL LE pitting edema  Pulses: 2+ and symmetric  Skin: Skin color, texture, turgor normal, no rashes or lesions  Neurologic: Difficult to assess      Scheduled Meds   amiodarone, 200 mg, Oral, Q24H  apixaban, 5 mg, Oral, Q12H  [MAR Hold] lactated ringers, 1,000 mL, Intravenous, Once  levothyroxine, 100 mcg, Oral, Q AM  melatonin, 10 mg, Oral, Nightly  pantoprazole, 40 mg, Oral, Daily  senna-docusate sodium, 2 tablet,  Oral, BID  sodium chloride, 10 mL, Intravenous, Q12H       PRN Meds     bisacodyl    bisacodyl    ondansetron    ondansetron ODT    polyethylene glycol    [COMPLETED] Insert Peripheral IV **AND** [MAR Hold] sodium chloride    [MAR Hold] sodium chloride    sodium chloride    [MAR Hold] sodium chloride   Infusions           Diagnostic Data    Results from last 7 days   Lab Units 01/19/24  1043 01/18/24  2328 01/16/24  1727 01/16/24  0956   WBC 10*3/mm3  --  7.40   < > 24.90*   HEMOGLOBIN g/dL  --  11.5*   < > 16.0*   HEMATOCRIT %  --  34.0   < > 48.8*   PLATELETS 10*3/mm3  --  122*   < > 271   GLUCOSE mg/dL 123* 114*   < > 126*   CREATININE mg/dL 1.14* 1.29*   < > 3.68*   BUN mg/dL 36* 42*   < > 89*   SODIUM mmol/L 144 147*   < > 160*   POTASSIUM mmol/L 3.2* 3.4*   < > 4.7   AST (SGOT) U/L  --   --   --  19   ALT (SGPT) U/L  --   --   --  20   ALK PHOS U/L  --   --   --  98   BILIRUBIN mg/dL  --   --   --  0.7   ANION GAP mmol/L 8.0 7.0   < > 19.0*    < > = values in this interval not displayed.       No radiology results for the last day      I reviewed the patient's new clinical results.    Assessment/Plan:     Active and Resolved Problems  Active Hospital Problems    Diagnosis  POA    **Acute cystitis with hematuria [N30.01]  Yes      Resolved Hospital Problems   No resolved problems to display.       Severe sepsis secondary to acute UTI with hematuria  -Concern for possible UVJ stone infection as well  -Patient underwent cystoscopy with stent placement on 1/16  -Completed 3-day course of IV Rocephin  -Urine cultures negative  -Tachycardia and leukocytosis improved    Acute renal failure on CKD stage II  -Likely prerenal in the setting of sepsis  -Initial creatinine was elevated at 3.6 but now improved and almost essentially at baseline  -Can likely DC IV fluids  -Monitor renal function and urine output    Acute hypernatremia  -Likely hypovolemic hypernatremia  -Nephrology following; started on half-normal saline  with improvement of sodium and almost normalized  -Likely DC IV fluids today  -Patient tolerating p.o. intake well and should remain hydrated with p.o. fluid intake    Acute metabolic encephalopathy with underlying dementia  -Likely multifactorial with severe sepsis and severe hyponatremia  -Mental status improving and close to baseline  -Continue treatment for infection as well as hypernatremia as above    Hypertension  -Resume home BP medications once more hemodynamically stable    Chronic A-fib  -Continue amiodarone, Eliquis    Hypothyroidism  -Continue Synthroid        DVT prophylaxis:  Medical DVT prophylaxis orders are present.     Code status is   Code Status and Medical Interventions:   Ordered at: 01/17/24 1724     Level Of Support Discussed With:    Next of Kin (If No Surrogate)     Code Status (Patient has no pulse and is not breathing):    CPR (Attempt to Resuscitate)     Medical Interventions (Patient has pulse or is breathing):    Full Support       Plan for disposition: Pending clinical course, hopefully DC back to long-term care facility this weekend    Time: 30 minutes    Signature: Electronically signed by Michoacano Sorto MD, 01/19/24, 13:46 EST.  [facility]ist [facility]

## 2024-01-19 NOTE — CASE MANAGEMENT/SOCIAL WORK
Continued Stay Note  ROSY Collier     Patient Name: Day Cabrera  MRN: [id number]  Today's Date: 1/19/2024    Admit Date: 1/16/2024    Plan: Plan to return to [facility].   Discharge Plan       Row Name 01/19/24 1303       Plan    Plan Plan to return to [facility].    Plan Comments DC Barriers: 2L NC, labs improving, final blood cultures pending. Urology/Nephrology/Wound care following.                 Expected Discharge Date and Time       Expected Discharge Date Expected Discharge Time    Jan 20, 2024               OLEKSANDR Meier RN  SIPS/ICU   O: [phone]  C: [phone]  [email or web address]

## 2024-01-19 NOTE — THERAPY TREATMENT NOTE
Acute Care - Speech Language Pathology   Swallow Treatment Note [facility]     Patient Name: Day Cabrera  : 1945  MRN: [id number]  Today's Date: 2024               Admit Date: 2024    Visit Dx:     ICD-10-CM ICD-9-CM   1. Altered mental status, unspecified altered mental status type  R41.82 780.97   2. Acute renal failure, unspecified acute renal failure type  N17.9 584.9   3. Urinary tract infection with hematuria, site unspecified  N39.0 599.0    R31.9 599.70   4. Dehydration  E86.0 276.51     Patient Active Problem List   Diagnosis    Chronic anxiety    Dementia with behavioral disturbance    Hypertension    Memory impairment    Obesity    Sleep apnea    E. coli UTI (urinary tract infection)    Acute UTI (urinary tract infection)    Delirium due to another medical condition    Pneumonia due to 2019 novel coronavirus    Sepsis    Arthritis    COVID-19 virus detected    Fever in adult    Elevated troponin    Altered mental status    Hypernatremia    Atrial fibrillation    History of pulmonary embolus (PE)    Acute respiratory failure with hypoxia    Hypothyroidism (acquired)    Colitis    Acute cystitis with hematuria     Past Medical History:   Diagnosis Date    Anxiety     Arthritis     Chest pain 10/15/2019    Dementia     Hypertension     Pulmonary embolus      Past Surgical History:   Procedure Laterality Date    CHOLECYSTECTOMY      COLONOSCOPY      CYSTOSCOPY, URETEROSCOPY, RETROGRADE PYELOGRAM, STENT INSERTION Right 2024    Procedure: CYSTOSCOPY STENT INSERTION;  Surgeon: Aldo Narvaez MD;  Location: [facility] MAIN OR;  Service: Urology;  Laterality: Right;    ENDOSCOPY N/A 3/30/2023    Procedure: ESOPHAGOGASTRODUODENOSCOPY;  Surgeon: Contreras Watts MD;  Location: [facility] ENDOSCOPY;  Service: Gastroenterology;  Laterality: N/A;    HYSTERECTOMY      total     SIGMOIDOSCOPY N/A 3/30/2023    Procedure: SIGMOIDOSCOPY with biopsy;  Surgeon: Contreras Watts MD;   Location: [facility] ENDOSCOPY;  Service: Gastroenterology;  Laterality: N/A;  ischemia       SLP Recommendation and Plan     SWALLOW EVALUATION (last 72 hours)            EDUCATION  The patient has been educated in the following areas:   Dysphagia (Swallowing Impairment) Oral Care/Hydration.        SLP GOALS       Row Name 01/19/24 1200       (LTG) Swallow    (LTG) Swallow The patient will maximize swallow function for least restrictive po diet, exhibiting no complications associated with dysphagia, adequate po intake, and demonstrating independent use of safe swallow compensations.  -CB    [place] (Swallow Long Term Goal) with minimal cues (75-90% accuracy)  -CB    Time Frame (Swallow Long Term Goal) by discharge  -CB    Progress/Outcomes (Swallow Long Term Goal) goal ongoing  -CB       (STG) Swallow 1    (STG) Swallow 1 The patient will participate in a follow up assessment to determine safety and adequacy of recommended diet, independent use of safe swallow compensations, and additional goals/recommendations to follow  -CB    [place] (Swallow Short Term Goal 1) with minimal cues (75-90% accuracy)  -CB    Time Frame (Swallow Short Term Goal 1) 1 week  -CB    Progress/Outcomes (Swallow Short Term Goal 1) goal ongoing  -CB    Comment (Swallow Short Term Goal 1) Patient was seen for DT/meal at lunch.  Patient was positioned upright in bed prior to meal. Patient was a full feed. Patient has her own natural teeth with some missing. Patient demonstrated adequate rotary chewing given added time. Patient cleared oral cavity effectively between bites as no oral residue was noted. No clearing of throat, coughing and/or vocal changes were identified. Patient took sips of thins via straw without any overt s/s of aspiration. Vocal quality remained clear throughout meal. ST will continue to follow to assure safety and tolerance with recommended diet.  -CB              User Key  (r) = Recorded By, (t) = Taken By, (c) =  Cosigned By      Initials Name Provider Type          Anita Villanueva, SLP Speech and Language Pathologist                       Time Calculation:                CHARLES Ingram  1/19/2024

## 2024-01-20 LAB
ANION GAP SERPL CALCULATED.3IONS-SCNC: 6 MMOL/L (ref 5–15)
ANION GAP SERPL CALCULATED.3IONS-SCNC: 8 MMOL/L (ref 5–15)
BUN SERPL-MCNC: 20 MG/DL (ref 8–23)
BUN SERPL-MCNC: 27 MG/DL (ref 8–23)
BUN/CREAT SERPL: 23.5 (ref 7–25)
BUN/CREAT SERPL: 24.3 (ref 7–25)
CALCIUM SPEC-SCNC: 8 MG/DL (ref 8.6–10.5)
CALCIUM SPEC-SCNC: 8.3 MG/DL (ref 8.6–10.5)
CHLORIDE SERPL-SCNC: 115 MMOL/L (ref 98–107)
CHLORIDE SERPL-SCNC: 120 MMOL/L (ref 98–107)
CO2 SERPL-SCNC: 22 MMOL/L (ref 22–29)
CO2 SERPL-SCNC: 23 MMOL/L (ref 22–29)
CREAT SERPL-MCNC: 0.85 MG/DL (ref 0.57–1)
CREAT SERPL-MCNC: 1.11 MG/DL (ref 0.57–1)
EGFRCR SERPLBLD CKD-EPI 2021: 51 ML/MIN/1.73
EGFRCR SERPLBLD CKD-EPI 2021: 70.2 ML/MIN/1.73
GLUCOSE SERPL-MCNC: 115 MG/DL (ref 65–99)
GLUCOSE SERPL-MCNC: 132 MG/DL (ref 65–99)
POTASSIUM SERPL-SCNC: 3.4 MMOL/L (ref 3.5–5.2)
POTASSIUM SERPL-SCNC: 4.5 MMOL/L (ref 3.5–5.2)
SODIUM SERPL-SCNC: 145 MMOL/L (ref 136–145)
SODIUM SERPL-SCNC: 149 MMOL/L (ref 136–145)

## 2024-01-20 PROCEDURE — 97162 PT EVAL MOD COMPLEX 30 MIN: CPT

## 2024-01-20 PROCEDURE — 80048 BASIC METABOLIC PNL TOTAL CA: CPT | Performed by: INTERNAL MEDICINE

## 2024-01-20 PROCEDURE — 0 DEXTROSE 5 % SOLUTION: Performed by: INTERNAL MEDICINE

## 2024-01-20 RX ORDER — SODIUM CHLORIDE 450 MG/100ML
75 INJECTION, SOLUTION INTRAVENOUS CONTINUOUS
Status: DISCONTINUED | OUTPATIENT
Start: 2024-01-20 | End: 2024-01-20

## 2024-01-20 RX ORDER — DEXTROSE MONOHYDRATE 50 MG/ML
50 INJECTION, SOLUTION INTRAVENOUS CONTINUOUS
Status: DISCONTINUED | OUTPATIENT
Start: 2024-01-20 | End: 2024-01-23 | Stop reason: HOSPADM

## 2024-01-20 RX ADMIN — Medication 10 MG: at 20:32

## 2024-01-20 RX ADMIN — DOCUSATE SODIUM AND SENNOSIDES 2 TABLET: 8.6; 5 TABLET, FILM COATED ORAL at 20:32

## 2024-01-20 RX ADMIN — PANTOPRAZOLE SODIUM 40 MG: 40 TABLET, DELAYED RELEASE ORAL at 08:16

## 2024-01-20 RX ADMIN — AMIODARONE HYDROCHLORIDE 200 MG: 200 TABLET ORAL at 08:16

## 2024-01-20 RX ADMIN — SODIUM CHLORIDE 75 ML/HR: 4.5 INJECTION, SOLUTION INTRAVENOUS at 10:22

## 2024-01-20 RX ADMIN — Medication 10 ML: at 20:32

## 2024-01-20 RX ADMIN — DEXTROSE MONOHYDRATE 50 ML/HR: 50 INJECTION, SOLUTION INTRAVENOUS at 15:04

## 2024-01-20 RX ADMIN — APIXABAN 5 MG: 5 TABLET, FILM COATED ORAL at 08:16

## 2024-01-20 RX ADMIN — LEVOTHYROXINE SODIUM 100 MCG: 0.1 TABLET ORAL at 07:35

## 2024-01-20 RX ADMIN — DOCUSATE SODIUM AND SENNOSIDES 2 TABLET: 8.6; 5 TABLET, FILM COATED ORAL at 08:16

## 2024-01-20 RX ADMIN — Medication 10 ML: at 08:15

## 2024-01-20 RX ADMIN — APIXABAN 5 MG: 5 TABLET, FILM COATED ORAL at 20:32

## 2024-01-20 NOTE — PLAN OF CARE
Goal Outcome Evaluation:              Outcome Evaluation: Pt is 79 yo female admitted from [facility] for AMS with findings of UTI, acute renal failure, dehydration, and sepsis.  Pt s/p cystoscopy with stent insertion on 1/16/24.  PMH: dementia, HTN, Afib.  Per daughter pt resides at [facility].  Daughter reports pt occasionally gets up to w/c, but unsure if staff pivots pt to w/c or if a lift is utilized.  Daughter reports pt has not ambulated in over a year.  Pt with decreased ROM of bilateral plantarflexors.  On this date, pt oriented to name but very lethargic and requries verbal cues to open eyes during evaluation.  Pt requiring maxAx2 for supine <-> sit bed mobility and scooting up in bed.  Pt requiring maxA for sitting EOB x5 minutes.  Not safe to attempt transfer due to pt's lethargy and decreased ability to participate.  Pt appears close to functional mobility baseline.  Plan return to ECF with therapy as needed.      Anticipated Discharge Disposition (PT): extended care facility

## 2024-01-20 NOTE — PROGRESS NOTES
"                                                                                                                                      Nephrology  Progress Note                                        [facility]    Patient Identification    Name: Day Cabrera  Age: 78 y.o.  Sex: female  :  1945  MRN: [id number]      DATE OF SERVICE:  2024        Subective    She might be a little bit more awake opens her eyes to voice and try to answer but does not really answer  Grandson at bedside  Objective   Scheduled Meds:amiodarone, 200 mg, Oral, Q24H  apixaban, 5 mg, Oral, Q12H  [MAR Hold] lactated ringers, 1,000 mL, Intravenous, Once  levothyroxine, 100 mcg, Oral, Q AM  melatonin, 10 mg, Oral, Nightly  pantoprazole, 40 mg, Oral, Daily  senna-docusate sodium, 2 tablet, Oral, BID  sodium chloride, 10 mL, Intravenous, Q12H          Continuous Infusions:sodium chloride, 75 mL/hr, Last Rate: 75 mL/hr (24 1022)        PRN Meds:  bisacodyl    bisacodyl    ondansetron    ondansetron ODT    polyethylene glycol    [COMPLETED] Insert Peripheral IV **AND** [MAR Hold] sodium chloride    [MAR Hold] sodium chloride    sodium chloride    [MAR Hold] sodium chloride     Exam:  /70 (BP Location: Right arm, Patient Position: Lying)   Pulse 67   Temp 98.7 °F (37.1 °C) (Oral)   Resp 17   Ht 162.6 cm (64\")   Wt 86.7 kg (191 lb 2.2 oz)   SpO2 99%   BMI 32.81 kg/m²     Intake/Output last 3 shifts:  I/O last 3 completed shifts:  In: 3620 [P.O.:1620; I.V.:]  Out: 2200 [Urine:2200]    Intake/Output this shift:  I/O this shift:  In: 340 [P.O.:340]  Out: -     Physical exam:  General Appearance: Awake but lethargic  Head:  Normocephalic, without obvious abnormality, atraumatic  Eyes:  PERRL, conjunctiva/corneas clear     Neck:  Supple,  no adenopathy;      Lungs:  Decreased BS occasion ronchi  Heart:  Regular rate and rhythm, S1 and S2 normal  Abdomen:  Soft, non-tender, bowel sounds active "   Extremities: trace edema  Pulses: 2+ and symmetric all extremities  Skin:  No rashes or lesions       Data Review:  All labs (24hrs):   Recent Results (from the past 24 hour(s))   Basic Metabolic Panel    Collection Time: 01/20/24  1:48 AM    Specimen: Arm, Right; Blood   Result Value Ref Range    Glucose 115 (H) 65 - 99 mg/dL    BUN 27 (H) 8 - 23 mg/dL    Creatinine 1.11 (H) 0.57 - 1.00 mg/dL    Sodium 149 (H) 136 - 145 mmol/L    Potassium 4.5 3.5 - 5.2 mmol/L    Chloride 120 (H) 98 - 107 mmol/L    CO2 23.0 22.0 - 29.0 mmol/L    Calcium 8.3 (L) 8.6 - 10.5 mg/dL    BUN/Creatinine Ratio 24.3 7.0 - 25.0    Anion Gap 6.0 5.0 - 15.0 mmol/L    eGFR 51.0 (L) >60.0 mL/min/1.73          Imaging:  CT Abdomen Pelvis Without Contrast    Result Date: 1/16/2024  Impression: 1. Multiple calculi or single irregular elongated calculus in the right renal pelvis, with low level obstructive uropathy. No visible ureteral calculus. Minimal renal pelvic inflammation. 2. Normally distended, but mildly inflamed-appearing urinary bladder. 3. Small focus of new right lower lobe pulmonary disease, suspicious for focal bronchitis, possibly aspiration. Electronically Signed: Oscar Lezama MD  1/16/2024 2:17 PM EST  Workstation ID: [id number]    CT Head Without Contrast    Result Date: 1/16/2024  Impression: Chronic findings. No acute process. Electronically Signed: Gail Bone MD  1/16/2024 11:07 AM EST  Workstation ID: [id number]    XR Chest 1 View    Result Date: 1/16/2024  Impression: No new chest disease. Electronically Signed: Oscar Lezama MD  1/16/2024 10:53 AM EST  Workstation ID: [id number]     Assessment/Plan:     Acute cystitis with hematuria         Hyponatremia  Acute kidney injury on chronic kidney disease  Azotemia  Dehydration  Encephalopathy  Hypertension  History of PE  Dementia  UTI     Recommendations:  Creatinine is down to 1.1 from 1.7   sodium is back up to 149 \adjust IVFs

## 2024-01-20 NOTE — PROGRESS NOTES
[facility] MEDICINE SERVICE  DAILY PROGRESS NOTE    NAME: Day Cabrera  : 1945  MRN: [id number]      LOS: 4 days     PROVIDER OF SERVICE: Michoacano Sorto MD    Chief Complaint: Acute cystitis with hematuria    Subjective:     Interval History: Patient doing well today, she is tolerating p.o. intake well.  She is at her baseline mental status currently.    Review of Systems:   Review of Systems    Objective:     Vital Signs  Temp:  [97.5 °F (36.4 °C)-98.7 °F (37.1 °C)] 98.7 °F (37.1 °C)  Heart Rate:  [67-71] 67  Resp:  [15-21] 15  BP: (107-150)/(67-77) 107/70  Flow (L/min):  [2] 2   Body mass index is 32.81 kg/m².    Physical Exam  Physical Exam  General Appearance: Alert but lethargic, responsive to simple questions  Head:  Normocephalic, without obvious abnormality, atraumatic  Eyes:  PERRL, conjunctiva/corneas clear, EOM's intact, fundi benign, both eyes  Ears:  Normal TM's and external ear canals, both ears  Nose: Nares normal, septum midline, mucosa normal, no drainage or sinus tenderness  Throat: Lips, mucosa, and tongue normal; teeth and gums normal  Neck: Supple, symmetrical, trachea midline, no adenopathy, thyroid: not enlarged, symmetric, no tenderness/mass/nodules, no carotid bruit or JVD  Lungs:   Clear to auscultation bilaterally, respirations unlabored  Heart:  Regular rate and rhythm, S1, S2 normal, no murmur, rub or gallop  Abdomen:  Soft, non-tender, bowel sounds active all four quadrants,  no masses, no organomegaly  Extremities: 1+ BL LE pitting edema  Pulses: 2+ and symmetric  Skin: Skin color, texture, turgor normal, no rashes or lesions  Neurologic: Difficult to assess      Scheduled Meds   amiodarone, 200 mg, Oral, Q24H  apixaban, 5 mg, Oral, Q12H  [MAR Hold] lactated ringers, 1,000 mL, Intravenous, Once  levothyroxine, 100 mcg, Oral, Q AM  melatonin, 10 mg, Oral, Nightly  pantoprazole, 40 mg, Oral, Daily  senna-docusate sodium, 2 tablet, Oral, BID  sodium chloride, 10 mL,  Intravenous, Q12H       PRN Meds     bisacodyl    bisacodyl    ondansetron    ondansetron ODT    polyethylene glycol    [COMPLETED] Insert Peripheral IV **AND** [MAR Hold] sodium chloride    [MAR Hold] sodium chloride    sodium chloride    [MAR Hold] sodium chloride   Infusions  sodium chloride, 75 mL/hr, Last Rate: 75 mL/hr (01/20/24 1022)            Diagnostic Data    Results from last 7 days   Lab Units 01/20/24  0148 01/19/24  1043 01/18/24  2328 01/16/24  1727 01/16/24  0956   WBC 10*3/mm3  --   --  7.40   < > 24.90*   HEMOGLOBIN g/dL  --   --  11.5*   < > 16.0*   HEMATOCRIT %  --   --  34.0   < > 48.8*   PLATELETS 10*3/mm3  --   --  122*   < > 271   GLUCOSE mg/dL 115*   < > 114*   < > 126*   CREATININE mg/dL 1.11*   < > 1.29*   < > 3.68*   BUN mg/dL 27*   < > 42*   < > 89*   SODIUM mmol/L 149*   < > 147*   < > 160*   POTASSIUM mmol/L 4.5   < > 3.4*   < > 4.7   AST (SGOT) U/L  --   --   --   --  19   ALT (SGPT) U/L  --   --   --   --  20   ALK PHOS U/L  --   --   --   --  98   BILIRUBIN mg/dL  --   --   --   --  0.7   ANION GAP mmol/L 6.0   < > 7.0   < > 19.0*    < > = values in this interval not displayed.       No radiology results for the last day      I reviewed the patient's new clinical results.    Assessment/Plan:     Active and Resolved Problems  Active Hospital Problems    Diagnosis  POA    **Acute cystitis with hematuria [N30.01]  Yes      Resolved Hospital Problems   No resolved problems to display.       Severe sepsis secondary to acute UTI with hematuria  -Concern for possible UVJ stone infection as well  -Patient underwent cystoscopy with stent placement on 1/16  -Completed 3-day course of IV Rocephin  -Urine cultures negative  -Tachycardia and leukocytosis improved    Acute renal failure on CKD stage II  -Likely prerenal in the setting of sepsis  -Initial creatinine was elevated at 3.6 but now improved and almost essentially at baseline  -Monitor renal function and urine output    Acute  hypernatremia  -Likely hypovolemic hypernatremia  -Nephrology following; started on half-normal saline with improvement of sodium and almost normalized although slightly worse today  -Continue half-normal saline her sodium level daily  -Patient tolerating p.o. intake well and should remain hydrated with p.o. fluid intake    Acute metabolic encephalopathy with underlying dementia  -Likely multifactorial with severe sepsis and severe hyponatremia  -Mental status improved and now at baseline  -Continue treatment for infection as well as hypernatremia as above    Hypertension  -Resume home BP medications once more hemodynamically stable    Chronic A-fib  -Continue amiodarone, Eliquis    Hypothyroidism  -Continue Synthroid        DVT prophylaxis:  Medical DVT prophylaxis orders are present.     Code status is   Code Status and Medical Interventions:   Ordered at: 01/17/24 7964     Level Of Support Discussed With:    Next of Kin (If No Surrogate)     Code Status (Patient has no pulse and is not breathing):    CPR (Attempt to Resuscitate)     Medical Interventions (Patient has pulse or is breathing):    Full Support       Plan for disposition: Pending clinical course, hopefully DC back to long-term care facility in the next 48 hours    Time: 30 minutes    Signature: Electronically signed by Michoacano Sorto MD, 01/20/24, 13:40 EST.  [facility] Hospitalist Team

## 2024-01-20 NOTE — PLAN OF CARE
Goal Outcome Evaluation:           Progress: improving          Patient is abed at this time. She has appeared to rest well through the night. She is disoriented X-4 at baseline. She is a total feed but has been eating and drinking better. Call light is in reach and bed in lowest position, bed alarm is on.

## 2024-01-20 NOTE — PLAN OF CARE
Goal Outcome Evaluation:         Pt is resting in bed and has been asleep most of the day. IVF restarted. Continue to turn every 2 hours and maintain skin interventions. Still requiring to be fed by staff. Tolerating diet. VSS. Plan of care ongoing.

## 2024-01-20 NOTE — THERAPY EVALUATION
Patient Name: Day Cabrera  : 1945    MRN: [id number]                              Today's Date: 2024       Admit Date: 2024    Visit Dx:     ICD-10-CM ICD-9-CM   1. Altered mental status, unspecified altered mental status type  R41.82 780.97   2. Acute renal failure, unspecified acute renal failure type  N17.9 584.9   3. Urinary tract infection with hematuria, site unspecified  N39.0 599.0    R31.9 599.70   4. Dehydration  E86.0 276.51     Patient Active Problem List   Diagnosis    Chronic anxiety    Dementia with behavioral disturbance    Hypertension    Memory impairment    Obesity    Sleep apnea    E. coli UTI (urinary tract infection)    Acute UTI (urinary tract infection)    Delirium due to another medical condition    Pneumonia due to 2019 novel coronavirus    Sepsis    Arthritis    COVID-19 virus detected    Fever in adult    Elevated troponin    Altered mental status    Hypernatremia    Atrial fibrillation    History of pulmonary embolus (PE)    Acute respiratory failure with hypoxia    Hypothyroidism (acquired)    Colitis    Acute cystitis with hematuria     Past Medical History:   Diagnosis Date    Anxiety     Arthritis     Chest pain 10/15/2019    Dementia     Hypertension     Pulmonary embolus      Past Surgical History:   Procedure Laterality Date    CHOLECYSTECTOMY      COLONOSCOPY      CYSTOSCOPY, URETEROSCOPY, RETROGRADE PYELOGRAM, STENT INSERTION Right 2024    Procedure: CYSTOSCOPY STENT INSERTION;  Surgeon: Aldo Narvaez MD;  Location: [facility] MAIN OR;  Service: Urology;  Laterality: Right;    ENDOSCOPY N/A 3/30/2023    Procedure: ESOPHAGOGASTRODUODENOSCOPY;  Surgeon: Contreras Watts MD;  Location: [facility] ENDOSCOPY;  Service: Gastroenterology;  Laterality: N/A;    HYSTERECTOMY      total     SIGMOIDOSCOPY N/A 3/30/2023    Procedure: SIGMOIDOSCOPY with biopsy;  Surgeon: Contreras Watts MD;  Location: [facility] ENDOSCOPY;  Service: Gastroenterology;   Laterality: N/A;  ischemia      General Information       Row Name 01/20/24 1534          Physical Therapy Time and Intention    Document Type evaluation  -     Mode of Treatment physical therapy  -       Row Name 01/20/24 1534          General Information    Patient Profile Reviewed yes  -     Prior Level of Function --  depA for ADLs, daughter is unsure if [facility] staff is able to transfer her to a w/c or if a lift is utilized for transfer  -     Existing Precautions/Restrictions fall  -     Barriers to Rehab previous functional deficit;cognitive status  -       Row Name 01/20/24 1534          Living Environment    People in Home facility resident  Resides at [facility]  -       Row Name 01/20/24 1534          Cognition    Orientation Status (Cognition) --  Oriented to name; not oriented to daughter's name, place, time, or situation  -       Row Name 01/20/24 1534          Safety Issues, Functional Mobility    Impairments Affecting Function (Mobility) cognition;balance;strength;endurance/activity tolerance  -               User Key  (r) = Recorded By, (t) = Taken By, (c) = Cosigned By      Initials Name Provider Type     Gretchen Salamanca PT Physical Therapist                   Mobility       Row Name 01/20/24 1536          Bed Mobility    Bed Mobility supine-sit;sit-supine;scooting/bridging  -     Scooting/Bridging [place] (Bed Mobility) 2 person assist;maximum assist (25% patient effort)  -     Supine-Sit [place] (Bed Mobility) 2 person assist;maximum assist (25% patient effort)  -     Sit-Supine [place] (Bed Mobility) 2 person assist;maximum assist (25% patient effort)  -       Row Name 01/20/24 1536          Bed-Chair Transfer    Bed-Chair [place] (Transfers) not tested  -       Row Name 01/20/24 1536          Sit-Stand Transfer    Sit-Stand [place] (Transfers) not tested  -       Row Name 01/20/24 1536          Gait/Stairs (Locomotion)     [place] Level (Gait) not tested  -               User Key  (r) = Recorded By, (t) = Taken By, (c) = Cosigned By      Initials Name Provider Type    Gretchen Teague PT Physical Therapist                   Obj/Interventions       Row Name 01/20/24 1536          Range of Motion Comprehensive    Comment, General Range of Motion BLEs PROM WFL except bilateral ankle DF lacking 25% from neutral  -       Row Name 01/20/24 1536          Strength Comprehensive (MMT)    Comment, General Manual Muscle Testing (MMT) Assessment unable to formally assess  -       Row Name 01/20/24 1536          Balance    Balance Assessment sitting static balance;sitting dynamic balance  -     Static Sitting Balance maximum assist  -     Dynamic Sitting Balance maximum assist  -       Row Name 01/20/24 1536          Sensory Assessment (Somatosensory)    Sensory Assessment (Somatosensory) unable/difficult to assess  -               User Key  (r) = Recorded By, (t) = Taken By, (c) = Cosigned By      Initials Name Provider Type    Gretchen Teague PT Physical Therapist                   Goals/Plan    No documentation.                  Clinical Impression       Row Name 01/20/24 1537          Pain    Pretreatment Pain Rating 0/10 - no pain  -     Posttreatment Pain Rating 0/10 - no pain  -       Row Name 01/20/24 1537          Plan of Care Review    Outcome Evaluation Pt is 79 yo female admitted from [facility] for AMS with findings of UTI, acute renal failure, dehydration, and sepsis.  Pt s/p cystoscopy with stent insertion on 1/16/24.  PMH: dementia, HTN, Afib.  Per daughter pt resides at [facility].  Daughter reports pt occasionally gets up to w/c, but unsure if staff pivots pt to w/c or if a lift is utilized.  Daughter reports pt has not ambulated in over a year.  Pt with decreased ROM of bilateral plantarflexors.  On this date, pt oriented to name but very lethargic and requries verbal cues to open eyes during  evaluation.  Pt requiring maxAx2 for supine <-> sit bed mobility and scooting up in bed.  Pt requiring maxA for sitting EOB x5 minutes.  Not safe to attempt transfer due to pt's lethargy and decreased ability to participate.  Pt appears close to functional mobility baseline.  Plan return to [facility] with therapy as needed.  -       Row Name 01/20/24 1537          Therapy Assessment/Plan (PT)    Patient/Family Therapy Goals Statement (PT) unable to state due to impaired cognition  -     Criteria for Skilled Interventions Met (PT) no;no problems identified which require skilled intervention  -HP     Therapy Frequency (PT) evaluation only  -HP       Row Name 01/20/24 1537          Positioning and Restraints    Pre-Treatment Position in bed  -HP     Post Treatment Position bed  -HP     In Bed notified nsg;call light within reach;encouraged to call for assist;exit alarm on  daughter present  -HP               User Key  (r) = Recorded By, (t) = Taken By, (c) = Cosigned By      Initials Name Provider Type    Gretchen Teague, PT Physical Therapist                   Outcome Measures       Row Name 01/20/24 1542 01/20/24 0818       How much help from another person do you currently need...    Turning from your back to your side while in flat bed without using bedrails? 1  -HP 1  -KS    Moving from lying on back to sitting on the side of a flat bed without bedrails? 1  -HP 1  -KS    Moving to and from a bed to a chair (including a wheelchair)? 1  -HP 1  -KS    Standing up from a chair using your arms (e.g., wheelchair, bedside chair)? 1  -HP 1  -KS    Climbing 3-5 steps with a railing? 1  -HP 1  -KS    To walk in hospital room? 1  -HP 1  -KS    AM-PAC 6 Clicks Score (PT) 6  -HP 6  -KS    Highest Level of Mobility Goal 2 --> Bed activities/dependent transfer  -HP 2 --> Bed activities/dependent transfer  -KS      Row Name 01/20/24 1542          Modified [place]ufus Scale    Modified [place] Scale 5 - Severe disability.  Bedridden,  incontinent, and requiring constant nursing care and attention.  -       Row Name 01/20/24 1542          Functional Assessment    Outcome Measure Options AM-PAC 6 Clicks Basic Mobility (PT);Modified [place]  -               User Key  (r) = Recorded By, (t) = Taken By, (c) = Cosigned By      Initials Name Provider Type     Gretchen Salamanca, PT Physical Therapist    Tiffany Morejon RN Registered Nurse                                 Physical Therapy Education       Title: PT OT SLP Therapies (Done)       Topic: Physical Therapy (Done)       Point: Mobility training (Done)       Learning Progress Summary             Patient Acceptance, E, VU by  at 1/20/2024 1544   Family Acceptance, E, VU by HP at 1/20/2024 1544                         Point: Precautions (Done)       Learning Progress Summary             Patient Acceptance, E, VU by  at 1/20/2024 1544   Family Acceptance, E, VU by HP at 1/20/2024 1544                                         User Key       Initials Effective Dates Name Provider Type Discipline     10/17/23 -  Gretchen Salamanca, PT Physical Therapist PT                  PT Recommendation and Plan     Outcome Evaluation: Pt is 79 yo female admitted from [facility] for AMS with findings of UTI, acute renal failure, dehydration, and sepsis.  Pt s/p cystoscopy with stent insertion on 1/16/24.  PMH: dementia, HTN, Afib.  Per daughter pt resides at [facility].  Daughter reports pt occasionally gets up to w/c, but unsure if staff pivots pt to w/c or if a lift is utilized.  Daughter reports pt has not ambulated in over a year.  Pt with decreased ROM of bilateral plantarflexors.  On this date, pt oriented to name but very lethargic and requries verbal cues to open eyes during evaluation.  Pt requiring maxAx2 for supine <-> sit bed mobility and scooting up in bed.  Pt requiring maxA for sitting EOB x5 minutes.  Not safe to attempt transfer due to pt's lethargy and decreased ability to participate.   Pt appears close to functional mobility baseline.  Plan return to ECF with therapy as needed.     Time Calculation:   PT Evaluation Complexity  History, PT Evaluation Complexity: 3 or more personal factors and/or comorbidities  Examination of Body Systems (PT Eval Complexity): total of 3 or more elements  Clinical Presentation (PT Evaluation Complexity): evolving  Clinical Decision Making (PT Evaluation Complexity): moderate complexity  Overall Complexity (PT Evaluation Complexity): moderate complexity     PT Charges       Row Name 01/20/24 1544             Time Calculation    Start Time 1435  -HP      Stop Time 1458  -HP      Time Calculation (min) 23 min  -HP      PT Received On 01/20/24  -         Time Calculation- PT    Total Timed Code Minutes- PT 0 minute(s)  -HP                User Key  (r) = Recorded By, (t) = Taken By, (c) = Cosigned By      Initials Name Provider Type     Gretchen Salamanca, PT Physical Therapist                  Therapy Charges for Today       Code Description Service Date Service Provider Modifiers Qty    [id number] HC PT EVAL MOD COMPLEXITY 4 1/20/2024 Gretchen Salamanca, PT GP 1            PT G-Codes  Outcome Measure Options: AM-PAC 6 Clicks Basic Mobility (PT), Modified [place]  AM-PAC 6 Clicks Score (PT): 6  Modified [place] Scale: 5 - Severe disability.  Bedridden, incontinent, and requiring constant nursing care and attention.  PT Discharge Summary  Anticipated Discharge Disposition (PT): extended care facility    Gretchen Salamanca PT  1/20/2024

## 2024-01-21 LAB
BACTERIA SPEC AEROBE CULT: NORMAL
BACTERIA SPEC AEROBE CULT: NORMAL

## 2024-01-21 PROCEDURE — 97166 OT EVAL MOD COMPLEX 45 MIN: CPT | Performed by: OCCUPATIONAL THERAPIST

## 2024-01-21 RX ORDER — POTASSIUM CHLORIDE 20 MEQ/1
20 TABLET, EXTENDED RELEASE ORAL ONCE
Status: COMPLETED | OUTPATIENT
Start: 2024-01-21 | End: 2024-01-21

## 2024-01-21 RX ADMIN — PANTOPRAZOLE SODIUM 40 MG: 40 TABLET, DELAYED RELEASE ORAL at 08:18

## 2024-01-21 RX ADMIN — APIXABAN 5 MG: 5 TABLET, FILM COATED ORAL at 08:18

## 2024-01-21 RX ADMIN — DOCUSATE SODIUM AND SENNOSIDES 2 TABLET: 8.6; 5 TABLET, FILM COATED ORAL at 20:40

## 2024-01-21 RX ADMIN — AMIODARONE HYDROCHLORIDE 200 MG: 200 TABLET ORAL at 08:18

## 2024-01-21 RX ADMIN — DOCUSATE SODIUM AND SENNOSIDES 2 TABLET: 8.6; 5 TABLET, FILM COATED ORAL at 08:18

## 2024-01-21 RX ADMIN — APIXABAN 5 MG: 5 TABLET, FILM COATED ORAL at 20:24

## 2024-01-21 RX ADMIN — Medication 10 ML: at 20:24

## 2024-01-21 RX ADMIN — POTASSIUM CHLORIDE 20 MEQ: 1500 TABLET, EXTENDED RELEASE ORAL at 10:20

## 2024-01-21 RX ADMIN — Medication 10 MG: at 20:40

## 2024-01-21 RX ADMIN — LEVOTHYROXINE SODIUM 100 MCG: 0.1 TABLET ORAL at 05:00

## 2024-01-21 NOTE — PLAN OF CARE
"Goal Outcome Evaluation:  Plan of Care Reviewed With: patient           Outcome Evaluation: Pt is a 78 y.o. F adm to [facility] on 01/16/24 from ECF with reports of AMS. Pt adm with acute cystitis with hematuria & concern for possible UVJ stone infection. Pt underwent cystoscopy with stent placement on 1/16/24. Additional hosp diagnoses: ARF, hypernatremia, metabolic encephalopathy, tachycardia, sepsis. PMH: dementia, CKD, HTN, a-fib, hypothyroidism. At baseline, pt lives in ECF. Pt is a poor historian & no family present to ask if she is active in her care at all, but medical chart states she is dep with ADLs & transfers. Upon eval, pt is not oriented & does not respond to her name consistently. She keeps her eyes closed except when repositioned/rolled in bed. Pt demo functional spontaneous movement & responses by taking nasal cannula out of nose when it is placed on nose & stating \"take that out of my nose\", but she would not follow any commands. She was dependent for drinking from cup & would not maintain grasp of cup once hand placed or assist with holding railing during bed mobility. Pt is dependent with all ADL & mobility & is not following commands to participate with OT tx. Based on notes in chart, pt seems to be at her baseline status. Recommend pt return to ECF once medically cleared. OT will sign off at this time.      Anticipated Discharge Disposition (OT): extended care facility                        "

## 2024-01-21 NOTE — PLAN OF CARE
Goal Outcome Evaluation:      Pt pleasantly confused, appears to be at baseline. Total assist for feeds and ADLs. Labs improving at this time. Turned and repositioned frequently. Urine with a dark color in appearance. No signs or symptoms of pain, plan of care ongoing.

## 2024-01-21 NOTE — PROGRESS NOTES
"                                                                                                                                      Nephrology  Progress Note                                        [facility]    Patient Identification    Name: Day Cabrera  Age: 78 y.o.  Sex: female  :  1945  MRN: [id number]      DATE OF SERVICE:  2024        Subective    She might be a little bit more awake opens her eyes to voice and try to answer but does not really answer  Grandson at bedside  Objective   Scheduled Meds:amiodarone, 200 mg, Oral, Q24H  apixaban, 5 mg, Oral, Q12H  [MAR Hold] lactated ringers, 1,000 mL, Intravenous, Once  levothyroxine, 100 mcg, Oral, Q AM  melatonin, 10 mg, Oral, Nightly  pantoprazole, 40 mg, Oral, Daily  senna-docusate sodium, 2 tablet, Oral, BID  sodium chloride, 10 mL, Intravenous, Q12H          Continuous Infusions:dextrose, 50 mL/hr, Last Rate: 50 mL/hr (24 1504)        PRN Meds:  bisacodyl    bisacodyl    ondansetron    ondansetron ODT    polyethylene glycol    [COMPLETED] Insert Peripheral IV **AND** [MAR Hold] sodium chloride    [MAR Hold] sodium chloride    sodium chloride    [MAR Hold] sodium chloride     Exam:  /76 (BP Location: Right arm, Patient Position: Lying)   Pulse 62   Temp 97.2 °F (36.2 °C) (Oral)   Resp 15   Ht 162.6 cm (64\")   Wt 85.2 kg (187 lb 13.3 oz)   SpO2 98%   BMI 32.24 kg/m²     Intake/Output last 3 shifts:  I/O last 3 completed shifts:  In: 1697.8 [P.O.:680; I.V.:1017.8]  Out: 2350 [Urine:2350]    Intake/Output this shift:  I/O this shift:  In: 120 [P.O.:120]  Out: -     Physical exam:  General Appearance: Awake but lethargic  Head:  Normocephalic, without obvious abnormality, atraumatic  Eyes:  PERRL, conjunctiva/corneas clear     Neck:  Supple,  no adenopathy;      Lungs:  Decreased BS occasion ronchi  Heart:  Regular rate and rhythm, S1 and S2 normal  Abdomen:  Soft, non-tender, bowel sounds active "   Extremities: trace edema  Pulses: 2+ and symmetric all extremities  Skin:  No rashes or lesions       Data Review:  All labs (24hrs):   Recent Results (from the past 24 hour(s))   Basic Metabolic Panel    Collection Time: 01/20/24  8:45 PM    Specimen: Arm, Left; Blood   Result Value Ref Range    Glucose 132 (H) 65 - 99 mg/dL    BUN 20 8 - 23 mg/dL    Creatinine 0.85 0.57 - 1.00 mg/dL    Sodium 145 136 - 145 mmol/L    Potassium 3.4 (L) 3.5 - 5.2 mmol/L    Chloride 115 (H) 98 - 107 mmol/L    CO2 22.0 22.0 - 29.0 mmol/L    Calcium 8.0 (L) 8.6 - 10.5 mg/dL    BUN/Creatinine Ratio 23.5 7.0 - 25.0    Anion Gap 8.0 5.0 - 15.0 mmol/L    eGFR 70.2 >60.0 mL/min/1.73          Imaging:  CT Abdomen Pelvis Without Contrast    Result Date: 1/16/2024  Impression: 1. Multiple calculi or single irregular elongated calculus in the right renal pelvis, with low level obstructive uropathy. No visible ureteral calculus. Minimal renal pelvic inflammation. 2. Normally distended, but mildly inflamed-appearing urinary bladder. 3. Small focus of new right lower lobe pulmonary disease, suspicious for focal bronchitis, possibly aspiration. Electronically Signed: Oscar Lezama MD  1/16/2024 2:17 PM EST  Workstation ID: [id number]    CT Head Without Contrast    Result Date: 1/16/2024  Impression: Chronic findings. No acute process. Electronically Signed: Gail Bone MD  1/16/2024 11:07 AM EST  Workstation ID: [id number]    XR Chest 1 View    Result Date: 1/16/2024  Impression: No new chest disease. Electronically Signed: Oscar Lezama MD  1/16/2024 10:53 AM EST  Workstation ID: [id number]     Assessment/Plan:     Acute cystitis with hematuria         Hyponatremia  Acute kidney injury on chronic kidney disease  Azotemia  Dehydration  Encephalopathy  Hypertension  History of PE  Dementia  UTI     Recommendations:  Creatinine is down to 0.8 from 1.7   sodium is better  She is still needs fluids because she is not eating good she is not waking up

## 2024-01-21 NOTE — PROGRESS NOTES
[facility] MEDICINE SERVICE  DAILY PROGRESS NOTE    NAME: Day Cabrera  : 1945  MRN: [id number]      LOS: 5 days     PROVIDER OF SERVICE: Ab Jacinto MD    Chief Complaint: Acute cystitis with hematuria    Subjective:     Interval History:  History taken from: patient    Patient seen today.  Feels OK right now but poor historian.  Not short of breath.  K low at 3.4 today.  Na better.    Review of Systems:   Review of Systems negative except what was mentioned above.    Objective:     Vital Signs  Temp:  [97.2 °F (36.2 °C)-98.2 °F (36.8 °C)] 97.2 °F (36.2 °C)  Heart Rate:  [62-67] 62  Resp:  [15-17] 15  BP: (107-134)/(66-76) 124/76  Flow (L/min):  [2] 2   Body mass index is 32.24 kg/m².    Physical Exam  Physical Exam  Constitutional:       General: She is not in acute distress.     Appearance: She is obese. She is not toxic-appearing.      Comments: On NC   HENT:      Head: Normocephalic and atraumatic.      Nose: Nose normal.      Mouth/Throat:      Mouth: Mucous membranes are dry.   Eyes:      Extraocular Movements: Extraocular movements intact.      Pupils: Pupils are equal, round, and reactive to light.   Cardiovascular:      Rate and Rhythm: Normal rate and regular rhythm.      Pulses: Normal pulses.      Heart sounds: Normal heart sounds.   Pulmonary:      Effort: Pulmonary effort is normal.      Breath sounds: Normal breath sounds.   Abdominal:      General: Bowel sounds are normal. There is no distension.      Palpations: There is no mass.      Tenderness: There is no abdominal tenderness.   Musculoskeletal:         General: Normal range of motion.      Cervical back: Normal range of motion and neck supple.   Skin:     General: Skin is warm.      Capillary Refill: Capillary refill takes less than 2 seconds.      Coloration: Skin is pale.   Neurological:      General: No focal deficit present.      Mental Status: She is alert. Mental status is at baseline.   Psychiatric:         Mood  and Affect: Mood normal.         Scheduled Meds   amiodarone, 200 mg, Oral, Q24H  apixaban, 5 mg, Oral, Q12H  [MAR Hold] lactated ringers, 1,000 mL, Intravenous, Once  levothyroxine, 100 mcg, Oral, Q AM  melatonin, 10 mg, Oral, Nightly  pantoprazole, 40 mg, Oral, Daily  senna-docusate sodium, 2 tablet, Oral, BID  sodium chloride, 10 mL, Intravenous, Q12H       PRN Meds     bisacodyl    bisacodyl    ondansetron    ondansetron ODT    polyethylene glycol    [COMPLETED] Insert Peripheral IV **AND** [MAR Hold] sodium chloride    [MAR Hold] sodium chloride    sodium chloride    [MAR Hold] sodium chloride   Infusions  dextrose, 50 mL/hr, Last Rate: 50 mL/hr (01/20/24 1504)          Diagnostic Data    Results from last 7 days   Lab Units 01/20/24  2045 01/19/24  1043 01/18/24  2328 01/16/24  1727 01/16/24  0956   WBC 10*3/mm3  --   --  7.40   < > 24.90*   HEMOGLOBIN g/dL  --   --  11.5*   < > 16.0*   HEMATOCRIT %  --   --  34.0   < > 48.8*   PLATELETS 10*3/mm3  --   --  122*   < > 271   GLUCOSE mg/dL 132*   < > 114*   < > 126*   CREATININE mg/dL 0.85   < > 1.29*   < > 3.68*   BUN mg/dL 20   < > 42*   < > 89*   SODIUM mmol/L 145   < > 147*   < > 160*   POTASSIUM mmol/L 3.4*   < > 3.4*   < > 4.7   AST (SGOT) U/L  --   --   --   --  19   ALT (SGPT) U/L  --   --   --   --  20   ALK PHOS U/L  --   --   --   --  98   BILIRUBIN mg/dL  --   --   --   --  0.7   ANION GAP mmol/L 8.0   < > 7.0   < > 19.0*    < > = values in this interval not displayed.       No radiology results for the last day      I reviewed the patient's new clinical results.    Assessment/Plan:     Active and Resolved Problems  Active Hospital Problems    Diagnosis  POA    **Acute cystitis with hematuria [N30.01]  Yes      Resolved Hospital Problems   No resolved problems to display.       Severe sepsis secondary to acute UTI with hematuria  -Concern for possible UVJ stone infection as well  -Patient underwent cystoscopy with stent placement on 1/16  -Completed  3-day course of IV Rocephin  -Urine cultures negative  -Tachycardia and leukocytosis improved     Acute renal failure on CKD stage II  -Likely prerenal in the setting of sepsis  -Initial creatinine was elevated at 3.6 but now improved and almost essentially at baseline  -Monitor renal function and urine output     Acute hypernatremia  -Likely hypovolemic hypernatremia  -Nephrology following; started on half-normal saline with improvement of sodium and almost normalized.  Na 145 TODAY.  -On D5W now.  Nephrology following  -Patient tolerating p.o. intake well right now.     Acute metabolic encephalopathy with underlying dementia  -Likely multifactorial with severe sepsis and severe hyponatremia  -Mental status improved and now at baseline.  -Continue treatment for infection as well as hypernatremia as above     Hypertension  -BP has remained stable.  124/76 today.     Chronic A-fib  -Continue amiodarone, Eliquis     Hypothyroidism  -Continue Synthroid    Hypokalemia-K 3.4 today.  Will order 20 oral today.    PLAN:  -AS ABOVE.  WILL NEED LONG TERM FACILITY.  LIKELY EARLY NEXT WEEK AS SEEMS MEDICALLY WELL RIGHT NOW.    DVT prophylaxis:  Medical DVT prophylaxis orders are present.     Code status is   Code Status and Medical Interventions:   Ordered at: 01/17/24 1724     Level Of Support Discussed With:    Next of Kin (If No Surrogate)     Code Status (Patient has no pulse and is not breathing):    CPR (Attempt to Resuscitate)     Medical Interventions (Patient has pulse or is breathing):    Full Support       Plan for disposition:SNF in FEW days    Time: 30 minutes    Signature: Electronically signed by Ab Jacinto MD, 01/21/24, 11:44 EST.  [facility] Hospitalist Team

## 2024-01-21 NOTE — PLAN OF CARE
Goal Outcome Evaluation:         Pleasantly Confused. No falls. Nonmobile. Occas dry cough. O2 at 2lpm per nc. No distress. Bilat scds on and functioning. Preventative mepilex intact to heels and are elevated. No s/s of bleeding or prob. Vss. Call light in reach.

## 2024-01-21 NOTE — THERAPY EVALUATION
Patient Name: Day Cabrera  : 1945    MRN: [id number]                              Today's Date: 2024       Admit Date: 2024    Visit Dx:     ICD-10-CM ICD-9-CM   1. Altered mental status, unspecified altered mental status type  R41.82 780.97   2. Acute renal failure, unspecified acute renal failure type  N17.9 584.9   3. Urinary tract infection with hematuria, site unspecified  N39.0 599.0    R31.9 599.70   4. Dehydration  E86.0 276.51     Patient Active Problem List   Diagnosis    Chronic anxiety    Dementia with behavioral disturbance    Hypertension    Memory impairment    Obesity    Sleep apnea    E. coli UTI (urinary tract infection)    Acute UTI (urinary tract infection)    Delirium due to another medical condition    Pneumonia due to 2019 novel coronavirus    Sepsis    Arthritis    COVID-19 virus detected    Fever in adult    Elevated troponin    Altered mental status    Hypernatremia    Atrial fibrillation    History of pulmonary embolus (PE)    Acute respiratory failure with hypoxia    Hypothyroidism (acquired)    Colitis    Acute cystitis with hematuria     Past Medical History:   Diagnosis Date    Anxiety     Arthritis     Chest pain 10/15/2019    Dementia     Hypertension     Pulmonary embolus      Past Surgical History:   Procedure Laterality Date    CHOLECYSTECTOMY      COLONOSCOPY      CYSTOSCOPY, URETEROSCOPY, RETROGRADE PYELOGRAM, STENT INSERTION Right 2024    Procedure: CYSTOSCOPY STENT INSERTION;  Surgeon: Aldo Narvaez MD;  Location: [facility] MAIN OR;  Service: Urology;  Laterality: Right;    ENDOSCOPY N/A 3/30/2023    Procedure: ESOPHAGOGASTRODUODENOSCOPY;  Surgeon: Contreras Watts MD;  Location: [facility] ENDOSCOPY;  Service: Gastroenterology;  Laterality: N/A;    HYSTERECTOMY      total     SIGMOIDOSCOPY N/A 3/30/2023    Procedure: SIGMOIDOSCOPY with biopsy;  Surgeon: Contreras Watts MD;  Location: [facility] ENDOSCOPY;  Service: Gastroenterology;   "Laterality: N/A;  ischemia      General Information       Row Name 01/21/24 1217          OT Time and Intention    Document Type evaluation  -DT     Mode of Treatment occupational therapy  -DT       Row Name 01/21/24 1217          General Information    Patient Profile Reviewed yes  -DT     Prior Level of Function --  Per chart, pt lives in [facility] & is dep for ADLs & mobility. Pt is poor historian & no family present to confirm PLOF.  -DT     Existing Precautions/Restrictions fall  -DT     Barriers to Rehab medically complex;previous functional deficit;cognitive status  -DT       Row Name 01/21/24 1217          Living Environment    People in Home --  [facility]  -DT       Row Name 01/21/24 1217          Cognition    Orientation Status (Cognition) unable/difficult to assess  Pt would not state name, answer any questions or follow any commands. She kept eyes closed most of the time except when rolling/repositioning pt. Pt stating \"take that out of my nose\" when placing O2 cannula in place.  -DT       Row Name 01/21/24 1217          Safety Issues, Functional Mobility    Safety Issues Affecting Function (Mobility) ability to follow commands;awareness of need for assistance;friction/shear risk;insight into deficits/self-awareness;judgment;problem-solving;safety precaution awareness;safety precautions follow-through/compliance;sequencing abilities  -DT     Impairments Affecting Function (Mobility) cognition;balance;strength;endurance/activity tolerance  -DT     Cognitive Impairments, Mobility Safety/Performance attention;awareness, need for assistance;impulsivity;insight into deficits/self-awareness;judgment;problem-solving/reasoning;safety precaution awareness;safety precaution follow-through;sequencing abilities  -DT               User Key  (r) = Recorded By, (t) = Taken By, (c) = Cosigned By      Initials Name Provider Type    DT Shabnam Rodarte, OT Occupational Therapist                     Mobility/ADL's  "      Row Name 01/21/24 1221          Bed Mobility    Bed Mobility rolling right;rolling left  -DT     Rolling Left [place] (Bed Mobility) dependent (less than 25% patient effort);2 person assist  -DT     Rolling Right [place] (Bed Mobility) dependent (less than 25% patient effort);2 person assist  -DT     Bed Mobility, Safety Issues cognitive deficits limit understanding;decreased use of arms for pushing/pulling;decreased use of legs for bridging/pushing;impaired trunk control for bed mobility  -DT     Assistive Device (Bed Mobility) draw sheet  -DT       Row Name 01/21/24 1221          Activities of Daily Living    BADL Assessment/Intervention feeding  -DT       Row Name 01/21/24 1221          Self-Feeding Assessment/Training    [place] Level (Feeding) feeding skills;dependent (less than 25% patient effort)  -DT     Position (Self-Feeding) supine  with HOB elevated.  -DT               User Key  (r) = Recorded By, (t) = Taken By, (c) = Cosigned By      Initials Name Provider Type    Shabnam Pickard, OT Occupational Therapist                   Obj/Interventions       Row Name 01/21/24 1222          Range of Motion Comprehensive    Comment, General Range of Motion BUEs PROM= WFL. Noted pt to actively reach up with BUEs to pull nasal cannula out of nose, but would not follow commands, hold drink or spoon actively or reach/hold side railing to assist with rolling.  -DT       Row Name 01/21/24 1222          Strength Comprehensive (MMT)    General Manual Muscle Testing (MMT) Assessment other (see comments)  -DT     Comment, General Manual Muscle Testing (MMT) Assessment unable to test as pt not following commands.  -DT               User Key  (r) = Recorded By, (t) = Taken By, (c) = Cosigned By      Initials Name Provider Type    Shabnam Pickard, OT Occupational Therapist                   Goals/Plan    No documentation.                  Clinical Impression       Row Name 01/21/24 1224  "         Pain Assessment    Additional Documentation Pain Scale: FACES Pre/Post-Treatment (Group)  -DT       Row Name 01/21/24 1224          Pain Scale: FACES Pre/Post-Treatment    Pain: FACES Scale, Pretreatment 0-->no hurt  -DT     Posttreatment Pain Rating 0-->no hurt  -DT       Row Name 01/21/24 1224          Plan of Care Review    Plan of Care Reviewed With patient  -DT     Outcome Evaluation Pt is a 78 y.o. F adm to [facility] on 01/16/24 from ECF with reports of AMS. Pt adm with acute cystitis with hematuria & concern for possible UVJ stone infection. Pt underwent cystoscopy with stent placement on 1/16/24. Additional hosp diagnoses: ARF, hypernatremia, metabolic encephalopathy, tachycardia, sepsis. PMH: dementia, CKD, HTN, a-fib, hypothyroidism. At baseline, pt lives in ECF. Pt is a poor historian & no family present to ask if she is active in her care at all, but medical chart states she is dep with ADLs & transfers. Upon eval, pt is not oriented & does not respond to her name consistently. She keeps her eyes closed except when repositioned/rolled in bed. Pt demo functional spontaneous movement & responses by taking nasal cannula out of nose when it is placed on nose & stating \"take that out of my nose\", but she would not follow any commands. She was dependent for drinking from cup & would not maintain grasp of cup once hand placed or assist with holding railing during bed mobility. Pt is dependent with all ADL & mobility & is not following commands to participate with OT tx. Based on notes in chart, pt seems to be at her baseline status. Recommend pt return to F once medically cleared. OT will sign off at this time.  -DT       Row Name 01/21/24 1224          Therapy Assessment/Plan (OT)    Criteria for Skilled Therapeutic Interventions Met (OT) does not meet criteria for skilled intervention  -DT     Therapy Frequency (OT) evaluation only  -DT       Row Name 01/21/24 1224          Therapy Plan Review/Discharge " Plan (OT)    Anticipated Discharge Disposition (OT) extended care facility  -DT       Row Name 01/21/24 1224          Positioning and Restraints    Pre-Treatment Position in bed  -DT     Post Treatment Position bed  -DT     In Bed notified nsg;supine;call light within reach;exit alarm on;side rails up x3  -DT               User Key  (r) = Recorded By, (t) = Taken By, (c) = Cosigned By      Initials Name Provider Type    DT Shabnam Rodarte, OT Occupational Therapist                   Outcome Measures       Row Name 01/21/24 0728          How much help from another person do you currently need...    Turning from your back to your side while in flat bed without using bedrails? 1  -SS     Moving from lying on back to sitting on the side of a flat bed without bedrails? 1  -SS     Moving to and from a bed to a chair (including a wheelchair)? 1  -SS     Standing up from a chair using your arms (e.g., wheelchair, bedside chair)? 1  -SS     Climbing 3-5 steps with a railing? 1  -SS     To walk in hospital room? 1  -SS     AM-PAC 6 Clicks Score (PT) 6  -SS     Highest Level of Mobility Goal 2 --> Bed activities/dependent transfer  -SS               User Key  (r) = Recorded By, (t) = Taken By, (c) = Cosigned By      Initials Name Provider Type    SS Wendy Rivas, RN Registered Nurse                    Occupational Therapy Education       Title: PT OT SLP Therapies (In Progress)       Topic: Occupational Therapy (In Progress)       Point: ADL training (In Progress)       Description:   Instruct learner(s) on proper safety adaptation and remediation techniques during self care or transfers.   Instruct in proper use of assistive devices.                  Learning Progress Summary             Patient Acceptance, E,TB, NL by DT at 1/21/2024 1240    Comment: Role of OT, safety prec                         Point: Precautions (In Progress)       Description:   Instruct learner(s) on prescribed precautions during self-care and  "functional transfers.                  Learning Progress Summary             Patient Acceptance, E,TB, NL by DT at 1/21/2024 1240    Comment: Role of OT, safety prec                                         User Key       Initials Effective Dates Name Provider Type Discipline    DT 07/11/23 -  Shabnam Rodarte, OT Occupational Therapist OT                  OT Recommendation and Plan  Therapy Frequency (OT): evaluation only  Plan of Care Review  Plan of Care Reviewed With: patient  Outcome Evaluation: Pt is a 78 y.o. F adm to [facility] on 01/16/24 from ECF with reports of AMS. Pt adm with acute cystitis with hematuria & concern for possible UVJ stone infection. Pt underwent cystoscopy with stent placement on 1/16/24. Additional hosp diagnoses: ARF, hypernatremia, metabolic encephalopathy, tachycardia, sepsis. PMH: dementia, CKD, HTN, a-fib, hypothyroidism. At baseline, pt lives in ECF. Pt is a poor historian & no family present to ask if she is active in her care at all, but medical chart states she is dep with ADLs & transfers. Upon eval, pt is not oriented & does not respond to her name consistently. She keeps her eyes closed except when repositioned/rolled in bed. Pt demo functional spontaneous movement & responses by taking nasal cannula out of nose when it is placed on nose & stating \"take that out of my nose\", but she would not follow any commands. She was dependent for drinking from cup & would not maintain grasp of cup once hand placed or assist with holding railing during bed mobility. Pt is dependent with all ADL & mobility & is not following commands to participate with OT tx. Based on notes in chart, pt seems to be at her baseline status. Recommend pt return to ECF once medically cleared. OT will sign off at this time.     Time Calculation:         Time Calculation- OT       Row Name 01/21/24 1241             Time Calculation- OT    OT Start Time 1006  -DT      OT Stop Time 1016  -DT      OT Time " Calculation (min) 10 min  -DT      OT Received On 01/21/24  -DT                User Key  (r) = Recorded By, (t) = Taken By, (c) = Cosigned By      Initials Name Provider Type    Shabnam Pickard, OT Occupational Therapist                           Shabnam Rodarte, OT  1/21/2024

## 2024-01-22 LAB
ANION GAP SERPL CALCULATED.3IONS-SCNC: 9 MMOL/L (ref 5–15)
BUN SERPL-MCNC: 15 MG/DL (ref 8–23)
BUN/CREAT SERPL: 20 (ref 7–25)
CALCIUM SPEC-SCNC: 7.8 MG/DL (ref 8.6–10.5)
CHLORIDE SERPL-SCNC: 106 MMOL/L (ref 98–107)
CO2 SERPL-SCNC: 22 MMOL/L (ref 22–29)
CREAT SERPL-MCNC: 0.75 MG/DL (ref 0.57–1)
EGFRCR SERPLBLD CKD-EPI 2021: 81.6 ML/MIN/1.73
GLUCOSE SERPL-MCNC: 110 MG/DL (ref 65–99)
MAGNESIUM SERPL-MCNC: 1.6 MG/DL (ref 1.6–2.4)
POTASSIUM SERPL-SCNC: 3.1 MMOL/L (ref 3.5–5.2)
POTASSIUM SERPL-SCNC: 3.6 MMOL/L (ref 3.5–5.2)
SODIUM SERPL-SCNC: 137 MMOL/L (ref 136–145)

## 2024-01-22 PROCEDURE — 83735 ASSAY OF MAGNESIUM: CPT | Performed by: INTERNAL MEDICINE

## 2024-01-22 PROCEDURE — 84132 ASSAY OF SERUM POTASSIUM: CPT

## 2024-01-22 PROCEDURE — 92526 ORAL FUNCTION THERAPY: CPT

## 2024-01-22 PROCEDURE — 80048 BASIC METABOLIC PNL TOTAL CA: CPT | Performed by: INTERNAL MEDICINE

## 2024-01-22 RX ORDER — POTASSIUM CHLORIDE 20 MEQ/1
40 TABLET, EXTENDED RELEASE ORAL EVERY 4 HOURS
Qty: 4 TABLET | Refills: 0 | Status: COMPLETED | OUTPATIENT
Start: 2024-01-22 | End: 2024-01-22

## 2024-01-22 RX ORDER — LACTULOSE 10 G/15ML
30 SOLUTION ORAL EVERY 6 HOURS PRN
Status: DISCONTINUED | OUTPATIENT
Start: 2024-01-22 | End: 2024-01-23 | Stop reason: HOSPADM

## 2024-01-22 RX ADMIN — DOCUSATE SODIUM AND SENNOSIDES 2 TABLET: 8.6; 5 TABLET, FILM COATED ORAL at 21:17

## 2024-01-22 RX ADMIN — POTASSIUM CHLORIDE 40 MEQ: 1500 TABLET, EXTENDED RELEASE ORAL at 08:05

## 2024-01-22 RX ADMIN — POTASSIUM CHLORIDE 40 MEQ: 1500 TABLET, EXTENDED RELEASE ORAL at 05:18

## 2024-01-22 RX ADMIN — Medication 10 ML: at 19:44

## 2024-01-22 RX ADMIN — Medication 10 MG: at 21:17

## 2024-01-22 RX ADMIN — AMIODARONE HYDROCHLORIDE 200 MG: 200 TABLET ORAL at 08:05

## 2024-01-22 RX ADMIN — PANTOPRAZOLE SODIUM 40 MG: 40 TABLET, DELAYED RELEASE ORAL at 08:05

## 2024-01-22 RX ADMIN — APIXABAN 5 MG: 5 TABLET, FILM COATED ORAL at 21:17

## 2024-01-22 RX ADMIN — APIXABAN 5 MG: 5 TABLET, FILM COATED ORAL at 08:05

## 2024-01-22 RX ADMIN — LACTULOSE 30 G: 20 SOLUTION ORAL at 21:17

## 2024-01-22 RX ADMIN — BISACODYL 5 MG: 5 TABLET, COATED ORAL at 08:05

## 2024-01-22 RX ADMIN — LACTULOSE 30 G: 20 SOLUTION ORAL at 11:41

## 2024-01-22 RX ADMIN — LEVOTHYROXINE SODIUM 100 MCG: 0.1 TABLET ORAL at 05:18

## 2024-01-22 RX ADMIN — POLYETHYLENE GLYCOL 3350 17 G: 17 POWDER, FOR SOLUTION ORAL at 08:04

## 2024-01-22 RX ADMIN — DOCUSATE SODIUM AND SENNOSIDES 2 TABLET: 8.6; 5 TABLET, FILM COATED ORAL at 08:05

## 2024-01-22 RX ADMIN — Medication 10 ML: at 08:06

## 2024-01-22 NOTE — PROGRESS NOTES
"                                                                                                                                      Nephrology  Progress Note                                        [facility]    Patient Identification    Name: Day Cabrera  Age: 78 y.o.  Sex: female  :  1945  MRN: [id number]      DATE OF SERVICE:  2024        Subective    She might be a little bit more awake opens her eyes to voice and try to answer but does not really answer  Grandson at bedside  Objective   Scheduled Meds:amiodarone, 200 mg, Oral, Q24H  apixaban, 5 mg, Oral, Q12H  [MAR Hold] lactated ringers, 1,000 mL, Intravenous, Once  levothyroxine, 100 mcg, Oral, Q AM  melatonin, 10 mg, Oral, Nightly  pantoprazole, 40 mg, Oral, Daily  potassium chloride, 40 mEq, Oral, Q4H  senna-docusate sodium, 2 tablet, Oral, BID  sodium chloride, 10 mL, Intravenous, Q12H          Continuous Infusions:dextrose, 50 mL/hr, Last Rate: 50 mL/hr (24 1504)        PRN Meds:  bisacodyl    bisacodyl    ondansetron    ondansetron ODT    polyethylene glycol    [COMPLETED] Insert Peripheral IV **AND** [MAR Hold] sodium chloride    [MAR Hold] sodium chloride    sodium chloride    [MAR Hold] sodium chloride     Exam:  /68 (BP Location: Right arm, Patient Position: Lying)   Pulse 58   Temp 97.6 °F (36.4 °C) (Axillary)   Resp 12   Ht 162.6 cm (64\")   Wt 85.2 kg (187 lb 13.3 oz)   SpO2 99%   BMI 32.24 kg/m²     Intake/Output last 3 shifts:  I/O last 3 completed shifts:  In: 1917.8 [P.O.:900; I.V.:1017.8]  Out: 1800 [Urine:1800]    Intake/Output this shift:  I/O this shift:  In: 220 [P.O.:220]  Out: -     Physical exam:  General Appearance: Awake but lethargic  Head:  Normocephalic, without obvious abnormality, atraumatic  Eyes:  PERRL, conjunctiva/corneas clear     Neck:  Supple,  no adenopathy;      Lungs:  Decreased BS occasion ronchi  Heart:  Regular rate and rhythm, S1 and S2 normal  Abdomen:  Soft, " non-tender, bowel sounds active   Extremities: trace edema  Pulses: 2+ and symmetric all extremities  Skin:  No rashes or lesions       Data Review:  All labs (24hrs):   Recent Results (from the past 24 hour(s))   Basic Metabolic Panel    Collection Time: 01/22/24  2:08 AM    Specimen: Blood   Result Value Ref Range    Glucose 110 (H) 65 - 99 mg/dL    BUN 15 8 - 23 mg/dL    Creatinine 0.75 0.57 - 1.00 mg/dL    Sodium 137 136 - 145 mmol/L    Potassium 3.1 (L) 3.5 - 5.2 mmol/L    Chloride 106 98 - 107 mmol/L    CO2 22.0 22.0 - 29.0 mmol/L    Calcium 7.8 (L) 8.6 - 10.5 mg/dL    BUN/Creatinine Ratio 20.0 7.0 - 25.0    Anion Gap 9.0 5.0 - 15.0 mmol/L    eGFR 81.6 >60.0 mL/min/1.73   Magnesium    Collection Time: 01/22/24  2:08 AM    Specimen: Blood   Result Value Ref Range    Magnesium 1.6 1.6 - 2.4 mg/dL          Imaging:  CT Abdomen Pelvis Without Contrast    Result Date: 1/16/2024  Impression: 1. Multiple calculi or single irregular elongated calculus in the right renal pelvis, with low level obstructive uropathy. No visible ureteral calculus. Minimal renal pelvic inflammation. 2. Normally distended, but mildly inflamed-appearing urinary bladder. 3. Small focus of new right lower lobe pulmonary disease, suspicious for focal bronchitis, possibly aspiration. Electronically Signed: Oscar Lezama MD  1/16/2024 2:17 PM EST  Workstation ID: [id number]    CT Head Without Contrast    Result Date: 1/16/2024  Impression: Chronic findings. No acute process. Electronically Signed: Gail Bone MD  1/16/2024 11:07 AM EST  Workstation ID: [id number]    XR Chest 1 View    Result Date: 1/16/2024  Impression: No new chest disease. Electronically Signed: Oscar Lezama MD  1/16/2024 10:53 AM EST  Workstation ID: [id number]     Assessment/Plan:     Acute cystitis with hematuria         Hyponatremia  Acute kidney injury on chronic kidney disease  Azotemia  Dehydration  Encephalopathy  Hypertension  History of PE  Dementia  UTI      Recommendations:  Creatinine is down to 0.8 from 1.7   sodium is better  She is still needs fluids because she is not eating good she is not waking up

## 2024-01-22 NOTE — THERAPY TREATMENT NOTE
Acute Care - Speech Language Pathology   Swallow Treatment Note [facility]     Patient Name: Day Cabrera  : 1945  MRN: [id number]  Today's Date: 2024               Admit Date: 2024    Visit Dx:     ICD-10-CM ICD-9-CM   1. Altered mental status, unspecified altered mental status type  R41.82 780.97   2. Acute renal failure, unspecified acute renal failure type  N17.9 584.9   3. Urinary tract infection with hematuria, site unspecified  N39.0 599.0    R31.9 599.70   4. Dehydration  E86.0 276.51     Patient Active Problem List   Diagnosis    Chronic anxiety    Dementia with behavioral disturbance    Hypertension    Memory impairment    Obesity    Sleep apnea    E. coli UTI (urinary tract infection)    Acute UTI (urinary tract infection)    Delirium due to another medical condition    Pneumonia due to 2019 novel coronavirus    Sepsis    Arthritis    COVID-19 virus detected    Fever in adult    Elevated troponin    Altered mental status    Hypernatremia    Atrial fibrillation    History of pulmonary embolus (PE)    Acute respiratory failure with hypoxia    Hypothyroidism (acquired)    Colitis    Acute cystitis with hematuria     Past Medical History:   Diagnosis Date    Anxiety     Arthritis     Chest pain 10/15/2019    Dementia     Hypertension     Pulmonary embolus      Past Surgical History:   Procedure Laterality Date    CHOLECYSTECTOMY      COLONOSCOPY      CYSTOSCOPY, URETEROSCOPY, RETROGRADE PYELOGRAM, STENT INSERTION Right 2024    Procedure: CYSTOSCOPY STENT INSERTION;  Surgeon: Aldo Narvaez MD;  Location: [facility] MAIN OR;  Service: Urology;  Laterality: Right;    ENDOSCOPY N/A 3/30/2023    Procedure: ESOPHAGOGASTRODUODENOSCOPY;  Surgeon: Contreras Watts MD;  Location: [facility] ENDOSCOPY;  Service: Gastroenterology;  Laterality: N/A;    HYSTERECTOMY      total     SIGMOIDOSCOPY N/A 3/30/2023    Procedure: SIGMOIDOSCOPY with biopsy;  Surgeon: Contreras Watts MD;   Location: [facility] ENDOSCOPY;  Service: Gastroenterology;  Laterality: N/A;  ischemia       SLP Recommendation and Plan  SLP Swallowing Diagnosis: mild, oral dysphagia, functional pharyngeal phase (01/22/24 1339)  SLP Diet Recommendation: mechanical ground textures, thin liquids (01/22/24 1339)  Recommended Precautions and Strategies: upright posture during/after eating, general aspiration precautions, fatigue precautions, assist with feeding (01/22/24 1339)  SLP Rec. for Method of Medication Administration: as tolerated (01/22/24 1339)     Monitor for Signs of Aspiration: yes, notify SLP if any concerns, cough, right lower lobe infiltrates (01/22/24 1339)  Recommended Diagnostics: reassess via clinical swallow evaluation (01/22/24 1339)  Swallow Criteria for Skilled Therapeutic Interventions Met: demonstrates skilled criteria (01/22/24 1339)  Anticipated Discharge Disposition (SLP): unknown (01/22/24 1339)  Rehab Potential/Prognosis, Swallowing: good, to achieve stated therapy goals (01/22/24 1339)  Therapy Frequency (Swallow): PRN (01/22/24 1339)  Predicted Duration Therapy Intervention (Days): until discharge (01/22/24 1339)  Oral Care Recommendations: Oral Care BID/PRN (01/22/24 1339)                                      Oral Care Recommendations: Oral Care BID/PRN (01/22/24 1339)           SWALLOW EVALUATION (last 72 hours)       SLP Adult Swallow Evaluation       Row Name 01/22/24 1339       Rehab Evaluation    Document Type therapy note (daily note)  -EC    Subjective Information no complaints  -EC    Patient Observations lethargic  -EC    Patient Effort poor  -EC    Comment Pt seen bedside for meal assessment to ensure continued diet tolerance. Pt currently on a mechanical ground diet w/thin liquids, full feed. Pt asleep upon entry. Pt only opens eyes briefly w/MAX verbal and tactile cues. Pt intermittently verbally responsive w/eyes remaining closed. Pt verbalizes wanting a drink so sprite via straw  administered x3 w/no overt s/s of aspiration. No further items from lunch tray administered d/t inability to maintain alertness. Discussed w/RN who reports pt has been tolerating diet. ST to follow up next date for diet tolerance.  -EC    Symptoms Noted During/After Treatment fatigue  -EC       General Information    Patient Profile Reviewed yes  -EC    Current Method of Nutrition mechanical ground textures;thin liquids  -EC       SLP Evaluation Clinical Impression    SLP Swallowing Diagnosis mild;oral dysphagia;functional pharyngeal phase  -EC    Functional Impact risk of malnutrition  -EC    Rehab Potential/Prognosis, Swallowing good, to achieve stated therapy goals  -EC    Swallow Criteria for Skilled Therapeutic Interventions Met demonstrates skilled criteria  -EC       SLP Treatment Clinical Impressions    Care Plan Review evaluation/treatment results reviewed  -EC       Recommendations    Therapy Frequency (Swallow) PRN  -EC    Predicted Duration Therapy Intervention (Days) until discharge  -EC    SLP Diet Recommendation mechanical ground textures;thin liquids  -EC    Recommended Diagnostics reassess via clinical swallow evaluation  -EC    Recommended Precautions and Strategies upright posture during/after eating;general aspiration precautions;fatigue precautions;assist with feeding  -EC    Oral Care Recommendations Oral Care BID/PRN  -EC    SLP Rec. for Method of Medication Administration as tolerated  -EC    Monitor for Signs of Aspiration yes;notify SLP if any concerns;cough;right lower lobe infiltrates  -EC    Anticipated Discharge Disposition (SLP) unknown  -EC       Swallow Goals (SLP)    Swallow LTGs Swallow Long Term Goal (free text)  -EC    Swallow STGs diet tolerance goal selection (SLP)  -EC    Diet Tolerance Goal Selection (SLP) Swallow Short Term Goal 1  -EC       (LTG) Swallow    (LTG) Swallow The patient will maximize swallow function for least restrictive po diet, exhibiting no complications  associated with dysphagia, adequate po intake, and demonstrating independent use of safe swallow compensations.  -EC    Ward (Swallow Long Term Goal) with minimal cues (75-90% accuracy)  -EC    Time Frame (Swallow Long Term Goal) by discharge  -EC    Progress/Outcomes (Swallow Long Term Goal) goal ongoing  -EC       (STG) Swallow 1    (STG) Swallow 1 The patient will participate in a follow up assessment to determine safety and adequacy of recommended diet, independent use of safe swallow compensations, and additional goals/recommendations to follow  -EC    Ward (Swallow Short Term Goal 1) with minimal cues (75-90% accuracy)  -EC    Time Frame (Swallow Short Term Goal 1) 1 week  -EC    Progress/Outcomes (Swallow Short Term Goal 1) goal ongoing  -EC              User Key  (r) = Recorded By, (t) = Taken By, (c) = Cosigned By      Initials Name Effective Dates    EC MartinKennaBecky 06/16/21 -                     EDUCATION  The patient has been educated in the following areas:   Dysphagia (Swallowing Impairment).        SLP GOALS       Row Name 01/22/24 1339             (LTG) Swallow    (LTG) Swallow The patient will maximize swallow function for least restrictive po diet, exhibiting no complications associated with dysphagia, adequate po intake, and demonstrating independent use of safe swallow compensations.  -EC      Ward (Swallow Long Term Goal) with minimal cues (75-90% accuracy)  -EC      Time Frame (Swallow Long Term Goal) by discharge  -EC      Progress/Outcomes (Swallow Long Term Goal) goal ongoing  -EC         (STG) Swallow 1    (STG) Swallow 1 The patient will participate in a follow up assessment to determine safety and adequacy of recommended diet, independent use of safe swallow compensations, and additional goals/recommendations to follow  -EC      Ward (Swallow Short Term Goal 1) with minimal cues (75-90% accuracy)  -EC      Time Frame (Swallow Short Term Goal 1) 1 week   -EC      Progress/Outcomes (Swallow Short Term Goal 1) goal ongoing  -EC                User Key  (r) = Recorded By, (t) = Taken By, (c) = Cosigned By      Initials Name Provider Type    Becky Agudelo Speech and Language Pathologist                       Time Calculation:                Becky Salazar  1/22/2024

## 2024-01-22 NOTE — CASE MANAGEMENT/SOCIAL WORK
Continued Stay Note  ROSY Collier     Patient Name: Day Cabrera  MRN: [id number]  Today's Date: 1/22/2024    Admit Date: 1/16/2024    Plan: Plan to return to [facility].   Discharge Plan       Row Name 01/22/24 1520       Plan    Plan Plan to return to [facility].    Plan Comments DC Barriers:  2l NC, elev CR/BUN, Mech ground diet, no BM in several days, Urology/Nephro/WC/Palliative following.                 Expected Discharge Date and Time       Expected Discharge Date Expected Discharge Time    Jan 23, 2024               OLEKSANDR Meier RN  SIPS/ICU   O: [phone]  C: [phone]  [email or web address]

## 2024-01-22 NOTE — PLAN OF CARE
Goal Outcome Evaluation:  Plan of Care Reviewed With: patient        Progress: no change  Outcome Evaluation: Pt VSS, Dementia, Pills whole with applesauce, Q2 turn, Full feed, Call light in reach , Plan on going

## 2024-01-22 NOTE — PROGRESS NOTES
[facility] MEDICINE SERVICE  DAILY PROGRESS NOTE    NAME: Day Cabrera  : 1945  MRN: [id number]      LOS: 6 days     PROVIDER OF SERVICE: CHARLOTTE Moss    Chief Complaint: Acute cystitis with hematuria    Subjective:     Interval History:  History taken from: patient chart  Patient Complaints: None at this time  Patient Denies: None at this time    Review of Systems:   Review of Systems   All other systems reviewed and are negative.      Objective:     Vital Signs  Temp:  [97.6 °F (36.4 °C)-97.7 °F (36.5 °C)] 97.7 °F (36.5 °C)  Heart Rate:  [58] 58  Resp:  [12-14] 14  BP: (135-148)/(68-71) 135/71  Flow (L/min):  [2] 2   Body mass index is 32.24 kg/m².    Physical Exam  Physical Exam  Vitals reviewed.   Constitutional:       Appearance: She is obese.   HENT:      Head: Normocephalic.      Nose: Nose normal.      Mouth/Throat:      Mouth: Mucous membranes are dry.   Eyes:      Conjunctiva/sclera: Conjunctivae normal.   Cardiovascular:      Rate and Rhythm: Normal rate and regular rhythm.   Pulmonary:      Effort: Pulmonary effort is normal.   Abdominal:      General: Abdomen is flat.   Musculoskeletal:         General: Normal range of motion.   Skin:     General: Skin is warm.      Coloration: Skin is pale.   Neurological:      Motor: Weakness present.      Comments: Generalized weakness   Psychiatric:      Comments: Patient silent during the exam.  Patient only answering a few questions, and usually with one-word answers.  Patient is inattentive, and only staring at the television           Scheduled Meds   amiodarone, 200 mg, Oral, Q24H  apixaban, 5 mg, Oral, Q12H  [MAR Hold] lactated ringers, 1,000 mL, Intravenous, Once  levothyroxine, 100 mcg, Oral, Q AM  melatonin, 10 mg, Oral, Nightly  pantoprazole, 40 mg, Oral, Daily  senna-docusate sodium, 2 tablet, Oral, BID  sodium chloride, 10 mL, Intravenous, Q12H       PRN Meds     bisacodyl    bisacodyl    lactulose    ondansetron     ondansetron ODT    polyethylene glycol    [COMPLETED] Insert Peripheral IV **AND** [MAR Hold] sodium chloride    [MAR Hold] sodium chloride    sodium chloride    [MAR Hold] sodium chloride   Infusions  dextrose, 50 mL/hr, Last Rate: 50 mL/hr (01/20/24 1504)          Diagnostic Data    Results from last 7 days   Lab Units 01/22/24  1050 01/22/24  0208 01/19/24  1043 01/18/24  2328 01/16/24  1727 01/16/24  0956   WBC 10*3/mm3  --   --   --  7.40   < > 24.90*   HEMOGLOBIN g/dL  --   --   --  11.5*   < > 16.0*   HEMATOCRIT %  --   --   --  34.0   < > 48.8*   PLATELETS 10*3/mm3  --   --   --  122*   < > 271   GLUCOSE mg/dL  --  110*   < > 114*   < > 126*   CREATININE mg/dL  --  0.75   < > 1.29*   < > 3.68*   BUN mg/dL  --  15   < > 42*   < > 89*   SODIUM mmol/L  --  137   < > 147*   < > 160*   POTASSIUM mmol/L 3.6 3.1*   < > 3.4*   < > 4.7   AST (SGOT) U/L  --   --   --   --   --  19   ALT (SGPT) U/L  --   --   --   --   --  20   ALK PHOS U/L  --   --   --   --   --  98   BILIRUBIN mg/dL  --   --   --   --   --  0.7   ANION GAP mmol/L  --  9.0   < > 7.0   < > 19.0*    < > = values in this interval not displayed.       No radiology results for the last day      I reviewed the patient's new clinical results.  I reviewed the patient's other test results and agree with the interpretation    Assessment/Plan:     Active and Resolved Problems  Active Hospital Problems    Diagnosis  POA    **Acute cystitis with hematuria [N30.01]  Yes      Resolved Hospital Problems   No resolved problems to display.         Severe sepsis secondary to acute UTI with hematuria  Resolved    Acute renal failure on CKD stage II  -Likely prerenal in the setting of sepsis  -Initial creatinine was elevated at 3.6 but now improved and almost essentially at baseline  -Monitor renal function and urine output     Acute hypernatremia  Patient's electrolytes improvement today.  With sodium of 137.  Potassium 3.6.  In calcium slowly trending down at 7.8.  -IV  fluids discontinued as patient has marked swelling of the feet, and electrolytes are improving and are normal  -Patient tolerating p.o. intake well right now.     Acute metabolic encephalopathy with underlying dementia  -Likely multifactorial with severe sepsis and severe hyponatremia  -Mental status improved and now at baseline.  -Continue treatment for infection as well as hypernatremia as above  -Palliative care consulted concerning patient's mental status during this exam.  Speech is involved and has recommended for patient to have a mechanical ground diet, with thin liquids.  However the concern is patient's ability to self feed, and if not, does the facility have the ability to assist with feedings.     Hypertension  -BP has remained stable.  124/76 today.     Chronic A-fib  -Continue amiodarone, Eliquis     Hypothyroidism  -Continue Synthroid    DVT prophylaxis:  Medical DVT prophylaxis orders are present.     Code status is   Code Status and Medical Interventions:   Ordered at: 01/17/24 1724     Level Of Support Discussed With:    Next of Kin (If No Surrogate)     Code Status (Patient has no pulse and is not breathing):    CPR (Attempt to Resuscitate)     Medical Interventions (Patient has pulse or is breathing):    Full Support       Plan for disposition: Pending clinical process  Time: 30 minutes    Signature: Electronically signed by CHARLOTTE Moss, 01/22/24, 15:48 EST.  [facility] Hospitalist Team

## 2024-01-22 NOTE — PLAN OF CARE
Goal Outcome Evaluation:  Plan of Care Reviewed With: patient        Progress: no change  Outcome Evaluation: Abed at this time. Condition remains stable. Lab values continue to improve. Tolerating diet well but is a total feed. Periwick remains in place d/t being incontinent and needing accurate outputs. Remains confused but is pleasant and cooperative. Monitoring closely d/t not being able to call out and make needs known.

## 2024-01-23 VITALS
DIASTOLIC BLOOD PRESSURE: 79 MMHG | OXYGEN SATURATION: 97 % | SYSTOLIC BLOOD PRESSURE: 155 MMHG | RESPIRATION RATE: 14 BRPM | HEART RATE: 67 BPM | WEIGHT: 187.83 LBS | TEMPERATURE: 97.7 F | HEIGHT: 64 IN | BODY MASS INDEX: 32.07 KG/M2

## 2024-01-23 PROCEDURE — 25010000002 MAGNESIUM SULFATE 2 GM/50ML SOLUTION: Performed by: INTERNAL MEDICINE

## 2024-01-23 PROCEDURE — 92526 ORAL FUNCTION THERAPY: CPT

## 2024-01-23 RX ORDER — MAGNESIUM SULFATE HEPTAHYDRATE 40 MG/ML
2 INJECTION, SOLUTION INTRAVENOUS ONCE
Status: COMPLETED | OUTPATIENT
Start: 2024-01-23 | End: 2024-01-23

## 2024-01-23 RX ADMIN — PANTOPRAZOLE SODIUM 40 MG: 40 TABLET, DELAYED RELEASE ORAL at 08:12

## 2024-01-23 RX ADMIN — APIXABAN 5 MG: 5 TABLET, FILM COATED ORAL at 08:12

## 2024-01-23 RX ADMIN — Medication 10 ML: at 08:12

## 2024-01-23 RX ADMIN — AMIODARONE HYDROCHLORIDE 200 MG: 200 TABLET ORAL at 08:12

## 2024-01-23 RX ADMIN — DOCUSATE SODIUM AND SENNOSIDES 2 TABLET: 8.6; 5 TABLET, FILM COATED ORAL at 08:12

## 2024-01-23 RX ADMIN — LEVOTHYROXINE SODIUM 100 MCG: 0.1 TABLET ORAL at 05:33

## 2024-01-23 RX ADMIN — MAGNESIUM SULFATE HEPTAHYDRATE 2 G: 40 INJECTION, SOLUTION INTRAVENOUS at 09:29

## 2024-01-23 NOTE — CASE MANAGEMENT/SOCIAL WORK
Case Management Discharge Note      Final Note: [facility]         Selected Continued Care - Discharged on 1/23/2024 Admission date: 1/16/2024 - Discharge disposition: Long Term Care (DC - External)      Destination Coordination complete.      Service Provider Selected Services Address Phone Fax Patient Preferred    [facility] [address] [place] [address] [phone] --           Transportation Services  Ambulance: [facility]    Final Discharge Disposition Code: 04 - intermediate care facility    OLEKSANDR Meier RN  SIPS/ICU   O: [phone]  C: [phone]  [email or web address]

## 2024-01-23 NOTE — CASE MANAGEMENT/SOCIAL WORK
"Physicians Statement of Medical Necessity for  Ambulance Transportation    GENERAL INFORMATION     Name: Day Cabrera  YOB: 1945  Medicare #: [id number]   Transport Date: 1/23/24 (Valid for round trips this date, or for scheduled repetitive trips for 60 days from the date signed below.)  Origin: [facility]  Destination: [facility]  Is the Patient's stay covered under Medicare Part A (PPS/DRG?)No   Closest appropriate facility? Yes  If this a hosp-hosp transfer? No  Is this a hospice patient? No    MEDICAL NECESSITY QUESTIONAIRE    Ambulance Transportation is medically necessary only if other means of transportation are contraindicated or would be potentially harmful to the patient.  To meet this requirement, the patient must be either \"bed confined\" or suffer from a condition such that transport by means other than an ambulance is contraindicated by the patient's condition.  The following questions must be answered by the healthcare professional signing below for this form to be valid:     1) Describe the MEDICAL CONDITION (physical and/or mental) of this patient AT THE TIME OF AMBULANCE TRANSPORT that requires the patient to be transported in an ambulance, and why transport by other means is contraindicated by the patient's condition: Bedbound, confused  Past Medical History:   Diagnosis Date    Anxiety     Arthritis     Chest pain 10/15/2019    Dementia     Hypertension     Pulmonary embolus       Past Surgical History:   Procedure Laterality Date    CHOLECYSTECTOMY      COLONOSCOPY      CYSTOSCOPY, URETEROSCOPY, RETROGRADE PYELOGRAM, STENT INSERTION Right 1/16/2024    Procedure: CYSTOSCOPY STENT INSERTION;  Surgeon: Aldo Narvaez MD;  Location: [facility] MAIN OR;  Service: Urology;  Laterality: Right;    ENDOSCOPY N/A 3/30/2023    Procedure: ESOPHAGOGASTRODUODENOSCOPY;  Surgeon: Contreras Watts MD;  Location: [facility] ENDOSCOPY;  Service: Gastroenterology;  Laterality: N/A;    " "HYSTERECTOMY      total     SIGMOIDOSCOPY N/A 3/30/2023    Procedure: SIGMOIDOSCOPY with biopsy;  Surgeon: Contreras Watts MD;  Location: [facility] ENDOSCOPY;  Service: Gastroenterology;  Laterality: N/A;  ischemia      2) Is this patient \"bed confined\" as defined below?Yes   To be \"bed confined\" the patient must satisfy all three of the following criteria:  (1) unable to get up from bed without assistance; AND (2) unable to ambulate;  AND (3) unable to sit in a chair or wheelchair.  3) Can this patient safely be transported by car or wheelchair van (I.e., may safely sit during transport, without an attendant or monitoring?)No   4. In addition to completing questions 1-3 above, please check any of the following conditions that apply*:          *Note: supporting documentation for any boxes checked must be maintained in the patient's medical records Contractures, Patient is confused, Requires oxygen - unable to self administer, and Unable to tolerate seated position for time needed to transport      SIGNATURE OF PHYSICIAN OR OTHER AUTHORIZED HEALTHCARE PROFESSIONAL    I certify that the above information is true and correct based on my evaluation of this patient, and represent that the patient requires transport by ambulance and that other forms of transport are contraindicated.  I understand that this information will be used by the Centers for Medicare and Medicaid Services (CMS) to support the determiniation of medical necessity for ambulance services, and I represent that I have personal knowledge of the patient's condition at the time of transport.    x   If this box is checked, I also certify that the patient is physically or mentally incapable of signing the ambulance service's claim form and that the institution with which I am affiliated has furnished care, services or assistance to the patient.  My signature below is made on behalf of the patient pursuant to 42 .36(b)(4). In accordance with 42 " .37, the specific reason(s) that the patient is physically or mentally incapable of signing the claim for is as follows: confused    Signature of Physician or Healthcare Professional     OLEKSANDR Meier RN Date/Time:   1/23/24     (For Scheduled repetitive transport, this form is not valid for transports performed more than 60 days after this date).                                                                                                                                            --------------------------------------------------------------------------------------------  Printed Name and Credentials of Physician or Authorized Healthcare Professional     *Form must be signed by patient's attending physician for scheduled, repetitive transports,.  For non-repetitive ambulance transports, if unable to obtain the signature of the attending physician, any of the following may sign (please select below):     Physician  Clinical Nurse Specialist x Registered Nurse     Physician Assistant xx Discharge Planner  Licensed Practical Nurse     Nurse Practitioner x

## 2024-01-23 NOTE — DISCHARGE SUMMARY
[facility] Medicine Services  Discharge Summary    Date of Service: 2024  Patient Name: Day Cabrera  : 1945  MRN: [id number]    Date of Admission: 2024  Discharge Diagnosis:   Severe sepsis secondary to acute UTI with hematuria  Acute renal failure on CKD stage II due to sepsis  Acute hypovolemic hypernatremia  Acute metabolic encephalopathy with underlying dementia due to sepsis  Hypertension  Chronic A-fib  Hypothyroidism  Obesity    Date of Discharge: 2024  Primary Care Physician: Smita Howard APRN      Presenting Problem:   Dehydration [E86.0]  Acute cystitis with hematuria [N30.01]  Urinary tract infection with hematuria, site unspecified [N39.0, R31.9]  Acute renal failure, unspecified acute renal failure type [N17.9]  Altered mental status, unspecified altered mental status type [R41.82]    Active and Resolved Hospital Problems:  Active Hospital Problems    Diagnosis POA    **Acute cystitis with hematuria [N30.01] Yes      Resolved Hospital Problems   No resolved problems to display.         Hospital Course     HPI:  Patient is a 78-year-old female who presented to the hospital with complaints of confusion.  Please see H&P for details.    Hospital Course:  The patient was admitted to the hospital after being found to be in acute renal failure as well as having severe hypernatremia.  Most likely this was all related to volume depletion.  She was initiated on IV fluids with improvement in her renal function from a creatinine of 3.6 back down to her baseline.  Urine output also improved as her renal function improved.  Her hypernatremia also improved with IV fluids although she was switched to half-normal saline after initial fluid bolus of normal saline.  Nephrology assisted in managing the patient's renal dysfunction and hypernatremia.  As the patient became more alert she returned to her baseline mental status and was able to tolerate p.o. intake very well.   She is stable to return back to her long-term care facility.        DISCHARGE Follow Up Recommendations for labs and diagnostics: Follow-up with PCP within the next week      Reasons For Change In Medications and Indications for New Medications:      Day of Discharge     Vital Signs:  Temp:  [97.3 °F (36.3 °C)-98.3 °F (36.8 °C)] 97.7 °F (36.5 °C)  Heart Rate:  [65-67] 67  Resp:  [12-16] 14  BP: ()/(51-79) 155/79  Flow (L/min):  [2] 2    Physical Exam:  Physical Exam   General Appearance:  Alert, cooperative, no distress, appears stated age, demented at baseline  Head:  Normocephalic, without obvious abnormality, atraumatic  Eyes:  PERRL, conjunctiva/corneas clear, EOM's intact, fundi benign, both eyes  Ears:  Normal TM's and external ear canals, both ears  Nose: Nares normal, septum midline, mucosa normal, no drainage or sinus tenderness  Throat: Lips, mucosa, and tongue normal; teeth and gums normal  Neck: Supple, symmetrical, trachea midline, no adenopathy, thyroid: not enlarged, symmetric, no tenderness/mass/nodules, no carotid bruit or JVD  Lungs:   Clear to auscultation bilaterally, respirations unlabored  Heart:  Regular rate and rhythm, S1, S2 normal, no murmur, rub or gallop  Abdomen:  Soft, non-tender, bowel sounds active all four quadrants,  no masses, no organomegaly  Extremities: Extremities normal, atraumatic, no cyanosis or edema  Pulses: 2+ and symmetric  Skin: Skin color, texture, turgor normal, no rashes or lesions  Neurologic: Difficult to assess due to dementia        Pertinent  and/or Most Recent Results     LAB RESULTS:      Lab 01/18/24  2328 01/18/24  0506 01/17/24  0451   WBC 7.40 7.20 13.50*   HEMOGLOBIN 11.5* 12.2 13.7   HEMATOCRIT 34.0 37.5 43.4   PLATELETS 122* 128* 181   NEUTROS ABS 4.80 5.10 10.80*   LYMPHS ABS 1.80 1.60 1.80   MONOS ABS 0.70 0.40 0.80   EOS ABS 0.20 0.10 0.00   MCV 94.9 97.1* 97.7*         Lab 01/22/24  1050 01/22/24  0208 01/20/24  2045 01/20/24  0148  01/19/24  1043 01/18/24  2328   SODIUM  --  137 145 149* 144 147*   POTASSIUM 3.6 3.1* 3.4* 4.5 3.2* 3.4*   CHLORIDE  --  106 115* 120* 116* 119*   CO2  --  22.0 22.0 23.0 20.0* 21.0*   ANION GAP  --  9.0 8.0 6.0 8.0 7.0   BUN  --  15 20 27* 36* 42*   CREATININE  --  0.75 0.85 1.11* 1.14* 1.29*   EGFR  --  81.6 70.2 51.0* 49.4* 42.6*   GLUCOSE  --  110* 132* 115* 123* 114*   CALCIUM  --  7.8* 8.0* 8.3* 7.8* 8.0*   MAGNESIUM  --  1.6  --   --   --   --                          Brief Urine Lab Results  (Last result in the past 365 days)        Color   Clarity   Blood   Leuk Est   Nitrite   Protein   CREAT   Urine HCG        01/16/24 0955 Red  Comment: Any Substance that causes an abnormal urine color can alter the accuracy of the chemical reactions.   Turbid  Comment: Result checked     Large (3+)   Large (3+)   Negative   >=300 mg/dL (3+)                 Microbiology Results (last 10 days)       Procedure Component Value - Date/Time    Blood Culture - Blood, Arm, Right [[id number]]  (Normal) Collected: 01/16/24 1028    Lab Status: Final result Specimen: Blood from Arm, Right Updated: 01/21/24 1045     Blood Culture No growth at 5 days    Blood Culture - Blood, Arm, Left [[id number]]  (Normal) Collected: 01/16/24 0956    Lab Status: Final result Specimen: Blood from Arm, Left Updated: 01/21/24 1015     Blood Culture No growth at 5 days    Urine Culture - Urine, Straight Cath [[id number]] Collected: 01/16/24 0955    Lab Status: Final result Specimen: Urine from Straight Cath Updated: 01/18/24 0831     Urine Culture >100,000 CFU/mL Mixed Adrienne Isolated    Narrative:      Specimen contains mixed organisms of questionable pathogenicity suggestive of contamination. If symptoms persist, suggest recollection.  Colonization of the urinary tract without infection is common. Treatment is discouraged unless the patient is symptomatic, pregnant, or undergoing an invasive urologic procedure.            CT Abdomen Pelvis Without  Contrast    Result Date: 1/16/2024  Impression: Impression: 1. Multiple calculi or single irregular elongated calculus in the right renal pelvis, with low level obstructive uropathy. No visible ureteral calculus. Minimal renal pelvic inflammation. 2. Normally distended, but mildly inflamed-appearing urinary bladder. 3. Small focus of new right lower lobe pulmonary disease, suspicious for focal bronchitis, possibly aspiration. Electronically Signed: Oscar Lezama MD  1/16/2024 2:17 PM EST  Workstation ID: [id number]    CT Head Without Contrast    Result Date: 1/16/2024  Impression: Impression: Chronic findings. No acute process. Electronically Signed: Gail Bone MD  1/16/2024 11:07 AM EST  Workstation ID: [id number]    XR Chest 1 View    Result Date: 1/16/2024  Impression: Impression: No new chest disease. Electronically Signed: Oscar Lezama MD  1/16/2024 10:53 AM EST  Workstation ID: [id number]                 Labs Pending at Discharge:      Procedures Performed  Procedure(s):  CYSTOSCOPY STENT INSERTION         Consults:   Consults       Date and Time Order Name Status Description    1/16/2024  5:05 PM Inpatient Urology Consult Completed     1/16/2024 11:38 AM Inpatient Nephrology Consult      1/16/2024 11:17 AM Hospitalist (on-call MD unless specified)                Discharge Details        Discharge Medications        Continue These Medications        Instructions Start Date   acetaminophen 325 MG tablet  Commonly known as: TYLENOL   650 mg, Oral, Every 6 Hours PRN      amiodarone 200 MG tablet  Commonly known as: PACERONE   200 mg, Oral, Every 24 Hours Scheduled      apixaban 5 MG tablet tablet  Commonly known as: ELIQUIS   5 mg, Oral, Every 12 Hours Scheduled      bisacodyl 10 MG suppository  Commonly known as: DULCOLAX   10 mg, Rectal, Daily PRN      Cholecalciferol 50 MCG (2000 UT) tablet   2,000 Units, Oral, Daily      fleet enema 7-19 GM/118ML enema   1 enema, Rectal, Daily PRN      furosemide 20 MG  tablet  Commonly known as: LASIX   20 mg, Oral, Daily      levothyroxine 100 MCG tablet  Commonly known as: SYNTHROID, LEVOTHROID   100 mcg, Oral, Daily      magnesium hydroxide 400 MG/5ML suspension  Commonly known as: MILK OF MAGNESIA   30 mL, Oral, Daily PRN      melatonin 5 MG tablet tablet   5 mg, Oral, Daily      nitroglycerin 0.4 MG SL tablet  Commonly known as: NITROSTAT   0.4 mg, Sublingual, Every 5 Minutes PRN, Take no more than 3 doses in 15 minutes.      nystatin 146401 UNIT/GM cream  Commonly known as: MYCOSTATIN   1 application , Topical, 2 Times Daily      omeprazole 20 MG capsule  Commonly known as: priLOSEC   20 mg, Oral, Daily      potassium chloride 10 MEQ CR tablet  Commonly known as: K-DUR,KLOR-CON   30 mEq, Oral, Daily      vitamin B-12 1000 MCG tablet  Commonly known as: CYANOCOBALAMIN   1,000 mcg, Oral, Daily             Stop These Medications      Polytrim 13419-9.1 UNIT/ML-% ophthalmic solution  Generic drug: trimethoprim-polymyxin b              Allergies   Allergen Reactions    Ciprofloxacin Unknown (See Comments)         Discharge Disposition:     Long Term Care (DC - External)    Diet:  Hospital:  Diet Order   Procedures    Diet: Regular/House Diet; Feeding Assistance - Nursing; Texture: Mechanical Ground (NDD 2); Fluid Consistency: Thin (IDDSI 0)         Discharge Activity:   Activity Instructions    Activity as tolerated             CODE STATUS:  Code Status and Medical Interventions:   Ordered at: 01/17/24 1724     Level Of Support Discussed With:    Next of Kin (If No Surrogate)     Code Status (Patient has no pulse and is not breathing):    CPR (Attempt to Resuscitate)     Medical Interventions (Patient has pulse or is breathing):    Full Support         No future appointments.        Time spent on Discharge including face to face service:  >30 minutes    Signature: Electronically signed by Michoacano Sorto MD, 01/23/24, 13:56 EST.  [facility] Hospitalist Team

## 2024-01-23 NOTE — THERAPY TREATMENT NOTE
Acute Care - Speech Language Pathology   Swallow Treatment Note [facility]     Patient Name: Day Cabrera  : 1945  MRN: [id number]  Today's Date: 2024               Admit Date: 2024    Visit Dx:     ICD-10-CM ICD-9-CM   1. Altered mental status, unspecified altered mental status type  R41.82 780.97   2. Acute renal failure, unspecified acute renal failure type  N17.9 584.9   3. Urinary tract infection with hematuria, site unspecified  N39.0 599.0    R31.9 599.70   4. Dehydration  E86.0 276.51     Patient Active Problem List   Diagnosis    Chronic anxiety    Dementia with behavioral disturbance    Hypertension    Memory impairment    Obesity    Sleep apnea    E. coli UTI (urinary tract infection)    Acute UTI (urinary tract infection)    Delirium due to another medical condition    Pneumonia due to 2019 novel coronavirus    Sepsis    Arthritis    COVID-19 virus detected    Fever in adult    Elevated troponin    Altered mental status    Hypernatremia    Atrial fibrillation    History of pulmonary embolus (PE)    Acute respiratory failure with hypoxia    Hypothyroidism (acquired)    Colitis    Acute cystitis with hematuria     Past Medical History:   Diagnosis Date    Anxiety     Arthritis     Chest pain 10/15/2019    Dementia     Hypertension     Pulmonary embolus      Past Surgical History:   Procedure Laterality Date    CHOLECYSTECTOMY      COLONOSCOPY      CYSTOSCOPY, URETEROSCOPY, RETROGRADE PYELOGRAM, STENT INSERTION Right 2024    Procedure: CYSTOSCOPY STENT INSERTION;  Surgeon: Aldo Narvaez MD;  Location: [facility] MAIN OR;  Service: Urology;  Laterality: Right;    ENDOSCOPY N/A 3/30/2023    Procedure: ESOPHAGOGASTRODUODENOSCOPY;  Surgeon: Contreras Watts MD;  Location: [facility] ENDOSCOPY;  Service: Gastroenterology;  Laterality: N/A;    HYSTERECTOMY      total     SIGMOIDOSCOPY N/A 3/30/2023    Procedure: SIGMOIDOSCOPY with biopsy;  Surgeon: Contreras Watts MD;   Location: [facility] ENDOSCOPY;  Service: Gastroenterology;  Laterality: N/A;  ischemia       SLP Recommendation and Plan          SWALLOW EVALUATION (last 72 hours)            EDUCATION  The patient has been educated in the following areas:   Dysphagia (Swallowing Impairment) Oral Care/Hydration.        SLP GOALS       Row Name 01/23/24 1300       (LTG) Swallow    (LTG) Swallow The patient will maximize swallow function for least restrictive po diet, exhibiting no complications associated with dysphagia, adequate po intake, and demonstrating independent use of safe swallow compensations.  -CB    [place] (Swallow Long Term Goal) with minimal cues (75-90% accuracy)  -CB    Time Frame (Swallow Long Term Goal) by discharge  -CB    Progress/Outcomes (Swallow Long Term Goal) goal ongoing  -CB       (STG) Swallow 1    (STG) Swallow 1 The patient will participate in a follow up assessment to determine safety and adequacy of recommended diet, independent use of safe swallow compensations, and additional goals/recommendations to follow  -CB    [place] (Swallow Short Term Goal 1) with minimal cues (75-90% accuracy)  -CB    Time Frame (Swallow Short Term Goal 1) 1 week  -CB    Progress/Outcomes (Swallow Short Term Goal 1) goal ongoing  -CB    Comment (Swallow Short Term Goal 1) Patient was seen for DT/meal at lunch. Patient was properly positioned upright in bed prior to meal. Patient requires full feed. Patient currentely receives a mechanical ground diet. At baseline patient is supposedly on a regular/easy to chew diet at [facility] where she resides. Patient tolerating mechanical ground without difficulty. Patient demonstrates adequate rotary chewing. Patient clears oral cavity given added time. No oral residue was observed between bites.  No anterior loss of bolus was evident. No clearing of throat, cough and/or vocal changes were identified. Patient took sips of thins via straw without evidence of aspiration. Patient  remained alert during meal. ST will continue to follow and provide solid trials of STC to determine if any further upgrade is possible before s/o with this patient.  -CB              User Key  (r) = Recorded By, (t) = Taken By, (c) = Cosigned By      Initials Name Provider Type          Anita Villanueva, SLP Speech and Language Pathologist                       Time Calculation:                CHARLES Ingram  1/23/2024

## 2024-01-23 NOTE — CONSULTS
Palliative Care Social Work Progress Note    Code Status:full code    Goals of Care: Full Treatment    Narrative: RN and CM stated there are no palliative care needs. Patient is feeding self and returning to facility today. Please reconsult palliative if needs arise.     Plan: No palliative needs.          Dejah Tse

## 2024-01-23 NOTE — CASE MANAGEMENT/SOCIAL WORK
Continued Stay Note   [facility]     Patient Name: Day Cabrera  MRN: [id number]  Today's Date: 1/23/2024    Admit Date: 1/16/2024    Plan: Plan to return to [facility].   Discharge Plan       Row Name 01/23/24 1306       Plan    Plan Plan to return to [facility].    Plan Comments Plan to return to [facility].  EMS ambulance form completed & request submitted, acknowledged.  Floor Rn and MD updated.                 Expected Discharge Date and Time       Expected Discharge Date Expected Discharge Time    Jan 23, 2024               OLEKSANDR Meier RN  SIPS/ICU   O: [phone]  C: [phone]  [email or web address]

## 2024-01-23 NOTE — PLAN OF CARE
Goal Outcome Evaluation:  Plan of Care Reviewed With: patient        Progress: no change  Outcome Evaluation: Abed at this time. Had Large BM during the night. Tolerating diet well although requires total feed assist. Periwick remains in place d/t need for accurate outputs. Remains confused but is pleasant. Plan is to discharge back to [facility] today. Will monitor

## 2024-01-23 NOTE — PROGRESS NOTES
"                                                                                                                                      Nephrology  Progress Note                                        [facility]    Patient Identification    Name: Day Cabrera  Age: 78 y.o.  Sex: female  :  1945  MRN: [id number]      DATE OF SERVICE:  2024        Subective    She might be a little bit more awake opens her eyes to voice and try to answer but does not really answer  Grandson at bedside  Objective   Scheduled Meds:amiodarone, 200 mg, Oral, Q24H  apixaban, 5 mg, Oral, Q12H  [MAR Hold] lactated ringers, 1,000 mL, Intravenous, Once  levothyroxine, 100 mcg, Oral, Q AM  melatonin, 10 mg, Oral, Nightly  pantoprazole, 40 mg, Oral, Daily  senna-docusate sodium, 2 tablet, Oral, BID  sodium chloride, 10 mL, Intravenous, Q12H          Continuous Infusions:dextrose, 50 mL/hr, Last Rate: 50 mL/hr (24 1504)        PRN Meds:  bisacodyl    bisacodyl    lactulose    ondansetron    ondansetron ODT    polyethylene glycol    [COMPLETED] Insert Peripheral IV **AND** [MAR Hold] sodium chloride    [MAR Hold] sodium chloride    sodium chloride    [MAR Hold] sodium chloride     Exam:  /66 (BP Location: Right arm, Patient Position: Lying)   Pulse 65   Temp 98.3 °F (36.8 °C) (Oral)   Resp 16   Ht 162.6 cm (64\")   Wt 85.2 kg (187 lb 13.3 oz)   SpO2 98%   BMI 32.24 kg/m²     Intake/Output last 3 shifts:  I/O last 3 completed shifts:  In: 3348 [P.O.:680; I.V.:2668]  Out: 1000 [Urine:1000]    Intake/Output this shift:  No intake/output data recorded.    Physical exam:  General Appearance: Awake but lethargic  Head:  Normocephalic, without obvious abnormality, atraumatic  Eyes:  PERRL, conjunctiva/corneas clear     Neck:  Supple,  no adenopathy;      Lungs:  Decreased BS occasion ronchi  Heart:  Regular rate and rhythm, S1 and S2 normal  Abdomen:  Soft, non-tender, bowel sounds active   Extremities: " trace edema  Pulses: 2+ and symmetric all extremities  Skin:  No rashes or lesions       Data Review:  All labs (24hrs):   Recent Results (from the past 24 hour(s))   Potassium    Collection Time: 01/22/24 10:50 AM    Specimen: Arm, Right; Blood   Result Value Ref Range    Potassium 3.6 3.5 - 5.2 mmol/L          Imaging:  CT Abdomen Pelvis Without Contrast    Result Date: 1/16/2024  Impression: 1. Multiple calculi or single irregular elongated calculus in the right renal pelvis, with low level obstructive uropathy. No visible ureteral calculus. Minimal renal pelvic inflammation. 2. Normally distended, but mildly inflamed-appearing urinary bladder. 3. Small focus of new right lower lobe pulmonary disease, suspicious for focal bronchitis, possibly aspiration. Electronically Signed: Oscar Lezama MD  1/16/2024 2:17 PM EST  Workstation ID: [id number]    CT Head Without Contrast    Result Date: 1/16/2024  Impression: Chronic findings. No acute process. Electronically Signed: Gail Bone MD  1/16/2024 11:07 AM EST  Workstation ID: [id number]    XR Chest 1 View    Result Date: 1/16/2024  Impression: No new chest disease. Electronically Signed: Oscar Lezama MD  1/16/2024 10:53 AM EST  Workstation ID: [id number]     Assessment/Plan:     Acute cystitis with hematuria         Hyponatremia  Acute kidney injury on chronic kidney disease  Azotemia  Dehydration  Encephalopathy  Hypertension  History of PE  Dementia  UTI     Recommendations:  Creatinine is down to 0.7   sodium is better  Replace k

## 2024-03-07 NOTE — PRE-PROCEDURE INSTRUCTIONS
Pre-op instructions with arrival time, meds to take/hold, bathing/oral care, and NPO instructions given to Aggie Bland pt's nurse at [facility]. Phone consent obtained from daughter- Patti Cabrera, informed of surgery date/time.

## 2024-03-11 NOTE — PAT
Message to Dr. Hook about Plts = 89.  Awaiting response.    He said ok to proceed with surgery on 3/18/2024.

## 2024-03-17 ENCOUNTER — ANESTHESIA EVENT (OUTPATIENT)
Dept: PERIOP | Facility: HOSPITAL | Age: 79
End: 2024-03-17
Payer: MEDICARE

## 2024-03-17 NOTE — ANESTHESIA PREPROCEDURE EVALUATION
Anesthesia Evaluation     Patient summary reviewed and Nursing notes reviewed   no history of anesthetic complications:   NPO Solid Status: Waived due to emergency  NPO Liquid Status: Waived due to emergency           Airway   Dental      Pulmonary    (+) pneumonia , pulmonary embolism, a smoker Former,sleep apnea  Cardiovascular     ECG reviewed  PT is on anticoagulation therapy    (+) hypertension, CAD, dysrhythmias Atrial Fib, Tachycardia      Neuro/Psych  (+) weakness, psychiatric history Anxiety, dementia  GI/Hepatic/Renal/Endo    (+) obesity, hiatal hernia, GERD, renal disease- ARF, CRI and stones, thyroid problem hypothyroidism    Musculoskeletal     (+) neck pain  Abdominal    Substance History      OB/GYN          Other   arthritis, blood dyscrasia thrombocytopenia,     ROS/Med Hx Other: Additional History:  LAFB, hypocalcemia, hypokalemia, chest pain, UTI, cystitis, hematuria, acute respiratory failure with hypoxemia, colitis, encephalopathy, memory impairment, delirium, dehydration, sepsis, hydronephrosis    Stress:  CONCLUSION:  Probably negative myocardial perfusion study.  Inferior apical and apical defects described above most likely  secondary to significant gastrointestinal uptake has normal wall  motion in these areas would indicate no prior MI.  Normal left ventricular systolic function wall motion and thickening  with a calculated ejection fraction of 68%.  Low risk scan.  Significant gastrointestinal uptake makes definitive  analysis of inferior wall difficult.  Clinical correlation suggested.      PSH:  HYSTERECTOMY CHOLECYSTECTOMY  COLONOSCOPY SIGMOIDOSCOPY  ENDOSCOPY CYSTOSCOPY, URETEROSCOPY, RETROGRADE PYELOGRAM, STENT INSERTION              Anesthesia Plan    ASA 4     general     (Patient identified; pre-operative vital signs, all relevant labs/studies, complete medical/surgical/anesthetic history, full medication list, full allergy list, and NPO status obtained/reviewed; physical  assessment performed; anesthetic options, side effects, potential complications, risks, and benefits discussed; questions answered; written anesthesia consent obtained; patient cleared for procedure; anesthesia machine and equipment checked and functioning)  intravenous induction     Anesthetic plan, risks, benefits, and alternatives have been provided, discussed and informed consent has been obtained with: patient.    Plan discussed with CRNA and CAA.    CODE STATUS:

## 2024-03-18 ENCOUNTER — ANESTHESIA (OUTPATIENT)
Dept: PERIOP | Facility: HOSPITAL | Age: 79
End: 2024-03-18
Payer: MEDICARE

## 2024-03-18 ENCOUNTER — HOSPITAL ENCOUNTER (OUTPATIENT)
Facility: HOSPITAL | Age: 79
Setting detail: HOSPITAL OUTPATIENT SURGERY
Discharge: NURSING, REHAB OR LONG-TERM CARE | End: 2024-03-18
Attending: UROLOGY | Admitting: UROLOGY
Payer: MEDICARE

## 2024-03-18 ENCOUNTER — APPOINTMENT (OUTPATIENT)
Dept: GENERAL RADIOLOGY | Facility: HOSPITAL | Age: 79
End: 2024-03-18
Payer: MEDICARE

## 2024-03-18 VITALS
SYSTOLIC BLOOD PRESSURE: 130 MMHG | OXYGEN SATURATION: 96 % | TEMPERATURE: 97.4 F | RESPIRATION RATE: 13 BRPM | WEIGHT: 180 LBS | DIASTOLIC BLOOD PRESSURE: 56 MMHG | HEART RATE: 52 BPM | BODY MASS INDEX: 30.73 KG/M2 | HEIGHT: 64 IN

## 2024-03-18 DIAGNOSIS — N30.01 ACUTE CYSTITIS WITH HEMATURIA: Primary | ICD-10-CM

## 2024-03-18 DIAGNOSIS — N13.30 HYDRONEPHROSIS: ICD-10-CM

## 2024-03-18 DIAGNOSIS — N20.0 CALCULUS, RENAL: ICD-10-CM

## 2024-03-18 PROCEDURE — 25010000002 CEFTRIAXONE PER 250 MG: Performed by: UROLOGY

## 2024-03-18 PROCEDURE — C2617 STENT, NON-COR, TEM W/O DEL: HCPCS | Performed by: UROLOGY

## 2024-03-18 PROCEDURE — 25010000002 PROPOFOL 200 MG/20ML EMULSION: Performed by: NURSE ANESTHETIST, CERTIFIED REGISTERED

## 2024-03-18 PROCEDURE — 76000 FLUOROSCOPY <1 HR PHYS/QHP: CPT

## 2024-03-18 PROCEDURE — 25810000003 SODIUM CHLORIDE 0.9 % SOLUTION: Performed by: UROLOGY

## 2024-03-18 PROCEDURE — 82365 CALCULUS SPECTROSCOPY: CPT | Performed by: UROLOGY

## 2024-03-18 PROCEDURE — C1758 CATHETER, URETERAL: HCPCS | Performed by: UROLOGY

## 2024-03-18 PROCEDURE — C1894 INTRO/SHEATH, NON-LASER: HCPCS | Performed by: UROLOGY

## 2024-03-18 PROCEDURE — 25010000002 FENTANYL CITRATE (PF) 100 MCG/2ML SOLUTION: Performed by: NURSE ANESTHETIST, CERTIFIED REGISTERED

## 2024-03-18 PROCEDURE — C1769 GUIDE WIRE: HCPCS | Performed by: UROLOGY

## 2024-03-18 PROCEDURE — 25010000002 DEXAMETHASONE PER 1 MG: Performed by: NURSE ANESTHETIST, CERTIFIED REGISTERED

## 2024-03-18 PROCEDURE — 25010000002 PROPOFOL 1000 MG/100ML EMULSION: Performed by: NURSE ANESTHETIST, CERTIFIED REGISTERED

## 2024-03-18 DEVICE — VARIABLE LENGTH URETERAL STENT
Type: IMPLANTABLE DEVICE | Site: URETER | Status: FUNCTIONAL
Brand: CONTOUR VL™

## 2024-03-18 RX ORDER — OXYCODONE HYDROCHLORIDE 5 MG/1
5 TABLET ORAL ONCE AS NEEDED
Status: DISCONTINUED | OUTPATIENT
Start: 2024-03-18 | End: 2024-03-18 | Stop reason: HOSPADM

## 2024-03-18 RX ORDER — LABETALOL HYDROCHLORIDE 5 MG/ML
5 INJECTION, SOLUTION INTRAVENOUS
Status: DISCONTINUED | OUTPATIENT
Start: 2024-03-18 | End: 2024-03-18 | Stop reason: HOSPADM

## 2024-03-18 RX ORDER — PROPOFOL 10 MG/ML
INJECTION, EMULSION INTRAVENOUS CONTINUOUS PRN
Status: DISCONTINUED | OUTPATIENT
Start: 2024-03-18 | End: 2024-03-18 | Stop reason: SURG

## 2024-03-18 RX ORDER — IPRATROPIUM BROMIDE AND ALBUTEROL SULFATE 2.5; .5 MG/3ML; MG/3ML
3 SOLUTION RESPIRATORY (INHALATION) ONCE AS NEEDED
Status: DISCONTINUED | OUTPATIENT
Start: 2024-03-18 | End: 2024-03-18 | Stop reason: HOSPADM

## 2024-03-18 RX ORDER — NALOXONE HCL 0.4 MG/ML
0.4 VIAL (ML) INJECTION AS NEEDED
Status: DISCONTINUED | OUTPATIENT
Start: 2024-03-18 | End: 2024-03-18 | Stop reason: HOSPADM

## 2024-03-18 RX ORDER — DIPHENHYDRAMINE HYDROCHLORIDE 50 MG/ML
12.5 INJECTION INTRAMUSCULAR; INTRAVENOUS ONCE AS NEEDED
Status: DISCONTINUED | OUTPATIENT
Start: 2024-03-18 | End: 2024-03-18 | Stop reason: HOSPADM

## 2024-03-18 RX ORDER — SODIUM CHLORIDE 9 MG/ML
10 INJECTION, SOLUTION INTRAVENOUS ONCE
Status: COMPLETED | OUTPATIENT
Start: 2024-03-18 | End: 2024-03-18

## 2024-03-18 RX ORDER — EPHEDRINE SULFATE 5 MG/ML
5 INJECTION INTRAVENOUS ONCE AS NEEDED
Status: DISCONTINUED | OUTPATIENT
Start: 2024-03-18 | End: 2024-03-18 | Stop reason: HOSPADM

## 2024-03-18 RX ORDER — OXYCODONE HYDROCHLORIDE 5 MG/1
10 TABLET ORAL EVERY 4 HOURS PRN
Status: DISCONTINUED | OUTPATIENT
Start: 2024-03-18 | End: 2024-03-18 | Stop reason: HOSPADM

## 2024-03-18 RX ORDER — HYDRALAZINE HYDROCHLORIDE 20 MG/ML
5 INJECTION INTRAMUSCULAR; INTRAVENOUS
Status: DISCONTINUED | OUTPATIENT
Start: 2024-03-18 | End: 2024-03-18 | Stop reason: HOSPADM

## 2024-03-18 RX ORDER — FLUMAZENIL 0.1 MG/ML
0.2 INJECTION INTRAVENOUS AS NEEDED
Status: DISCONTINUED | OUTPATIENT
Start: 2024-03-18 | End: 2024-03-18 | Stop reason: HOSPADM

## 2024-03-18 RX ORDER — CEFDINIR 300 MG/1
300 CAPSULE ORAL 2 TIMES DAILY
Qty: 14 CAPSULE | Refills: 0 | Status: SHIPPED | OUTPATIENT
Start: 2024-03-18

## 2024-03-18 RX ORDER — SODIUM CHLORIDE, SODIUM LACTATE, POTASSIUM CHLORIDE, CALCIUM CHLORIDE 600; 310; 30; 20 MG/100ML; MG/100ML; MG/100ML; MG/100ML
9 INJECTION, SOLUTION INTRAVENOUS CONTINUOUS PRN
Status: DISCONTINUED | OUTPATIENT
Start: 2024-03-18 | End: 2024-03-18

## 2024-03-18 RX ORDER — PROPOFOL 10 MG/ML
INJECTION, EMULSION INTRAVENOUS AS NEEDED
Status: DISCONTINUED | OUTPATIENT
Start: 2024-03-18 | End: 2024-03-18 | Stop reason: SURG

## 2024-03-18 RX ORDER — DOCUSATE SODIUM 100 MG/1
100 CAPSULE, LIQUID FILLED ORAL 2 TIMES DAILY PRN
Qty: 30 CAPSULE | Refills: 1 | Status: SHIPPED | OUTPATIENT
Start: 2024-03-18

## 2024-03-18 RX ORDER — FENTANYL CITRATE 50 UG/ML
INJECTION, SOLUTION INTRAMUSCULAR; INTRAVENOUS AS NEEDED
Status: DISCONTINUED | OUTPATIENT
Start: 2024-03-18 | End: 2024-03-18 | Stop reason: SURG

## 2024-03-18 RX ORDER — TRAMADOL HYDROCHLORIDE 50 MG/1
50 TABLET ORAL EVERY 6 HOURS PRN
Qty: 10 TABLET | Refills: 0 | Status: SHIPPED | OUTPATIENT
Start: 2024-03-18

## 2024-03-18 RX ORDER — SODIUM CHLORIDE 0.9 % (FLUSH) 0.9 %
10 SYRINGE (ML) INJECTION EVERY 12 HOURS SCHEDULED
Status: DISCONTINUED | OUTPATIENT
Start: 2024-03-18 | End: 2024-03-18 | Stop reason: HOSPADM

## 2024-03-18 RX ORDER — ONDANSETRON 2 MG/ML
4 INJECTION INTRAMUSCULAR; INTRAVENOUS ONCE AS NEEDED
Status: DISCONTINUED | OUTPATIENT
Start: 2024-03-18 | End: 2024-03-18 | Stop reason: HOSPADM

## 2024-03-18 RX ORDER — LIDOCAINE HYDROCHLORIDE 20 MG/ML
INJECTION, SOLUTION EPIDURAL; INFILTRATION; INTRACAUDAL; PERINEURAL AS NEEDED
Status: DISCONTINUED | OUTPATIENT
Start: 2024-03-18 | End: 2024-03-18 | Stop reason: SURG

## 2024-03-18 RX ORDER — SODIUM CHLORIDE 0.9 % (FLUSH) 0.9 %
10 SYRINGE (ML) INJECTION AS NEEDED
Status: DISCONTINUED | OUTPATIENT
Start: 2024-03-18 | End: 2024-03-18 | Stop reason: HOSPADM

## 2024-03-18 RX ORDER — DEXAMETHASONE SODIUM PHOSPHATE 4 MG/ML
INJECTION, SOLUTION INTRA-ARTICULAR; INTRALESIONAL; INTRAMUSCULAR; INTRAVENOUS; SOFT TISSUE AS NEEDED
Status: DISCONTINUED | OUTPATIENT
Start: 2024-03-18 | End: 2024-03-18 | Stop reason: SURG

## 2024-03-18 RX ORDER — DIPHENHYDRAMINE HYDROCHLORIDE 50 MG/ML
12.5 INJECTION INTRAMUSCULAR; INTRAVENOUS
Status: DISCONTINUED | OUTPATIENT
Start: 2024-03-18 | End: 2024-03-18 | Stop reason: HOSPADM

## 2024-03-18 RX ADMIN — FENTANYL CITRATE 25 MCG: 50 INJECTION, SOLUTION INTRAMUSCULAR; INTRAVENOUS at 09:03

## 2024-03-18 RX ADMIN — PROPOFOL 80 MG: 10 INJECTION, EMULSION INTRAVENOUS at 08:53

## 2024-03-18 RX ADMIN — SODIUM CHLORIDE: 9 INJECTION, SOLUTION INTRAVENOUS at 08:46

## 2024-03-18 RX ADMIN — LIDOCAINE HYDROCHLORIDE 60 MG: 20 INJECTION, SOLUTION EPIDURAL; INFILTRATION; INTRACAUDAL; PERINEURAL at 08:53

## 2024-03-18 RX ADMIN — PROPOFOL INJECTABLE EMULSION 125 MCG/KG/MIN: 10 INJECTION, EMULSION INTRAVENOUS at 08:53

## 2024-03-18 RX ADMIN — CEFTRIAXONE SODIUM 2000 MG: 2 INJECTION, POWDER, FOR SOLUTION INTRAMUSCULAR; INTRAVENOUS at 08:56

## 2024-03-18 RX ADMIN — FENTANYL CITRATE 25 MCG: 50 INJECTION, SOLUTION INTRAMUSCULAR; INTRAVENOUS at 08:53

## 2024-03-18 RX ADMIN — FENTANYL CITRATE 25 MCG: 50 INJECTION, SOLUTION INTRAMUSCULAR; INTRAVENOUS at 09:16

## 2024-03-18 RX ADMIN — DEXAMETHASONE SODIUM PHOSPHATE 4 MG: 4 INJECTION, SOLUTION INTRAMUSCULAR; INTRAVENOUS at 09:07

## 2024-03-18 NOTE — OP NOTE
Urology Operative Note    3/18/2024    Day Cabrera  78 y.o.  1945  female  [id number]      Surgeon(s) and Role:  Calvin Hook MD - Primary     Pre-operative Diagnosis: Right renal stone    Post-operative Diagnosis: Same    Complications: None    Procedures:  Cystoscopy  Right ureteroscopy  Laser Lithotripsy  Basket-extraction of stone fragments  Stent placement  Fluoroscopy with Interpretation     Indications   Day Cabrera is a 78 y.o. female who was found to have up to 15 mm right renal pelvis stones mild hydronephrosis had stent placed 2 months ago now here for ureteroscopy.    During the informed consent process, the procedure was discussed in detail including the risks of bleeding, infection, damage to surrounding structures and need for staged procedure.      Findings     Fluoroscopy with interpretation: Stent removed wire placed the upper pole the kidney.  Stent with redundant curl in the middle the renal pelvis.    Description of procedure:  The patient was properly identified in the preoperative holding area and taken to the operating room where general anesthesia was induced. The patient was placed in the cystolithotomy position and prepped and draped in a sterile fashion. The patient was given antibiotics intravenously before the start of the surgery. After ensuring that all of the required equipment was ready and available a surgical timeout was performed.     A 21 German rigid cystoscope was inserted into the bladder under direct visualization.  Stent was grasped and removed entirely.  A Sensor wire was passed up the ureter under fluoroscopic guidance.  Semirigid ureteroscope was passed into the ureter.   No stones were seen within the ureter under direct visualization.   12/14 36 access sheath was then placed to the UPJ.  Disposable flexible ureteroscope was then advanced through the sheath and into the kidney.  3 large stones were seen in upper pole calyx.  200 laser was used to dust  the stones into tiny pieces.  The stone was rather soft and dusted easily.  No other stones were seen.  Basket was then used to extract all significant fragments.  Scope was slowly backed out the ureter was intact and wire was replaced.  The cystoscope was then replaced over the wire and a 7 Persian x multi cm stent was placed under direct and fluoroscopic visualization.  The bladder was then emptied and scope removed.    There were no apparent complications. The patient woke up in the operating room and was taken to the recovery room in stable condition.     I was present and scrubbed for the entire procedure.     Specimens: Stone    Estimated Blood Loss: minimal      Plan   -Follow up with Dr. Hook next week for stent removal         Calvin Hook MD  [facility]  [facility] [facility] [address], [address]  [place] [address] [phone]

## 2024-03-18 NOTE — DISCHARGE INSTRUCTIONS
Procedures:  Cystoscopy  Right ureteroscopy  Laser Lithotripsy  Basket-extraction of stone fragments  Stent placement  Fluoroscopy with Interpretation    Plan   -Follow up with Dr. Hook next week for stent removal

## 2024-03-18 NOTE — ANESTHESIA POSTPROCEDURE EVALUATION
Patient: Day Cabrera    Procedure Summary       Date: 03/18/24 Room / Location: [facility] OR 06 / [facility] MAIN OR    Anesthesia Start: 0846 Anesthesia Stop: 0944    Procedure: CYSTOSCOPY URETEROSCOPY , LASER LITHOTRIPSY, POSSIBLE STONE BASKET EXTRACTION, POSSIBLE RIGHT STENT INSERTION (Right) Diagnosis:       Calculus, renal      Hydronephrosis      (Calculus, renal [N20.0])      (Hydronephrosis [N13.30])    Surgeons: Calvin Hook MD Provider: Bruno Fine MD    Anesthesia Type: general ASA Status: 4            Anesthesia Type: general    Vitals  Vitals Value Taken Time   /53 03/18/24 1056   Temp 97.4 °F (36.3 °C) 03/18/24 1050   Pulse 63 03/18/24 1100   Resp 12 03/18/24 1055   SpO2 100 % 03/18/24 1100   Vitals shown include unfiled device data.        Post Anesthesia Care and Evaluation    Patient location during evaluation: PACU  Patient participation: complete - patient participated  Level of consciousness: awake  Pain scale: See nurse's notes for pain score.  Pain management: adequate    Airway patency: patent  Anesthetic complications: No anesthetic complications  PONV Status: none  Cardiovascular status: acceptable  Respiratory status: acceptable and spontaneous ventilation  Hydration status: acceptable    Comments: Patient seen and examined postoperatively; vital signs stable; SpO2 greater than or equal to 90%; cardiopulmonary status stable; nausea/vomiting adequately controlled; pain adequately controlled; no apparent anesthesia complications; patient discharged from anesthesia care when discharge criteria were met

## 2024-03-18 NOTE — ANESTHESIA PROCEDURE NOTES
Airway  Urgency: elective    Date/Time: 3/18/2024 8:58 AM  Airway not difficult    General Information and Staff    Patient location during procedure: OR    Indications and Patient Condition  Indications for airway management: airway protection    Preoxygenated: yes  Mask difficulty assessment: 0 - not attempted    Final Airway Details  Final airway type: supraglottic airway      Successful airway: LMA and I-gel  Size 4     Number of attempts at approach: 1  Assessment: lips, teeth, and gum same as pre-op and atraumatic intubation

## 2024-03-26 LAB
AMM URATE MFR STONE: 40 %
CA CARBONATE CRY STONE QL IR: 10 %
COLOR STONE: NORMAL
COMPN STONE: NORMAL
LABORATORY COMMENT REPORT: NORMAL
Lab: NORMAL
Lab: NORMAL
PHOTO: NORMAL
SIZE STONE: NORMAL MM
SPEC SOURCE SUBJ: NORMAL
STONE ANALYSIS-IMP: NORMAL
TRI-PHOS MFR STONE: 50 %
WT STONE: 17 MG

## (undated) DEVICE — BITEBLOCK ENDO W/STRAP 60F A/ LF DISP

## (undated) DEVICE — PK CYSTO 50

## (undated) DEVICE — URETERAL ACCESS SHEATH SET: Brand: NAVIGATOR HD

## (undated) DEVICE — SOL IRRG H2O BG 3000ML STRL

## (undated) DEVICE — KT SURG TURNOVER 050

## (undated) DEVICE — Device

## (undated) DEVICE — GW SENSR DUALFLX NITNL STR .035 3X150CM

## (undated) DEVICE — DUAL LUMEN URETERAL CATHETER

## (undated) DEVICE — NITINOL STONE RETRIEVAL BASKET: Brand: ZERO TIP

## (undated) DEVICE — GLV SURG SIGNATURE ESSENTIAL PF LTX SZ7.5

## (undated) DEVICE — PREP SPRY PVPI 10P 2OZ

## (undated) DEVICE — GLV SURG SIGNATURE ESSENTIAL PF LTX SZ7

## (undated) DEVICE — PRT BIOP SEALS

## (undated) DEVICE — SYS IRR PUMP SGL ACTN VAC SYR 10CC

## (undated) DEVICE — NITINOL WIRE WITH HYDROPHILIC TIP: Brand: SENSOR

## (undated) DEVICE — SOLUTION,WATER,IRRIGATION,1000ML,STERILE: Brand: MEDLINE

## (undated) DEVICE — SOL H20 STRL 1000ML

## (undated) DEVICE — TUBING, SUCTION, 1/4" X 12', STRAIGHT: Brand: MEDLINE

## (undated) DEVICE — PK ENDO GI 50

## (undated) DEVICE — SINGLE-USE BIOPSY FORCEPS: Brand: RADIAL JAW 4